# Patient Record
Sex: FEMALE | Race: WHITE | NOT HISPANIC OR LATINO | Employment: PART TIME | URBAN - METROPOLITAN AREA
[De-identification: names, ages, dates, MRNs, and addresses within clinical notes are randomized per-mention and may not be internally consistent; named-entity substitution may affect disease eponyms.]

---

## 2017-05-12 DIAGNOSIS — M87.19 OSTEONECROSIS DUE TO DRUGS OF MULTIPLE SITES (H): Primary | ICD-10-CM

## 2017-05-18 ENCOUNTER — OFFICE VISIT (OUTPATIENT)
Dept: ORTHOPEDICS | Facility: CLINIC | Age: 37
End: 2017-05-18

## 2017-05-18 VITALS — BODY MASS INDEX: 22.89 KG/M2 | WEIGHT: 159.9 LBS | HEIGHT: 70 IN

## 2017-05-18 DIAGNOSIS — M16.7 OTHER SECONDARY OSTEOARTHRITIS OF LEFT HIP: Primary | ICD-10-CM

## 2017-05-18 NOTE — MR AVS SNAPSHOT
"              After Visit Summary   5/18/2017    Alethea Patel    MRN: [id number]           Patient Information     Date Of Birth          1980        Visit Information        Provider Department      5/18/2017 10:45 AM Cheko Saleem MD [facility]        Today's Diagnoses     Other secondary osteoarthritis of left hip    -  1       Follow-ups after your visit        Who to contact     Please call your clinic at [phone] to:    Ask questions about your health    Make or cancel appointments    Discuss your medicines    Learn about your test results    Speak to your doctor   If you have compliments or concerns about an experience at your clinic, or if you wish to file a complaint, please contact [facility] Patient Relations at [phone] or email us at [email or web address]         Additional Information About Your Visit        MyChart Information     [employer] gives you secure access to your electronic health record. If you see a primary care provider, you can also send messages to your care team and make appointments. If you have questions, please call your primary care clinic.  If you do not have a primary care provider, please call [phone] and they will assist you.      [employer] is an electronic gateway that provides easy, online access to your medical records. With [employer], you can request a clinic appointment, read your test results, renew a prescription or communicate with your care team.     To access your existing account, please contact your [facility] or call [phone] for assistance.        Care EveryWhere ID     This is your Care EveryWhere ID. This could be used by other organizations to access your [place] medical records  [id number]        Your Vitals Were     Height BMI (Body Mass Index)                1.778 m (5' 10\") 22.94 kg/m2           Blood Pressure from Last 3 Encounters:   01/10/16 " 115/72   10/22/14 110/64   06/14/12 104/65    Weight from Last 3 Encounters:   05/18/17 72.5 kg (159 lb 14.4 oz)   01/08/16 80.3 kg (177 lb 0.5 oz)   06/25/15 72.6 kg (160 lb)                 Today's Medication Changes          These changes are accurate as of: 5/18/17 11:59 PM.  If you have any questions, ask your nurse or doctor.               Stop taking these medicines if you haven't already. Please contact your care team if you have questions.     citalopram 20 MG tablet   Commonly known as:  celeXA   Stopped by:  Cheko Saleem MD           fentaNYL 100 mcg/hr 72 hr patch   Commonly known as:  DURAGESIC   Stopped by:  Cheko Saleem MD                    Primary Care Provider Office Phone # Fax #    [facility] [phone] [address] [address]        Thank you!     Thank you for choosing [facility]  for your care. Our goal is always to provide you with excellent care. Hearing back from our patients is one way we can continue to improve our services. Please take a few minutes to complete the written survey that you may receive in the mail after your visit with us. Thank you!             Your Updated Medication List - Protect others around you: Learn how to safely use, store and throw away your medicines at www.disposemymeds.org.          This list is accurate as of: 5/18/17 11:59 PM.  Always use your most recent med list.                   Brand Name Dispense Instructions for use    B COMPLEX PO      Take by mouth daily       BACLOFEN PO      Take 20 mg by mouth 4 times daily       BUPROPION HCL PO      Take 150 mg by mouth 2 times daily       COMPAZINE PO      Take 10 mg by mouth every 6 hours as needed.       fluticasone 50 MCG/ACT spray    FLONASE     Spray 2 sprays into both nostrils daily as needed for rhinitis or allergies       ibuprofen 200 MG tablet    ADVIL/MOTRIN    120 tablet    Take 2 tablets (400 mg) by mouth every 6 hours as  needed for mild pain       IRON SUPPLEMENT PO      Take by mouth daily       levothyroxine 75 MCG tablet    SYNTHROID/LEVOTHROID     Take 75 mcg by mouth daily       LORazepam 1 MG tablet    ATIVAN     Take 1 mg by mouth every 8 hours as needed for anxiety       MELATONIN PO      Take 3 mg by mouth At Bedtime       METHADOSE PO      Take 10 mg by mouth every 8 hours as needed for severe pain       metoclopramide 10 MG tablet    REGLAN     Take 10 mg by mouth every 4 hours as needed       montelukast 10 MG tablet    SINGULAIR     Take 10 mg by mouth At Bedtime       NEURONTIN 300 MG capsule   Generic drug:  gabapentin      Take 900 mg by mouth 3 times daily       NEW MED          order for DME     1 Device    [facility] [phone]   Directions: Advance as Tolerated and Instructed by MD       oxyCODONE HCl 20 MG Tabs immediate release tablet    ROXICODONE     Take 20-30 mg by mouth every 3 hours as needed for moderate to severe pain       priLOSEC OTC 20 MG tablet   Generic drug:  omeprazole      Take 20 mg by mouth daily       QUEtiapine 25 MG tablet    SEROquel     Take by mouth At Bedtime       senna-docusate 8.6-50 MG per tablet    SENOKOT-S;PERICOLACE    30 tablet    Take 2 tablets by mouth 2 times daily

## 2017-05-18 NOTE — NURSING NOTE
"Reason For Visit:   Chief Complaint   Patient presents with     Surgical Followup     F/u for Left HAJA Pain S/p Left HAJA: 2002?                  Ht 1.778 m (5' 10\")  Wt 72.5 kg (159 lb 14.4 oz)  BMI 22.94 kg/m2                         Current Outpatient Prescriptions   Medication     NEW MED     Methadone HCl (METHADOSE PO)     oxyCODONE (ROXICODONE) 20 MG TABS immediate release tablet     ibuprofen (ADVIL,MOTRIN) 200 MG tablet     senna-docusate (SENOKOT-S;PERICOLACE) 8.6-50 MG per tablet     order for DME     Ferrous Sulfate (IRON SUPPLEMENT PO)     LORazepam (ATIVAN) 1 MG tablet     B Complex Vitamins (B COMPLEX PO)     QUEtiapine (SEROQUEL) 25 MG tablet     montelukast (SINGULAIR) 10 MG tablet     fluticasone (FLONASE) 50 MCG/ACT nasal spray     levothyroxine (SYNTHROID, LEVOTHROID) 75 MCG tablet     metoclopramide (REGLAN) 10 MG tablet     BACLOFEN PO     BUPROPION HCL PO     Prochlorperazine Maleate (COMPAZINE PO)     Nutritional Supplements (MELATONIN PO)     gabapentin (NEURONTIN) 300 MG capsule     omeprazole (PRILOSEC OTC) 20 MG tablet     No current facility-administered medications for this visit.        Allergies   Allergen Reactions     Chantix [Varenicline] Nausea and Vomiting     Latex Itching     Seasonal Allergies        Neha Gatica C.M.A.             "

## 2017-05-18 NOTE — LETTER
5/18/2017       RE: Alethea Patel  [address]  [place] [address]     Dear Colleague,    Thank you for referring your patient, Alethea Patel, to the [facility] at [facility]. Please see a copy of my visit note below.    DIAGNOSIS:   1. Multi-focal osteonecrosis.  2. Lumbar disc herniation  3. Acute Lymphoblastic Leukemia, chemotherapy [id number]   SURGERIES:  1. 2005, L femoral head resurfacing hemiarthroplasty  2. 6/5/2007, R HAJA  3. 2/2/2010, right total knee arthroplasty. No patella resurfacing  4. 6/12/2012, R patella resurfacing  5. 1/8/2016, L TKA (Stiven) [facility]    HISTORY OF PRESENT ILLNESS:      HISTORY OF PRESENT ILLNESS:  Alethea returns for evaluation of her left hip.  She notes that she has been having increasing pain and discomfort in the groin as well as some radiation to the left thigh area.  It occurs with motion of her left hip joint.  She underwent resurfacing hemiarthroplasty 12 years ago.      She has been able to discontinue her fentanyl patch and only is on oral oxycodone at the present time.  She takes the oxycodone both due to her left hip as well as overall discomfort diffusely related to her osteonecrosis.      IMAGING:  I examined her and also obtained imaging x-rays today which demonstrate continued acetabular cartilage erosion as compared to films from 2005.      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      In order to ascertain what pain component is due to her acetabular arthritis, I have suggested that she undergo a diagnostic lidocaine and a steroid injection into the left hip joint.  I indicated that she will experience pain relief for the first couple of hours related to the lidocaine anesthetic usage.  Thereafter, the steroid may be helpful in the longer term.      If she finds that this is helpful to her then it might be worthwhile to consider proceeding with  conversion of her left surface replacement hemiarthroplasty to a total hip arthroplasty implant.  She has a good understanding of this having undergone a similar procedure on the right side.     If the steroid injection into her hip is not helpful, then the next step would be to obtain a L spine MR for comparison to her last study in 9/2014.  Alethea will self report the outcome of her hip injection via [employer].    Total Time = 15 min, 50% of which was spent in counseling and coordination of care as documented above.    MD Blas Rhoades Family Professor  Oncology and Adult Reconstructive Surgery  Dept Orthopaedic Surgery, [facility]  [phone] office, [phone] pager  [email or web address]

## 2017-05-18 NOTE — PROGRESS NOTES
DIAGNOSIS:   1. Multi-focal osteonecrosis.  2. Lumbar disc herniation  3. Acute Lymphoblastic Leukemia, chemotherapy [id number]   SURGERIES:  1. 2005, L femoral head resurfacing hemiarthroplasty  2. 6/5/2007, R HAJA  3. 2/2/2010, right total knee arthroplasty. No patella resurfacing  4. 6/12/2012, R patella resurfacing  5. 1/8/2016, L TKA (Stiven) [facility]    HISTORY OF PRESENT ILLNESS:      HISTORY OF PRESENT ILLNESS:  Alethea returns for evaluation of her left hip.  She notes that she has been having increasing pain and discomfort in the groin as well as some radiation to the left thigh area.  It occurs with motion of her left hip joint.  She underwent resurfacing hemiarthroplasty 12 years ago.      She has been able to discontinue her fentanyl patch and only is on oral oxycodone at the present time.  She takes the oxycodone both due to her left hip as well as overall discomfort diffusely related to her osteonecrosis.      IMAGING:  I examined her and also obtained imaging x-rays today which demonstrate continued acetabular cartilage erosion as compared to films from 2005.      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      In order to ascertain what pain component is due to her acetabular arthritis, I have suggested that she undergo a diagnostic lidocaine and a steroid injection into the left hip joint.  I indicated that she will experience pain relief for the first couple of hours related to the lidocaine anesthetic usage.  Thereafter, the steroid may be helpful in the longer term.      If she finds that this is helpful to her then it might be worthwhile to consider proceeding with conversion of her left surface replacement hemiarthroplasty to a total hip arthroplasty implant.  She has a good understanding of this having undergone a similar procedure on the right side.     If the steroid injection into her hip is not helpful, then the next step would be to obtain a L spine  MR for comparison to her last study in 9/2014.  Alethea will self report the outcome of her hip injection via Mychart.    Total Time = 15 min, 50% of which was spent in counseling and coordination of care as documented above.    MD Blas Rhoades Family Professor  Oncology and Adult Reconstructive Surgery  Dept Orthopaedic Surgery, [facility]  [phone] office, [phone] pager  [email or web address]

## 2017-08-19 ENCOUNTER — HEALTH MAINTENANCE LETTER (OUTPATIENT)
Age: 37
End: 2017-08-19

## 2017-08-25 DIAGNOSIS — M54.10 RADICULAR PAIN OF LEFT LOWER EXTREMITY: Primary | ICD-10-CM

## 2017-09-11 ENCOUNTER — RADIANT APPOINTMENT (OUTPATIENT)
Dept: MRI IMAGING | Facility: CLINIC | Age: 37
End: 2017-09-11
Attending: ORTHOPAEDIC SURGERY
Payer: COMMERCIAL

## 2017-09-11 PROCEDURE — 72148 MRI LUMBAR SPINE W/O DYE: CPT | Performed by: RADIOLOGY

## 2017-12-14 ENCOUNTER — RADIANT APPOINTMENT (OUTPATIENT)
Dept: GENERAL RADIOLOGY | Facility: CLINIC | Age: 37
End: 2017-12-14
Attending: ORTHOPAEDIC SURGERY
Payer: MEDICARE

## 2017-12-14 ENCOUNTER — OFFICE VISIT (OUTPATIENT)
Dept: ORTHOPEDICS | Facility: CLINIC | Age: 37
End: 2017-12-14
Payer: MEDICARE

## 2017-12-14 VITALS — HEIGHT: 70 IN | BODY MASS INDEX: 23.35 KG/M2 | WEIGHT: 163.1 LBS

## 2017-12-14 DIAGNOSIS — M16.0 PRIMARY OSTEOARTHRITIS OF BOTH HIPS: Primary | ICD-10-CM

## 2017-12-14 DIAGNOSIS — M16.0 PRIMARY OSTEOARTHRITIS OF BOTH HIPS: ICD-10-CM

## 2017-12-14 NOTE — PROGRESS NOTES
DIAGNOSIS:   1. Multi-focal osteonecrosis.  2. Lumbar disc herniation  3. Acute Lymphoblastic Leukemia, chemotherapy [id number]   SURGERIES:  1. 2005, L femoral head resurfacing hemiarthroplasty  2. 6/5/2007, R HAJA  3. 2/2/2010, right total knee arthroplasty. No patella resurfacing  4. 6/12/2012, R patella resurfacing  5. 1/8/2016, L TKA (Stiven) [facility]    HISTORY OF PRESENT ILLNESS:  Alethea returns for evaluation of her left hip.  She notes that she has been having increasing pain and discomfort in the groin as well as some radiation to the left thigh area.  It occurs with motion of her left hip joint.  She underwent resurfacing hemiarthroplasty 12 years ago.      She has been able to discontinue her fentanyl patch and only is on oral oxycodone at the present time.  She takes the oxycodone both due to her left hip as well as overall discomfort diffusely related to her osteonecrosis.      IMAGING:  I examined her and also obtained imaging x-rays today which demonstrate continued acetabular cartilage erosion as compared to films from 2005.      Final Report   MR LUMBAR SPINE W/O CONTRAST 9/11/2017 8:54 AM    History: Radiating pain left lower extremity, Radiculopathy, site  unspecified    Comparison: 9/3/2014 MRI of the spine.    Technique: Sagittal T1-weighted, sagittal T2-weighted, sagittal STIR,  sagittal diffusion-weighted, axial T2-weighted, and axial gradient  echo images of the lumbar spine were obtained without the  administration of intravenous contrast.    Findings: There are 5 lumbar-type vertebrae assumed for the purposes  of this dictation. The tip of the conus medullaris is at L1. The  lumbar vertebral column appears normally aligned. There is moderate  disc height narrowing L4-L5 and L5-S1. Within the lumbar vertebrae,  bone marrow signal demonstrates a superior endplate Schmorl's node at  L4. There is associated increased STIR/T2 signal in the anterior  superior endplate of L4 suspicious for a chronic  fracture deformity.  This is largely unchanged compared to 9/3/2014. Somewhat heterogeneous  T1 and T2 signal throughout the L5 vertebral body, similar to prior.  Loss of the normal T2 interbody disc signal at L4-5 and L5-S1.    On a level by level basis:    L1-2: No significant neural foraminal or spinal canal narrowing.    L2-3: Mild broad-based disc bulge. No significant neural foraminal or  spinal canal narrowing.    L3-4: Mild broad-based disc bulge. No significant spinal canal  narrowing. No significant neural foraminal narrowing.    L4-5: Posterior disc protrusion, ligamentum flavum hypertrophy, and  facet degenerative changes bilaterally causes mild, left greater than  right neural foraminal narrowing.    L5-S1: Broad-based posterior disc protrusion abuts the descending left  S1 nerve root, without clear impingement, with moderate, left greater  than right neural foraminal narrowing. No significant spinal canal  narrowing.     Visualized paraspinal soft tissues are within normal limits.    Impression:   1. Multilevel neural foraminal narrowing most notable at L5-S1  involving the L5 nerve root, left greater than right. This is largely  unchanged compared to 9/3/2014.  2. Unchanged abnormality in the superior/anterior aspect of the L4  vertebral body, possibly related to chronic osteonecrosis. This is  also largely unchanged.    I have personally reviewed the examination and initial interpretation  and I agree with the findings.    JAVI GE MD        IMPRESSION:    1. 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   2. L L5-S1 herniated disc with L5 root compression  3. LLD longer on RLE     We discussed a diagnostic injection into L hip at her last clinic visit but it never got done.  We will arrange for this today.    In order to ascertain what pain component is due to her acetabular arthritis, I have suggested that she undergo a diagnostic lidocaine and a steroid  injection into the left hip joint.  I indicated that she will experience pain relief for the first couple of hours related to the lidocaine anesthetic usage.  Thereafter, the steroid may be helpful in the longer term.     If she finds that this is helpful to her then it might be worthwhile to consider proceeding with conversion of her left surface replacement hemiarthroplasty to a total hip arthroplasty implant.  She has a good understanding of this having undergone a similar procedure on the right side.       Total Time = 25 min, 50% of which was spent in counseling and coordination of care as documented above.    MD Blas Rhoades Family Professor  Oncology and Adult Reconstructive Surgery  Dept Orthopaedic Surgery, [facility]  [phone] office, [phone] pager  [email or web address]

## 2017-12-14 NOTE — MR AVS SNAPSHOT
After Visit Summary   12/14/2017    Alethea Patel    MRN: [id number]           Patient Information     Date Of Birth          1980        Visit Information        Provider Department      12/14/2017 10:45 AM Cheko Saleem MD [facility]        Today's Diagnoses     Primary osteoarthritis of both hips    -  1       Follow-ups after your visit        Future tests that were ordered for you today     Open Future Orders        Priority Expected Expires Ordered    XR Joint Injection Major Left Routine 12/14/2017 12/14/2018 12/14/2017            Who to contact     Please call your clinic at [phone] to:    Ask questions about your health    Make or cancel appointments    Discuss your medicines    Learn about your test results    Speak to your doctor   If you have compliments or concerns about an experience at your clinic, or if you wish to file a complaint, please contact [facility] Patient Relations at [phone] or email us at [email or web address]         Additional Information About Your Visit        [employer] Information     [employer] gives you secure access to your electronic health record. If you see a primary care provider, you can also send messages to your care team and make appointments. If you have questions, please call your primary care clinic.  If you do not have a primary care provider, please call [phone] and they will assist you.      [employer] is an electronic gateway that provides easy, online access to your medical records. With [employer], you can request a clinic appointment, read your test results, renew a prescription or communicate with your care team.     To access your existing account, please contact your [facility] or call [phone] for assistance.        Care EveryWhere ID     This is your Care EveryWhere ID. This could be used by other organizations to access your [place]  "medical records  [id number]        Your Vitals Were     Height BMI (Body Mass Index)                1.765 m (5' 9.5\") 23.74 kg/m2           Blood Pressure from Last 3 Encounters:   01/10/16 115/72   10/22/14 110/64   06/14/12 104/65    Weight from Last 3 Encounters:   12/14/17 74 kg (163 lb 1.6 oz)   05/18/17 72.5 kg (159 lb 14.4 oz)   01/08/16 80.3 kg (177 lb 0.5 oz)               Primary Care Provider Office Phone # Fax #    [facility] [phone]       [address] [address]        Equal Access to Services     EMILIANO MARTINEZ : Salmoe huntero Soshantalali, waaxda luqadaha, qaybta kaalmada adeegyada, anabella silva. So [facility] [phone].    ATENCIÓN: Si habla español, tiene a parisi disposición servicios gratuitos de asistencia lingüística. Ll[facility] [phone].    We comply with applicable federal civil rights laws and [place] laws. We do not discriminate on the basis of race, color, national origin, age, disability, sex, sexual orientation, or gender identity.            Thank you!     Thank you for choosing [facility]  for your care. Our goal is always to provide you with excellent care. Hearing back from our patients is one way we can continue to improve our services. Please take a few minutes to complete the written survey that you may receive in the mail after your visit with us. Thank you!             Your Updated Medication List - Protect others around you: Learn how to safely use, store and throw away your medicines at www.disposemymeds.org.          This list is accurate as of: 12/14/17  2:24 PM.  Always use your most recent med list.                   Brand Name Dispense Instructions for use Diagnosis    B COMPLEX PO      Take by mouth daily        BACLOFEN PO      Take 20 mg by mouth 4 times daily        BUPROPION HCL PO      Take 150 mg by mouth 2 times daily        COMPAZINE PO      Take 10 mg by mouth every 6 " hours as needed.        fluticasone 50 MCG/ACT spray    FLONASE     Spray 2 sprays into both nostrils daily as needed for rhinitis or allergies        ibuprofen 200 MG tablet    ADVIL/MOTRIN    120 tablet    Take 2 tablets (400 mg) by mouth every 6 hours as needed for mild pain        IRON SUPPLEMENT PO      Take by mouth daily        levothyroxine 75 MCG tablet    SYNTHROID/LEVOTHROID     Take 75 mcg by mouth daily        LORazepam 1 MG tablet    ATIVAN     Take 1 mg by mouth every 8 hours as needed for anxiety        MELATONIN PO      Take 3 mg by mouth At Bedtime        METHADOSE PO      Take 30 mg by mouth every 8 hours as needed for severe pain        metoclopramide 10 MG tablet    REGLAN     Take 10 mg by mouth every 4 hours as needed        montelukast 10 MG tablet    SINGULAIR     Take 10 mg by mouth At Bedtime        NEURONTIN 300 MG capsule   Generic drug:  gabapentin      Take 900 mg by mouth 3 times daily        NEW MED           order for DME     1 Device    [facility] [phone]   Directions: Advance as Tolerated and Instructed by MD    Status post total right knee replacement       oxyCODONE HCl 20 MG Tabs immediate release tablet    ROXICODONE     Take 30 mg by mouth every 3 hours as needed for moderate to severe pain        priLOSEC OTC 20 MG tablet   Generic drug:  omeprazole      Take 20 mg by mouth daily        QUEtiapine 25 MG tablet    SEROquel     Take by mouth At Bedtime        senna-docusate 8.6-50 MG per tablet    SENOKOT-S;PERICOLACE    30 tablet    Take 2 tablets by mouth 2 times daily    Status post total left knee replacement

## 2017-12-14 NOTE — LETTER
12/14/2017       RE: Alethea Patel  [address]  [place] [address]     Dear Colleague,    Thank you for referring your patient, Alethea Patel, to the [facility] at [facility]. Please see a copy of my visit note below.    DIAGNOSIS:   1. Multi-focal osteonecrosis.  2. Lumbar disc herniation  3. Acute Lymphoblastic Leukemia, chemotherapy [id number]   SURGERIES:  1. 2005, L femoral head resurfacing hemiarthroplasty  2. 6/5/2007, R HAJA  3. 2/2/2010, right total knee arthroplasty. No patella resurfacing  4. 6/12/2012, R patella resurfacing  5. 1/8/2016, L TKA ([facility]) [facility]    HISTORY OF PRESENT ILLNESS:  Alethea returns for evaluation of her left hip.  She notes that she has been having increasing pain and discomfort in the groin as well as some radiation to the left thigh area.  It occurs with motion of her left hip joint.  She underwent resurfacing hemiarthroplasty 12 years ago.      She has been able to discontinue her fentanyl patch and only is on oral oxycodone at the present time.  She takes the oxycodone both due to her left hip as well as overall discomfort diffusely related to her osteonecrosis.      IMAGING:  I examined her and also obtained imaging x-rays today which demonstrate continued acetabular cartilage erosion as compared to films from 2005.      Final Report   MR LUMBAR SPINE W/O CONTRAST 9/11/2017 8:54 AM    History: Radiating pain left lower extremity, Radiculopathy, site  unspecified    Comparison: 9/3/2014 MRI of the spine.    Technique: Sagittal T1-weighted, sagittal T2-weighted, sagittal STIR,  sagittal diffusion-weighted, axial T2-weighted, and axial gradient  echo images of the lumbar spine were obtained without the  administration of intravenous contrast.    Findings: There are 5 lumbar-type vertebrae assumed for the purposes  of this dictation. The tip of the conus medullaris is at L1. The  lumbar vertebral column appears  normally aligned. There is moderate  disc height narrowing L4-L5 and L5-S1. Within the lumbar vertebrae,  bone marrow signal demonstrates a superior endplate Schmorl's node at  L4. There is associated increased STIR/T2 signal in the anterior  superior endplate of L4 suspicious for a chronic fracture deformity.  This is largely unchanged compared to 9/3/2014. Somewhat heterogeneous  T1 and T2 signal throughout the L5 vertebral body, similar to prior.  Loss of the normal T2 interbody disc signal at L4-5 and L5-S1.    On a level by level basis:    L1-2: No significant neural foraminal or spinal canal narrowing.    L2-3: Mild broad-based disc bulge. No significant neural foraminal or  spinal canal narrowing.    L3-4: Mild broad-based disc bulge. No significant spinal canal  narrowing. No significant neural foraminal narrowing.    L4-5: Posterior disc protrusion, ligamentum flavum hypertrophy, and  facet degenerative changes bilaterally causes mild, left greater than  right neural foraminal narrowing.    L5-S1: Broad-based posterior disc protrusion abuts the descending left  S1 nerve root, without clear impingement, with moderate, left greater  than right neural foraminal narrowing. No significant spinal canal  narrowing.     Visualized paraspinal soft tissues are within normal limits.    Impression:   1. Multilevel neural foraminal narrowing most notable at L5-S1  involving the L5 nerve root, left greater than right. This is largely  unchanged compared to 9/3/2014.  2. Unchanged abnormality in the superior/anterior aspect of the L4  vertebral body, possibly related to chronic osteonecrosis. This is  also largely unchanged.    I have personally reviewed the examination and initial interpretation  and I agree with the findings.    JAVI GE MD        IMPRESSION:    1. 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   2. L L5-S1 herniated disc with L5 root compression  3. LLD longer on  RLE     We discussed a diagnostic injection into L hip at her last clinic visit but it never got done.  We will arrange for this today.    In order to ascertain what pain component is due to her acetabular arthritis, I have suggested that she undergo a diagnostic lidocaine and a steroid injection into the left hip joint.  I indicated that she will experience pain relief for the first couple of hours related to the lidocaine anesthetic usage.  Thereafter, the steroid may be helpful in the longer term.     If she finds that this is helpful to her then it might be worthwhile to consider proceeding with conversion of her left surface replacement hemiarthroplasty to a total hip arthroplasty implant.  She has a good understanding of this having undergone a similar procedure on the right side.       Total Time = 25 min, 50% of which was spent in counseling and coordination of care as documented above.    Again, thank you for allowing me to participate in the care of your patient.      Sincerely,    Cheko Saleem MD

## 2018-02-06 DIAGNOSIS — M87.9 OSTEONECROSIS (H): Primary | ICD-10-CM

## 2018-02-07 ENCOUNTER — TELEPHONE (OUTPATIENT)
Dept: ORTHOPEDICS | Facility: CLINIC | Age: 38
End: 2018-02-07

## 2018-02-07 NOTE — TELEPHONE ENCOUNTER
Left message with Alethea inquiring as to whether or not she had a steroid injection to her Left hip as ordered by Dr. Saleem. I left her my direct call back number to return my call when she is able.

## 2018-03-31 ENCOUNTER — ANESTHESIA EVENT (OUTPATIENT)
Dept: SURGERY | Facility: CLINIC | Age: 38
DRG: 467 | End: 2018-03-31
Payer: MEDICARE

## 2018-04-03 ENCOUNTER — APPOINTMENT (OUTPATIENT)
Dept: GENERAL RADIOLOGY | Facility: CLINIC | Age: 38
DRG: 467 | End: 2018-04-03
Attending: ORTHOPAEDIC SURGERY
Payer: MEDICARE

## 2018-04-03 ENCOUNTER — APPOINTMENT (OUTPATIENT)
Dept: GENERAL RADIOLOGY | Facility: CLINIC | Age: 38
DRG: 467 | End: 2018-04-03
Attending: PHYSICIAN ASSISTANT
Payer: MEDICARE

## 2018-04-03 ENCOUNTER — HOSPITAL ENCOUNTER (INPATIENT)
Facility: CLINIC | Age: 38
LOS: 1 days | Discharge: HOME HEALTH | DRG: 467 | End: 2018-04-04
Attending: ORTHOPAEDIC SURGERY | Admitting: ORTHOPAEDIC SURGERY
Payer: MEDICARE

## 2018-04-03 ENCOUNTER — ANESTHESIA (OUTPATIENT)
Dept: SURGERY | Facility: CLINIC | Age: 38
DRG: 467 | End: 2018-04-03
Payer: MEDICARE

## 2018-04-03 DIAGNOSIS — Z98.890 STATUS POST HIP SURGERY: Primary | ICD-10-CM

## 2018-04-03 LAB
ABO + RH BLD: NORMAL
ABO + RH BLD: NORMAL
BLD GP AB SCN SERPL QL: NORMAL
BLD PROD TYP BPU: NORMAL
BLOOD BANK CMNT PATIENT-IMP: NORMAL
GLUCOSE SERPL-MCNC: 96 MG/DL (ref 70–99)
HCG UR QL: NEGATIVE
HGB BLD-MCNC: 12.6 G/DL (ref 11.7–15.7)
INR PPP: 1.05 (ref 0.86–1.14)
NUM BPU REQUESTED: 1
POTASSIUM SERPL-SCNC: 3.3 MMOL/L (ref 3.4–5.3)
SPECIMEN EXP DATE BLD: NORMAL

## 2018-04-03 PROCEDURE — 36000068 ZZH SURGERY LEVEL 5 1ST 30 MIN - UMMC: Performed by: ORTHOPAEDIC SURGERY

## 2018-04-03 PROCEDURE — 82947 ASSAY GLUCOSE BLOOD QUANT: CPT | Performed by: ANESTHESIOLOGY

## 2018-04-03 PROCEDURE — 86900 BLOOD TYPING SEROLOGIC ABO: CPT | Performed by: ANESTHESIOLOGY

## 2018-04-03 PROCEDURE — 37000009 ZZH ANESTHESIA TECHNICAL FEE, EACH ADDTL 15 MIN: Performed by: ORTHOPAEDIC SURGERY

## 2018-04-03 PROCEDURE — P9041 ALBUMIN (HUMAN),5%, 50ML: HCPCS | Performed by: NURSE ANESTHETIST, CERTIFIED REGISTERED

## 2018-04-03 PROCEDURE — 81025 URINE PREGNANCY TEST: CPT | Performed by: ANESTHESIOLOGY

## 2018-04-03 PROCEDURE — 25000566 ZZH SEVOFLURANE, EA 15 MIN: Performed by: ORTHOPAEDIC SURGERY

## 2018-04-03 PROCEDURE — 25000125 ZZHC RX 250: Performed by: NURSE ANESTHETIST, CERTIFIED REGISTERED

## 2018-04-03 PROCEDURE — 85018 HEMOGLOBIN: CPT | Performed by: ANESTHESIOLOGY

## 2018-04-03 PROCEDURE — 36000070 ZZH SURGERY LEVEL 5 EA 15 ADDTL MIN - UMMC: Performed by: ORTHOPAEDIC SURGERY

## 2018-04-03 PROCEDURE — 40000986 XR PELVIS AND HIP PORTABLE LEFT 2 VIEWS

## 2018-04-03 PROCEDURE — 25000132 ZZH RX MED GY IP 250 OP 250 PS 637: Mod: GY | Performed by: ORTHOPAEDIC SURGERY

## 2018-04-03 PROCEDURE — 84132 ASSAY OF SERUM POTASSIUM: CPT | Performed by: ANESTHESIOLOGY

## 2018-04-03 PROCEDURE — A9270 NON-COVERED ITEM OR SERVICE: HCPCS | Mod: GY | Performed by: PHYSICIAN ASSISTANT

## 2018-04-03 PROCEDURE — 25000128 H RX IP 250 OP 636: Performed by: NURSE ANESTHETIST, CERTIFIED REGISTERED

## 2018-04-03 PROCEDURE — A9270 NON-COVERED ITEM OR SERVICE: HCPCS | Mod: GY | Performed by: ANESTHESIOLOGY

## 2018-04-03 PROCEDURE — 86850 RBC ANTIBODY SCREEN: CPT | Performed by: ANESTHESIOLOGY

## 2018-04-03 PROCEDURE — 25000128 H RX IP 250 OP 636: Performed by: ORTHOPAEDIC SURGERY

## 2018-04-03 PROCEDURE — 25000128 H RX IP 250 OP 636: Performed by: ANESTHESIOLOGY

## 2018-04-03 PROCEDURE — 27210995 ZZH RX 272: Performed by: ORTHOPAEDIC SURGERY

## 2018-04-03 PROCEDURE — 86901 BLOOD TYPING SEROLOGIC RH(D): CPT | Performed by: ANESTHESIOLOGY

## 2018-04-03 PROCEDURE — 25000128 H RX IP 250 OP 636: Performed by: PHYSICIAN ASSISTANT

## 2018-04-03 PROCEDURE — 71000015 ZZH RECOVERY PHASE 1 LEVEL 2 EA ADDTL HR: Performed by: ORTHOPAEDIC SURGERY

## 2018-04-03 PROCEDURE — A9270 NON-COVERED ITEM OR SERVICE: HCPCS | Mod: GY | Performed by: ORTHOPAEDIC SURGERY

## 2018-04-03 PROCEDURE — 40000985 XR PELVIS PORT 1/2 VW

## 2018-04-03 PROCEDURE — 40000170 ZZH STATISTIC PRE-PROCEDURE ASSESSMENT II: Performed by: ORTHOPAEDIC SURGERY

## 2018-04-03 PROCEDURE — C1713 ANCHOR/SCREW BN/BN,TIS/BN: HCPCS | Performed by: ORTHOPAEDIC SURGERY

## 2018-04-03 PROCEDURE — 85610 PROTHROMBIN TIME: CPT | Performed by: ORTHOPAEDIC SURGERY

## 2018-04-03 PROCEDURE — 36415 COLL VENOUS BLD VENIPUNCTURE: CPT | Performed by: ORTHOPAEDIC SURGERY

## 2018-04-03 PROCEDURE — 0SRB04A REPLACEMENT OF LEFT HIP JOINT WITH CERAMIC ON POLYETHYLENE SYNTHETIC SUBSTITUTE, UNCEMENTED, OPEN APPROACH: ICD-10-PCS | Performed by: ORTHOPAEDIC SURGERY

## 2018-04-03 PROCEDURE — 25000132 ZZH RX MED GY IP 250 OP 250 PS 637: Mod: GY | Performed by: ANESTHESIOLOGY

## 2018-04-03 PROCEDURE — 27210794 ZZH OR GENERAL SUPPLY STERILE: Performed by: ORTHOPAEDIC SURGERY

## 2018-04-03 PROCEDURE — 25000132 ZZH RX MED GY IP 250 OP 250 PS 637: Mod: GY | Performed by: PHYSICIAN ASSISTANT

## 2018-04-03 PROCEDURE — 25000125 ZZHC RX 250: Performed by: ORTHOPAEDIC SURGERY

## 2018-04-03 PROCEDURE — 71000014 ZZH RECOVERY PHASE 1 LEVEL 2 FIRST HR: Performed by: ORTHOPAEDIC SURGERY

## 2018-04-03 PROCEDURE — 86923 COMPATIBILITY TEST ELECTRIC: CPT | Performed by: ANESTHESIOLOGY

## 2018-04-03 PROCEDURE — 12000001 ZZH R&B MED SURG/OB UMMC

## 2018-04-03 PROCEDURE — C1776 JOINT DEVICE (IMPLANTABLE): HCPCS | Performed by: ORTHOPAEDIC SURGERY

## 2018-04-03 PROCEDURE — 0SPS0JZ REMOVAL OF SYNTHETIC SUBSTITUTE FROM LEFT HIP JOINT, FEMORAL SURFACE, OPEN APPROACH: ICD-10-PCS | Performed by: ORTHOPAEDIC SURGERY

## 2018-04-03 PROCEDURE — 37000008 ZZH ANESTHESIA TECHNICAL FEE, 1ST 30 MIN: Performed by: ORTHOPAEDIC SURGERY

## 2018-04-03 PROCEDURE — 27210995 ZZH RX 272: Performed by: PHYSICIAN ASSISTANT

## 2018-04-03 PROCEDURE — C9399 UNCLASSIFIED DRUGS OR BIOLOG: HCPCS | Performed by: NURSE ANESTHETIST, CERTIFIED REGISTERED

## 2018-04-03 DEVICE — IMPLANTABLE DEVICE
Type: IMPLANTABLE DEVICE | Site: HIP | Status: NON-FUNCTIONAL
Removed: 2018-05-25

## 2018-04-03 RX ORDER — METHADONE HYDROCHLORIDE 10 MG/1
30 TABLET ORAL ONCE
Status: COMPLETED | OUTPATIENT
Start: 2018-04-03 | End: 2018-04-03

## 2018-04-03 RX ORDER — ONDANSETRON 2 MG/ML
4 INJECTION INTRAMUSCULAR; INTRAVENOUS EVERY 30 MIN PRN
Status: DISCONTINUED | OUTPATIENT
Start: 2018-04-03 | End: 2018-04-03 | Stop reason: HOSPADM

## 2018-04-03 RX ORDER — PROPOFOL 10 MG/ML
INJECTION, EMULSION INTRAVENOUS PRN
Status: DISCONTINUED | OUTPATIENT
Start: 2018-04-03 | End: 2018-04-03

## 2018-04-03 RX ORDER — AMOXICILLIN 250 MG
1 CAPSULE ORAL 2 TIMES DAILY
Status: DISCONTINUED | OUTPATIENT
Start: 2018-04-03 | End: 2018-04-04 | Stop reason: HOSPADM

## 2018-04-03 RX ORDER — ACETAMINOPHEN 325 MG/1
975 TABLET ORAL ONCE
Status: COMPLETED | OUTPATIENT
Start: 2018-04-03 | End: 2018-04-03

## 2018-04-03 RX ORDER — POTASSIUM CHLORIDE 1.5 G/1.58G
20-40 POWDER, FOR SOLUTION ORAL
Status: DISCONTINUED | OUTPATIENT
Start: 2018-04-03 | End: 2018-04-04 | Stop reason: HOSPADM

## 2018-04-03 RX ORDER — OXYCODONE HYDROCHLORIDE 5 MG/1
15 TABLET ORAL
Status: COMPLETED | OUTPATIENT
Start: 2018-04-03 | End: 2018-04-04

## 2018-04-03 RX ORDER — NALOXONE HYDROCHLORIDE 0.4 MG/ML
.1-.4 INJECTION, SOLUTION INTRAMUSCULAR; INTRAVENOUS; SUBCUTANEOUS
Status: ACTIVE | OUTPATIENT
Start: 2018-04-03 | End: 2018-04-04

## 2018-04-03 RX ORDER — FENTANYL CITRATE 50 UG/ML
INJECTION, SOLUTION INTRAMUSCULAR; INTRAVENOUS PRN
Status: DISCONTINUED | OUTPATIENT
Start: 2018-04-03 | End: 2018-04-03

## 2018-04-03 RX ORDER — CEFAZOLIN SODIUM 1 G
1 VIAL (EA) INJECTION EVERY 8 HOURS
Status: COMPLETED | OUTPATIENT
Start: 2018-04-03 | End: 2018-04-04

## 2018-04-03 RX ORDER — PROCHLORPERAZINE MALEATE 10 MG
10 TABLET ORAL EVERY 6 HOURS PRN
Status: DISCONTINUED | OUTPATIENT
Start: 2018-04-03 | End: 2018-04-04 | Stop reason: HOSPADM

## 2018-04-03 RX ORDER — AMOXICILLIN 250 MG
2 CAPSULE ORAL 2 TIMES DAILY
Status: DISCONTINUED | OUTPATIENT
Start: 2018-04-03 | End: 2018-04-04 | Stop reason: HOSPADM

## 2018-04-03 RX ORDER — HYDROMORPHONE HYDROCHLORIDE 1 MG/ML
.5-1 INJECTION, SOLUTION INTRAMUSCULAR; INTRAVENOUS; SUBCUTANEOUS
Status: DISCONTINUED | OUTPATIENT
Start: 2018-04-03 | End: 2018-04-04 | Stop reason: HOSPADM

## 2018-04-03 RX ORDER — ALBUTEROL SULFATE 90 UG/1
2 AEROSOL, METERED RESPIRATORY (INHALATION) EVERY 6 HOURS PRN
Status: DISCONTINUED | OUTPATIENT
Start: 2018-04-03 | End: 2018-04-04 | Stop reason: HOSPADM

## 2018-04-03 RX ORDER — LIDOCAINE 40 MG/G
CREAM TOPICAL
Status: DISCONTINUED | OUTPATIENT
Start: 2018-04-03 | End: 2018-04-04 | Stop reason: HOSPADM

## 2018-04-03 RX ORDER — WARFARIN SODIUM 5 MG/1
5 TABLET ORAL
Status: COMPLETED | OUTPATIENT
Start: 2018-04-03 | End: 2018-04-03

## 2018-04-03 RX ORDER — POTASSIUM CHLORIDE 7.45 MG/ML
10 INJECTION INTRAVENOUS
Status: DISCONTINUED | OUTPATIENT
Start: 2018-04-03 | End: 2018-04-04 | Stop reason: HOSPADM

## 2018-04-03 RX ORDER — LEVOTHYROXINE SODIUM 75 UG/1
75 TABLET ORAL DAILY
Status: DISCONTINUED | OUTPATIENT
Start: 2018-04-04 | End: 2018-04-04 | Stop reason: HOSPADM

## 2018-04-03 RX ORDER — CELECOXIB 200 MG/1
200 CAPSULE ORAL ONCE
Status: COMPLETED | OUTPATIENT
Start: 2018-04-03 | End: 2018-04-03

## 2018-04-03 RX ORDER — POTASSIUM CHLORIDE 29.8 MG/ML
20 INJECTION INTRAVENOUS
Status: DISCONTINUED | OUTPATIENT
Start: 2018-04-03 | End: 2018-04-04 | Stop reason: HOSPADM

## 2018-04-03 RX ORDER — ACETAMINOPHEN 325 MG/1
975 TABLET ORAL EVERY 8 HOURS
Status: DISCONTINUED | OUTPATIENT
Start: 2018-04-03 | End: 2018-04-04 | Stop reason: HOSPADM

## 2018-04-03 RX ORDER — OXYCODONE HYDROCHLORIDE 5 MG/1
20-30 TABLET ORAL
Status: DISCONTINUED | OUTPATIENT
Start: 2018-04-03 | End: 2018-04-04

## 2018-04-03 RX ORDER — GABAPENTIN 100 MG/1
300 CAPSULE ORAL ONCE
Status: DISCONTINUED | OUTPATIENT
Start: 2018-04-03 | End: 2018-04-03 | Stop reason: HOSPADM

## 2018-04-03 RX ORDER — ONDANSETRON 4 MG/1
4 TABLET, ORALLY DISINTEGRATING ORAL EVERY 6 HOURS PRN
Status: DISCONTINUED | OUTPATIENT
Start: 2018-04-03 | End: 2018-04-04 | Stop reason: HOSPADM

## 2018-04-03 RX ORDER — CITRIC ACID/SODIUM CITRATE 334-500MG
30 SOLUTION, ORAL ORAL ONCE
Status: COMPLETED | OUTPATIENT
Start: 2018-04-03 | End: 2018-04-03

## 2018-04-03 RX ORDER — POLYETHYLENE GLYCOL 3350 17 G/17G
17 POWDER, FOR SOLUTION ORAL DAILY
Status: DISCONTINUED | OUTPATIENT
Start: 2018-04-03 | End: 2018-04-04 | Stop reason: HOSPADM

## 2018-04-03 RX ORDER — ACETAMINOPHEN 325 MG/1
650 TABLET ORAL EVERY 4 HOURS PRN
Status: DISCONTINUED | OUTPATIENT
Start: 2018-04-06 | End: 2018-04-04 | Stop reason: HOSPADM

## 2018-04-03 RX ORDER — POTASSIUM CL/LIDO/0.9 % NACL 10MEQ/0.1L
10 INTRAVENOUS SOLUTION, PIGGYBACK (ML) INTRAVENOUS
Status: DISCONTINUED | OUTPATIENT
Start: 2018-04-03 | End: 2018-04-04 | Stop reason: HOSPADM

## 2018-04-03 RX ORDER — FENTANYL CITRATE 50 UG/ML
25-50 INJECTION, SOLUTION INTRAMUSCULAR; INTRAVENOUS
Status: DISCONTINUED | OUTPATIENT
Start: 2018-04-03 | End: 2018-04-03 | Stop reason: HOSPADM

## 2018-04-03 RX ORDER — ONDANSETRON 2 MG/ML
INJECTION INTRAMUSCULAR; INTRAVENOUS PRN
Status: DISCONTINUED | OUTPATIENT
Start: 2018-04-03 | End: 2018-04-03

## 2018-04-03 RX ORDER — ACETAMINOPHEN 325 MG/1
975 TABLET ORAL ONCE
Status: DISCONTINUED | OUTPATIENT
Start: 2018-04-03 | End: 2018-04-03 | Stop reason: HOSPADM

## 2018-04-03 RX ORDER — MAGNESIUM HYDROXIDE 1200 MG/15ML
LIQUID ORAL PRN
Status: DISCONTINUED | OUTPATIENT
Start: 2018-04-03 | End: 2018-04-03 | Stop reason: HOSPADM

## 2018-04-03 RX ORDER — LORAZEPAM 2 MG/1
2 TABLET ORAL
Status: DISCONTINUED | OUTPATIENT
Start: 2018-04-03 | End: 2018-04-03 | Stop reason: HOSPADM

## 2018-04-03 RX ORDER — SODIUM CHLORIDE, SODIUM LACTATE, POTASSIUM CHLORIDE, CALCIUM CHLORIDE 600; 310; 30; 20 MG/100ML; MG/100ML; MG/100ML; MG/100ML
INJECTION, SOLUTION INTRAVENOUS CONTINUOUS
Status: DISCONTINUED | OUTPATIENT
Start: 2018-04-03 | End: 2018-04-04 | Stop reason: HOSPADM

## 2018-04-03 RX ORDER — CEFAZOLIN SODIUM 2 G/100ML
2 INJECTION, SOLUTION INTRAVENOUS
Status: COMPLETED | OUTPATIENT
Start: 2018-04-03 | End: 2018-04-03

## 2018-04-03 RX ORDER — ALBUMIN, HUMAN INJ 5% 5 %
SOLUTION INTRAVENOUS CONTINUOUS PRN
Status: DISCONTINUED | OUTPATIENT
Start: 2018-04-03 | End: 2018-04-03

## 2018-04-03 RX ORDER — CEFAZOLIN SODIUM 1 G/3ML
INJECTION, POWDER, FOR SOLUTION INTRAMUSCULAR; INTRAVENOUS PRN
Status: DISCONTINUED | OUTPATIENT
Start: 2018-04-03 | End: 2018-04-03

## 2018-04-03 RX ORDER — CITRIC ACID/SODIUM CITRATE 334-500MG
30 SOLUTION, ORAL ORAL ONCE
Status: DISCONTINUED | OUTPATIENT
Start: 2018-04-03 | End: 2018-04-03

## 2018-04-03 RX ORDER — GABAPENTIN 300 MG/1
900 CAPSULE ORAL 3 TIMES DAILY
Status: DISCONTINUED | OUTPATIENT
Start: 2018-04-03 | End: 2018-04-04 | Stop reason: HOSPADM

## 2018-04-03 RX ORDER — POTASSIUM CHLORIDE 750 MG/1
20-40 TABLET, EXTENDED RELEASE ORAL
Status: DISCONTINUED | OUTPATIENT
Start: 2018-04-03 | End: 2018-04-04 | Stop reason: HOSPADM

## 2018-04-03 RX ORDER — SODIUM CHLORIDE 9 MG/ML
INJECTION, SOLUTION INTRAVENOUS CONTINUOUS PRN
Status: DISCONTINUED | OUTPATIENT
Start: 2018-04-03 | End: 2018-04-03

## 2018-04-03 RX ORDER — LIDOCAINE HYDROCHLORIDE 20 MG/ML
INJECTION, SOLUTION INFILTRATION; PERINEURAL PRN
Status: DISCONTINUED | OUTPATIENT
Start: 2018-04-03 | End: 2018-04-03

## 2018-04-03 RX ORDER — NALOXONE HYDROCHLORIDE 0.4 MG/ML
.1-.4 INJECTION, SOLUTION INTRAMUSCULAR; INTRAVENOUS; SUBCUTANEOUS
Status: DISCONTINUED | OUTPATIENT
Start: 2018-04-03 | End: 2018-04-04 | Stop reason: HOSPADM

## 2018-04-03 RX ORDER — DEXAMETHASONE SODIUM PHOSPHATE 4 MG/ML
INJECTION, SOLUTION INTRA-ARTICULAR; INTRALESIONAL; INTRAMUSCULAR; INTRAVENOUS; SOFT TISSUE PRN
Status: DISCONTINUED | OUTPATIENT
Start: 2018-04-03 | End: 2018-04-03

## 2018-04-03 RX ORDER — ONDANSETRON 4 MG/1
4 TABLET, ORALLY DISINTEGRATING ORAL EVERY 30 MIN PRN
Status: DISCONTINUED | OUTPATIENT
Start: 2018-04-03 | End: 2018-04-03 | Stop reason: HOSPADM

## 2018-04-03 RX ORDER — LIDOCAINE HYDROCHLORIDE 40 MG/ML
SOLUTION TOPICAL PRN
Status: DISCONTINUED | OUTPATIENT
Start: 2018-04-03 | End: 2018-04-03

## 2018-04-03 RX ORDER — SODIUM CHLORIDE, SODIUM LACTATE, POTASSIUM CHLORIDE, CALCIUM CHLORIDE 600; 310; 30; 20 MG/100ML; MG/100ML; MG/100ML; MG/100ML
INJECTION, SOLUTION INTRAVENOUS CONTINUOUS
Status: DISCONTINUED | OUTPATIENT
Start: 2018-04-03 | End: 2018-04-03 | Stop reason: HOSPADM

## 2018-04-03 RX ORDER — SODIUM CHLORIDE, SODIUM LACTATE, POTASSIUM CHLORIDE, CALCIUM CHLORIDE 600; 310; 30; 20 MG/100ML; MG/100ML; MG/100ML; MG/100ML
INJECTION, SOLUTION INTRAVENOUS CONTINUOUS PRN
Status: DISCONTINUED | OUTPATIENT
Start: 2018-04-03 | End: 2018-04-03

## 2018-04-03 RX ORDER — BACLOFEN 20 MG/1
40 TABLET ORAL
Status: DISCONTINUED | OUTPATIENT
Start: 2018-04-03 | End: 2018-04-03 | Stop reason: HOSPADM

## 2018-04-03 RX ORDER — ONDANSETRON 2 MG/ML
4 INJECTION INTRAMUSCULAR; INTRAVENOUS EVERY 6 HOURS PRN
Status: DISCONTINUED | OUTPATIENT
Start: 2018-04-03 | End: 2018-04-04 | Stop reason: HOSPADM

## 2018-04-03 RX ORDER — KETAMINE HYDROCHLORIDE 10 MG/ML
INJECTION INTRAMUSCULAR; INTRAVENOUS PRN
Status: DISCONTINUED | OUTPATIENT
Start: 2018-04-03 | End: 2018-04-03

## 2018-04-03 RX ORDER — LORAZEPAM 0.5 MG/1
1-2 TABLET ORAL 3 TIMES DAILY PRN
Status: DISCONTINUED | OUTPATIENT
Start: 2018-04-03 | End: 2018-04-04 | Stop reason: HOSPADM

## 2018-04-03 RX ORDER — MONTELUKAST SODIUM 10 MG/1
10 TABLET ORAL AT BEDTIME
Status: DISCONTINUED | OUTPATIENT
Start: 2018-04-03 | End: 2018-04-04 | Stop reason: HOSPADM

## 2018-04-03 RX ORDER — BISACODYL 10 MG
10 SUPPOSITORY, RECTAL RECTAL DAILY
Status: DISCONTINUED | OUTPATIENT
Start: 2018-04-04 | End: 2018-04-04 | Stop reason: HOSPADM

## 2018-04-03 RX ORDER — BUPROPION HYDROCHLORIDE 150 MG/1
150 TABLET, EXTENDED RELEASE ORAL
Status: DISCONTINUED | OUTPATIENT
Start: 2018-04-03 | End: 2018-04-04 | Stop reason: HOSPADM

## 2018-04-03 RX ORDER — GABAPENTIN 100 MG/1
300 CAPSULE ORAL ONCE
Status: COMPLETED | OUTPATIENT
Start: 2018-04-03 | End: 2018-04-03

## 2018-04-03 RX ADMIN — KETAMINE HCL-NACL SOLN PREF SY 50 MG/5ML-0.9% (10MG/ML) 10 MG: 10 SOLUTION PREFILLED SYRINGE at 12:55

## 2018-04-03 RX ADMIN — MIDAZOLAM 2 MG: 1 INJECTION INTRAMUSCULAR; INTRAVENOUS at 11:32

## 2018-04-03 RX ADMIN — PHENYLEPHRINE HYDROCHLORIDE 100 MCG: 10 INJECTION, SOLUTION INTRAMUSCULAR; INTRAVENOUS; SUBCUTANEOUS at 12:30

## 2018-04-03 RX ADMIN — SODIUM CHLORIDE: 9 INJECTION, SOLUTION INTRAVENOUS at 11:50

## 2018-04-03 RX ADMIN — SUGAMMADEX 150 MG: 100 INJECTION, SOLUTION INTRAVENOUS at 14:29

## 2018-04-03 RX ADMIN — KETAMINE HCL-NACL SOLN PREF SY 50 MG/5ML-0.9% (10MG/ML) 10 MG: 10 SOLUTION PREFILLED SYRINGE at 12:58

## 2018-04-03 RX ADMIN — KETAMINE HCL-NACL SOLN PREF SY 50 MG/5ML-0.9% (10MG/ML) 10 MG: 10 SOLUTION PREFILLED SYRINGE at 12:29

## 2018-04-03 RX ADMIN — WARFARIN SODIUM 5 MG: 5 TABLET ORAL at 20:17

## 2018-04-03 RX ADMIN — CEFAZOLIN 1 G: 1 INJECTION, POWDER, FOR SOLUTION INTRAMUSCULAR; INTRAVENOUS at 14:16

## 2018-04-03 RX ADMIN — GABAPENTIN 300 MG: 300 CAPSULE ORAL at 10:49

## 2018-04-03 RX ADMIN — MONTELUKAST SODIUM 10 MG: 10 TABLET, FILM COATED ORAL at 21:52

## 2018-04-03 RX ADMIN — METHADONE HYDROCHLORIDE 30 MG: 10 TABLET ORAL at 16:25

## 2018-04-03 RX ADMIN — KETAMINE HCL-NACL SOLN PREF SY 50 MG/5ML-0.9% (10MG/ML) 10 MG: 10 SOLUTION PREFILLED SYRINGE at 13:23

## 2018-04-03 RX ADMIN — ACETAMINOPHEN 975 MG: 325 TABLET ORAL at 20:16

## 2018-04-03 RX ADMIN — CEFAZOLIN 1 G: 1 INJECTION, POWDER, FOR SOLUTION INTRAMUSCULAR; INTRAVENOUS at 20:34

## 2018-04-03 RX ADMIN — SODIUM CHLORIDE, POTASSIUM CHLORIDE, SODIUM LACTATE AND CALCIUM CHLORIDE: 600; 310; 30; 20 INJECTION, SOLUTION INTRAVENOUS at 13:27

## 2018-04-03 RX ADMIN — GABAPENTIN 900 MG: 300 CAPSULE ORAL at 20:16

## 2018-04-03 RX ADMIN — ROCURONIUM BROMIDE 10 MG: 10 INJECTION INTRAVENOUS at 13:21

## 2018-04-03 RX ADMIN — MAGNESIUM HYDROXIDE 15 ML: 400 SUSPENSION ORAL at 20:17

## 2018-04-03 RX ADMIN — KETAMINE HCL-NACL SOLN PREF SY 50 MG/5ML-0.9% (10MG/ML) 10 MG: 10 SOLUTION PREFILLED SYRINGE at 12:39

## 2018-04-03 RX ADMIN — SODIUM CHLORIDE, POTASSIUM CHLORIDE, SODIUM LACTATE AND CALCIUM CHLORIDE: 600; 310; 30; 20 INJECTION, SOLUTION INTRAVENOUS at 18:09

## 2018-04-03 RX ADMIN — FENTANYL CITRATE 50 MCG: 50 INJECTION, SOLUTION INTRAMUSCULAR; INTRAVENOUS at 13:04

## 2018-04-03 RX ADMIN — ACETAMINOPHEN 975 MG: 325 TABLET ORAL at 10:49

## 2018-04-03 RX ADMIN — HYDROMORPHONE HYDROCHLORIDE 0.5 MG: 1 INJECTION, SOLUTION INTRAMUSCULAR; INTRAVENOUS; SUBCUTANEOUS at 12:49

## 2018-04-03 RX ADMIN — LIDOCAINE HYDROCHLORIDE 160 ML: 40 SOLUTION TOPICAL at 11:38

## 2018-04-03 RX ADMIN — ROCURONIUM BROMIDE 50 MG: 10 INJECTION INTRAVENOUS at 11:36

## 2018-04-03 RX ADMIN — DEXAMETHASONE SODIUM PHOSPHATE 6 MG: 4 INJECTION, SOLUTION INTRAMUSCULAR; INTRAVENOUS at 12:45

## 2018-04-03 RX ADMIN — ROCURONIUM BROMIDE 10 MG: 10 INJECTION INTRAVENOUS at 12:49

## 2018-04-03 RX ADMIN — BUPROPION HYDROCHLORIDE 150 MG: 150 TABLET, FILM COATED, EXTENDED RELEASE ORAL at 20:17

## 2018-04-03 RX ADMIN — PROPOFOL 200 MG: 10 INJECTION, EMULSION INTRAVENOUS at 11:32

## 2018-04-03 RX ADMIN — PHENYLEPHRINE HYDROCHLORIDE 100 MCG: 10 INJECTION, SOLUTION INTRAMUSCULAR; INTRAVENOUS; SUBCUTANEOUS at 11:45

## 2018-04-03 RX ADMIN — PHENYLEPHRINE HYDROCHLORIDE 100 MCG: 10 INJECTION, SOLUTION INTRAMUSCULAR; INTRAVENOUS; SUBCUTANEOUS at 11:39

## 2018-04-03 RX ADMIN — HYDROMORPHONE HYDROCHLORIDE 0.5 MG: 1 INJECTION, SOLUTION INTRAMUSCULAR; INTRAVENOUS; SUBCUTANEOUS at 12:58

## 2018-04-03 RX ADMIN — ROCURONIUM BROMIDE 10 MG: 10 INJECTION INTRAVENOUS at 12:58

## 2018-04-03 RX ADMIN — FENTANYL CITRATE 50 MCG: 50 INJECTION, SOLUTION INTRAMUSCULAR; INTRAVENOUS at 11:32

## 2018-04-03 RX ADMIN — CEFAZOLIN SODIUM 2 G: 2 INJECTION, SOLUTION INTRAVENOUS at 12:20

## 2018-04-03 RX ADMIN — SODIUM CHLORIDE 1 G: 9 INJECTION, SOLUTION INTRAVENOUS at 13:55

## 2018-04-03 RX ADMIN — Medication 25 MG: at 21:52

## 2018-04-03 RX ADMIN — PHENYLEPHRINE HYDROCHLORIDE 100 MCG: 10 INJECTION, SOLUTION INTRAMUSCULAR; INTRAVENOUS; SUBCUTANEOUS at 12:22

## 2018-04-03 RX ADMIN — LIDOCAINE HYDROCHLORIDE 40 MG: 20 INJECTION, SOLUTION INFILTRATION; PERINEURAL at 11:32

## 2018-04-03 RX ADMIN — ALBUMIN (HUMAN): 12.5 SOLUTION INTRAVENOUS at 13:45

## 2018-04-03 RX ADMIN — KETAMINE HCL-NACL SOLN PREF SY 50 MG/5ML-0.9% (10MG/ML) 10 MG: 10 SOLUTION PREFILLED SYRINGE at 12:00

## 2018-04-03 RX ADMIN — ONDANSETRON 4 MG: 2 INJECTION INTRAMUSCULAR; INTRAVENOUS at 14:11

## 2018-04-03 RX ADMIN — PHENYLEPHRINE HYDROCHLORIDE 100 MCG: 10 INJECTION, SOLUTION INTRAMUSCULAR; INTRAVENOUS; SUBCUTANEOUS at 11:42

## 2018-04-03 RX ADMIN — PHENYLEPHRINE HYDROCHLORIDE 100 MCG: 10 INJECTION, SOLUTION INTRAMUSCULAR; INTRAVENOUS; SUBCUTANEOUS at 11:36

## 2018-04-03 RX ADMIN — SODIUM CHLORIDE, POTASSIUM CHLORIDE, SODIUM LACTATE AND CALCIUM CHLORIDE: 600; 310; 30; 20 INJECTION, SOLUTION INTRAVENOUS at 15:00

## 2018-04-03 RX ADMIN — SENNOSIDES AND DOCUSATE SODIUM 1 TABLET: 8.6; 5 TABLET ORAL at 20:17

## 2018-04-03 RX ADMIN — PHENYLEPHRINE HYDROCHLORIDE 100 MCG: 10 INJECTION, SOLUTION INTRAMUSCULAR; INTRAVENOUS; SUBCUTANEOUS at 11:49

## 2018-04-03 RX ADMIN — SODIUM CITRATE AND CITRIC ACID MONOHYDRATE 30 ML: 500; 334 SOLUTION ORAL at 10:49

## 2018-04-03 RX ADMIN — FENTANYL CITRATE 50 MCG: 50 INJECTION, SOLUTION INTRAMUSCULAR; INTRAVENOUS at 13:39

## 2018-04-03 RX ADMIN — CELECOXIB 200 MG: 200 CAPSULE ORAL at 10:49

## 2018-04-03 RX ADMIN — SODIUM CHLORIDE 1 G: 9 INJECTION, SOLUTION INTRAVENOUS at 12:26

## 2018-04-03 RX ADMIN — POLYETHYLENE GLYCOL 3350 17 G: 17 POWDER, FOR SOLUTION ORAL at 20:18

## 2018-04-03 RX ADMIN — PHENYLEPHRINE HYDROCHLORIDE 100 MCG: 10 INJECTION, SOLUTION INTRAMUSCULAR; INTRAVENOUS; SUBCUTANEOUS at 12:15

## 2018-04-03 RX ADMIN — KETAMINE HCL-NACL SOLN PREF SY 50 MG/5ML-0.9% (10MG/ML) 10 MG: 10 SOLUTION PREFILLED SYRINGE at 14:19

## 2018-04-03 RX ADMIN — SODIUM CHLORIDE, POTASSIUM CHLORIDE, SODIUM LACTATE AND CALCIUM CHLORIDE: 600; 310; 30; 20 INJECTION, SOLUTION INTRAVENOUS at 11:15

## 2018-04-03 RX ADMIN — HYDROMORPHONE HYDROCHLORIDE 0.5 MG: 1 INJECTION, SOLUTION INTRAMUSCULAR; INTRAVENOUS; SUBCUTANEOUS at 14:14

## 2018-04-03 ASSESSMENT — ENCOUNTER SYMPTOMS: SEIZURES: 1

## 2018-04-03 ASSESSMENT — LIFESTYLE VARIABLES: TOBACCO_USE: 1

## 2018-04-03 NOTE — OR NURSING
Patient very somnolent in PACU but arousable and follows commands. Portable XR done. Methadone dose given. Art line d/c'd. Dr. Morrison at bedside, verbal sign out given and ok to transfer to 8th floor.

## 2018-04-03 NOTE — BRIEF OP NOTE
Orthopaedic Surgery Brief Op-Note      Patient: Alethea Patel  : 1980  Date of Service: 4/3/2018 2:45 PM    Pre-operative Diagnosis: Left Hip Arthrosis After Prior Surface Replacement  Post-operative Diagnosis: same    Procedure(s) Performed: Procedure(s):  Conversion of Left Hip Rigo-Arthroplasty to Left Total Hip Replacement - Wound Class: I-Clean    Staff: Dr. Saleem  Assistants:   MD Jeff Stack PA-C    Anesthesia: General  EBL: 300 cc  UOP: see anesthesia record    Implants:     Implant Name Type Inv. Item Serial No.  Lot No. LRB No. Used   IMP SHELL ACETABULUM BIOM RANAWAT 54MM SIZE [id number] Total Joint Component/Insert IMP SHELL ACETABULUM BIOM RANAWAT 54MM SIZE [id number]  [employer] [id number] Left 1   resurfacing hip component      Left 1   IMP LINER ACET POLY RINGLOC BIOM [id number] Total Joint Component/Insert IMP LINER ACET POLY RINGLOC BIOM [id number]  [employer] [id number] Left 1   taperloc femoral porous coated stem reduced distal high offset type 1 taper     [id number] Left 1   biolox delta hip system modular ceramic neck         [id number] Left 1     Drains: ANAHY drain  Intra-op Labs/Cxs/Specimens: None  Complications: No apparent complications during procedure  Findings: Please see dictated operative note for details    Disposition: Stable to PACU, then admit to Orthopaedics    POST OPERATIVE PLAN    Assessment/Plan: Alethea Patel is a 37 year old female s/p Procedure(s):  Conversion of Left Hip Rigo-Arthroplasty to Left Total Hip Replacement - Wound Class: I-Clean on 4/3/2018 with Dr. Saleem.    Activity: Up with assist and assistive devices as needed until independent. Posterior hip precautions x 3 months:  1) No L hip flexion beyond 90 degrees  2) No adduction beyond midline  3) No internal rotation beyond neutral   Weight bearing status: WBAT  Antibiotics: Cefazolin x 24 hours  Diet: Begin with clear fluids and progress diet as  tolerated. Bowel regimen. Anti-emetics PRN.    DVT prophylaxis: Mechanical while in hospital with Warfarin starting POD 0 (Pharmacy to dose) x 4 weeks  Elevation: Elevate heels off of bed on pillows   Wound Care: Aquacel x 5-7 days.    Drains: Document output per shift, Ortho will discontinue on POD #1-2.  Pain management: Orals PRN, IV for breakthrough only; Pain consult placed, appreciate recommendations  X-rays: AP Pelvis and Lateral L hip in PACU.   Physical Therapy: Mobilization, ROM, ADL's, Posterior hip precautions.    Occupational Therapy: ADL's  Labs: Trend Hgb on POD #1, 2  Consults: PT, OT. Hospitalist, appreciate assistance in caring for this patient throughout the perioperative period    Future Appointments  Date Time Provider Department Center   4/4/2018 9:30 AM Sabra Morales, PT [facility] [place]   4/4/2018 11:00 AM Liz Proctor OT [facility] [place]   4/4/2018 1:30 PM Misty Neil, PT [facility]       Disposition: Pending progress with therapies, pain control on orals, and medical stability, anticipate discharge to Home vs TCU on POD #2-3.    I assisted with positioning, prepping and draping, and closure.      Jeff Cifuentes PA-C 4/3/2018 2:45 PM  Orthopaedic Surgery    Please page me at [phone] with any questions/concerns during regular weekday hours before 5pm. If there is no response, if it is a weekend, or if it is during evening hours then please page the orthopaedic surgery resident on call.

## 2018-04-03 NOTE — IP AVS SNAPSHOT
[facility]    [address]    [facility] [address]    Phone:  [phone]                                       After Visit Summary   4/3/2018    Alethea Patel    MRN: [id number]           After Visit Summary Signature Page     I have received my discharge instructions, and my questions have been answered. I have discussed any challenges I see with this plan with the nurse or doctor.    ..........................................................................................................................................  Patient/Patient Representative Signature      ..........................................................................................................................................  Patient Representative Print Name and Relationship to Patient    ..................................................               ................................................  Date                                            Time    ..........................................................................................................................................  Reviewed by Signature/Title    ...................................................              ..............................................  Date                                                            Time

## 2018-04-03 NOTE — IP AVS SNAPSHOT
MRN:[id number]                      After Visit Summary   4/3/2018    Alethea Patel    MRN: [id number]           Thank you!     Thank you for choosing [facility] for your care. Our goal is always to provide you with excellent care. Hearing back from our patients is one way we can continue to improve our services. Please take a few minutes to complete the written survey that you may receive in the mail after you visit with us. Thank you!        Patient Information     Date Of Birth          1980        About your hospital stay     You were admitted on:  April 3, 2018 You last received care in the:  formerly [facility]    You were discharged on:  April 4, 2018        Reason for your hospital stay       You were admitted to the hospital following your total hip arthroplasty.                  Who to Call     For medical emergencies, please call 911.  For non-urgent questions about your medical care, please call your primary care provider or clinic, [phone]  For questions related to your surgery, please call your surgery clinic        Attending Provider     Provider Cheko Christianson MD Orthopedics       Primary Care Provider Office Phone # Fax #    [facility] [phone]       When to contact your care team       CALL YOUR PHYSICIAN IF:  -Pain in your hip persists or worsens in the first few days after surgery.  -Excessive redness or drainage of cloudy or bloody material from the wounds (Clear red tinted fluid and some mild drainage should be expected). Drainage of any kind 5 days after surgery should be reported to the doctor.  -You have a temperature elevation greater than 101.5    -You have pain, swelling or redness in your calf.  -You have numbness or weakness in your leg or foot.      You may contact your physician at [phone] during business hours.    For after hours or weekend calls, you may contact the hospital at [phone] and ask for the  on-call orthopedic resident                  After Care Instructions     Activity       Your activity upon discharge will be as tolerated. You must maintain posterior hip precautions to the operative hip (see below) for the next 12 weeks with no exceptions.     POSTERIOR HIP PRECAUTIONS:  1) no hip flexion >90 degrees (no bending down at the waist)  2) no internal rotation past neutral (no pivoting)  3) no adduction past midline (no crossing your legs)            Diet       You may return to your regular, pre-surgery diet unless instructed otherwise by your provider.            Discharge Instructions       JOSY/TOTAL HIP ARTHROPLASTY POST OPERATIVE INSTRUCTIONS    A.  COMFORT:  -Swelling: An ice pack can be an effective means of controlling swelling and discomfort. Place a thin towel between your skin and the ice pack and apply to skin for 20 minutes.   -Pain Medication: Take medication as prescribed, but only as often as necessary.  Avoid alcohol and driving if you are taking pain medication.  Remember that Tylenol can be used for pain relief as well, as you wean off the narcotics.  Maximum Tylenol dose for a single day is 4000 mg.            B.  ACTIVITIES:  -Activity: Posterior hip precautions should be maintained on the operative hip for at least 12 weeks.  -Exercises: Point and flex your feet and wiggle your toes, move your ankle around in a [place], to help prevent complications such as blood clotting in your legs.   -Weight bearing status: You may weight bear on the operative leg as tolerated  -Physical therapy: Follow-up with physical therapy as discussed with your provider  -Driving: Driving is NOT permitted until allowed by your provider.  Athletic activities: Athletic activities, such as swimming, bicycling, jogging, running and stop-and-go-sports should be avoided until allowed by your doctor.  -Return to work: May returned to work when allowed by your provider.       C. INCISION CARE:    -Keep the  initial post-op dressing on for 7 days, as discussed above. You may shower 48 hours after surgery, however the dressing on your hip should remain in place during showering. It is acceptable for water to run over the dressing, but you are not to soak or submerge your wound underwater. The steri-strips (small white tape that is directly on the incision areas) should be left on until they fall off or are removed at your first office visit.            Discharge Instructions       [facility] with RN - start 4/5 - check clinically, VS, review meds, check hgb, BMP - update PMD.  Suppository if 3 days and no BM  Call primary MD to arrange follow up in next 1-2 weeks.            Wound care and dressings       You have a clean dressing on your surgical wound. This dressing should stay in place for 5-7 days to allow the incision to heal. After 7 days, you may change the dressing. Please use sterile 4x4 gauze dressings with tape over top to secure the dressing. If the dressing becomes wet or saturated with wound drainage, it is appropriate to change the dressing. If drainage persists from the incision site to the extent that it is frequently saturating the dressing, please contact your clinic nurse.                  Follow-up Appointments     Adult [facility] Follow-up and recommended labs and tests       You are to return to clinic in 2 weeks for wound check with the clinic nurse. Approximately 6 weeks after your surgery, you will be scheduled for an appointment with Dr. Saleem. At this time, AP pelvis imaging will be obtained.  *patient is scheduled on 4/16 at 11:00a and 5/14 at 11:00a with Dr. Saleem*    If you need to schedule your 2 and 6 week postoperative visits or haven't received confirmation regarding these visits, please call one of the following numbers within 7 days of discharge.    For patients that see Dr. Saleem at:   -[facility]: [phone]                  Your next 10 appointments  already scheduled     Apr 16, 2018 11:00 AM CDT   (Arrive by 10:45 AM)   Return Visit with Cheko Saleem MD   [facility] ([facility])    [address]  [address] [facility] [address] [phone]            May 14, 2018 11:00 AM CDT   (Arrive by 10:45 AM)   Return Visit with Cheko Saleem MD   [facility] ([facility])    [address]  [address] [facility] [address] [phone]              Additional Services     Home Care OT Referral for Hospital Discharge       Occupational Therapy- Evaluate and Treat.            Home Care PT Referral for Hospital Discharge       Physical Therapy- Evaluate and Treat.            Home care nursing referral       [facility] [place] [address]   Phone  [phone]    RN skilled nursing visit. RN to assess vital signs and weight, respiratory and cardiac status, pain level and activity tolerance, incision for signs/symptoms of infection, hydration, nutrition and bowel status and home safety.  RN to teach medication management.  RN to provide lab draws. RN to draw INR on Thursday 4/5 and call results to [facility]  Phone: [phone]  Fax: [phone]            INR Clinic Referral       [facility]  Phone: [phone]  Fax: [phone]    For INR and Warfarin monitoring (Goal = 1.8-2.5 )  Indication for Anticoagulation: DVT prophylaxis  MD Following: Dr. Cheko Saleem  Expected duration of therapy: 4 weeks  Next INR lab draw due on: Thursday 4/5                  Warfarin Instruction     You have started taking a medicine called warfarin. This is a blood-thinning medicine (anticoagulant). It helps prevent and treat blood clots.      Before leaving the hospital, make sure you know how much to take and how long to take it.      You will need regular blood tests to make sure your blood is clotting  "safely. It is very important to see your doctor for regular blood tests.    Talk to your doctor before taking any new medicine (this includes over-the-counter drugs and herbal products). Many medicines can interact with warfarin. This may cause more bleeding or too much clotting.     Eating a lot of vitamin K--found in green, leafy vegetables--can change the way warfarin works in your body. Do NOT avoid these foods. Instead, try to eat the same amount each day.     Bleeding is the most common side-effect of warfarin. You may notice bleeding gums, a bloody nose, bruises and bleeding longer when you cut yourself. See a doctor at once if:   o You cough up blood  o You find blood in your stool (poop)  o You have a deep cut, or a cut that bleeds longer than 10 minutes   o You have a bad cut, hard fall, accident or hit your head (go to urgent care or the emergency room).    For women who can get pregnant: This medicine can harm an unborn baby. Be very careful not to get pregnant while taking this medicine. If you think you might be pregnant, call your doctor right away.    For more information, read \"Guide to Warfarin Therapy,  the booklet you received in the hospital.        Additional Information     If you use hormonal birth control (such as the pill, patch, ring or implants): You'll need a second form of birth control for 7 days (condoms, a diaphragm or contraceptive foam). While in the hospital, you received a medicine called Bridion. Your normal birth control will not work as well for a week after taking this medicine.          Pending Results     No orders found for last 3 day(s).            Statement of Approval     Ordered          04/04/18 3999  I have reviewed and agree with all the recommendations and orders detailed in this document.  EFFECTIVE NOW     Approved and electronically signed by:  Antonia Romero APRN CNP             Admission Information     Date & Time Provider Department Dept. Phone    " "4/3/2018 Cheko Saleem MD [facility] [phone]      Your Vitals Were     Blood Pressure Pulse Temperature Respirations Height Weight    102/62 94 97.8  F (36.6  C) 16 1.765 m (5' 9.5\") 75 kg (165 lb 5.5 oz)    Last Period Pulse Oximetry BMI (Body Mass Index)             03/21/2018 (Approximate) 92% 24.07 kg/m2         [facility] Information     [employer] gives you secure access to your electronic health record. If you see a primary care provider, you can also send messages to your care team and make appointments. If you have questions, please call your primary care clinic.  If you do not have a primary care provider, please call [phone] and they will assist you.        Care EveryWhere ID     This is your Care EveryWhere ID. This could be used by other organizations to access your [facility] medical records  [id number]        Equal Access to Services     EMILIANO MARTINEZ : Salome Mccann, homer dao, verenice grey, anabella chacon . So [facility] [phone].    ATENCIÓN: Si habla español, tiene a parisi disposición servicios gratuitos de asistencia lingüística. Llame al [phone].    We comply with applicable federal civil rights laws and [place] laws. We do not discriminate on the basis of race, color, national origin, age, disability, sex, sexual orientation, or gender identity.               Review of your medicines      START taking        Dose / Directions    acetaminophen 325 MG tablet   Commonly known as:  TYLENOL        Dose:  650 mg   Start taking on:  4/6/2018   Take 2 tablets (650 mg) by mouth every 4 hours as needed for other (multimodal surgical pain management along with NSAIDS and opioid medication as indicated based on pain control and physical function.)   Quantity:  100 tablet   Refills:  1       bisacodyl 10 MG Suppository   Commonly known as:  DULCOLAX        Dose:  10 mg   Place 1 suppository (10 mg) rectally daily as needed for constipation "   Quantity:  3 suppository   Refills:  0       order for DME        Equipment being ordered: Bath Seat () Treatment Diagnosis: Limited independence in ADLs/mobility due to hip precautions and recent hip surgery   Quantity:  1 each   Refills:  0       polyethylene glycol Packet   Commonly known as:  MIRALAX/GLYCOLAX        Dose:  17 g   Take 17 g by mouth daily   Quantity:  7 packet   Refills:  0       warfarin 5 MG tablet   Commonly known as:  COUMADIN        Take 1 tablet today. INR tomorrow. Additional dose instructions per anticoagulation clinic.   Quantity:  45 tablet   Refills:  1         CONTINUE these medicines which may have CHANGED, or have new prescriptions. If we are uncertain of the size of tablets/capsules you have at home, strength may be listed as something that might have changed.        Dose / Directions    oxyCODONE IR 30 MG tablet   Commonly known as:  ROXICODONE   This may have changed:    - medication strength  - how much to take  - when to take this  - reasons to take this  - additional instructions        Dose:  30-45 mg   Take 1-1.5 tablets (30-45 mg) by mouth every 3 hours as needed for other (pain control or improvement in physical function. Hold dose for analgesic side effects.) Continue this regimen as prescribed in the hospital. Work with pain provider to adjust dose appropriately   Refills:  0       WELLBUTRIN SR PO   This may have changed:  Another medication with the same name was removed. Continue taking this medication, and follow the directions you see here.        Dose:  150 mg   Take 150 mg by mouth 2 times daily   Refills:  0         CONTINUE these medicines which have NOT CHANGED        Dose / Directions    albuterol 108 (90 BASE) MCG/ACT Inhaler   Commonly known as:  PROAIR HFA/PROVENTIL HFA/VENTOLIN HFA        Dose:  2 puff   Inhale 2 puffs into the lungs every 6 hours as needed for shortness of breath / dyspnea or wheezing   Refills:  0       B COMPLEX PO        Take by  mouth daily   Refills:  0       BACLOFEN PO        Dose:  20 mg   Take 20 mg by mouth 4 times daily   Refills:  0       COMPAZINE PO        Dose:  10 mg   Take 10 mg by mouth every 6 hours as needed.   Refills:  0       fluticasone 50 MCG/ACT spray   Commonly known as:  FLONASE        Dose:  2 spray   Spray 2 sprays into both nostrils daily as needed for rhinitis or allergies   Refills:  0       IRON SUPPLEMENT PO        Take by mouth daily   Refills:  0       levothyroxine 75 MCG tablet   Commonly known as:  SYNTHROID/LEVOTHROID        Dose:  75 mcg   Take 75 mcg by mouth daily   Refills:  0       LEXAPRO PO        Dose:  20 mg   Take 20 mg by mouth daily   Refills:  0       LORazepam 1 MG tablet   Commonly known as:  ATIVAN        Dose:  1-2 mg   Take 1-2 mg by mouth 3 times daily Max 5 tablets daily   Refills:  0       MELATONIN PO        Dose:  3 mg   Take 3 mg by mouth At Bedtime   Refills:  0       METHADOSE PO   Indication:  Pt reports that she takes 3/daily        Dose:  30-40 mg   Take 30-40 mg by mouth every 12 hours as needed for severe pain   Refills:  0       metoclopramide 10 MG tablet   Commonly known as:  REGLAN        Dose:  10 mg   Take 10 mg by mouth every 4 hours as needed   Refills:  0       montelukast 10 MG tablet   Commonly known as:  SINGULAIR        Dose:  10 mg   Take 10 mg by mouth At Bedtime   Refills:  0       NEURONTIN 300 MG capsule   Generic drug:  gabapentin        Dose:  900 mg   Take 900 mg by mouth 3 times daily   Refills:  0       order for DME   Used for:  Status post total right knee replacement        [facility] [phone]   Directions: Advance as Tolerated and Instructed by MD   Quantity:  1 Device   Refills:  0       priLOSEC OTC 20 MG tablet   Generic drug:  omeprazole        Dose:  20 mg   Take 20 mg by mouth daily   Refills:  0       QUEtiapine 25 MG tablet   Commonly known as:  SEROquel        Take by mouth At Bedtime   Refills:  0       senna-docusate  8.6-50 MG per tablet   Commonly known as:  SENOKOT-S;PERICOLACE   Used for:  Status post total left knee replacement        Dose:  2 tablet   Take 2 tablets by mouth 2 times daily   Quantity:  30 tablet   Refills:  0       TIZANIDINE HCL PO        Dose:  2 mg   Take 2 mg by mouth every 6 hours as needed for muscle spasms   Refills:  0       TRAZODONE HCL PO        Dose:  50 mg   Take 50 mg by mouth At Bedtime   Refills:  0         STOP taking     ibuprofen 200 MG tablet   Commonly known as:  ADVIL/MOTRIN           NEW MED                Where to get your medicines      These medications were sent to [facility] [address] [place] [address] [address]  [address] [facility] [address]     Phone:  [phone]     bisacodyl 10 MG Suppository    polyethylene glycol Packet    warfarin 5 MG tablet         Some of these will need a paper prescription and others can be bought over the counter. Ask your nurse if you have questions.     Bring a paper prescription for each of these medications     acetaminophen 325 MG tablet    order for DME                Protect others around you: Learn how to safely use, store and throw away your medicines at www.disposemymeds.org.        Information about OPIOIDS     PRESCRIPTION OPIOIDS: WHAT YOU NEED TO KNOW    Prescription opioids can be used to help relieve moderate to severe pain and are often prescribed following a surgery or injury, or for certain health conditions. These medications can be an important part of treatment but also come with serious risks. It is important to work with your health care provider to make sure you are getting the safest, most effective care.    WHAT ARE THE RISKS AND SIDE EFFECTS OF OPIOID USE?  Prescription opioids carry serious risks of addiction and overdose, especially with prolonged use. An opioid overdose, often marked by slowed breathing can cause sudden death. The use of prescription  opioids can have a number of side effects as well, even when taken as directed:      Tolerance - meaning you might need to take more of a medication for the same pain relief    Physical dependence - meaning you have symptoms of withdrawal when a medication is stopped    Increased sensitivity to pain    Constipation    Nausea, vomiting, and dry mouth    Sleepiness and dizziness    Confusion    Depression    Low levels of testosterone that can result in lower sex drive, energy, and strength    Itching and sweating    RISKS ARE GREATER WITH:    History of drug misuse, substance use disorder, or overdose    Mental health conditions (such as depression or anxiety)    Sleep apnea    Older age (65 years or older)    Pregnancy    Avoid alcohol while taking prescription opioids.   Also, unless specifically advised by your health care provider, medications to avoid include:    Benzodiazepines (such as Xanax or Valium)    Muscle relaxants (such as Soma or Flexeril)    Hypnotics (such as Ambien or Lunesta)    Other prescription opioids    KNOW YOUR OPTIONS:  Talk to your health care provider about ways to manage your pain that do not involve prescription opioids. Some of these options may actually work better and have fewer risks and side effects:    Pain relievers such as acetaminophen, ibuprofen, and naproxen    Some medications that are also used for depression or seizures    Physical therapy and exercise    Cognitive behavioral therapy, a psychological, goal-directed approach, in which patients learn how to modify physical, behavioral, and emotional triggers of pain and stress    IF YOU ARE PRESCRIBED OPIOIDS FOR PAIN:    Never take opioids in greater amounts or more often than prescribed    Follow up with your primary health care provider and work together to create a plan on how to manage your pain.    Talk about ways to help manage your pain that do not involve prescription opioids    Talk about all concerns and side  effects    Help prevent misuse and abuse    Never sell or share prescription opioids    Never use another person's prescription opioids    Store prescription opioids in a secure place and out of reach of others (this may include visitors, children, friends, and family)    Visit www.cdc.gov/drugoverdose to learn about risks of opioid abuse and overdose    If you believe you may be struggling with addiction, tell your health care provider and ask for guidance or call [facility]'s National Helpline at 6-199-347-HELP    LEARN MORE / www.cdc.gov/drugoverdose/prescribing/guideline.html    Safely dispose of unused prescription opioids: Find your local drug take-back programs and more information about the importance of safe disposal at www.doseofreality.[place].gov             Medication List: This is a list of all your medications and when to take them. Check marks below indicate your daily home schedule. Keep this list as a reference.      Medications           Morning Afternoon Evening Bedtime As Needed    acetaminophen 325 MG tablet   Commonly known as:  TYLENOL   Take 2 tablets (650 mg) by mouth every 4 hours as needed for other (multimodal surgical pain management along with NSAIDS and opioid medication as indicated based on pain control and physical function.)   Start taking on:  4/6/2018   Last time this was given:  975 mg on 4/4/2018 10:48 AM                                albuterol 108 (90 BASE) MCG/ACT Inhaler   Commonly known as:  PROAIR HFA/PROVENTIL HFA/VENTOLIN HFA   Inhale 2 puffs into the lungs every 6 hours as needed for shortness of breath / dyspnea or wheezing                                B COMPLEX PO   Take by mouth daily                                BACLOFEN PO   Take 20 mg by mouth 4 times daily                                bisacodyl 10 MG Suppository   Commonly known as:  DULCOLAX   Place 1 suppository (10 mg) rectally daily as needed for constipation                                COMPAZINE PO   Take  10 mg by mouth every 6 hours as needed.                                fluticasone 50 MCG/ACT spray   Commonly known as:  FLONASE   Spray 2 sprays into both nostrils daily as needed for rhinitis or allergies                                IRON SUPPLEMENT PO   Take by mouth daily                                levothyroxine 75 MCG tablet   Commonly known as:  SYNTHROID/LEVOTHROID   Take 75 mcg by mouth daily   Last time this was given:  75 mcg on 4/4/2018  8:19 AM                                LEXAPRO PO   Take 20 mg by mouth daily                                LORazepam 1 MG tablet   Commonly known as:  ATIVAN   Take 1-2 mg by mouth 3 times daily Max 5 tablets daily                                MELATONIN PO   Take 3 mg by mouth At Bedtime                                METHADOSE PO   Take 30-40 mg by mouth every 12 hours as needed for severe pain                                metoclopramide 10 MG tablet   Commonly known as:  REGLAN   Take 10 mg by mouth every 4 hours as needed                                montelukast 10 MG tablet   Commonly known as:  SINGULAIR   Take 10 mg by mouth At Bedtime   Last time this was given:  10 mg on 4/3/2018  9:52 PM                                NEURONTIN 300 MG capsule   Take 900 mg by mouth 3 times daily   Last time this was given:  900 mg on 4/4/2018  8:18 AM   Generic drug:  gabapentin                                order for DME   [facility] [phone]   Directions: Advance as Tolerated and Instructed by MD                                order for DME   Equipment being ordered: Bath Seat () Treatment Diagnosis: Limited independence in ADLs/mobility due to hip precautions and recent hip surgery                                oxyCODONE IR 30 MG tablet   Commonly known as:  ROXICODONE   Take 1-1.5 tablets (30-45 mg) by mouth every 3 hours as needed for other (pain control or improvement in physical function. Hold dose for analgesic side effects.) Continue  this regimen as prescribed in the hospital. Work with pain provider to adjust dose appropriately   Last time this was given:  45 mg on 4/4/2018 11:30 AM                                polyethylene glycol Packet   Commonly known as:  MIRALAX/GLYCOLAX   Take 17 g by mouth daily   Last time this was given:  17 g on 4/4/2018  8:19 AM                                priLOSEC OTC 20 MG tablet   Take 20 mg by mouth daily   Generic drug:  omeprazole                                QUEtiapine 25 MG tablet   Commonly known as:  SEROquel   Take by mouth At Bedtime   Last time this was given:  25 mg on 4/3/2018  9:52 PM                                senna-docusate 8.6-50 MG per tablet   Commonly known as:  SENOKOT-S;PERICOLACE   Take 2 tablets by mouth 2 times daily   Last time this was given:  2 tablets on 4/4/2018  8:19 AM                                TIZANIDINE HCL PO   Take 2 mg by mouth every 6 hours as needed for muscle spasms                                TRAZODONE HCL PO   Take 50 mg by mouth At Bedtime                                warfarin 5 MG tablet   Commonly known as:  COUMADIN   Take 1 tablet today. INR tomorrow. Additional dose instructions per anticoagulation clinic.   Last time this was given:  5 mg on 4/3/2018  8:17 PM                                WELLBUTRIN SR PO   Take 150 mg by mouth 2 times daily   Last time this was given:  150 mg on 4/4/2018  8:18 AM

## 2018-04-03 NOTE — ANESTHESIA PROCEDURE NOTES
Arterial Line Procedure Note  Staff:     Anesthesiologist:  ЕКАТЕРИНА LEVY  Location: In OR After Induction  Procedure Start/Stop Times:     patient identified, IV checked, site marked, risks and benefits discussed, informed consent, monitors and equipment checked, pre-op evaluation and at physician/surgeon's request      Correct Patient: Yes      Correct Position: Yes      Correct Site: Yes      Correct Procedure: Yes      Correct Laterality:  Yes    Site Marked:  Yes  Line Placement:     Procedure:  Arterial Line    Insertion Site:  Radial    Insertion laterality:  Right    Skin Prep: Chloraprep      Patient Prep: mask, sterile gloves, hat and hand hygiene      Local skin infiltration:  None    Catheter size:  20 gauge, 12 cm    Dressing:  Tegaderm    Complications:  None obvious    Arterial waveform: Yes      IBP within 10% of NIBP: Yes    Assessment/Narrative:      No issues with placement.

## 2018-04-03 NOTE — OR NURSING
PACU to Inpatient Nursing Handoff    Patient Alethea Patel is a 37 year old female who speaks English.   Procedure Procedure(s):  Conversion of Left Hip Rigo-Arthroplasty to Left Total Hip Replacement - Wound Class: I-Clean   Surgeon(s) Primary: Cheko Saleem MD  Assisting: Jose Knutson MD; Jeff Cifuentes PA-C     Allergies   Allergen Reactions     Chantix [Varenicline] Nausea and Vomiting     Other reaction(s): Vomiting     No Clinical Screening - See Comments      Other reaction(s): Unknown Reaction     Seasonal Allergies      Ciprofloxacin      Other reaction(s): Other  Interacts with other medication     Latex Itching and Rash     Other reaction(s): Other - Describe In Comment Field  Vaginal itching, burning  Other reaction(s): Intolerance       Isolation  [unfilled]     Past Medical History   has a past medical history of Acute lymphoblastic leukemia in remission (H); Anxiety; Depression; Depressive disorder; Gastro-oesophageal reflux disease; IBS (irritable bowel syndrome); Osteonecrosis (H); and Osteonecrosis (H).    Anesthesia General   Dermatome Level     Preop Meds acetaminophen (Tylenol) - time given: 1049  celecoxib (Celebrex) - time given: 1049  gabapentin (Neurontin) - time given: 1049  Bicitra - time given: 1049   Nerve block Not applicable   Intraop Meds dexamethasone (Decadron)  fentanyl (Sublimaze): 150 mcg total  hydromorphone (Dilaudid): 1.5 mg total  ketamine (Ketalar): 70 mg given  ondansetron (Zofran): last given at 4   Local Meds Yes - Local Cocktail (morphine, ropivacaine, epinephrine, Toradol)   Antibiotics cefazolin (Ancef) - last given at 1420     Pain Patient Currently in Pain: sleeping: patient not able to self report   PACU meds  Not applicable   PCA / epidural No   Capnography Respiratory Monitoring (EtCO2): 45 mmHg  Integrated Pulmonary Index (IPI): 8-9   Telemetry ECG Rhythm: Sinus tachycardia   Inpatient Telemetry Monitor Ordered? No        Labs Glucose Lab  Results   Component Value Date    GLC 96 04/03/2018       Hgb Lab Results   Component Value Date    HGB 12.6 04/03/2018       INR Lab Results   Component Value Date    INR 2.0 01/29/2016      PACU Imaging Completed     Wound/Incision Incision/Surgical Site 06/12/12 Right Knee (Active)   Number of days:2121       Incision/Surgical Site 01/08/16 Left Knee (Active)   Number of days:816       Incision/Surgical Site 04/03/18 Left Hip (Active)   Incision Assessment [employer] 4/3/2018  2:48 PM   Closure Adhesive strip(s) 4/3/2018  2:48 PM   Incision Drainage Amount None 4/3/2018  2:48 PM   Dressing Intervention Clean, dry, intact 4/3/2018  2:48 PM   Number of days:0      CMS Peripheral Neurovascular WDL: WDL (04/03/18 1448)  LLE Temperature: warm (04/03/18 1109)  LLE Color: no discoloration (04/03/18 1109)  LLE Sensation: no numbness;no tingling (04/03/18 1109)   Equipment ice pack and abductor pillow   Other LDA  15fr ANAHY Left hip     IV Access Peripheral IV 06/12/12 Right Hand (Active)   Number of days:2121       Peripheral IV 04/03/18 Right Lower forearm (Active)   Site Assessment [employer] 4/3/2018  2:48 PM   Line Status Infusing 4/3/2018  2:48 PM   Phlebitis Scale 0-->no symptoms 4/3/2018  2:48 PM   Infiltration Scale 0 4/3/2018 10:44 AM   Number of days:0       Peripheral IV 04/03/18 Left Hand (Active)   Site Assessment [employer] 4/3/2018  2:48 PM   Line Status Saline locked 4/3/2018  2:48 PM   Number of days:0       Peripheral IV 04/03/18 Left;Dorsal Hand (Active)   Number of days:0      Blood Products Albumin  mL   Intake/Output Date 04/03/18 0700 - 04/04/18 0659   Shift 8274-1638 1907-3021 6072-8924 24 Hour Total   I  N  T  A  K  E   I.V. 1500 300  1800    Colloid 250   250    Shift Total  (mL/kg) 1750  (23.33) 300  (4)  2050  (27.33)   O  U  T  P  U  T   Urine 50   50    Blood 300   300    Shift Total  (mL/kg) 350  (4.67)   350  (4.67)   Weight (kg) 75 75 75 75        Drains / Jordan Closed/Suction Drain 1 Left Hip Bulb 15  Jamil (Active)   Site Description WDL X;UTV 4/3/2018  2:48 PM   Drainage Appearance Bloody/Bright Red 4/3/2018  2:48 PM   Number of days:0       Urethral Catheter Non-latex;Straight-tip 16 fr (Active)   Collection Container Standard 4/3/2018  2:48 PM   Securement Method Securing device (Describe) 4/3/2018  2:48 PM   Rationale for Continued Use Anesthesia 4/3/2018  2:48 PM   Number of days:0      Time of void PreOp Void Prior to Procedure: 1000 (04/03/18 1036)    PostOp      Diapered? No   Bladder Scan     PO    ice chips     Vitals    B/P: 125/85  T: 97.9  F (36.6  C)    Temp src: Axillary  P:  Pulse: 141 (04/03/18 0944)    Heart Rate: 114 (04/03/18 1515)     R: 15  O2:  SpO2: 100 %    O2 Device: None (Room air) (04/03/18 0944)              Family/support present significant other   Patient belongings Patient Belongings: clothing;shoes;dental appliance/dentures (upper dentures)  Disposition of Belongings: Sent to security per site process   Patient transported on bed   DC meds/scripts (obs/outpt) Yes, meds   Inpatient Pain Meds Released? Yes       Special needs/considerations Significant longstanding opioid use, upper dentures   Tasks needing completion        Thu Carballo, STEVEN  ASCOM [id number]

## 2018-04-03 NOTE — ANESTHESIA CARE TRANSFER NOTE
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    Anesthesia Type:   General, ETT     Note:  Airway :Face Mask  Patient transferred to:PACU  Comments: Alethea arrived in PACU sleeping on her back and exchanging well.  Report given and all questions answered.  Handoff Report: Identifed the Patient, Identified the Reponsible Provider, Reviewed the pertinent medical history, Discussed the surgical course, Reviewed Intra-OP anesthesia mangement and issues during anesthesia, Set expectations for post-procedure period and Allowed opportunity for questions and acknowledgement of understanding      Vitals: (Last set prior to Anesthesia Care Transfer)    CRNA VITALS  4/3/2018 1416 - 4/3/2018 1503      4/3/2018             Pulse: 115    SpO2: 100 %                Electronically Signed By: Joel Betancourt CRNA, APRN CRNA  April 3, 2018  3:03 PM

## 2018-04-03 NOTE — ANESTHESIA POSTPROCEDURE EVALUATION
Patient: Alethea Patel    Procedure(s):  Conversion of Left Hip Rigo-Arthroplasty to Left Total Hip Replacement - Wound Class: I-Clean    Diagnosis:Left Hip Arthrosis After Prior Surface Replacement  Diagnosis Additional Information: No value filed.    Anesthesia Type:  General, ETT    Note:  Anesthesia Post Evaluation    Patient location during evaluation: PACU and Bedside  Patient participation: Able to fully participate in evaluation  Level of consciousness: lethargic and awake  Pain management: adequate  Airway patency: patent  Cardiovascular status: acceptable  Respiratory status: acceptable  Hydration status: balanced  PONV: none     Anesthetic complications: None          Last vitals:  Vitals:    04/03/18 1500 04/03/18 1515 04/03/18 1600   BP: 119/86 125/85 120/74   Pulse:      Resp: 14 15 12   Temp:  36.6  C (97.9  F)    SpO2: 100% 100% 95%         Electronically Signed By: Damari Morrison MD  April 3, 2018  4:29 PM

## 2018-04-03 NOTE — PROVIDER NOTIFICATION
Notified ortho resident on-call of CO2 trending at 50-51 on capnography. Will not give any narcotics until more alert and CO2 decreases. Will monitor closely.

## 2018-04-03 NOTE — ANESTHESIA PREPROCEDURE EVALUATION
Anesthesia Evaluation     . Pt has had prior anesthetic. Type: General    No history of anesthetic complications          ROS/MED HX    ENT/Pulmonary:     (+)tobacco use, Current use 1 packs/day  , . .   (-) recent URI   Neurologic:     (+)seizures last seizure: 2014 features: One sz only,     Cardiovascular:  - neg cardiovascular ROS       METS/Exercise Tolerance:  >4 METS   Hematologic:  - neg hematologic  ROS       Musculoskeletal:   (+) , , other musculoskeletal- Joint issues due to high dose prednisone to treat ALL in 2006      GI/Hepatic:     (+) GERD Asymptomatic on medication,       Renal/Genitourinary:  - ROS Renal section negative       Endo:  - neg endo ROS       Psychiatric:     (+) psychiatric history anxiety and depression      Infectious Disease:  - neg infectious disease ROS       Malignancy:   (+) Malignancy History of Lymphoma/Leukemia  Lymph CA Remission status post Chemo,         Other:    (+) No chance of pregnancy C-spine cleared: N/A, H/O Chronic Pain,H/O chronic opiod use , no other significant disability                    Physical Exam  Normal systems: pulmonary    Airway   Mallampati: I  TM distance: >3 FB  Neck ROM: full    Dental   (+) upper dentures    Cardiovascular   Rhythm and rate: regular and abnormal    PE comment: Sinus tachycardia 141 on admission.  Pt denies any illict drug use.    Pulmonary    breath sounds clear to auscultation                    Anesthesia Plan      History & Physical Review  History and physical reviewed and following examination; no interval change.    ASA Status:  3 .    NPO Status:  > 2 hours and > 8 hours    Plan for General and ETT with Intravenous and Propofol induction. Maintenance will be Balanced.    PONV prophylaxis:  Ondansetron (or other 5HT-3)  Additional equipment: 2nd IV and Arterial Line Reviewed GA risks.  Pt known to have anxiety in healthcare setting, traditionally is tachycardic due to anxiety.  Will monitor HR prior to going to OR. Pt  had part of a throat lozenge on arrival at the hospital at 8:45; pt not ill, took it for a dry throat. Pt will take Bicitra just prior to going to the OR.  All questions answered.  Pt agrees with plan and wishes to proceed.       Postoperative Care  Postoperative pain management:  IV analgesics and Oral pain medications.      Consents  Anesthetic plan, risks, benefits and alternatives discussed with:  Patient..                          .

## 2018-04-04 ENCOUNTER — APPOINTMENT (OUTPATIENT)
Dept: PHYSICAL THERAPY | Facility: CLINIC | Age: 38
DRG: 467 | End: 2018-04-04
Attending: ORTHOPAEDIC SURGERY
Payer: MEDICARE

## 2018-04-04 ENCOUNTER — APPOINTMENT (OUTPATIENT)
Dept: PHYSICAL THERAPY | Facility: CLINIC | Age: 38
DRG: 467 | End: 2018-04-04
Attending: PHYSICIAN ASSISTANT
Payer: MEDICARE

## 2018-04-04 ENCOUNTER — ANTICOAGULATION THERAPY VISIT (OUTPATIENT)
Dept: ANTICOAGULATION | Facility: CLINIC | Age: 38
End: 2018-04-04

## 2018-04-04 ENCOUNTER — APPOINTMENT (OUTPATIENT)
Dept: OCCUPATIONAL THERAPY | Facility: CLINIC | Age: 38
DRG: 467 | End: 2018-04-04
Attending: PHYSICIAN ASSISTANT
Payer: MEDICARE

## 2018-04-04 VITALS
RESPIRATION RATE: 16 BRPM | WEIGHT: 165.34 LBS | HEIGHT: 70 IN | BODY MASS INDEX: 23.67 KG/M2 | HEART RATE: 94 BPM | TEMPERATURE: 97.8 F | SYSTOLIC BLOOD PRESSURE: 102 MMHG | DIASTOLIC BLOOD PRESSURE: 62 MMHG | OXYGEN SATURATION: 92 %

## 2018-04-04 DIAGNOSIS — Z47.1 AFTERCARE FOLLOWING JOINT REPLACEMENT: ICD-10-CM

## 2018-04-04 DIAGNOSIS — Z79.01 LONG-TERM (CURRENT) USE OF ANTICOAGULANTS: ICD-10-CM

## 2018-04-04 LAB
ANION GAP SERPL CALCULATED.3IONS-SCNC: 9 MMOL/L (ref 3–14)
BUN SERPL-MCNC: 7 MG/DL (ref 7–30)
CALCIUM SERPL-MCNC: 7.7 MG/DL (ref 8.5–10.1)
CHLORIDE SERPL-SCNC: 107 MMOL/L (ref 94–109)
CO2 SERPL-SCNC: 26 MMOL/L (ref 20–32)
CREAT SERPL-MCNC: 0.75 MG/DL (ref 0.52–1.04)
GFR SERPL CREATININE-BSD FRML MDRD: 86 ML/MIN/1.7M2
GLUCOSE SERPL-MCNC: 105 MG/DL (ref 70–99)
HGB BLD-MCNC: 8.9 G/DL (ref 11.7–15.7)
INR PPP: 1.12 (ref 0.86–1.14)
MAGNESIUM SERPL-MCNC: 2 MG/DL (ref 1.6–2.3)
POTASSIUM SERPL-SCNC: 4 MMOL/L (ref 3.4–5.3)
POTASSIUM SERPL-SCNC: 4 MMOL/L (ref 3.4–5.3)
SODIUM SERPL-SCNC: 142 MMOL/L (ref 133–144)

## 2018-04-04 PROCEDURE — A9270 NON-COVERED ITEM OR SERVICE: HCPCS | Mod: GY | Performed by: NURSE PRACTITIONER

## 2018-04-04 PROCEDURE — A9270 NON-COVERED ITEM OR SERVICE: HCPCS | Mod: GY | Performed by: SPECIALIST

## 2018-04-04 PROCEDURE — A9270 NON-COVERED ITEM OR SERVICE: HCPCS | Mod: GY | Performed by: ANESTHESIOLOGY

## 2018-04-04 PROCEDURE — A9270 NON-COVERED ITEM OR SERVICE: HCPCS | Mod: GY | Performed by: ORTHOPAEDIC SURGERY

## 2018-04-04 PROCEDURE — 97165 OT EVAL LOW COMPLEX 30 MIN: CPT | Mod: GO | Performed by: OCCUPATIONAL THERAPIST

## 2018-04-04 PROCEDURE — 40000193 ZZH STATISTIC PT WARD VISIT: Performed by: PHYSICAL THERAPIST

## 2018-04-04 PROCEDURE — 97110 THERAPEUTIC EXERCISES: CPT | Mod: GP | Performed by: PHYSICAL THERAPIST

## 2018-04-04 PROCEDURE — 97535 SELF CARE MNGMENT TRAINING: CPT | Mod: GO | Performed by: OCCUPATIONAL THERAPIST

## 2018-04-04 PROCEDURE — 36415 COLL VENOUS BLD VENIPUNCTURE: CPT | Performed by: PHYSICIAN ASSISTANT

## 2018-04-04 PROCEDURE — 25000132 ZZH RX MED GY IP 250 OP 250 PS 637: Mod: GY | Performed by: PHYSICIAN ASSISTANT

## 2018-04-04 PROCEDURE — 99207 ZZC CONSULT E&M CHANGED TO INITIAL LEVEL: CPT | Performed by: NURSE PRACTITIONER

## 2018-04-04 PROCEDURE — 40000133 ZZH STATISTIC OT WARD VISIT: Performed by: OCCUPATIONAL THERAPIST

## 2018-04-04 PROCEDURE — 25000132 ZZH RX MED GY IP 250 OP 250 PS 637: Mod: GY | Performed by: ORTHOPAEDIC SURGERY

## 2018-04-04 PROCEDURE — 25000132 ZZH RX MED GY IP 250 OP 250 PS 637: Mod: GY | Performed by: ANESTHESIOLOGY

## 2018-04-04 PROCEDURE — 25000128 H RX IP 250 OP 636: Performed by: PHYSICIAN ASSISTANT

## 2018-04-04 PROCEDURE — 85610 PROTHROMBIN TIME: CPT | Performed by: PHYSICIAN ASSISTANT

## 2018-04-04 PROCEDURE — 25000132 ZZH RX MED GY IP 250 OP 250 PS 637: Mod: GY | Performed by: SPECIALIST

## 2018-04-04 PROCEDURE — 97116 GAIT TRAINING THERAPY: CPT | Mod: GP | Performed by: PHYSICAL THERAPIST

## 2018-04-04 PROCEDURE — 99207 ZZC CDG-MDM COMPONENT: MEETS LOW - DOWN CODED: CPT | Performed by: INTERNAL MEDICINE

## 2018-04-04 PROCEDURE — 99232 SBSQ HOSP IP/OBS MODERATE 35: CPT | Performed by: INTERNAL MEDICINE

## 2018-04-04 PROCEDURE — 84132 ASSAY OF SERUM POTASSIUM: CPT | Performed by: PHYSICIAN ASSISTANT

## 2018-04-04 PROCEDURE — 80048 BASIC METABOLIC PNL TOTAL CA: CPT | Performed by: INTERNAL MEDICINE

## 2018-04-04 PROCEDURE — 97530 THERAPEUTIC ACTIVITIES: CPT | Mod: GP | Performed by: PHYSICAL THERAPIST

## 2018-04-04 PROCEDURE — 85018 HEMOGLOBIN: CPT | Performed by: PHYSICIAN ASSISTANT

## 2018-04-04 PROCEDURE — 83735 ASSAY OF MAGNESIUM: CPT | Performed by: INTERNAL MEDICINE

## 2018-04-04 PROCEDURE — A9270 NON-COVERED ITEM OR SERVICE: HCPCS | Mod: GY | Performed by: PHYSICIAN ASSISTANT

## 2018-04-04 PROCEDURE — 25000132 ZZH RX MED GY IP 250 OP 250 PS 637: Mod: GY | Performed by: NURSE PRACTITIONER

## 2018-04-04 PROCEDURE — 99222 1ST HOSP IP/OBS MODERATE 55: CPT | Performed by: NURSE PRACTITIONER

## 2018-04-04 PROCEDURE — 97161 PT EVAL LOW COMPLEX 20 MIN: CPT | Mod: GP | Performed by: PHYSICAL THERAPIST

## 2018-04-04 RX ORDER — BISACODYL 10 MG
10 SUPPOSITORY, RECTAL RECTAL DAILY PRN
Qty: 3 SUPPOSITORY | Refills: 0 | Status: SHIPPED | OUTPATIENT
Start: 2018-04-04 | End: 2018-05-17

## 2018-04-04 RX ORDER — ACETAMINOPHEN 325 MG/1
650 TABLET ORAL EVERY 4 HOURS PRN
Qty: 100 TABLET | Refills: 1 | Status: ON HOLD | OUTPATIENT
Start: 2018-04-06 | End: 2018-05-18

## 2018-04-04 RX ORDER — OXYCODONE HYDROCHLORIDE 30 MG/1
30-45 TABLET ORAL
Refills: 0 | COMMUNITY
Start: 2018-04-04 | End: 2018-05-17

## 2018-04-04 RX ORDER — WARFARIN SODIUM 5 MG/1
5 TABLET ORAL ONCE
Status: COMPLETED | OUTPATIENT
Start: 2018-04-04 | End: 2018-04-04

## 2018-04-04 RX ORDER — WARFARIN SODIUM 5 MG/1
TABLET ORAL
Qty: 45 TABLET | Refills: 1 | Status: SHIPPED | OUTPATIENT
Start: 2018-04-04 | End: 2018-05-17

## 2018-04-04 RX ORDER — POLYETHYLENE GLYCOL 3350 17 G/17G
17 POWDER, FOR SOLUTION ORAL DAILY
Qty: 7 PACKET | Refills: 0 | Status: SHIPPED | OUTPATIENT
Start: 2018-04-04 | End: 2018-05-17

## 2018-04-04 RX ORDER — OXYCODONE HYDROCHLORIDE 15 MG/1
30-45 TABLET ORAL
Status: DISCONTINUED | OUTPATIENT
Start: 2018-04-04 | End: 2018-04-04 | Stop reason: HOSPADM

## 2018-04-04 RX ORDER — WARFARIN SODIUM 5 MG/1
5 TABLET ORAL
Status: DISCONTINUED | OUTPATIENT
Start: 2018-04-04 | End: 2018-04-04

## 2018-04-04 RX ADMIN — OXYCODONE HYDROCHLORIDE 45 MG: 15 TABLET ORAL at 05:06

## 2018-04-04 RX ADMIN — OXYCODONE HYDROCHLORIDE 15 MG: 5 TABLET ORAL at 02:20

## 2018-04-04 RX ADMIN — HYDROMORPHONE HYDROCHLORIDE 1 MG: 1 INJECTION, SOLUTION INTRAMUSCULAR; INTRAVENOUS; SUBCUTANEOUS at 03:35

## 2018-04-04 RX ADMIN — GABAPENTIN 900 MG: 300 CAPSULE ORAL at 08:18

## 2018-04-04 RX ADMIN — CEFAZOLIN 1 G: 1 INJECTION, POWDER, FOR SOLUTION INTRAMUSCULAR; INTRAVENOUS at 03:28

## 2018-04-04 RX ADMIN — BUPROPION HYDROCHLORIDE 150 MG: 150 TABLET, FILM COATED, EXTENDED RELEASE ORAL at 08:18

## 2018-04-04 RX ADMIN — WARFARIN SODIUM 5 MG: 5 TABLET ORAL at 15:14

## 2018-04-04 RX ADMIN — ACETAMINOPHEN 975 MG: 325 TABLET ORAL at 02:16

## 2018-04-04 RX ADMIN — SENNOSIDES AND DOCUSATE SODIUM 2 TABLET: 8.6; 5 TABLET ORAL at 08:19

## 2018-04-04 RX ADMIN — OMEPRAZOLE 20 MG: 20 CAPSULE, DELAYED RELEASE ORAL at 08:18

## 2018-04-04 RX ADMIN — POTASSIUM CHLORIDE 40 MEQ: 750 TABLET, FILM COATED, EXTENDED RELEASE ORAL at 00:13

## 2018-04-04 RX ADMIN — OXYCODONE HYDROCHLORIDE 45 MG: 15 TABLET ORAL at 11:30

## 2018-04-04 RX ADMIN — SODIUM CHLORIDE, POTASSIUM CHLORIDE, SODIUM LACTATE AND CALCIUM CHLORIDE: 600; 310; 30; 20 INJECTION, SOLUTION INTRAVENOUS at 06:39

## 2018-04-04 RX ADMIN — POLYETHYLENE GLYCOL 3350 17 G: 17 POWDER, FOR SOLUTION ORAL at 08:19

## 2018-04-04 RX ADMIN — POTASSIUM CHLORIDE 20 MEQ: 750 TABLET, FILM COATED, EXTENDED RELEASE ORAL at 02:15

## 2018-04-04 RX ADMIN — GABAPENTIN 900 MG: 300 CAPSULE ORAL at 15:14

## 2018-04-04 RX ADMIN — LEVOTHYROXINE SODIUM 75 MCG: 75 TABLET ORAL at 08:19

## 2018-04-04 RX ADMIN — ACETAMINOPHEN 975 MG: 325 TABLET ORAL at 10:48

## 2018-04-04 NOTE — PHARMACY-ADMISSION MEDICATION HISTORY
Admission medication history interview status for the 4/3/2018 admission is complete. See Epic admission navigator for allergy information, pharmacy, prior to admission medications and immunization status.     Medication history interview sources:  Jo Ann ([place], [place]) [phone]; [place] PDMP (accessed by Salazar Romo CNP)    Changes made to PTA medication list (reason)  Added: Lexapro 20 mg daily, tizanidine 2mg q6h prn muscle spasms, trazodone 50mg QHS  Deleted: n/a  Changed: bupropion 150 mg BID --> bupropion (Wellbutrin SR) PO 150mg BID  Methadone 30mg q8h prn -->  30-40mg q12h prn severe pain  Oxycodone 120mg TID --> 90-120mg q4h prn pain  Lorazepam 1-2mg TID --> lorazepam 1-2mg TID, Max 5 tablets daily    Additional medication history information (including reliability of information, actions taken by pharmacist):   - Ativan 1mg tablet filled for #180, 30DS  - methadone 10mg tablet filled for #240, 30DS (per [place] PDMP)  - oxycodone 30mg tablet filled for #360, 15DS (per [place] PDMP)      Prior to Admission medications    Medication Sig Last Dose Taking? Auth Provider   acetaminophen (TYLENOL) 325 MG tablet Take 2 tablets (650 mg) by mouth every 4 hours as needed for other (multimodal surgical pain management along with NSAIDS and opioid medication as indicated based on pain control and physical function.)  Yes Thomas Rayo   polyethylene glycol (MIRALAX/GLYCOLAX) Packet Take 17 g by mouth daily  Yes Thomas Rayo   BuPROPion HCl (WELLBUTRIN SR PO) Take 150 mg by mouth 2 times daily 4/2/2018 Yes Unknown, Entered By History   TIZANIDINE HCL PO Take 2 mg by mouth every 6 hours as needed for muscle spasms  Yes Unknown, Entered By History   Escitalopram Oxalate (LEXAPRO PO) Take 20 mg by mouth daily  Yes Unknown, Entered By History   OXYCODONE HCL PO Take  mg by mouth every 4 hours as needed  4/3/2018 at 0800 Yes Reported, Patient   albuterol (PROAIR HFA/PROVENTIL HFA/VENTOLIN HFA) 108 (90  BASE) MCG/ACT Inhaler Inhale 2 puffs into the lungs every 6 hours as needed for shortness of breath / dyspnea or wheezing 4/3/2018 at 1030 Yes Reported, Patient   Methadone HCl (METHADOSE PO) Take 30-40 mg by mouth every 12 hours as needed for severe pain  4/3/2018 at Unknown time Yes Reported, Patient   senna-docusate (SENOKOT-S;PERICOLACE) 8.6-50 MG per tablet Take 2 tablets by mouth 2 times daily 4/2/2018 at Unknown time Yes Devante Broderick MD   Ferrous Sulfate (IRON SUPPLEMENT PO) Take by mouth daily 4/2/2018 at Unknown time Yes Reported, Patient   LORazepam (ATIVAN) 1 MG tablet Take 1-2 mg by mouth 3 times daily Max 5 tablets daily 4/3/2018 at 0800 Yes Reported, Patient   QUEtiapine (SEROQUEL) 25 MG tablet Take by mouth At Bedtime 4/2/2018 at Unknown time Yes Reported, Patient   montelukast (SINGULAIR) 10 MG tablet Take 10 mg by mouth At Bedtime 4/2/2018 at Unknown time Yes Reported, Patient   fluticasone (FLONASE) 50 MCG/ACT nasal spray Spray 2 sprays into both nostrils daily as needed for rhinitis or allergies Past Month at Unknown time Yes Reported, Patient   levothyroxine (SYNTHROID, LEVOTHROID) 75 MCG tablet Take 75 mcg by mouth daily 4/2/2018 at Unknown time Yes Reported, Patient   BACLOFEN PO Take 20 mg by mouth 4 times daily  4/2/2018 at Unknown time Yes Reported, Patient   Prochlorperazine Maleate (COMPAZINE PO) Take 10 mg by mouth every 6 hours as needed.   4/2/2018 at Unknown time Yes Reported, Patient   gabapentin (NEURONTIN) 300 MG capsule Take 900 mg by mouth 3 times daily  4/3/2018 at 0745 Yes Reported, Patient   omeprazole (PRILOSEC OTC) 20 MG tablet Take 20 mg by mouth daily  4/2/2018 at Unknown time Yes Reported, Patient   order for [facility]  [facility]  [phone]     Directions: Advance as Tolerated and Instructed by Cheko Ireland MD   B Complex Vitamins (B COMPLEX PO) Take by mouth daily More than a month at Unknown time  Reported, Patient   metoclopramide (REGLAN) 10  MG tablet Take 10 mg by mouth every 4 hours as needed Unknown at Unknown time  Reported, Patient   Nutritional Supplements (MELATONIN PO) Take 3 mg by mouth At Bedtime  More than a month at Unknown time  Reported, Patient         Medication history completed by: Theodora Saleem, PharmD-4

## 2018-04-04 NOTE — PROGRESS NOTES
Care Coordinator Progress Note     Admission Date/Time:  4/3/2018  Attending MD:  Cheko Saleem MD     Data  Chart reviewed, discussed with interdisciplinary team.   Patient was admitted for: Status post hip surgery.    Concerns with insurance coverage for discharge needs: None.  Current Living Situation: Patient lives with family.  Support System: Supportive and Involved  Services Involved: none prior to admission but has had homecare after previous ortho surgeries  Transportation: Family or Friend will provide  Barriers to Discharge: none    Coordination of Care and Referrals: Provided patient/family with options for Home Care.  Patient's first choice is [facility] as she had services through them in 2016 after last surgery. Per Dariana at [facility], they are unable to staff home RN visit tomorrow.  Patient's second choice was [facility].  Referral initiated for RN, PT, OT. INR Clinic referral entered. Orders reflected on AVS.     Assessment  Patient is s/p hip surgery on 4/3. Met with patient at bedside to review d/c planning. We discussed home care referral as detailed above and reviewed Medicare's criteria for homebound status. She is familiar with home care and post-op recovery process from previous hip surgery in 2016. Family will provide ride home. She had no other questions or concerns.     Plan  Anticipated Discharge Date:  Thurs 4/4  Anticipated Discharge Plan:  Home with family and home care    Clarisa Vizcaino RN  Care Coordinator  Pager [phone]

## 2018-04-04 NOTE — PLAN OF CARE
Problem: Hip Arthroplasty (Total, Partial) (Adult)  Goal: Signs and Symptoms of Listed Potential Problems Will be Absent, Minimized or Managed (Hip Arthroplasty)  Signs and symptoms of listed potential problems will be absent, minimized or managed by discharge/transition of care (reference Hip Arthroplasty (Total, Partial) (Adult) CPG).  Outcome: Improving   Pt arrived to the unit at 1715 form PACU. Pt is lethargic. Able to make needs known.  VS: Stable except tachycardic.   O2: On 2 lpm and sats 94-96%. Pt CO2 level running high 51-53. Ortho resident notified.    Output: Has montano catheter and draining.   Last BM:    Activity: Pt dangled at bedside. Assist X1. WBAT to LLE.   Skin: Intact except left hip incision    Pain: Pt stated pain as tolerable. Pt has been lethargic and CO2 is high. Ortho resident Dr. Olmstead instructed to hold pain meds till this resolve.   CMS: Intact. Denies N/T.   Dressing: Left hip incisional dressing CDI. Ice applied. Abductor pillow maintained between lower extremities.    Diet: Tolerating regular diet. Denies N/V.   LDA: PIV running LR at 50 ml/hr into right AC.   Equipment: Walker, IV pole, abductor pillow, PCDS.   Plan: TBD. Will continue to monitor.   Additional Info:  pt's potassium was 3.3. Notified moonlighter in person.

## 2018-04-04 NOTE — DISCHARGE SUMMARY
[facility] Discharge Summary    Alethea Patel MRN# [id number]   Age: 37 year old YOB: 1980     Date of Admission:  4/3/2018  Date of Discharge::  4/4/2018  3:49 PM  Admitting Physician:  Cheko Saleem MD  Discharge Physician:  Thomas Rayo      Admission Diagnoses:  Status post hip surgery [Z98.890]    Discharge Diagnosis:  Patient Active Problem List    Diagnosis Date Noted     Status post hip surgery 04/03/2018     Priority: Medium     S/P total knee arthroplasty 01/08/2016     Priority: Medium     Irritable bowel syndrome 07/14/2015     Priority: Medium     Anxiety 07/14/2015     Priority: Medium     Avascular necrosis of bone (H) 07/14/2015     Priority: Medium     Overview:   Overview:   Has pain contract with Dr. Dewitt in [facility]  Many of her large joints as a result of high dose prednisone from her protocol for ALL.   Has had bilateral total hip arthroplasty.  Right TKA.  Patella repair.  Possibility of left TKA soon       Moderate episode of recurrent major depressive disorder (H) 06/08/2015     Priority: Medium     Lumbar disc herniation with radiculopathy 10/07/2014     Priority: Medium     Elbow pain 07/07/2014     Priority: Medium     Seizure (H) 05/08/2014     Priority: Medium     Overview:   Overview:   In January 2014. EEG on 3/21/14 was nl. Dr. Sweeney believed that her seizure like disorder was due to Wellbutrin and antibiotic that she took at that time, that made her seizure threshold low. See scanned document.        Gastroesophageal reflux disease 02/03/2014     Priority: Medium     Female infertility 11/20/2013     Priority: Medium     Failed total knee, right (H) 06/12/2012     Priority: Medium     Acute lymphoblastic leukemia (ALL) (H) 02/06/2012     Priority: Medium     Overview:   Overview:   In remission. Diagnosed in 2003. 3 years of chemotherapy.   Under care of Dr. Dewitt in [facility] clinic in [place].        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)  02/06/2012     Priority: Medium         Procedures:  Left hip conversion of resurfacing to total hip arthroplasty    Medications Prior to Admission:  Prescriptions Prior to Admission   Medication Sig Dispense Refill Last Dose     OXYCODONE HCL PO Take 120 mg by mouth 3 times daily   4/3/2018 at 0800     albuterol (PROAIR HFA/PROVENTIL HFA/VENTOLIN HFA) 108 (90 BASE) MCG/ACT Inhaler Inhale 2 puffs into the lungs every 6 hours as needed for shortness of breath / dyspnea or wheezing   4/3/2018 at 1030     Methadone HCl (METHADOSE PO) Take 30 mg by mouth every 8 hours as needed for severe pain    4/3/2018 at Unknown time     senna-docusate (SENOKOT-S;PERICOLACE) 8.6-50 MG per tablet Take 2 tablets by mouth 2 times daily 30 tablet 0 4/2/2018 at Unknown time     Ferrous Sulfate (IRON SUPPLEMENT PO) Take by mouth daily   4/2/2018 at Unknown time     LORazepam (ATIVAN) 1 MG tablet Take 1-2 mg by mouth 3 times daily    4/3/2018 at 0800     QUEtiapine (SEROQUEL) 25 MG tablet Take by mouth At Bedtime   4/2/2018 at Unknown time     montelukast (SINGULAIR) 10 MG tablet Take 10 mg by mouth At Bedtime   4/2/2018 at Unknown time     fluticasone (FLONASE) 50 MCG/ACT nasal spray Spray 2 sprays into both nostrils daily as needed for rhinitis or allergies   Past Month at Unknown time     levothyroxine (SYNTHROID, LEVOTHROID) 75 MCG tablet Take 75 mcg by mouth daily   4/2/2018 at Unknown time     BACLOFEN PO Take 20 mg by mouth 4 times daily    4/2/2018 at Unknown time     BUPROPION HCL PO Take 150 mg by mouth 2 times daily   4/2/2018 at Unknown time     Prochlorperazine Maleate (COMPAZINE PO) Take 10 mg by mouth every 6 hours as needed.     4/2/2018 at Unknown time     gabapentin (NEURONTIN) 300 MG capsule Take 900 mg by mouth 3 times daily    4/3/2018 at 0745     omeprazole (PRILOSEC OTC) 20 MG tablet Take 20 mg by mouth daily    4/2/2018 at Unknown time     NEW MED    Taking     [DISCONTINUED] ibuprofen (ADVIL,MOTRIN) 200 MG tablet  Take 2 tablets (400 mg) by mouth every 6 hours as needed for mild pain 120 tablet  3/30/2018     order for DME [facility]  [facility]  [phone]     Directions: Advance as Tolerated and Instructed by MD 1 Device 0 Taking     B Complex Vitamins (B COMPLEX PO) Take by mouth daily   More than a month at Unknown time     metoclopramide (REGLAN) 10 MG tablet Take 10 mg by mouth every 4 hours as needed   Unknown at Unknown time     Nutritional Supplements (MELATONIN PO) Take 3 mg by mouth At Bedtime    More than a month at Unknown time       Discharge Medications:  Current Discharge Medication List      START taking these medications    Details   acetaminophen (TYLENOL) 325 MG tablet Take 2 tablets (650 mg) by mouth every 4 hours as needed for other (multimodal surgical pain management along with NSAIDS and opioid medication as indicated based on pain control and physical function.)  Qty: 100 tablet, Refills: 1    Associated Diagnoses: Status post hip surgery      polyethylene glycol (MIRALAX/GLYCOLAX) Packet Take 17 g by mouth daily  Qty: 7 packet, Refills: 0    Associated Diagnoses: Status post hip surgery         CONTINUE these medications which have NOT CHANGED    Details   OXYCODONE HCL PO Take 120 mg by mouth 3 times daily      albuterol (PROAIR HFA/PROVENTIL HFA/VENTOLIN HFA) 108 (90 BASE) MCG/ACT Inhaler Inhale 2 puffs into the lungs every 6 hours as needed for shortness of breath / dyspnea or wheezing      Methadone HCl (METHADOSE PO) Take 30 mg by mouth every 8 hours as needed for severe pain       senna-docusate (SENOKOT-S;PERICOLACE) 8.6-50 MG per tablet Take 2 tablets by mouth 2 times daily  Qty: 30 tablet, Refills: 0    Associated Diagnoses: Status post total left knee replacement      Ferrous Sulfate (IRON SUPPLEMENT PO) Take by mouth daily      LORazepam (ATIVAN) 1 MG tablet Take 1-2 mg by mouth 3 times daily       QUEtiapine (SEROQUEL) 25 MG tablet Take by mouth At Bedtime      montelukast (SINGULAIR)  10 MG tablet Take 10 mg by mouth At Bedtime      fluticasone (FLONASE) 50 MCG/ACT nasal spray Spray 2 sprays into both nostrils daily as needed for rhinitis or allergies      levothyroxine (SYNTHROID, LEVOTHROID) 75 MCG tablet Take 75 mcg by mouth daily      BACLOFEN PO Take 20 mg by mouth 4 times daily       BUPROPION HCL PO Take 150 mg by mouth 2 times daily      Prochlorperazine Maleate (COMPAZINE PO) Take 10 mg by mouth every 6 hours as needed.        gabapentin (NEURONTIN) 300 MG capsule Take 900 mg by mouth 3 times daily       omeprazole (PRILOSEC OTC) 20 MG tablet Take 20 mg by mouth daily       NEW MED       order for [facility]  [facility]  [phone]     Directions: Advance as Tolerated and Instructed by MD Rangel: 1 Device, Refills: 0    Associated Diagnoses: Status post total right knee replacement      B Complex Vitamins (B COMPLEX PO) Take by mouth daily      metoclopramide (REGLAN) 10 MG tablet Take 10 mg by mouth every 4 hours as needed      Nutritional Supplements (MELATONIN PO) Take 3 mg by mouth At Bedtime          STOP taking these medications       ibuprofen (ADVIL,MOTRIN) 200 MG tablet Comments:   Reason for Stopping:               Consultations:  Hospital medicine, PT, OT.     Brief History of Illness:   Patient with AVN and prior left hip bartolo-resurfacing approximately 10 yrs prior.  She performed well for a time, however developed acetabular wear leading to pain.  After discussion of risks and benefits she was taken to the OR for the above procedure.     Hospital Course:  Patient was admitted to the hospital post-operatively for pain control and rehab. (S)He received 24 hours of antibiotics, and was placed on Warfarin for DVT ppx. (S)He did well post-op with no major complications. Patient began working with PT on POD#1, and (s)he progressed as expected for discharge to home. At the time of discharge, patient was tolerating a regular diet, voiding spontaneously, had a BM, was ambulating  independently, and had good PO pain control. Patient was deemed appropriate for discharge to home on POD#1.    Physical exam at discharge:  Gen: AOx3, NAD  Resp: non-labored breathing on room air  Extremities:   LLE: Dressing C/D/I. Fires EHL, FHL, tib ant, GSC. SILT in DP/Sp/Tib nerves. DP pulse palp.       Labs at discharge:   Hemoglobin   Date Value Ref Range Status   04/04/2018 8.9 (L) 11.7 - 15.7 g/dL Final   ]    Lab Results   Component Value Date    INR 1.12 04/04/2018    INR 1.05 04/03/2018    INR 2.0 01/29/2016    INR 2.1 01/25/2016    INR 1.20 01/22/2016    INR 1.3 01/18/2016    INR 1.2 01/15/2016    INR 1.8 01/13/2016    INR 2.42 01/11/2016    INR 2.15 01/10/2016    INR 1.12 01/09/2016    INR 1.04 01/08/2016         Discharge Instructions and Follow-Up:    Discharge Procedure Orders  Reason for your hospital stay   Order Comments: You were admitted to the hospital following your total hip arthroplasty.     Adult [facility] Follow-up and recommended labs and tests   Order Comments: You are to return to clinic in 2 weeks for wound check with the clinic nurse. Approximately 6 weeks after your surgery, you will be scheduled for an appointment with Dr. Saleem. At this time, AP pelvis imaging will be obtained.    If you need to schedule your 2 and 6 week postoperative visits or haven't received confirmation regarding these visits, please call one of the following numbers within 7 days of discharge.    For patients that see Dr. Saleem at:   -[facility]: [phone]     Activity   Order Comments: Your activity upon discharge will be as tolerated. You must maintain posterior hip precautions to the operative hip (see below) for the next 12 weeks with no exceptions.     POSTERIOR HIP PRECAUTIONS:  1) no hip flexion >90 degrees (no bending down at the waist)  2) no internal rotation past neutral (no pivoting)  3) no adduction past midline (no crossing your legs)   Order Specific  Question Answer Comments   Is discharge order? Yes      When to contact your care team   Order Comments: CALL YOUR PHYSICIAN IF:  -Pain in your hip persists or worsens in the first few days after surgery.  -Excessive redness or drainage of cloudy or bloody material from the wounds (Clear red tinted fluid and some mild drainage should be expected). Drainage of any kind 5 days after surgery should be reported to the doctor.  -You have a temperature elevation greater than 101.5    -You have pain, swelling or redness in your calf.  -You have numbness or weakness in your leg or foot.      You may contact your physician at [phone] during business hours.    For after hours or weekend calls, you may contact the hospital at [phone] and ask for the on-call orthopedic resident     Wound care and dressings   Order Comments: You have a clean dressing on your surgical wound. This dressing should stay in place for 5-7 days to allow the incision to heal. After 7 days, you may change the dressing. Please use sterile 4x4 gauze dressings with tape over top to secure the dressing. If the dressing becomes wet or saturated with wound drainage, it is appropriate to change the dressing. If drainage persists from the incision site to the extent that it is frequently saturating the dressing, please contact your clinic nurse.     Discharge Instructions   Order Comments: JOSY/TOTAL HIP ARTHROPLASTY POST OPERATIVE INSTRUCTIONS    A.  COMFORT:  -Swelling: An ice pack can be an effective means of controlling swelling and discomfort. Place a thin towel between your skin and the ice pack and apply to skin for 20 minutes.   -Pain Medication: Take medication as prescribed, but only as often as necessary.  Avoid alcohol and driving if you are taking pain medication.  Remember that Tylenol can be used for pain relief as well, as you wean off the narcotics.  Maximum Tylenol dose for a single day is 4000 mg.            B.   ACTIVITIES:  -Activity: Posterior hip precautions should be maintained on the operative hip for at least 12 weeks.  -Exercises: Point and flex your feet and wiggle your toes, move your ankle around in a [place], to help prevent complications such as blood clotting in your legs.   -Weight bearing status: You may weight bear on the operative leg as tolerated  -Physical therapy: Follow-up with physical therapy as discussed with your provider  -Driving: Driving is NOT permitted until allowed by your provider.  Athletic activities: Athletic activities, such as swimming, bicycling, jogging, running and stop-and-go-sports should be avoided until allowed by your doctor.  -Return to work: May returned to work when allowed by your provider.       C. INCISION CARE:    -Keep the initial post-op dressing on for 7 days, as discussed above. You may shower 48 hours after surgery, however the dressing on your hip should remain in place during showering. It is acceptable for water to run over the dressing, but you are not to soak or submerge your wound underwater. The steri-strips (small white tape that is directly on the incision areas) should be left on until they fall off or are removed at your first office visit.     Full Code     Diet   Order Comments: You may return to your regular, pre-surgery diet unless instructed otherwise by your provider.   Order Specific Question Answer Comments   Is discharge order? Yes            Discharge Disposition:  Discharged to home    Thomas Rayo  PGY4  [phone]

## 2018-04-04 NOTE — PROGRESS NOTES
04/04/18 0957   Quick Adds   Type of Visit Initial Occupational Therapy Evaluation   Living Environment   Lives With child(jasvir), dependent;spouse   Living Arrangements house   Number of Stairs to Enter Home 3   Number of Stairs Within Home 7   Transportation Available car;family or friend will provide   Living Environment Comment Split level, shower chair, reacher, RTS   Self-Care   Usual Activity Tolerance good   Current Activity Tolerance moderate   Equipment Currently Used at Home none   Activity/Exercise/Self-Care Comment Patient independent in self-cares/mobility without AE   Functional Level Prior   Ambulation 0-->independent   Transferring 0-->independent   Toileting 0-->independent   Bathing 0-->independent   Dressing 0-->independent   Eating 0-->independent   Communication 0-->understands/communicates without difficulty   Swallowing 0-->swallows foods/liquids without difficulty   Cognition 0 - no cognition issues reported   Fall history within last six months no   Prior Functional Level Comment Patient independent in self-cares/mobility without AE   General Information   Onset of Illness/Injury or Date of Surgery - Date 04/03/18   Referring Physician Dr Saleem   Patient/Family Goals Statement return home to baseline   Additional Occupational Profile Info/Pertinent History of Current Problem POD #1 s/p L HAJA   Precautions/Limitations left hip precautions   Weight-Bearing Status - LLE weight-bearing as tolerated   General Observations On RA, alert   Cognitive Status Examination   Cognitive Comment No cognitive concerns   Sensory Examination   Sensory Comments No N/T noted   Pain Assessment   Patient Currently in Pain Yes, see Vital Sign flowsheet   Mobility   Bed Mobility Bed mobility skill: Sit to supine;Bed mobility skill: Supine to sit   Bed Mobility Skill: Sit to Supine   Level of [place]: Sit/Supine stand-by assist   Bed Mobility Skill: Supine to Sit   Level of [place]: Supine/Sit stand-by  "assist   Transfer Skill: Bed to Chair/Chair to Bed   Level of [place]: Bed to Chair stand-by assist   Assistive Device - Transfer Skill Bed to Chair Chair to Bed Rehab Eval standard walker   Transfer Skill: Sit to Stand   Level of [place]: Sit/Stand stand-by assist   Assistive Device for Transfer: Sit/Stand standard walker   Transfer Skill: Toilet Transfer   Level of [place]: Toilet stand-by assist   Assistive Device standard walker   Tub/Shower Transfer   Tub/Shower Transfer Transfer Skill: Tub/Shower Transfers   Transfer Skill: Tub/Shower Transfer   Level of [place]: Tub/Shower contact guard   Assistive Device standard walker   Upper Body Dressing   Level of [place]: Dress Upper Body independent   Lower Body Dressing   Level of [place]: Dress Lower Body stand-by assist   Assistive Device reacher;sock-aid   Toileting   Level of [place]: Toilet stand-by assist   Grooming   Level of [place]: Grooming independent   Eating/Self Feeding   Level of [place]: Eating independent   Activities of Daily Living Analysis   Impairments Contributing to Impaired Activities of Daily Living pain;post surgical precautions;ROM decreased   General Therapy Interventions   Planned Therapy Interventions ADL retraining   Clinical Impression   Criteria for Skilled Therapeutic Interventions Met yes, treatment indicated   OT Diagnosis impaired self-cares/mobility   Influenced by the following impairments pain; decreased ROM   Assessment of Occupational Performance 1-3 Performance Deficits   Identified Performance Deficits dressing, bathing, toileting   Clinical Decision Making (Complexity) Low complexity   Therapy Frequency daily   Predicted Duration of Therapy Intervention (days/wks) 1 day   Anticipated Discharge Disposition Home with Assist   Risks and Benefits of Treatment have been explained. Yes   Patient, Family & other staff in agreement with plan of care Yes   [facility] AM-PAC TM \"6 " "Clicks\"   2016, Trustees of [facility], under license to [employer].  All rights reserved.   6 Clicks Short Forms Daily Activity Inpatient Short Form   [facility] AM-PAC  \"6 Clicks\" Daily Activity Inpatient Short Form   1. Putting on and taking off regular lower body clothing? 4 - None   2. Bathing (including washing, rinsing, drying)? 4 - None   3. Toileting, which includes using toilet, bedpan or urinal? 4 - None   4. Putting on and taking off regular upper body clothing? 4 - None   5. Taking care of personal grooming such as brushing teeth? 4 - None   6. Eating meals? 4 - None   Daily Activity Raw Score (Score out of 24.Lower scores equate to lower levels of function) 24   Total Evaluation Time   Total Evaluation Time (Minutes) 8     "

## 2018-04-04 NOTE — PLAN OF CARE
Problem: Patient Care Overview  Goal: Plan of Care/Patient Progress Review  Pt seen by PT. She does well with mobility. Supervision required for hip precautions with bed mobility and turning during gait training.  Pt does demonstrate safety for discharge home today but if still in house at 1:30, could review bed mobility and HEP.  Recommend OP PT as soon as possible to continue glut strengthening and hip flexor stretching due to posturing with anterior tilt, excessive lordosis and tendency toward IR/add.

## 2018-04-04 NOTE — MR AVS SNAPSHOT
Alethea Patel   4/4/2018   Anticoagulation Therapy Visit    Description:  37 year old female   Provider:  Ambreen Vines, RN   Department:  [facility]           INR as of 4/4/2018     Today's INR       Anticoagulation Summary as of 4/4/2018     INR goal 2.0-2.5   Today's INR    Next INR check     Indications   Long-term (current) use of anticoagulants [Z79.01] [Z79.01]  Aftercare following joint replacement [Z47.1] [Z47.1]

## 2018-04-04 NOTE — PLAN OF CARE
Problem: PT General Care Plan  Goal: Bed Mobility (PT)  PT Bed Mobility   Discharge Planner PT   Patient plan for discharge: home with home PT today  Current status: Ambulating in 200' with ww independently.  Pt Transfers independently.  Needs cues for hip precautions. Excellent pain control.    Barriers to return to prior living situation: none  Recommendations for discharge: home with home PT.   Rationale for recommendations: Home PT to assess home safety and reinforce hip precautions.     Physical Therapy Discharge Summary    Reason for therapy discharge:    Discharged to home with home therapy.    Progress towards therapy goal(s). See goals on Care Plan in [facility] electronic health record for goal details.  Goals met    Therapy recommendation(s):    Continued therapy is recommended.  Rationale/Recommendations:  Home PT to address home safety and strengthening of hip and to reinforce hip precautions with daily activity.  Continue home exercise program.           Entered by: Misty Neil 04/04/2018 3:01 PM

## 2018-04-04 NOTE — CONSULTS
Inpatient Pain Management Service: Consultation      DATE OF CONSULT: April 4, 2018      REASON FOR PAIN CONSULTATION:  Alethea Patel is a 37 year old female I am seeing in consultation at the request of Jeff Hurd PA-C for evaluation and recommendations for her acute post-operative pain.       CHIEF PAIN COMPLAINT: left hip pain      ASSESSMENT:   1. Acute postoperative pain status post left hip revision, conversion from left femoral bartolo-resurfacing to a total hip arthroplasty on 04/03/2018. Patient currently requiring less opioid then what she takes at home at baseline with no symptoms of withdrawal during this admission  2. It is very concerning the patient is having issues with sedation last night on less opioid than what she takes outpatient at baseline, cannot rule out possible diversion of her chronic opioids. Patient received post-op, 1.5 mg total of IV hydromorphone from 12:00PM to 14:14PM in the PACU yesterday, and methadone 30mg at 16:25, then received another 1 mg of IV hydromorphone at 03:35, and then 15 mg of oxycodone IR at 02:20 AM, and oxycodone IR 45mg at 05:06 this morning. Patient had issues with sedation last night.   3. History of chronic pain with history of avascular necrosis and ALL.  4. ALL in remission. Chronic opioids managed by [facility]. Patient reports they plan to taper her opioids when she is out of the global postoperative recover period after her hip surgery.  5. History of seizure-like activity x 1 episode, patient underwent neurology workup, unknown etiology.  6. Anxiety, reports anxiety well managed on lorazepam at home.  7. Depression, denies any suicidal ideation and reports her depression is stable.   8. History of GERD, and IBS.  9. Opioid tolerant on extremely high dose opioids.   10. History of concern for opioid abuse/overuse, according to Dr. Johnny Armijo's (Pain Service provider) note on 6/12/12, the patient was applying heating pads over fentanyl  patches to the point of burning her skin. Patient is no longer on fentanyl patches.   11. Inconsistent report surrounding her reported home doses of multiple sedating medications and doses verified by our pain pharmacist with her home pharmacy and the [place] Prescription Monitoring Program Report.   12. History of marijuana use. She reports last smoking marijuana about 3 months ago, and states it helps her pain.   13. Patient is on multiple sedating medications at baseline as outlined below. Need to use extreme caution in re-initiating her home medications as the patient had issues with sedation last night during this admission, and was on less medication than what she is allowed to take outpatient. Patient denies ever being sedated on her home regimen today during our visit.     Outpatient medications related to pain prior to admission:   --methadone 30-40 mg PO BID, Maximum of 80mg/day, (Patient reports taking 30 mg PO Q 8 hours at home this would = 90 mg total daily dose).   --oxycodone 90 -120mg PO Q 4 hours PRN, Patient reports taking 90-120mg 3-4 times per day and uses on average 12-16 tablets/day (30mg tablets). Patient is prescribed oxycodone 30 mg tablets a maximum of 24 tablets per day that was recently increased from 12 tablets per day to 24 tablets per day on 03/31/2018.  --lorazepam 1-2 mg PO TID PRN, (maximum of 5 per day)  --baclofen 20mg PO QID  --gabapentin 900mg PO TID  --tizanidine 2mg PO Q 6 hours PRN  --trazodone 50 mg PO Q HS  --quetiapine 25 mg PO Q HS  --bupropion 150 mg PO BID  --escitalopram 20 mg PO Daily       Outpatient opioids prescribed by: Jasvir Justice [facility]  PRIMARY CARE PROVIDER: [facility]  PAIN SPECIALIST: NONE    [facility] database reviewed      TREATMENT RECOMMENDATIONS/PLAN:   1. IF Patient stays inpatient, start methadone 15 mg in am, 15mg daytime, and 15 mg in PM, PO Q 8 hours scheduled, this is about a 50% reduction from what her home  dose is in the setting of sedation after receiving 30 mg dose yesterday .  (Patient is prescribed methadone 10 mg tablets with a total daily dose of 80mg/day, this was verified with her home pharmacy and with the [place] Prescription Monitoring Program Report.)    I thoroughly questioned the patient about her home opioid use and doses. I questioned her multiple times if she is indeed taking her methadone every day as she reports, and I notified her that she could die if we start this dose and she has not been taking this medication at home. She was adamant that she is indeed taking her methadone every day and takes it 90mg TID.     Patient should follow up in the outpatient setting with [facility] for possible adjustments to her chronic regimen, and possible taper. I talked with [facility] today via phone, and notified Jasvir Justice CNP of my concerns.     Would recommend limiting sedating medications.   2. Continue oxycodone 30-45 mg PO Q 3 hours PRN.     I thoroughly questioned the patient about her home opioid use and doses. I questioned her multiple times if she is indeed taking her oxycodone every day as she reports, and I notified her that she could die if we start this dose and she has not been taking this medication at home. She was adamant that she is indeed taking her oxycodone every day and takes it 90-120mg 3-4 times per day.   3. Change hydromorphone to 0.5-0.8mg IV Q 2 hours PRN for rescue only. Use orals first.   4. Start baclofen 20 mg PO Q 6 hours scheduled, this is her home dose, verified with her home pharmacy.  5. Start escitalopram 20 mg PO Q Daily, this is her home dose, verified with her home pharmacy.    6. Start lidocaine 5% patches, 1-3 patches TD  Q 12 hours on and 12 hours off.   7. Defer use of NSAID to Orthopedic team.   8. Continue acetaminophen 975 mg PO Q 8 hours scheduled    Discontinue PRN acetaminophen  9. Continue gabapentin 900mg PO Q 8 hours, this is her home  dose,  can re-assess for any possible titration going forward.  10. Continue lorazepam 1-2 mg PO TID PRN, this is the patient's home dose. however concurrent use of chronic benzodiazepines and chronic opioids increases risk for respiratory depression 4 fold, consider possible adjustment outpatient by [facility], who is prescribing her chronic opioids and benzos.   11. Continue quetiapine 25 mg PO Q HS, this is her home dose.   12. Continue bupropion 150 mg PO BID, this is her home dose.   13. Hold home tizanidine at this time, will re-assess for possible initiation, as patient is already on multiple sedating medications, most of which are her home medications,  with reports of sedation last night.   14. Hold home trazodone at this time, will re-assess for possible initiation, as patient is already on multiple sedating medications, most of which are her home medications, with reports of sedation last night.   15. ICE 20 minutes Q 1 hours PRN  16. Continue aggressive bowel regimen for opioid induced constipation.   17. I strongly recommend the Orthopedic Team send this patient to the PAC clinic, prior to any other surgeries for a Pre-operative pain visit with our Pain Pharmacist as this patient is on extremely high doses of chronic opioids with multiple sedating medications, and is at very high risk for respiratory depression.   18. I received a call from Antonia Romero [facility]  Orth[facility] at 11:08 AM after seeing the patient at 9:00AM today.  Orthopedic team informed me the patient was cleared by physical therapy and the patient wants to discharge home today. Per review of the [place] Prescription Monitoring Program Report, the patient received a refill of her methadone on 03/31/18 for a 30 day supply. OF NOTE: the patient also received a refill on her oxycodone 30 mg tablets #360 for a 15 day supply (this is 24 tablets per day) on 03/31/2018 which was an increase from her baseline maximum of 12 tablets per day  "that she had been on since October 2017 prior to this admisison.   19. UPON DISCHARGE: DO NOT prescribe opioids as patient has plenty of opioid at home to cover her post-operative pain base upon her recent refills outpatient and her current inpatient opioid requirements.   20. IF patient goes for further surgical procedures, get a comprehensive quantitative urine drug screen with mass spectrometry and gas chromatography,  prior to any further surgical procedures in the Pre-Op area to rule out possible diversion of opioids, and to assist with pos-operative pain recommendations.         ASSESSMENT AND RECOMMENDATIONS DISCUSSED WITH: Antonia Romero CNP, plan discussed with Dr. Arturo Tijerina MD from the pain service.       Thank you for consulting the Inpatient Pain Management Service.   The above recommendations are to be acted upon at the primary team s discretion.    To reach us:  Mon - Friday 8 AM - 3 PM: Pager [phone]   After hours, weekends and holidays: Primary service should call [phone] for the on-call pain specialist    HISTORY OF PRESENT ILLNESS: Alethea Patel is a 37 year old female with history of ALL, avascular necrosis in multiple joints. History of bilateral HAJA, and right TKA, anxiety and depression who is on extremely high chronic opioids, Patient is status post left hip revision, conversion from left femoral bartolo-resurfacing to a total hip arthroplasty on 04/03/2018.   Today she alert and oriented, no signs of sedation during our visit. She appears comfortable, but reports her pain is 8/10. Before I could assess her pain any further the patient states that she is concerned her oxycodone IR dose is less that what she is taking at home, and said \" are you guys trying to make me go through withdrawal?\". I explained to her that although the 45mg of oxycodone, that she is currently receiving with each dose, is less than her home dose, we are however, giving her the exact total daily oxycodone " "amount that she takes at home, as we are giving the oxycodone more frequently than what she takes it at home. She then calmed down and allowed me to further assess her pain. She reports her pain is intolerable, and states \"nothing you guys are giving me is helping\". The patient at this time still appeared to be comfortable and was moving around the bed with no increased pain behavior. She reports her pain is located in her left hip, and describes the pain as throbbing, comes and goes, and admits to muscle spasms in her calves. She reports standing at the bedside last night with assistance. She reports sleeping for 2-3 hours total last night due to interruptions and pain. She reports being hungry and has ordered a breakfast. She reports her last bowel movement was the day before surgery. She denies chest pain, shortness of breath and denies any left lower extremity weakness. She admits to marijuana use and reports last using 3 months ago. She denies any other illicit drug use. She denies ETOH abuse and states she only drinks one drink of hard liquor every 6 months. She reports smoking tobacco 1 PPD.       PAIN HISTORY:   Location: left hip  Duration: comes and goes  Pain intensity: 8/10  Quality of the pain: throbbing, aching  Aggravating factors: activity  Relieving factors : rest, nothing helps    CAPA (Clinically Aligned Pain Assessment)  Comfort (How is your pain?): Intolerable  Change in Pain (Since your last medication/intervention?): About the same  Pain Control (How are your pain treatments working?): Partially effective pain control  Functioning (Are you able to do activities to get better?) : Pain keeps me from doing most of what I need to do  Sleep (Does your pain management allow you to sleep or rest?): Awake with pain most of the night       FUNCTIONAL STATUS:  Change:      improving  Oral intake:     Regular  Bowel function:    Normal  Activity level:     Up with assistance ambulating in " room  Sleep:      Reports sleeping 2-3 hours last night  Mood:      having problems adjusting      REVIEW OF 10 BODY SYSTEMS: 10 point ROS of systems including Constitutional, Eyes, Respiratory, Cardiovascular, Gastroenterology, Genitourinary, Integumentary, Musculoskeletal, Psychiatric were all negative except for pertinent positives noted in my HPI.       INPATIENT MEDICATIONS PERTINENT TO PAIN CONSULT:   Medications related to Pain Management (Future)    Start     Dose/Rate Route Frequency Ordered Stop    04/06/18 0000  acetaminophen (TYLENOL) tablet 650 mg      650 mg Oral EVERY 4 HOURS PRN 04/03/18 1510      04/06/18 0000  acetaminophen (TYLENOL) 325 MG tablet      650 mg Oral EVERY 4 HOURS PRN 04/04/18 0748      04/04/18 0800  bisacodyl (DULCOLAX) Suppository 10 mg      10 mg Rectal DAILY 04/03/18 1717      04/04/18 0308  oxyCODONE IR (ROXICODONE) tablet 30-45 mg      30-45 mg Oral EVERY 3 HOURS PRN 04/04/18 0310      04/04/18 0000  polyethylene glycol (MIRALAX/GLYCOLAX) Packet      17 g Oral DAILY 04/04/18 0748      04/03/18 2009  LORazepam (ATIVAN) tablet 1-2 mg      1-2 mg Oral 3 TIMES DAILY PRN 04/03/18 2009 04/03/18 2000  gabapentin (NEURONTIN) capsule 900 mg      900 mg Oral 3 TIMES DAILY 04/03/18 1717      04/03/18 2000  senna-docusate (SENOKOT-S;PERICOLACE) 8.6-50 MG per tablet 1 tablet      1 tablet Oral 2 TIMES DAILY 04/03/18 1510      04/03/18 2000  senna-docusate (SENOKOT-S;PERICOLACE) 8.6-50 MG per tablet 2 tablet      2 tablet Oral 2 TIMES DAILY 04/03/18 1510      04/03/18 1730  polyethylene glycol (MIRALAX/GLYCOLAX) Packet 17 g      17 g Oral DAILY 04/03/18 1717      04/03/18 1717  lidocaine 1 % 1 mL      1 mL Other EVERY 1 HOUR PRN 04/03/18 1717      04/03/18 1717  lidocaine (LMX4) kit       Topical EVERY 1 HOUR PRN 04/03/18 1717      04/03/18 1515  acetaminophen (TYLENOL) tablet 975 mg      975 mg Oral EVERY 8 HOURS 04/03/18 1510 04/06/18 0959    04/03/18 1510  HYDROmorphone (PF)  (DILAUDID) injection 0.5-1 mg      0.5-1 mg Intravenous EVERY 2 HOURS PRN 04/03/18 1510              CURRENT MEDICATIONS:   Current Facility-Administered Medications Ordered in Epic   Medication Dose Route Frequency Provider Last Rate Last Dose     oxyCODONE IR (ROXICODONE) tablet 30-45 mg  30-45 mg Oral Q3H PRN Kristopher Olmstead MD   45 mg at 04/04/18 0506     warfarin (COUMADIN) tablet 5 mg  5 mg Oral ONCE at 18:00 Cheko Saleem MD         naloxone (NARCAN) injection 0.1-0.4 mg  0.1-0.4 mg Intravenous Q2 Min PRN Damari Morrison MD         albuterol (PROAIR HFA/PROVENTIL HFA/VENTOLIN HFA) Inhaler 2 puff  2 puff Inhalation Q6H PRN Jeff Cifuentes PA-C         buPROPion (WELLBUTRIN SR) 12 hr tablet 150 mg  150 mg Oral BID AC Jeff Cifuentes PA-C   150 mg at 04/04/18 0818     gabapentin (NEURONTIN) capsule 900 mg  900 mg Oral TID Jeff Cifuentes PA-C   900 mg at 04/04/18 0818     levothyroxine (SYNTHROID/LEVOTHROID) tablet 75 mcg  75 mcg Oral Daily Jeff Cifuentes PA-C   75 mcg at 04/04/18 0819     montelukast (SINGULAIR) tablet 10 mg  10 mg Oral At Bedtime Jeff Cifuentes PA-C   10 mg at 04/03/18 2152     omeprazole (priLOSEC) CR capsule 20 mg  20 mg Oral QAM AC Jeff Cifuentes PA-C   20 mg at 04/04/18 0818     QUEtiapine (SEROquel) half-tab 25 mg  25 mg Oral At Bedtime Jeff Cifuentes PA-C   25 mg at 04/03/18 2152     lidocaine 1 % 1 mL  1 mL Other Q1H PRN Jeff Cifuentes PA-C         lidocaine (LMX4) kit   Topical Q1H PRN Jeff Cifuentes PA-C         sodium chloride (PF) 0.9% PF flush 3 mL  3 mL Intracatheter Q1H PRN Jeff Cifuentes PA-C         sodium chloride (PF) 0.9% PF flush 3 mL  3 mL Intracatheter Q8H Jeff Cifuentes PA-C         Warfarin Therapy Reminder (Check START DATE - warfarin may be starting in the FUTURE)  1 each Does not apply Continuous PRN Jeff Cifuentes PA-C         lactated ringers infusion    Intravenous Continuous Jeff Cifuentes PA-C 50 mL/hr at 04/04/18 0639       bisacodyl (DULCOLAX) Suppository 10 mg  10 mg Rectal Daily Jeff Cifuentes PA-C         naloxone (NARCAN) injection 0.1-0.4 mg  0.1-0.4 mg Intravenous Q2 Min PRN Jeff Cifuentes PA-C         magnesium hydroxide (MILK OF MAGNESIA) suspension 15 mL  15 mL Oral BID Jeff Cifuentes PA-C   15 mL at 04/03/18 2017     HYDROmorphone (PF) (DILAUDID) injection 0.5-1 mg  0.5-1 mg Intravenous Q2H PRN Jeff Cifuentes PA-C   1 mg at 04/04/18 0335     acetaminophen (TYLENOL) tablet 975 mg  975 mg Oral Q8H Jeff Cifuentes PA-C   975 mg at 04/04/18 0216     [START ON 4/6/2018] acetaminophen (TYLENOL) tablet 650 mg  650 mg Oral Q4H PRN Jeff Cifuentes PA-C         ondansetron (ZOFRAN-ODT) ODT tab 4 mg  4 mg Oral Q6H PRN Jeff Cifuentes PA-C        Or     ondansetron (ZOFRAN) injection 4 mg  4 mg Intravenous Q6H PRN Jeff Cifuentes PA-C         prochlorperazine (COMPAZINE) injection 10 mg  10 mg Intravenous Q6H PRN Jeff Cifuentes PA-C        Or     prochlorperazine (COMPAZINE) tablet 10 mg  10 mg Oral Q6H PRN Jeff Cifuentes PA-C         senna-docusate (SENOKOT-S;PERICOLACE) 8.6-50 MG per tablet 1 tablet  1 tablet Oral BID Jeff Cifuentes PA-C   1 tablet at 04/03/18 2017    Or     senna-docusate (SENOKOT-S;PERICOLACE) 8.6-50 MG per tablet 2 tablet  2 tablet Oral BID Jeff Cifuentes PA-C   2 tablet at 04/04/18 0819     polyethylene glycol (MIRALAX/GLYCOLAX) Packet 17 g  17 g Oral Daily Jeff Cifuentes PA-C   17 g at 04/04/18 0819     LORazepam (ATIVAN) tablet 1-2 mg  1-2 mg Oral TID PRN Valarie Najera MD         potassium chloride SA (K-DUR/KLOR-CON M) CR tablet 20-40 mEq  20-40 mEq Oral Q2H PRN Valarie Najera MD   20 mEq at 04/04/18 0215     potassium chloride (KLOR-CON) Packet 20-40 mEq  20-40 mEq Oral or Feeding Tube Q2H PRN Reinaldo  Valarie Ledezma MD         potassium chloride 10 mEq in 100 mL sterile water intermittent infusion (premix)  10 mEq Intravenous Q1H PRN Valarie Najera MD         potassium chloride 10 mEq in 100 mL intermittent infusion with 10 mg lidocaine  10 mEq Intravenous Q1H PRN Valarie Najera MD         potassium chloride 20 mEq in 50 mL intermittent infusion  20 mEq Intravenous Q1H PRN Valarie Najera MD         Current Outpatient Prescriptions Ordered in Epic   Medication Sig Dispense Refill     [START ON 4/6/2018] acetaminophen (TYLENOL) 325 MG tablet Take 2 tablets (650 mg) by mouth every 4 hours as needed for other (multimodal surgical pain management along with NSAIDS and opioid medication as indicated based on pain control and physical function.) 100 tablet 1     polyethylene glycol (MIRALAX/GLYCOLAX) Packet Take 17 g by mouth daily 7 packet 0           HOME/PREVIOUS MEDICATIONS:   Prior to Admission medications    Medication Sig Start Date End Date Taking? Authorizing Provider   acetaminophen (TYLENOL) 325 MG tablet Take 2 tablets (650 mg) by mouth every 4 hours as needed for other (multimodal surgical pain management along with NSAIDS and opioid medication as indicated based on pain control and physical function.) 4/6/18  Yes Kyra Rayos   polyethylene glycol (MIRALAX/GLYCOLAX) Packet Take 17 g by mouth daily 4/4/18  Yes LesDavid de souzaolas   OXYCODONE HCL PO Take 120 mg by mouth 3 times daily   Yes Reported, Patient   albuterol (PROAIR HFA/PROVENTIL HFA/VENTOLIN HFA) 108 (90 BASE) MCG/ACT Inhaler Inhale 2 puffs into the lungs every 6 hours as needed for shortness of breath / dyspnea or wheezing   Yes Reported, Patient   Methadone HCl (METHADOSE PO) Take 30 mg by mouth every 8 hours as needed for severe pain    Yes Reported, Patient   senna-docusate (SENOKOT-S;PERICOLACE) 8.6-50 MG per tablet Take 2 tablets by mouth 2 times daily 1/11/16  Yes Devante Broderick MD   Ferrous Sulfate (IRON  SUPPLEMENT PO) Take by mouth daily   Yes Reported, Patient   LORazepam (ATIVAN) 1 MG tablet Take 1-2 mg by mouth 3 times daily    Yes Reported, Patient   QUEtiapine (SEROQUEL) 25 MG tablet Take by mouth At Bedtime   Yes Reported, Patient   montelukast (SINGULAIR) 10 MG tablet Take 10 mg by mouth At Bedtime   Yes Reported, Patient   fluticasone (FLONASE) 50 MCG/ACT nasal spray Spray 2 sprays into both nostrils daily as needed for rhinitis or allergies   Yes Reported, Patient   levothyroxine (SYNTHROID, LEVOTHROID) 75 MCG tablet Take 75 mcg by mouth daily   Yes Reported, Patient   BACLOFEN PO Take 20 mg by mouth 4 times daily    Yes Reported, Patient   BUPROPION HCL PO Take 150 mg by mouth 2 times daily   Yes Reported, Patient   Prochlorperazine Maleate (COMPAZINE PO) Take 10 mg by mouth every 6 hours as needed.     Yes Reported, Patient   gabapentin (NEURONTIN) 300 MG capsule Take 900 mg by mouth 3 times daily    Yes Reported, Patient   omeprazole (PRILOSEC OTC) 20 MG tablet Take 20 mg by mouth daily    Yes Reported, Patient   NEW MED     Reported, Patient   order for [facility]  [facility]  [phone]     Directions: Advance as Tolerated and Instructed by MD 1/9/16   Cheko Saleem MD   B Complex Vitamins (B COMPLEX PO) Take by mouth daily    Reported, Patient   metoclopramide (REGLAN) 10 MG tablet Take 10 mg by mouth every 4 hours as needed    Reported, Patient   Nutritional Supplements (MELATONIN PO) Take 3 mg by mouth At Bedtime     Reported, Patient         ALLERGIES:    Allergies   Allergen Reactions     Chantix [Varenicline] Nausea and Vomiting     Other reaction(s): Vomiting     No Clinical Screening - See Comments      Other reaction(s): Unknown Reaction     Seasonal Allergies      Ciprofloxacin      Other reaction(s): Other  Interacts with other medication     Latex Itching and Rash     Other reaction(s): Other - Describe In Comment Field  Vaginal itching, burning  Other reaction(s): Intolerance             PAST MEDICAL AND PSYCHIATRIC HISTORY:    Past Medical History:   Diagnosis Date     Acute lymphoblastic leukemia in remission (H)      Anxiety      Depression      Depressive disorder      Gastro-oesophageal reflux disease      IBS (irritable bowel syndrome)      Osteonecrosis (H)      Osteonecrosis (H)            PAST SURGICAL HISTORY:   Past Surgical History:   Procedure Laterality Date     ARTHROPLASTY KNEE Left 1/8/2016    Procedure: ARTHROPLASTY KNEE;  Surgeon: Cheko Saleem MD;  Location: [facility] OR     ARTHROPLASTY PATELLO-FEMORAL (KNEE)  6/12/2012    Procedure: ARTHROPLASTY PATELLO-FEMORAL (KNEE);  Right Knee Patella Resurfacing;  Surgeon: Cheko Saleem MD;  Location: [facility] OR     ARTHROPLASTY REVISION HIP Left 4/3/2018    Procedure: ARTHROPLASTY REVISION HIP;  Conversion of Left Hip Rigo-Arthroplasty to Left Total Hip Replacement;  Surgeon: Cheko Saleem MD;  Location: [facility] OR     C PELVIS/HIP JOINT SURGERY UNLISTED       HIP SURGERY  5/31/2005    Left hip resurfacing hemiarthroplasty     HIP SURGERY  6/5/07     INJECT BLOCK MEDIAL BRANCH CERVICAL/THORACIC/LUMBAR N/A 1/9/2016    Procedure: INJECT BLOCK MEDIAL BRANCH CERVICAL / THORACIC / LUMBAR;  Surgeon: GENERIC ANESTHESIA PROVIDER;  Location: [facility] OR     JOINT REPLACEMENT  2/2/2010    Rt TKA     KNEE SURGERY       ORTHOPEDIC SURGERY      right foot surgery           FAMILY HISTORY: family history includes Low Back Problems in her mother; Migraines in her son; Soft Tissue Cancer in her brother.      HEALTH & LIFESTYLE PRACTICES:   Tobacco:  reports that she has been smoking Cigarettes.  She started smoking about 20 years ago. She has a 10.00 pack-year smoking history. She has never used smokeless tobacco.  Alcohol:  reports that she drinks alcohol.  Illicit drugs:  reports that she does not use illicit drugs.       SOCIAL HISTORY: Lives with her 15 year old son and  in [place], [place]      LABORATORY VALUES:   Last Basic Metabolic Panel:  Lab  Results   Component Value Date     04/04/2018      Lab Results   Component Value Date    POTASSIUM 4.0 04/04/2018    POTASSIUM 4.0 04/04/2018     Lab Results   Component Value Date    CHLORIDE 107 04/04/2018     Lab Results   Component Value Date    APOLONIA 7.7 04/04/2018     Lab Results   Component Value Date    CO2 26 04/04/2018     Lab Results   Component Value Date    BUN 7 04/04/2018     Lab Results   Component Value Date    CR 0.75 04/04/2018     Lab Results   Component Value Date     04/04/2018       CBC results:  Lab Results   Component Value Date    WBC 10.4 01/11/2016     Lab Results   Component Value Date    HGB 8.9 04/04/2018     Lab Results   Component Value Date    HCT 31.1 07/18/2008     Lab Results   Component Value Date     07/18/2008       LFT results:  Lab Results   Component Value Date    AST 11 01/10/2016     Lab Results   Component Value Date    ALT 15 01/10/2016     Lab Results   Component Value Date    ALKPHOS 64 01/10/2016         Lab Results   Component Value Date    ALBUMIN 2.5 01/10/2016         Lab Results   Component Value Date    INR 1.12 04/04/2018       Labs above reviewed as well as additional relevant diagnostic studies from the EPIC record.       PHYSICAL EXAMINATION:  VITAL SIGNS:  B/P: 102/62, T: 97.8, P: 94, R: 16    CONSTITUTIONAL/GENERAL APPEARANCE: Alert and Oriented x3 and no acute distress  NECK: free range of motion  ENT: moist mucous membranes  EYES: PERRLA and Pupils 4mm  PULMONARY:non-labored, clear to auscultation and regular respiratory rate  CARDIOVASCULAR:Regular rate and rhythm, acyanotic and peripheral pulses +2 all extremities  ABDOMINAL:bowel sounds positive all quadrants and round, non-tender  MUSCULOSKELETAL/BACK/SPINE/EXTREMITIES: 4/5 strength left lower extremity with dorsi and plantar flexion.    GAIT: not assessed, patient resting in bed  NEURO:  SILT all major nerve distributions LLE and No allodynia  SKIN:  normal, warm,  dry  PSYCHIATRIC/BEHAVIORAL/OBSERVATIONS:     Judgment/Insight - intact   Orientation - oriented x 3   Memory - intact   Mood and affect - appropriate      TIME SPENT: 65 minutes including 45 minutes of face-to-face time counseling her  about her pain management treatment options, and coordinating care with the primary team.    Salazar Ramirez CNP  April 4, 2018, 9:30 AM  Inpatient Pain Management Service

## 2018-04-04 NOTE — PLAN OF CARE
"Problem: Hip Arthroplasty (Total, Partial) (Adult)  Goal: Signs and Symptoms of Listed Potential Problems Will be Absent, Minimized or Managed (Hip Arthroplasty)  Signs and symptoms of listed potential problems will be absent, minimized or managed by discharge/transition of care (reference Hip Arthroplasty (Total, Partial) (Adult) CPG).     VS: VS stable, capnography improved throughout night. CO2 decreased to normal range of 35-45. Denies CP   O2: >92% on RA, denies SOB   Output: Jordan draining adequate output. Removed at 0650.    Last BM:    Activity: WBAT, Ax1.     Skin: Incision, tattoos, bruising on R wrist, purple areas on bilateral thighs, abdomen.   Pain: Reported pain in L hip radiating to knee. Pt was very lethargic at beginning of shift and was informed not to give narcotics until CO2 improved & increased alertness per moonlighter. Pt became more alert and was very upset that oxycodone was only 20-30 mg q3h. Her and her  state she takes 90 mg 3-4x/day. Paged on-call Dr. Olmstead who increased Oxycodone to 30-45 mg q3h but pt still states this \"probably won't be enough\". Received 1 mg IV Dilaudid x1 which reduced pain significantly. Has pain consult in AM.   CMS: Intact, denies numbness/tingling in all extremities   Dressing: CDI   Diet: Regular, no N/V   LDA: PIV in R forearm infusing, PIV in L hand SL    Equipment: PCDs, capnography    Plan: Continue to monitor.    Additional Info: Pt has pain consult this AM. Pt  states he \"can't trust this place and shouldn't get surgeries here anymore. Pain consult should be before surgery\". Both patient and  upset that she was not on same dosage of meds as before surgery.              "

## 2018-04-04 NOTE — PROGRESS NOTES
[facility]   Met with pt and spouse to discuss plans for HC.  Pt to be discharged home 04 04 18  and has agreed to have [facility] follow with services of SN, PT and OT. Patient care support center processing referral.  Pt and spouse verbalized understanding that initial visit is scheduled for 04 05 18.    Pt has 24 hour phone number for [facility] for any questions or concerns.

## 2018-04-04 NOTE — PLAN OF CARE
Problem: Patient Care Overview  Goal: Plan of Care/Patient Progress Review  Discharge Planner OT   Patient plan for discharge: Home with assist  Current status: Patient met all OT goals.  Ordered AE for DC.  Barriers to return to prior living situation: None  Recommendations for discharge: Home with assist  Rationale for recommendations: No further inpatient or home OT needs.    Occupational Therapy Discharge Summary    Reason for therapy discharge:    All goals and outcomes met, no further needs identified.    Progress towards therapy goal(s). See goals on Care Plan in [facility] electronic health record for goal details.  Goals met    Therapy recommendation(s):    No further therapy is recommended.           Entered by: Liz Proctor 04/04/2018 1:23 PM

## 2018-04-04 NOTE — CONSULTS
[facility] Internal Medicine Consultation    Alethea Patel MRN# [id number]   Age: 37 year old YOB: 1980   Date of Admission: 4/3/2018     Reason for consult:  post operative medical management        Requesting physician         Level of consult: Consult, follow and place orders           Assessment and Recommendation:   Assessment and plan    Failed left hip bartolo-resurfacing   s/p Revision left hip bartolo-resurfacing to total hip arthroplasty  discharge and pain management per orthopedic surgery    postoperative sleepiness and hypoxia  Secondary to huge dose of narcotics  Continue oxygen supplementation and continuous pulse oximetry     chronic pain and narcotic dependency   Pt is on huge dose of methadone along with oxycodone   Defer pain team to further regulate     Acute lymphoblastic leukemia (ALL)  Remote history and in remission      Gastroesophageal reflux disease  Symptoms under control with PPI   Continue and monitor      Irritable bowel syndrome  No acute symptoms      Anxiety  Continue home dose of lorazepam      Seizure   Not on any med's currently   Did not had any recently per significant other      Moderate episode of recurrent major depressive disorder  Optimally controlled  Continue home med's and defer out pt work up     H/o Hypothyroidism   Continue home dose of levothyroxine     Code status : full code    DVT prophylaxis: warfarin                   Chief Complaint:        History is obtained from the patient          History of Present Illness:   This patient is a 37 year old  female with a significant past medical history of GERD,depression, anxiety, seizures, narcotic dependency on methadone and huge dose of oxycodone  who had Failed left hip bartolo-resurfacing and underwent Revision left hip bartolo-resurfacing to total hip arthroplasty. Pt was seen after the surgery and was falling asleep during exam. Pt also had issues with hypoxia and took her oxygen off to eat  food but was falling asleep even when eating her dinner. Pt denies any chest pain, SOB, nausea, dizziness and palpitations during exam.           Past Medical History:     Past Medical History:   Diagnosis Date     Acute lymphoblastic leukemia in remission (H)      Anxiety      Depression      Depressive disorder      Gastro-oesophageal reflux disease      IBS (irritable bowel syndrome)      Osteonecrosis (H)      Osteonecrosis (H)              Past Surgical History:     Past Surgical History:   Procedure Laterality Date     ARTHROPLASTY KNEE Left 1/8/2016    Procedure: ARTHROPLASTY KNEE;  Surgeon: Cheko Saleem MD;  Location: [facility] OR     ARTHROPLASTY PATELLO-FEMORAL (KNEE)  6/12/2012    Procedure: ARTHROPLASTY PATELLO-FEMORAL (KNEE);  Right Knee Patella Resurfacing;  Surgeon: Cheko Saleem MD;  Location: [facility] OR     C PELVIS/HIP JOINT SURGERY UNLISTED       HIP SURGERY  5/31/2005    Left hip resurfacing hemiarthroplasty     HIP SURGERY  6/5/07     INJECT BLOCK MEDIAL BRANCH CERVICAL/THORACIC/LUMBAR N/A 1/9/2016    Procedure: INJECT BLOCK MEDIAL BRANCH CERVICAL / THORACIC / LUMBAR;  Surgeon: GENERIC ANESTHESIA PROVIDER;  Location: [facility] OR     JOINT REPLACEMENT  2/2/2010    Rt TKA     KNEE SURGERY       ORTHOPEDIC SURGERY      right foot surgery             Social History:     Social History     Social History     Marital status:      Spouse name: N/A     Number of children: N/A     Years of education: N/A     Occupational History     Not on file.     Social History Main Topics     Smoking status: Current Every Day Smoker     Packs/day: 1.00     Years: 10.00     Types: Cigarettes     Start date: 5/29/1997     Smokeless tobacco: Never Used     Alcohol use Yes      Comment: very rare     Drug use: No     Sexual activity: Yes     Partners: Male     Birth control/ protection: None     Other Topics Concern     Not on file     Social History Narrative             Family History:     Family History   Problem Relation  Age of Onset     Migraines Son      Soft Tissue Cancer Brother      Low Back Problems Mother      Family history reviewed and updated in EPIC          Immunizations:   Immunizations are up to date          Allergies:   This patient is allergic to is allergic to chantix [varenicline]; no clinical screening - see comments; seasonal allergies; ciprofloxacin; and latex.          Medications:     Prescriptions Prior to Admission   Medication Sig Dispense Refill Last Dose     OXYCODONE HCL PO Take 120 mg by mouth 3 times daily   4/3/2018 at 0800     albuterol (PROAIR HFA/PROVENTIL HFA/VENTOLIN HFA) 108 (90 BASE) MCG/ACT Inhaler Inhale 2 puffs into the lungs every 6 hours as needed for shortness of breath / dyspnea or wheezing   4/3/2018 at 1030     Methadone HCl (METHADOSE PO) Take 30 mg by mouth every 8 hours as needed for severe pain    4/3/2018 at Unknown time     senna-docusate (SENOKOT-S;PERICOLACE) 8.6-50 MG per tablet Take 2 tablets by mouth 2 times daily 30 tablet 0 4/2/2018 at Unknown time     Ferrous Sulfate (IRON SUPPLEMENT PO) Take by mouth daily   4/2/2018 at Unknown time     LORazepam (ATIVAN) 1 MG tablet Take 1-2 mg by mouth 3 times daily    4/3/2018 at 0800     QUEtiapine (SEROQUEL) 25 MG tablet Take by mouth At Bedtime   4/2/2018 at Unknown time     montelukast (SINGULAIR) 10 MG tablet Take 10 mg by mouth At Bedtime   4/2/2018 at Unknown time     fluticasone (FLONASE) 50 MCG/ACT nasal spray Spray 2 sprays into both nostrils daily as needed for rhinitis or allergies   Past Month at Unknown time     levothyroxine (SYNTHROID, LEVOTHROID) 75 MCG tablet Take 75 mcg by mouth daily   4/2/2018 at Unknown time     BACLOFEN PO Take 20 mg by mouth 4 times daily    4/2/2018 at Unknown time     BUPROPION HCL PO Take 150 mg by mouth 2 times daily   4/2/2018 at Unknown time     Prochlorperazine Maleate (COMPAZINE PO) Take 10 mg by mouth every 6 hours as needed.     4/2/2018 at Unknown time     gabapentin (NEURONTIN) 300  MG capsule Take 900 mg by mouth 3 times daily    4/3/2018 at 0745     omeprazole (PRILOSEC OTC) 20 MG tablet Take 20 mg by mouth daily    4/2/2018 at Unknown time     NEW MED    Taking     ibuprofen (ADVIL,MOTRIN) 200 MG tablet Take 2 tablets (400 mg) by mouth every 6 hours as needed for mild pain 120 tablet  3/30/2018     order for DME CPM  [facility]  [phone]     Directions: Advance as Tolerated and Instructed by MD 1 Device 0 Taking     B Complex Vitamins (B COMPLEX PO) Take by mouth daily   More than a month at Unknown time     metoclopramide (REGLAN) 10 MG tablet Take 10 mg by mouth every 4 hours as needed   Unknown at Unknown time     Nutritional Supplements (MELATONIN PO) Take 3 mg by mouth At Bedtime    More than a month at Unknown time             Review of Systems:   The Review of Systems is negative other than noted in the HPI    General: Alert, interactive, NAD.   HEENT: AT/NC. PERRLA. Anicteric.Moist MM.    Neck: Supple. No JVD. No Lymphadenopathy.  Heart/CVS: Normal S1 and S2. Regular. No m/r/g .   Chest/Respi: Non labored breathing. CTA BL.   Abdomen/GI: Soft, non distended, non tender. No g/r/r.   Extremities/MSK: Distal pulses 2+, well perfused. Rest per ortho.   Neuro: Alert and oriented x4. Cranial nerves II-XII are grossly intact.    Skin:  No new rash over exposed areas.             Data:     Lab Results   Component Value Date    WBC 10.4 01/11/2016    HGB 12.6 04/03/2018    HCT 31.1 (L) 07/18/2008     07/18/2008     01/18/2016    POTASSIUM 3.3 (L) 04/03/2018    CHLORIDE 105 01/18/2016    CO2 24 01/18/2016    BUN 5 (L) 01/18/2016    CR 0.70 01/18/2016    GLC 96 04/03/2018    AST 11 01/10/2016    ALT 15 01/10/2016    ALKPHOS 64 01/10/2016    BILITOTAL 0.2 01/10/2016    INR 1.05 04/03/2018     All cardiac studies reviewed by me.  All imaging studies reviewed by me.     Attestation:  I have reviewed today's vital signs, notes, medications, labs and imaging.    Vidu B.  MD Reinaldo

## 2018-04-04 NOTE — PROGRESS NOTES
"[facility] Internal Medicine Progress Note            Interval History:   Record reviewed.  Seen with RN.  Discussed with ortho.  S/P Conversion of Left Hip Rigo-Arthroplasty to Left Total Hip Replacement - Wound Class: I-Clean,  Dr. Saleem  4/3.  5 total hip surgeries.   High dose PTA opiate requirement.   Adequate  pain control.  Ambulated, tolerated stairs without postural dizziness.  Planning on DC later today with planned resumption of PTA opiate regimen.   No CP, SOB, cough, nausea, reflux, abd pain or distention.  Passing  flatus.  Last BM 2 days ago.  Voiding Ok.  On O2 2 liters NC over night.           Medications:   All medications reviewed today          Physical Exam:   Blood pressure 102/62, pulse 94, temperature 97.8  F (36.6  C), resp. rate 16, height 1.765 m (5' 9.5\"), weight 75 kg (165 lb 5.5 oz), last menstrual period 03/21/2018, SpO2 92 %.    Intake/Output Summary (Last 24 hours) at 04/04/18 1206  Last data filed at 04/04/18 0937   Gross per 24 hour   Intake             2050 ml   Output             1735 ml   Net              315 ml       General:  Alert.  Appropriate.  No distress.  Off O2.     Heent:      Neck:    Skin:    Chest:  clear    Cardiac:  Reg without gallop, murmur.  No JVD.     Abdomen:  Non distended, soft, non-tender.  BS normal.     Extremities:  Perfused.  No edema, calf, posterior thigh tenderness to suggest DVT.     Neuro:            Data:     Results for orders placed or performed during the hospital encounter of 04/03/18 (from the past 24 hour(s))   XR Pelvis Port 1/2 Views    Narrative    Exam: Intraoperative film of the bilateral hips dated 4/3/2018.    COMPARISON: 12/14/2017.    CLINICAL HISTORY: Hip replacement.    FINDINGS: Single intraoperative view of the bilateral hips were  obtained. Postsurgical changes of a right total hip arthroplasty.  Interval removal of a left femoral resurfacing arthroplasty with  placement of new components of a left total hip " arthroplasty. The  femoral stem component and acetabular component are in place.      Impression    IMPRESSION: New postsurgical changes of placement of a left total hip  arthroplasty, as above.    TOBY GALDAMEZ MD   XR Pelvis w Hip Port Left G/E 2 Views    Narrative    Exam: Single frontal view of both hips and a lateral view of the left  hip dated 4/3/2018.    COMPARISON: Same day.    CLINICAL HISTORY: Postop hip arthroplasty.    FINDINGS: Single frontal view of both hips and a lateral view of the  left hip was obtained. Post surgical changes of bilateral total hip  arthroplasties, the left hip new. The hardware appears intact.      Impression    IMPRESSION:  1. New postsurgical changes of a left total hip arthroplasty, without  complication.  2. Stable postsurgical changes of a right total hip orthoplasty.    TOBY GALDAMEZ MD   Internal Medicine Adult IP Consult for [facility]: Patient to be seen: Routine within 24 hrs; Call back #: [phone]; Perioperative medical management; Consultant may enter orders: Yes    Narrative    Valarie Najera MD     4/3/2018  9:42 PM  Hospital Internal Medicine Consultation    Alethea Patel MRN# [id number]   Age: 37 year old YOB: 1980   Date of Admission: 4/3/2018     Reason for consult:  post operative medical management        Requesting physician         Level of consult: Consult, follow and place orders           Assessment and Recommendation:   Assessment and plan    Failed left hip bartolo-resurfacing   s/p Revision left hip bartolo-resurfacing to total hip arthroplasty  discharge and pain management per orthopedic surgery    postoperative sleepiness and hypoxia  Secondary to huge dose of narcotics  Continue oxygen supplementation and continuous pulse oximetry     chronic pain and narcotic dependency   Pt is on huge dose of methadone along with oxycodone   Defer pain team to further regulate     Acute lymphoblastic leukemia (ALL)  Remote  history and in remission      Gastroesophageal reflux disease  Symptoms under control with PPI   Continue and monitor      Irritable bowel syndrome  No acute symptoms      Anxiety  Continue home dose of lorazepam      Seizure   Not on any med's currently   Did not had any recently per significant other      Moderate episode of recurrent major depressive disorder  Optimally controlled  Continue home med's and defer out pt work up     H/o Hypothyroidism   Continue home dose of levothyroxine     Code status : full code    DVT prophylaxis: warfarin                   Chief Complaint:        History is obtained from the patient          History of Present Illness:   This patient is a 37 year old  female with a significant   past medical history of GERD,depression, anxiety, seizures,   narcotic dependency on methadone and huge dose of oxycodone  who   had Failed left hip bartolo-resurfacing and underwent Revision left   hip bartolo-resurfacing to total hip arthroplasty. Pt was seen after   the surgery and was falling asleep during exam. Pt also had   issues with hypoxia and took her oxygen off to eat food but was   falling asleep even when eating her dinner. Pt denies any chest   pain, SOB, nausea, dizziness and palpitations during exam.           Past Medical History:     Past Medical History:   Diagnosis Date     Acute lymphoblastic leukemia in remission (H)      Anxiety      Depression      Depressive disorder      Gastro-oesophageal reflux disease      IBS (irritable bowel syndrome)      Osteonecrosis (H)      Osteonecrosis (H)              Past Surgical History:     Past Surgical History:   Procedure Laterality Date     ARTHROPLASTY KNEE Left 1/8/2016    Procedure: ARTHROPLASTY KNEE;  Surgeon: Cheko Saleem MD;    Location: [facility] OR     ARTHROPLASTY PATELLO-FEMORAL (KNEE)  6/12/2012    Procedure: ARTHROPLASTY PATELLO-FEMORAL (KNEE);  Right Knee   Patella Resurfacing;  Surgeon: Cheko Saleem MD;  Location: [facility]    OR     C PELVIS/HIP JOINT SURGERY UNLISTED       HIP SURGERY  5/31/2005    Left hip resurfacing hemiarthroplasty     HIP SURGERY  6/5/07     INJECT BLOCK MEDIAL BRANCH CERVICAL/THORACIC/LUMBAR N/A   1/9/2016    Procedure: INJECT BLOCK MEDIAL BRANCH CERVICAL / THORACIC /   LUMBAR;  Surgeon: GENERIC ANESTHESIA PROVIDER;  Location: [facility] OR     JOINT REPLACEMENT  2/2/2010    Rt TKA     KNEE SURGERY       ORTHOPEDIC SURGERY      right foot surgery             Social History:     Social History     Social History     Marital status:      Spouse name: N/A     Number of children: N/A     Years of education: N/A     Occupational History     Not on file.     Social History Main Topics     Smoking status: Current Every Day Smoker     Packs/day: 1.00     Years: 10.00     Types: Cigarettes     Start date: 5/29/1997     Smokeless tobacco: Never Used     Alcohol use Yes      Comment: very rare     Drug use: No     Sexual activity: Yes     Partners: Male     Birth control/ protection: None     Other Topics Concern     Not on file     Social History Narrative             Family History:     Family History   Problem Relation Age of Onset     Migraines Son      Soft Tissue Cancer Brother      Low Back Problems Mother      Family history reviewed and updated in [employer]          Immunizations:   Immunizations are up to date          Allergies:   This patient is allergic to is allergic to chantix [varenicline];   no clinical screening - see comments; seasonal allergies;   ciprofloxacin; and latex.          Medications:     Prescriptions Prior to Admission   Medication Sig Dispense Refill Last Dose     OXYCODONE HCL PO Take 120 mg by mouth 3 times daily   4/3/2018   at 0800     albuterol (PROAIR HFA/PROVENTIL HFA/VENTOLIN HFA) 108 (90 BASE)   MCG/ACT Inhaler Inhale 2 puffs into the lungs every 6 hours as   needed for shortness of breath / dyspnea or wheezing   4/3/2018   at 1030     Methadone HCl (METHADOSE PO) Take 30 mg by mouth  every 8 hours   as needed for severe pain    4/3/2018 at Unknown time     senna-docusate (SENOKOT-S;PERICOLACE) 8.6-50 MG per tablet Take   2 tablets by mouth 2 times daily 30 tablet 0 4/2/2018 at Unknown   time     Ferrous Sulfate (IRON SUPPLEMENT PO) Take by mouth daily     4/2/2018 at Unknown time     LORazepam (ATIVAN) 1 MG tablet Take 1-2 mg by mouth 3 times   daily    4/3/2018 at 0800     QUEtiapine (SEROQUEL) 25 MG tablet Take by mouth At Bedtime     4/2/2018 at Unknown time     montelukast (SINGULAIR) 10 MG tablet Take 10 mg by mouth At   Bedtime   4/2/2018 at Unknown time     fluticasone (FLONASE) 50 MCG/ACT nasal spray Spray 2 sprays   into both nostrils daily as needed for rhinitis or allergies     Past Month at Unknown time     levothyroxine (SYNTHROID, LEVOTHROID) 75 MCG tablet Take 75 mcg   by mouth daily   4/2/2018 at Unknown time     BACLOFEN PO Take 20 mg by mouth 4 times daily    4/2/2018 at   Unknown time     BUPROPION HCL PO Take 150 mg by mouth 2 times daily   4/2/2018   at Unknown time     Prochlorperazine Maleate (COMPAZINE PO) Take 10 mg by mouth   every 6 hours as needed.     4/2/2018 at Unknown time     gabapentin (NEURONTIN) 300 MG capsule Take 900 mg by mouth 3   times daily    4/3/2018 at 0745     omeprazole (PRILOSEC OTC) 20 MG tablet Take 20 mg by mouth   daily    4/2/2018 at Unknown time     NEW MED    Taking     ibuprofen (ADVIL,MOTRIN) 200 MG tablet Take 2 tablets (400 mg)   by mouth every 6 hours as needed for mild pain 120 tablet    3/30/2018     order for DME CPM  [facility]  [phone]     Directions: Advance as Tolerated and Instructed by MD 1 Device 0   Taking     B Complex Vitamins (B COMPLEX PO) Take by mouth daily   More   than a month at Unknown time     metoclopramide (REGLAN) 10 MG tablet Take 10 mg by mouth every   4 hours as needed   Unknown at Unknown time     Nutritional Supplements (MELATONIN PO) Take 3 mg by mouth At   Bedtime    More than a month at  Unknown time             Review of Systems:   The Review of Systems is negative other than noted in the HPI    General: Alert, interactive, NAD.   HEENT: AT/NC. PERRLA. Anicteric.Moist MM.    Neck: Supple. No JVD. No Lymphadenopathy.  Heart/CVS: Normal S1 and S2. Regular. No m/r/g .   Chest/Respi: Non labored breathing. CTA BL.   Abdomen/GI: Soft, non distended, non tender. No g/r/r.   Extremities/MSK: Distal pulses 2+, well perfused. Rest per ortho.     Neuro: Alert and oriented x4. Cranial nerves II-XII are grossly   intact.    Skin:  No new rash over exposed areas.             Data:     Lab Results   Component Value Date    WBC 10.4 01/11/2016    HGB 12.6 04/03/2018    HCT 31.1 (L) 07/18/2008     07/18/2008     01/18/2016    POTASSIUM 3.3 (L) 04/03/2018    CHLORIDE 105 01/18/2016    CO2 24 01/18/2016    BUN 5 (L) 01/18/2016    CR 0.70 01/18/2016    GLC 96 04/03/2018    AST 11 01/10/2016    ALT 15 01/10/2016    ALKPHOS 64 01/10/2016    BILITOTAL 0.2 01/10/2016    INR 1.05 04/03/2018     All cardiac studies reviewed by me.  All imaging studies reviewed by me.     Attestation:  I have reviewed today's vital signs, notes, medications, labs and   imaging.    Valarie Najera MD        INR   Result Value Ref Range    INR 1.05 0.86 - 1.14   Hemoglobin   Result Value Ref Range    Hemoglobin 8.9 (L) 11.7 - 15.7 g/dL   INR   Result Value Ref Range    INR 1.12 0.86 - 1.14   Potassium Level scheduled every Mon Wed Fri   Result Value Ref Range    Potassium 4.0 3.4 - 5.3 mmol/L   Basic metabolic panel   Result Value Ref Range    Sodium 142 133 - 144 mmol/L    Potassium 4.0 3.4 - 5.3 mmol/L    Chloride 107 94 - 109 mmol/L    Carbon Dioxide 26 20 - 32 mmol/L    Anion Gap 9 3 - 14 mmol/L    Glucose 105 (H) 70 - 99 mg/dL    Urea Nitrogen 7 7 - 30 mg/dL    Creatinine 0.75 0.52 - 1.04 mg/dL    GFR Estimate 86 >60 mL/min/1.7m2    GFR Estimate If Black >90 >60 mL/min/1.7m2    Calcium 7.7 (L) 8.5 - 10.1 mg/dL   Magnesium    Result Value Ref Range    Magnesium 2.0 1.6 - 2.3 mg/dL              Assessment and Plan:   1)  S'/P Conversion of Left Hip Rigo-Arthroplasty to Left Total Hip Replacement - Wound Class: I-Clean.  Indication Left Hip Arthrosis After Prior Surface Replacement.  On coumadin.  Passed therapy with plan to resume PTA opiate regimen (reviewed).  2)  Acute blood loss anemia, adequate.    3)  HS hypoxemia 2nd to sedation from residual effect anesthesia and administered opiates.  Resolved presently.  Anticipate should do OK off HS O2 with going back to PTA opiate regimen which she tolerated previously.  4)  GERD controlled on PPI.  5)  Chronic pain, high dose opiate requirement.  6)  Seizure disorder quiescent.  7)  Hypothyroid on replacement.   8)  ALL in remission.   9)  H/o ON involving multiple joints.    PLAN:  1)  Clinically appears stable for DC.  2)  Meds/orders reviewed.  Expect should do well on resumed PTA meds in terms of oxygenation as did well before admit.  Bowel meds.  Supp if 3 days and no BM.  3)  HHC with RN - check labs, clinically, labs, review meds.  PMD appt 1-2 weeks.  After lunch RN to check O2 sat RA with sleep if pt able.   Disposition Plan   Expected discharge today to home.      Entered: Devante Broderick 04/04/2018, 12:06 PM     ADD:  Per RN able to sleep on RA maintaining O2 sat > 90%.  Stable for DC.          Attestation:  I have reviewed today's vital signs, notes, medications, labs and imaging.     Devante Broderick MD

## 2018-04-04 NOTE — PHARMACY-ANTICOAGULATION SERVICE
Clinical Pharmacy - Warfarin Dosing Consult     Pharmacy has been consulted to manage this patient s warfarin therapy.  Indication: DVT/PE Prophylaxis  Therapy Goal:  (inr 1.8--2.5)  Warfarin Prior to Admission: No  Recent documented change in oral intake/nutrition: Yes    INR   Date Value Ref Range Status   04/03/2018 1.05 0.86 - 1.14 Final   01/29/2016 2.0  Final       Recommend warfarin 5 mg today.  Pharmacy will monitor Alethea Patel daily and order warfarin doses to achieve specified goal.      Please contact pharmacy as soon as possible if the warfarin needs to be held for a procedure or if the warfarin goals change.

## 2018-04-04 NOTE — PLAN OF CARE
Problem: Patient Care Overview  Goal: Plan of Care/Patient Progress Review  Outcome: Adequate for Discharge Date Met: 04/04/18  Pt. states ready for discharge and is requesting discharge.Passed PT/OT.PIV's removed. Went over discharge instructions with patient. Coumadin 5  mg po given at 15:15. Pt. states that she understands her therapeutic discharge plan and her medication regimen. Pt. states that she will follow her medication regimen and her therapeutic discharge plan. All question were addressed. Pt script sent to her own pharmacy.[facility] to follow. Pt. left the unit at 15:25 with all valuables, belongings,  and copy of her after visit summary accompanied by .

## 2018-04-04 NOTE — OP NOTE
OPERATION REPORT     DATE OF OPERATION: 4/3/2018     SURGEON: Cheko Saleem MD.     ASSISTANT: Jose Knutson, Fellow, NARCISO Álvarez    ANESTHESIOLOGIST: Anesthesiologist: Damari Morrison MD  CRNA: Joel Betancourt APRN CRNA; Brenna Hendricks APRN CRNA     ANESTHESIA: General    PREOPERATIVE DIAGNOSIS:   1. Failed left hip bartolo-resurfacing  1. Multi-focal osteonecrosis.  2. Lumbar disc herniation  3. Acute Lymphoblastic Leukemia, chemotherapy [id number]     POSTOPERATIVE DIAGNOSIS: same    OPERATION(S) PERFORMED: Revision left hip bartolo-resurfacing to total hip arthroplasty    BACKGROUND:  Patient with AVN and prior left hip bartolo-resurfacing approximately 12 yrs prior.  She performed well for a time, however developed acetabular wear leading to pain.  After discussion of risks and benefits she was taken to the OR for the above procedure.   PRIOR SURGERIES:  1. 2005, L femoral head resurfacing hemiarthroplasty  2. 6/5/2007, R HAJA  3. 2/2/2010, right total knee arthroplasty. No patella resurfacing  4. 6/12/2012, R patella resurfacing  5. 1/8/2016, L TKA ([facility]) [facility]    OPERATIVE FINDINGS: Stable appearing hip bartolo-resurfacing, obvious acetabular wear and cyst formation    OPERATIVE PROCEDURE:   The patient was placed on the operating table in the lateral decubitus position after induction of general anesthesia. With the operated hip turned up, standard prep and drape was performed. A minimal incision posterior lateral approach to the hip joint was then performed making a curvilinear incision over the greater trochanter in line with the prior incision and taking this down through the subcutaneous tissue. The gluteus micheal was then split in line with its fibers. After internal rotation of the femur, the posterior capsule was detached and the soft tissue capsule was divided from the posterior aspect of the femoral neck. The capsule tagged with a suture.  The hip joint was then dislocated. A femoral neck  osteotomy was performed at the mid portion of the femoral neck, using a single sided reciprocating saw to reach around the neck of the prior femoral implant. Head was removed. The femur was then displaced anteriorly and the acetabulum was exposed.  The acetabulum was now reamed by medializing to an appropriate dept. Sequentially enlarging reaming was performed to a 1 mm undersize relative to the cup size implanted. The above mentioned size cup was selected as the optimal implant. It was impacted into a 45-degree abducted and 20-degree anteverted position. A trial liner component was placed.   Attention was now directed to the femur where it was exposed. Given the prior cemet, care was taken to remove this with a curette and deandre.  Once this had been adequately evacuated, sequential broaching were performed to allow placement of the above mentioned diameter component. After trialing and using the broach, the hip was found to be secure and stable in full extension and external rotation of > 45 degrees as well as flexion of 90 degrees combined with internal rotation > 45 degrees.  Leg lengths were judged to be within 5 mm of symmetry. An intraoperative image was then obtained to ensure appropriate sizing and placement of implants.   At this time, the acetabulum was re-exposed and a liner insert locked into position with the apex of the elevated lip placed at 9 o'clock position. After securing the stability of the implant, attention was directed to the femur where the above-mentioned size was inserted in antegrade fashion. There is no evidence of any fracture of the femur during insertion. Again, trial reduction was performed and the above mentioned size neck length was selected. The range of motion was confirmed again and the hip was stable. The real head was impacted on the Moya taper junction after it had been cleansed and dried.  A thorough irrigation of the wound was now performed. A drain was placed in the deep  portion of the wound. Wound closure was accomplished by reapproximating the posterior capsular soft tissues as well as obturator internus and the piriformis muscles. The remainder of the wound was closed in layers with absorbable sutures using standard technique. Sterile dressing was applied.     ESTIMATED BLOOD LOSS: 300 mL.     POSTOPERATIVE PLAN: Weightbearing as tolerated on the operated hip. Standard posterior hip exposure dislocation precautions. Coumadin anticoagulation x 4 week's duration with target INR of 2.0.     FINAL IMPLANTS:   Implant Name Type Inv. Item Serial No.  Lot No. LRB No. Used   IMP SHELL ACETABULUM BIOM RANAWAT 54MM SIZE [id number] Total Joint Component/Insert IMP SHELL ACETABULUM BIOM RANAWAT 54MM SIZE 24 [id number]  [employer] [id number] Left 1             IMP LINER ACET POLY RINGLOC BIOM [id number] Total Joint Component/Insert IMP LINER ACET POLY RINGLOC BIOM [id number]  [employer] [id number] Left 1   taperloc femoral porous coated stem reduced distal high offset type 1 taper Size 12    [id number] Left 1   biolox delta hip system modular ceramic neck  [facility]       [id number] Left 1     ATTESTATION: Dr Saleem was present at all critical portions of this case and immediately available at all times during the duration of the case.       Signed:  Jose Knutson 4/3/2018 7:26 PM    Attending MD (Dr. Cheko Saleem) Attestation:  I was present during the key portions of the procedure and I was immediately available for the entire procedure between opening and closing.    Cheko Saleem MD  [facility] Professor  Oncology and Adult Reconstructive Surgery  Dept Orthopaedic Surgery, [facility]

## 2018-04-05 ENCOUNTER — ANTICOAGULATION THERAPY VISIT (OUTPATIENT)
Dept: ANTICOAGULATION | Facility: CLINIC | Age: 38
End: 2018-04-05

## 2018-04-05 DIAGNOSIS — Z79.01 LONG-TERM (CURRENT) USE OF ANTICOAGULANTS: ICD-10-CM

## 2018-04-05 DIAGNOSIS — Z96.642 AFTERCARE FOLLOWING LEFT HIP JOINT REPLACEMENT SURGERY: ICD-10-CM

## 2018-04-05 DIAGNOSIS — Z47.1 AFTERCARE FOLLOWING LEFT HIP JOINT REPLACEMENT SURGERY: ICD-10-CM

## 2018-04-05 LAB
ANION GAP SERPL CALCULATED.3IONS-SCNC: 8 MMOL/L (ref 3–14)
BUN SERPL-MCNC: 5 MG/DL (ref 7–30)
CALCIUM SERPL-MCNC: 8.1 MG/DL (ref 8.5–10.1)
CHLORIDE SERPL-SCNC: 104 MMOL/L (ref 94–109)
CO2 SERPL-SCNC: 28 MMOL/L (ref 20–32)
CREAT SERPL-MCNC: 0.76 MG/DL (ref 0.52–1.04)
GFR SERPL CREATININE-BSD FRML MDRD: 85 ML/MIN/1.7M2
GLUCOSE SERPL-MCNC: 84 MG/DL (ref 70–99)
HGB BLD-MCNC: 9.2 G/DL (ref 11.7–15.7)
INR PPP: 1.6
POTASSIUM SERPL-SCNC: 4.1 MMOL/L (ref 3.4–5.3)
SODIUM SERPL-SCNC: 140 MMOL/L (ref 133–144)

## 2018-04-05 PROCEDURE — 85018 HEMOGLOBIN: CPT | Performed by: INTERNAL MEDICINE

## 2018-04-05 PROCEDURE — 80048 BASIC METABOLIC PNL TOTAL CA: CPT | Performed by: INTERNAL MEDICINE

## 2018-04-05 NOTE — PROGRESS NOTES
ANTICOAGULATION FOLLOW-UP CLINIC VISIT    Patient Name:  Alethea Patel  Date:  4/5/2018  Contact Type:  Telephone    SUBJECTIVE:     Patient Findings     Positives Change in medications (taking tylenol 650 mg BID)    Comments Left HAJA on 4/3/18  Menses started about 2 weeks early.  INR jumped from 1.12 to 1.6 after 2 doses of warfarin.           OBJECTIVE    INR   Date Value Ref Range Status   04/05/2018 1.6  Final       ASSESSMENT / PLAN  INR assessment SUB    Recheck INR In: 4 DAYS    INR Location [facility] INR      Anticoagulation Summary as of 4/5/2018     INR goal 2.0-2.5   Today's INR 1.6!   Maintenance plan No maintenance plan   Full instructions 4/5: 2.5 mg; 4/6: 2.5 mg; 4/7: 5 mg; 4/8: 2.5 mg; Otherwise No maintenance plan   Next INR check 4/9/2018   Priority INR   Target end date     Indications   Long-term (current) use of anticoagulants [Z79.01] [Z79.01]  Aftercare following joint replacement [Z47.1] [Z47.1]         Anticoagulation Episode Summary     INR check location     Preferred lab     [facility]    Comments HAJA on 4/3  End date 5/1      Anticoagulation Care Providers     Provider Role Specialty Phone number    Cheko Saleem MD Responsible Orthopedics [phone]            See the Encounter Report to view Anticoagulation Flowsheet and Dosing Calendar (Go to Encounters tab in chart review, and find the Anticoagulation Therapy Visit)    Spoke with Henny AMBROSIO.    Anita Borja RN

## 2018-04-05 NOTE — MR AVS SNAPSHOT
Alethea Patel   4/5/2018   Anticoagulation Therapy Visit    Description:  37 year old female   Provider:  Anita Borja RN   Department:  [facility]           INR as of 4/5/2018     Today's INR 1.6!      Anticoagulation Summary as of 4/5/2018     INR goal 2.0-2.5   Today's INR 1.6!   Full instructions 4/5: 2.5 mg; 4/6: 2.5 mg; 4/7: 5 mg; 4/8: 2.5 mg; Otherwise No maintenance plan   Next INR check 4/9/2018    Indications   Long-term (current) use of anticoagulants [Z79.01] [Z79.01]  Aftercare following joint replacement [Z47.1] [Z47.1]         April 2018 Details    Sun Mon Tue Wed Thu Fri Sat     1               2               3               4               5      2.5 mg   See details      6      2.5 mg         7      5 mg           8      2.5 mg         9            10               11               12               13               14                 15               16               17               18               19               20               21                 22               23               24               25               26               27               28                 29               30                     Date Details   04/05 This INR check       Date of next INR:  4/9/2018         How to take your warfarin dose     To take:  2.5 mg Take 0.5 of a 5 mg tablet.    To take:  5 mg Take 1 of the 5 mg tablets.

## 2018-04-05 NOTE — PROGRESS NOTES
Dates of hospitalization: 4/3/2018 to 4/4/2018  Reason for hospitalization: Left Total Hip Arthroplasty   Procedures performed: Left Total Hip Arthroplasty  Vitamin K or FFP administered? No  Inpatient warfarin doses added to calendar? No warfarin administered inpatient  Medication changes at discharge: Start Tylenol, stop ibuprofen  Warfarin dosing after DC: Warfarin 5 mg daily or per coumadin clinic  Patient discharged on Lovenox? No  Next INR date: 4/5/2018  Where is the patient discharging to? (home, TCU, staying locally, etc.): Home  Will patient have home care? Yes

## 2018-04-05 NOTE — MR AVS SNAPSHOT
Alethea Patel   4/5/2018   Anticoagulation Therapy Visit    Description:  37 year old female   Provider:  Kay Gates [facility]   Department:  [facility]           INR as of 4/5/2018     Today's INR       Anticoagulation Summary as of 4/5/2018     INR goal 2.0-2.5   Today's INR    Next INR check     Indications   Long-term (current) use of anticoagulants [Z79.01] [Z79.01]  Aftercare following joint replacement [Z47.1] [Z47.1]

## 2018-04-06 ENCOUNTER — TELEPHONE (OUTPATIENT)
Dept: ORTHOPEDICS | Facility: CLINIC | Age: 38
End: 2018-04-06

## 2018-04-06 NOTE — TELEPHONE ENCOUNTER
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 Varghese is recommending 2 visits next week, 1 visit the following week for gait training, HEP. Approved recommendations.

## 2018-04-07 LAB
BLD PROD TYP BPU: NORMAL
BLD UNIT ID BPU: 0
BLOOD PRODUCT CODE: NORMAL
BPU ID: NORMAL
TRANSFUSION STATUS PATIENT QL: NORMAL
TRANSFUSION STATUS PATIENT QL: NORMAL

## 2018-04-09 ENCOUNTER — ANTICOAGULATION THERAPY VISIT (OUTPATIENT)
Dept: ANTICOAGULATION | Facility: CLINIC | Age: 38
End: 2018-04-09

## 2018-04-09 DIAGNOSIS — Z47.1 AFTERCARE FOLLOWING LEFT HIP JOINT REPLACEMENT SURGERY: ICD-10-CM

## 2018-04-09 DIAGNOSIS — Z79.01 LONG-TERM (CURRENT) USE OF ANTICOAGULANTS: ICD-10-CM

## 2018-04-09 DIAGNOSIS — Z96.642 AFTERCARE FOLLOWING LEFT HIP JOINT REPLACEMENT SURGERY: ICD-10-CM

## 2018-04-09 LAB — INR PPP: 1.6

## 2018-04-09 NOTE — PROGRESS NOTES
ANTICOAGULATION FOLLOW-UP CLINIC VISIT    Patient Name:  Alethea Patel  Date:  4/9/2018  Contact Type:  Telephone    SUBJECTIVE:        OBJECTIVE    INR   Date Value Ref Range Status   04/09/2018 1.6  Final       ASSESSMENT / PLAN  No question data found.  Anticoagulation Summary as of 4/9/2018     INR goal 2.0-2.5   Today's INR 1.6!   Maintenance plan No maintenance plan   Full instructions 4/9: 5 mg; 4/10: 5 mg; 4/11: 2.5 mg; Otherwise No maintenance plan   Next INR check 4/12/2018   Priority INR   Target end date     Indications   Long-term (current) use of anticoagulants [Z79.01] [Z79.01]  Aftercare following joint replacement [Z47.1] [Z47.1]         Anticoagulation Episode Summary     INR check location     Preferred lab     Send INR reminders to [facility]    Comments HAJA on 4/3  End date 5/1      Anticoagulation Care Providers     Provider Role Specialty Phone number    Cheko Saleem MD Responsible Orthopedics [phone]            See the Encounter Report to view Anticoagulation Flowsheet and Dosing Calendar (Go to Encounters tab in chart review, and find the Anticoagulation Therapy Visit)    Spoke with Akosua Vines RN

## 2018-04-12 ENCOUNTER — ANTICOAGULATION THERAPY VISIT (OUTPATIENT)
Dept: ANTICOAGULATION | Facility: CLINIC | Age: 38
End: 2018-04-12

## 2018-04-12 DIAGNOSIS — Z79.01 LONG-TERM (CURRENT) USE OF ANTICOAGULANTS: ICD-10-CM

## 2018-04-12 DIAGNOSIS — Z47.1 AFTERCARE FOLLOWING LEFT HIP JOINT REPLACEMENT SURGERY: ICD-10-CM

## 2018-04-12 DIAGNOSIS — Z96.642 AFTERCARE FOLLOWING LEFT HIP JOINT REPLACEMENT SURGERY: ICD-10-CM

## 2018-04-12 LAB — INR PPP: 1.6

## 2018-04-12 NOTE — MR AVS SNAPSHOT
Alethea Patel   4/12/2018   Anticoagulation Therapy Visit    Description:  37 year old female   Provider:  Ambreen Vines, RN   Department:  [facility]           INR as of 4/12/2018     Today's INR 1.6!      Anticoagulation Summary as of 4/12/2018     INR goal 2.0-2.5   Today's INR 1.6!   Full instructions 4/12: 7.5 mg; 4/13: 5 mg; 4/14: 5 mg; 4/15: 5 mg; Otherwise No maintenance plan   Next INR check 4/16/2018    Indications   Long-term (current) use of anticoagulants [Z79.01] [Z79.01]  Aftercare following joint replacement [Z47.1] [Z47.1]         April 2018 Details    Sun Mon Tue Wed Thu Fri Sat     1               2               3               4               5               6               7                 8               9               10               11               12      7.5 mg   See details      13      5 mg         14      5 mg           15      5 mg         16            17               18               19               20               21                 22               23               24               25               26               27               28                 29               30                     Date Details   04/12 This INR check       Date of next INR:  4/16/2018         How to take your warfarin dose     To take:  5 mg Take 1 of the 5 mg tablets.    To take:  7.5 mg Take 1.5 of the 5 mg tablets.

## 2018-04-12 NOTE — PROGRESS NOTES
ANTICOAGULATION FOLLOW-UP CLINIC VISIT    Patient Name:  Alethea Patel  Date:  4/12/2018  Contact Type:  Telephone    SUBJECTIVE:     Patient Findings     Positives Unexplained INR or factor level change           OBJECTIVE    INR   Date Value Ref Range Status   04/12/2018 1.6  Final       ASSESSMENT / PLAN  No question data found.  Anticoagulation Summary as of 4/12/2018     INR goal 2.0-2.5   Today's INR 1.6!   Maintenance plan No maintenance plan   Full instructions 4/12: 7.5 mg; 4/13: 5 mg; 4/14: 5 mg; 4/15: 5 mg; Otherwise No maintenance plan   Next INR check 4/16/2018   Priority INR   Target end date     Indications   Long-term (current) use of anticoagulants [Z79.01] [Z79.01]  Aftercare following joint replacement [Z47.1] [Z47.1]         Anticoagulation Episode Summary     INR check location     Preferred lab     Send INR reminders to [facility]    Comments HAJA on 4/3  End date 5/1      Anticoagulation Care Providers     Provider Role Specialty Phone number    Cheko Saleem MD Responsible Orthopedics [phone]            See the Encounter Report to view Anticoagulation Flowsheet and Dosing Calendar (Go to Encounters tab in chart review, and find the Anticoagulation Therapy Visit)    Spoke with Henny AMBROSIO.    Ambreen Vines RN

## 2018-04-16 ENCOUNTER — ANTICOAGULATION THERAPY VISIT (OUTPATIENT)
Dept: ANTICOAGULATION | Facility: CLINIC | Age: 38
End: 2018-04-16

## 2018-04-16 DIAGNOSIS — Z47.1 AFTERCARE FOLLOWING LEFT HIP JOINT REPLACEMENT SURGERY: ICD-10-CM

## 2018-04-16 DIAGNOSIS — Z96.642 AFTERCARE FOLLOWING LEFT HIP JOINT REPLACEMENT SURGERY: ICD-10-CM

## 2018-04-16 DIAGNOSIS — Z79.01 LONG-TERM (CURRENT) USE OF ANTICOAGULANTS: ICD-10-CM

## 2018-04-16 LAB — INR POINT OF CARE: 1.2 (ref 0.86–1.14)

## 2018-04-16 PROCEDURE — 85610 PROTHROMBIN TIME: CPT | Mod: QW

## 2018-04-16 NOTE — MR AVS SNAPSHOT
Alethea Patel   4/16/2018   Anticoagulation Therapy Visit    Description:  37 year old female   Provider:  Cari Mai, RN   Department:  [facility]           INR as of 4/16/2018     Today's INR 1.2!      Anticoagulation Summary as of 4/16/2018     INR goal 2.0-2.5   Today's INR 1.2!   Full instructions 4/16: 7.5 mg; 4/17: 7.5 mg; 4/18: 5 mg; Otherwise No maintenance plan   Next INR check 4/19/2018    Indications   Long-term (current) use of anticoagulants [Z79.01] [Z79.01]  Aftercare following joint replacement [Z47.1] [Z47.1]         April 2018 Details    Sun Mon Tue Wed Thu Fri Sat     1               2               3               4               5               6               7                 8               9               10               11               12               13               14                 15               16      7.5 mg   See details      17      7.5 mg         18      5 mg         19            20               21                 22               23               24               25               26               27               28                 29               30                     Date Details   04/16 This INR check       Date of next INR:  4/19/2018         How to take your warfarin dose     To take:  5 mg Take 1 of the 5 mg tablets.    To take:  7.5 mg Take 1.5 of the 5 mg tablets.

## 2018-04-16 NOTE — PROGRESS NOTES
ANTICOAGULATION FOLLOW-UP CLINIC VISIT    Patient Name:  Alethea Patel  Date:  4/16/2018  Contact Type:  Telephone    SUBJECTIVE:     Patient Findings     Positives Missed doses    Comments Dose on 4/14 was missed            OBJECTIVE    INR Protime   Date Value Ref Range Status   04/16/2018 1.2 (A) 0.86 - 1.14 Final       ASSESSMENT / PLAN  No question data found.  Anticoagulation Summary as of 4/16/2018     INR goal 2.0-2.5   Today's INR 1.2!   Maintenance plan No maintenance plan   Full instructions 4/16: 7.5 mg; 4/17: 7.5 mg; 4/18: 5 mg; Otherwise No maintenance plan   Next INR check 4/19/2018   Priority INR   Target end date     Indications   Long-term (current) use of anticoagulants [Z79.01] [Z79.01]  Aftercare following joint replacement [Z47.1] [Z47.1]         Anticoagulation Episode Summary     INR check location     Preferred lab     Send INR reminders to [facility]    Comments HAJA on 4/3  End date 5/1      Anticoagulation Care Providers     Provider Role Specialty Phone number    Cheko Slaeem MD Responsible Orthopedics [phone]            See the Encounter Report to view Anticoagulation Flowsheet and Dosing Calendar (Go to Encounters tab in chart review, and find the Anticoagulation Therapy Visit)    Spoke with patient's home care nurse Henny. Gave them their new warfarin recommendation.  No changes in health, medication, or diet. No falls. No signs or symptoms of bleed or clotting.  See above notation      Cari Mai RN             4/19/18 ADDENDUM  Spoke with home care.  They will get an INR tomorrow.  Patient was unavailable today.  Updated Anticoagulation tracking flowsheet.    Kai Oliver RN

## 2018-04-19 ENCOUNTER — RADIANT APPOINTMENT (OUTPATIENT)
Dept: GENERAL RADIOLOGY | Facility: CLINIC | Age: 38
End: 2018-04-19
Attending: ORTHOPAEDIC SURGERY
Payer: MEDICARE

## 2018-04-19 ENCOUNTER — TELEPHONE (OUTPATIENT)
Dept: ORTHOPEDICS | Facility: CLINIC | Age: 38
End: 2018-04-19

## 2018-04-19 ENCOUNTER — OFFICE VISIT (OUTPATIENT)
Dept: ORTHOPEDICS | Facility: CLINIC | Age: 38
End: 2018-04-19
Payer: MEDICARE

## 2018-04-19 DIAGNOSIS — M87.9 OSTEONECROSIS (H): ICD-10-CM

## 2018-04-19 DIAGNOSIS — M87.9 OSTEONECROSIS (H): Primary | ICD-10-CM

## 2018-04-19 RX ORDER — SENNOSIDES A AND B 8.6 MG/1
TABLET, FILM COATED ORAL
COMMUNITY
Start: 2016-12-07 | End: 2018-05-17

## 2018-04-19 RX ORDER — VIT B1 MN/B2/B3/B5/B6/B12/C/FA 18-250-400
TABLET ORAL
COMMUNITY
Start: 2013-12-02 | End: 2018-05-17

## 2018-04-19 RX ORDER — DULOXETIN HYDROCHLORIDE 60 MG/1
60 CAPSULE, DELAYED RELEASE ORAL 2 TIMES DAILY
COMMUNITY
End: 2024-01-26

## 2018-04-19 RX ORDER — POTASSIUM GLUCONATE 595(99)MG
1 TABLET, EXTENDED RELEASE ORAL DAILY
COMMUNITY
Start: 2013-11-20 | End: 2018-05-17

## 2018-04-19 ASSESSMENT — ENCOUNTER SYMPTOMS
BOWEL INCONTINENCE: 0
HEADACHES: 1
LOSS OF CONSCIOUSNESS: 0
MYALGIAS: 1
NERVOUS/ANXIOUS: 1
NAIL CHANGES: 0
SKIN CHANGES: 0
TREMORS: 0
WEAKNESS: 1
SYNCOPE: 0
WEIGHT LOSS: 0
PARALYSIS: 0
PANIC: 1
ALTERED TEMPERATURE REGULATION: 1
SWOLLEN GLANDS: 0
SEIZURES: 0
POOR WOUND HEALING: 0
CHILLS: 0
DIFFICULTY URINATING: 0
STIFFNESS: 1
CONSTIPATION: 1
DEPRESSION: 1
HALLUCINATIONS: 0
DIARRHEA: 0
HYPOTENSION: 0
POLYDIPSIA: 0
MUSCLE CRAMPS: 1
HEMATURIA: 0
FEVER: 0
BACK PAIN: 1
DECREASED CONCENTRATION: 1
ORTHOPNEA: 0
DYSURIA: 0
DECREASED APPETITE: 0
SLEEP DISTURBANCES DUE TO BREATHING: 0
DIZZINESS: 1
LIGHT-HEADEDNESS: 1
POLYPHAGIA: 0
NECK PAIN: 1
LEG PAIN: 1
SPEECH CHANGE: 0
RECTAL PAIN: 0
EXERCISE INTOLERANCE: 0
JAUNDICE: 0
MUSCLE WEAKNESS: 1
TINGLING: 1
BLOATING: 1
BLOOD IN STOOL: 0
FATIGUE: 0
ARTHRALGIAS: 1
BRUISES/BLEEDS EASILY: 1
NIGHT SWEATS: 0
FLANK PAIN: 0
INCREASED ENERGY: 1
HEARTBURN: 0
MEMORY LOSS: 0
DISTURBANCES IN COORDINATION: 0
NUMBNESS: 1
NAUSEA: 1
WEIGHT GAIN: 0
PALPITATIONS: 0
INSOMNIA: 1
JOINT SWELLING: 1
HYPERTENSION: 1
VOMITING: 0
ABDOMINAL PAIN: 1

## 2018-04-19 ASSESSMENT — HOOS S4: HOW SEVERE IS YOUR HIP JOINT STIFFNESS AFTER FIRST WAKENING IN THE MORNING?: SEVERE

## 2018-04-19 ASSESSMENT — ACTIVITIES OF DAILY LIVING (ADL)
ADL_SUM: 68
ADL_MEAN: 4
ADL_SUBSCALE_SCORE: 0

## 2018-04-19 NOTE — LETTER
4/19/2018       RE: Alethea Patel  [address]  [facility] [address]     Dear Colleague,    Thank you for referring your patient, Alethea Patel, to the [facility] at [facility]. Please see a copy of my visit note below.    Pt seen by fellow for post-op visit after recent dislocation.  Closed reduction done in [place]    No problems currently.      Again, thank you for allowing me to participate in the care of your patient.      Sincerely,    Cheko Saleem MD

## 2018-04-19 NOTE — NURSING NOTE
Chief Complaint   Patient presents with     RECHECK     Pt. states that she is here today after she Dislocated her Hip on 4/16/18. She was brought to [facility] in [place] via Ambulance. She states that they tried to put her hip back in 6 times, and finally brought her to the OR.      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 DOS: 4/3/18       37 year old  1980                                      [facility] [place], [place] - [address]  [facility] - [place], [place] - [address]    Allergies   Allergen Reactions     Chantix [Varenicline] Nausea and Vomiting     Other reaction(s): Vomiting     No Clinical Screening - See Comments      Other reaction(s): Unknown Reaction     Seasonal Allergies      Ciprofloxacin      Other reaction(s): Other  Interacts with other medication     Latex Itching and Rash     Other reaction(s): Other - Describe In Comment Field  Vaginal itching, burning  Other reaction(s): Intolerance     Current Outpatient Prescriptions   Medication     acetaminophen (TYLENOL) 325 MG tablet     albuterol (PROAIR HFA/PROVENTIL HFA/VENTOLIN HFA) 108 (90 BASE) MCG/ACT Inhaler     B Complex Vitamins (B COMPLEX PO)     B Complex-C-Folic Acid (KP B COMPLEX-C) TABS     BACLOFEN PO     bisacodyl (DULCOLAX) 10 MG Suppository     BuPROPion HCl (WELLBUTRIN SR PO)     DULoxetine (CYMBALTA) 60 MG EC capsule     Escitalopram Oxalate (LEXAPRO PO)     Ferrous Sulfate (IRON SUPPLEMENT PO)     fluticasone (FLONASE) 50 MCG/ACT nasal spray     gabapentin (NEURONTIN) 300 MG capsule     Iron, Ferrous Gluconate, 256 (28 Fe) MG TABS     levothyroxine (SYNTHROID, LEVOTHROID) 75 MCG tablet     LORazepam (ATIVAN) 1 MG tablet     Methadone HCl (METHADOSE PO)     metoclopramide (REGLAN) 10 MG tablet     montelukast (SINGULAIR) 10 MG tablet     Nutritional Supplements (MELATONIN PO)      omeprazole (PRILOSEC OTC) 20 MG tablet     omeprazole (PRILOSEC) 20 MG CR capsule     order for DME     order for DME     oxyCODONE IR (ROXICODONE) 30 MG tablet     polyethylene glycol (MIRALAX/GLYCOLAX) Packet     Potassium Gluconate 595 MG TBCR     Xlxoya-WwMdn-LjFjcv-FA-Omega (MULTIVITAMIN/MINERALS PO)     Prochlorperazine Maleate (COMPAZINE PO)     QUEtiapine (SEROQUEL) 25 MG tablet     ranitidine (ZANTAC) 150 MG tablet     senna (RA SENNA) 8.6 MG tablet     senna-docusate (SENOKOT-S;PERICOLACE) 8.6-50 MG per tablet     TIZANIDINE HCL PO     TRAZODONE HCL PO     warfarin (COUMADIN) 5 MG tablet     No current facility-administered medications for this visit.          Questionnaires:  HOOS Hip Dysfunction & Osteoarthritis Outcome Questionnaire    Hip Dysfunction & Osteoarthritis Outcome Score (HOOS), English Version LK 2.0 (Tono PAGE, Hayder DINH, Tameka VIEYRA, 2003) 4/19/2018   S1. Do you feel grinding, hear clicking or any other type of noise from your hip? Sometimes   S2. Difficulties spreading legs wide apart None   S3. Difficulties to stride out when walking Severe   S4. How severe is your hip joint stiffness after first wakening in the morning? Severe   S5. How severe is your hip stiffness after sitting, lying or resting LATER IN THE DAY? Severe   Symptom Count 5   Symptom Sum 11   Symptom Mean 2.2   Symptom Subscale Score 45   P1. How often is your hip painful? Always   P2. Straightening your hip fully Extreme   P3. Bending your hip FULLY Extreme   P4. Walking on a flat surface Extreme   P5. Going up or down stairs Severe   P6. At night while in bed Severe   P7. Sitting or lying Severe   P8. Standing upright Extreme   P9. Walking on a hard surface (asphalt, concrete, etc.) Severe   P10. Walking on an uneven surface Extreme   Pain Count 10   Pain Sum 36   Pain Mean 3.6   Pain Subscale Score 10   A1. Descending stairs Extreme   A2. Ascending stairs Extreme   A3. Rising from sitting Extreme   A4. Standing Extreme    A5. Bending to the floor/ an object Extreme   A6. Walking on a flat surface Extreme   A7. Getting in/out of car Extreme   A8. Going shopping Extreme   A9. Putting on socks/stockings Extreme   A10. Rising from bed Extreme   A11. Taking off socks/stockings Extreme   A12. Lying in bed (turning over, maintaining hip position) Extreme   A13. Getting in/out of bed Extreme   A14. Sitting Extreme   A15. Getting on/off toilet Extreme   A16. Heavy domestic duties (moving heavy boxes, scrubbing floors, etc.) Extreme   A17. Light domestic duties (cooking, dusting, etc.) Extreme   ADL Count 17   ADL Sum 68   ADL Mean 4   ADL Subscale Score 0   SP1. Squatting Extreme   SP2. Running Extreme   SP3. Twisting/pivoting on loaded leg Extreme   SP4. Walking on uneven surface Extreme   Sports/Rec Count 4   Sports/Rec Sum 16   Sports/Rec Mean 4   Sports/Rec Subscale Score 0   Q1. How often are you aware of your hip problem? Constantly   Q2. Have you modified you life style to avoid activities potentially damaging to your hip? Totally   Q3. How much are you troubled with lack of confidence in your hip? Extremely   Q4. In general, how much difficulty do you have with your hip? Extreme   QOL Count 4   QOL Sum 16   QOL Mean 4   Quality of Life Subscale Score 0            KOOS Knee Survey Assessment    No flowsheet data found.         Promis 10 Assessment    PROMIS 10 4/19/2018   In general, would you say your health is: Poor   In general, would you say your quality of life is: Poor   In general, how would you rate your physical health? Poor   In general, how would you rate your mental health, including your mood and your ability to think? Poor   In general, how would you rate your satisfaction with your social activities and relationships? Poor   In general, would you say your health is: -   In general, would you say your quality of life is: -   In general, how would you rate your physical health? -   In general, how would you rate  your mental health, including your mood and your ability to think? -   In general, how would you rate your satisfaction with your social activities and relationships? -   In general, please rate how well you carry out your usual social activities and roles. (This includes activities at home, at work and in your community, and responsibilities as a parent, child, spouse, employee, friend, etc.) -   To what extent are you able to carry out your everyday physical activities such as walking, climbing stairs, carrying groceries, or moving a chair? -   In the past 7 days, how often have you been bothered by emotional problems such as feeling anxious, depressed, or irritable? -   In the past 7 days, how would you rate your fatigue on average? -   In the past 7 days, how would you rate your pain on average, where 0 means no pain, and 10 means worst imaginable pain? -   Global Mental Health Score -   Global Physical Health Score -   PROMIS TOTAL - SUBSCORES -   Some recent data might be hidden

## 2018-04-19 NOTE — MR AVS SNAPSHOT
After Visit Summary   4/19/2018    Alethea Patel    MRN: [id number]           Patient Information     Date Of Birth          1980        Visit Information        Provider Department      4/19/2018 1:45 PM Cheko Saleem MD [facility]        Today's Diagnoses     Osteonecrosis (H)    -  1       Follow-ups after your visit        Your next 10 appointments already scheduled     May 14, 2018 11:00 AM CDT   (Arrive by 10:45 AM)   Return Visit with Cheko Saleem MD   [facility] ([facility])    [address] [place] [id number] [phone]              Who to contact     Please call your clinic at [phone] to:    Ask questions about your health    Make or cancel appointments    Discuss your medicines    Learn about your test results    Speak to your doctor            Additional Information About Your Visit        MyChart Information     [employer] gives you secure access to your electronic health record. If you see a primary care provider, you can also send messages to your care team and make appointments. If you have questions, please call your primary care clinic.  If you do not have a primary care provider, please call [phone] and they will assist you.      [employer] is an electronic gateway that provides easy, online access to your medical records. With [employer], you can request a clinic appointment, read your test results, renew a prescription or communicate with your care team.     To access your existing account, please contact your [facility] or call [phone] for assistance.        Care EveryWhere ID     This is your Care EveryWhere ID. This could be used by other organizations to access your [facility] medical records  [id number]        Your Vitals Were     Last Period                   03/21/2018 (Approximate)            Blood Pressure from Last 3 Encounters:    04/04/18 102/62   01/10/16 115/72   10/22/14 110/64    Weight from Last 3 Encounters:   04/03/18 75 kg (165 lb 5.5 oz)   12/14/17 74 kg (163 lb 1.6 oz)   05/18/17 72.5 kg (159 lb 14.4 oz)               Primary Care Provider Office Phone # Fax #    [facility] [phone] [address] [address]        Equal Access to Services     EMILIANO MARTINEZ : Hadii aad ku hadasho Soomaali, waaxda luqadaha, qaybta kaalmada adeegyada, anabella chacon . So [facility] [phone].    ATENCIÓN: Si habla español, tiene a parisi disposición servicios gratuitos de asistencia lingüística. [facility] [phone].    We comply with applicable federal civil rights laws and [place] laws. We do not discriminate on the basis of race, color, national origin, age, disability, sex, sexual orientation, or gender identity.            Thank you!     Thank you for choosing [facility]  for your care. Our goal is always to provide you with excellent care. Hearing back from our patients is one way we can continue to improve our services. Please take a few minutes to complete the written survey that you may receive in the mail after your visit with us. Thank you!             Your Updated Medication List - Protect others around you: Learn how to safely use, store and throw away your medicines at www.disposemymeds.org.          This list is accurate as of 4/19/18 11:59 PM.  Always use your most recent med list.                   Brand Name Dispense Instructions for use Diagnosis    acetaminophen 325 MG tablet    TYLENOL    100 tablet    Take 2 tablets (650 mg) by mouth every 4 hours as needed for other (multimodal surgical pain management along with NSAIDS and opioid medication as indicated based on pain control and physical function.)    Status post hip surgery       albuterol 108 (90 BASE) MCG/ACT Inhaler    PROAIR HFA/PROVENTIL HFA/VENTOLIN HFA     Inhale 2 puffs into the  lungs every 6 hours as needed for shortness of breath / dyspnea or wheezing        B COMPLEX PO      Take by mouth daily        BACLOFEN PO      Take 20 mg by mouth 4 times daily        bisacodyl 10 MG Suppository    DULCOLAX    3 suppository    Place 1 suppository (10 mg) rectally daily as needed for constipation    Status post hip surgery       COMPAZINE PO      Take 10 mg by mouth every 6 hours as needed.        DULoxetine 60 MG EC capsule    CYMBALTA     Take 120 mg by mouth        fluticasone 50 MCG/ACT spray    FLONASE     Spray 2 sprays into both nostrils daily as needed for rhinitis or allergies        Iron (Ferrous Gluconate) 256 (28 Fe) MG Tabs           IRON SUPPLEMENT PO      Take by mouth daily        KP B COMPLEX-C Tabs           levothyroxine 75 MCG tablet    SYNTHROID/LEVOTHROID     Take 75 mcg by mouth daily        LEXAPRO PO      Take 20 mg by mouth daily        LORazepam 1 MG tablet    ATIVAN     Take 1-2 mg by mouth 3 times daily Max 5 tablets daily        MELATONIN PO      Take 3 mg by mouth At Bedtime        METHADOSE PO      Take 30-40 mg by mouth every 12 hours as needed for severe pain        metoclopramide 10 MG tablet    REGLAN     Take 10 mg by mouth every 4 hours as needed        montelukast 10 MG tablet    SINGULAIR     Take 10 mg by mouth At Bedtime        MULTIVITAMIN/MINERALS PO           NEURONTIN 300 MG capsule   Generic drug:  gabapentin      Take 900 mg by mouth 3 times daily        omeprazole 20 MG CR capsule    priLOSEC     Take 20 mg by mouth        order for DME     1 Device    [facility] [phone]   Directions: Advance as Tolerated and Instructed by MD    Status post total right knee replacement       order for DME     1 each    Equipment being ordered: Bath Seat () Treatment Diagnosis: Limited independence in ADLs/mobility due to hip precautions and recent hip surgery    Status post hip surgery       oxyCODONE IR 30 MG tablet    ROXICODONE     Take 1-1.5  tablets (30-45 mg) by mouth every 3 hours as needed for other (pain control or improvement in physical function. Hold dose for analgesic side effects.) Continue this regimen as prescribed in the hospital. Work with pain provider to adjust dose appropriately    Status post hip surgery       polyethylene glycol Packet    MIRALAX/GLYCOLAX    7 packet    Take 17 g by mouth daily    Status post hip surgery       Potassium Gluconate 595 MG Tbcr           priLOSEC OTC 20 MG tablet   Generic drug:  omeprazole      Take 20 mg by mouth daily        QUEtiapine 25 MG tablet    SEROquel     Take by mouth At Bedtime        RA SENNA 8.6 MG tablet   Generic drug:  senna      TK 1 T PO ONCE DAILY        ranitidine 150 MG tablet    ZANTAC     Take 150 mg by mouth        senna-docusate 8.6-50 MG per tablet    SENOKOT-S;PERICOLACE    30 tablet    Take 2 tablets by mouth 2 times daily    Status post total left knee replacement       TIZANIDINE HCL PO      Take 2 mg by mouth every 6 hours as needed for muscle spasms        TRAZODONE HCL PO      Take 50 mg by mouth At Bedtime        warfarin 5 MG tablet    COUMADIN    45 tablet    Take 1 tablet today. INR tomorrow. Additional dose instructions per anticoagulation clinic.    Status post hip surgery       WELLBUTRIN SR PO      Take 150 mg by mouth 2 times daily

## 2018-04-20 ENCOUNTER — ANTICOAGULATION THERAPY VISIT (OUTPATIENT)
Dept: ANTICOAGULATION | Facility: CLINIC | Age: 38
End: 2018-04-20

## 2018-04-20 DIAGNOSIS — Z96.642 AFTERCARE FOLLOWING LEFT HIP JOINT REPLACEMENT SURGERY: ICD-10-CM

## 2018-04-20 DIAGNOSIS — Z47.1 AFTERCARE FOLLOWING LEFT HIP JOINT REPLACEMENT SURGERY: ICD-10-CM

## 2018-04-20 DIAGNOSIS — Z79.01 LONG-TERM (CURRENT) USE OF ANTICOAGULANTS: ICD-10-CM

## 2018-04-20 LAB — INR PPP: 1.3

## 2018-04-20 NOTE — MR AVS SNAPSHOT
Alethea Patel   4/20/2018   Anticoagulation Therapy Visit    Description:  37 year old female   Provider:  Rita Fischer RN   Department:  [facility]           INR as of 4/20/2018     Today's INR 1.3!      Anticoagulation Summary as of 4/20/2018     INR goal 2.0-2.5   Today's INR 1.3!   Full instructions 4/20: 7.5 mg; 4/21: 5 mg; 4/22: 5 mg; Otherwise No maintenance plan   Next INR check 4/23/2018    Indications   Long-term (current) use of anticoagulants [Z79.01] [Z79.01]  Aftercare following joint replacement [Z47.1] [Z47.1]         April 2018 Details    Sun Mon Tue Wed Thu Fri Sat     1               2               3               4               5               6               7                 8               9               10               11               12               13               14                 15               16               17               18               19               20      7.5 mg   See details      21      5 mg           22      5 mg         23            24               25               26               27               28                 29               30                     Date Details   04/20 This INR check       Date of next INR:  4/23/2018         How to take your warfarin dose     To take:  5 mg Take 1 of the 5 mg tablets.    To take:  7.5 mg Take 1.5 of the 5 mg tablets.

## 2018-04-20 NOTE — PROGRESS NOTES
ANTICOAGULATION FOLLOW-UP CLINIC VISIT    Patient Name:  Alethea Patel  Date:  4/20/2018  Contact Type:  Telephone    SUBJECTIVE:     Patient Findings     Positives No Problem Findings           OBJECTIVE    INR   Date Value Ref Range Status   04/20/2018 1.3  Final       ASSESSMENT / PLAN  INR assessment SUB    Recheck INR In: 3 DAYS    INR Location [facility]      Anticoagulation Summary as of 4/20/2018     INR goal 2.0-2.5   Today's INR 1.3!   Maintenance plan No maintenance plan   Full instructions 4/20: 7.5 mg; 4/21: 5 mg; 4/22: 5 mg; Otherwise No maintenance plan   Next INR check 4/23/2018   Priority INR   Target end date     Indications   Long-term (current) use of anticoagulants [Z79.01] [Z79.01]  Aftercare following joint replacement [Z47.1] [Z47.1]         Anticoagulation Episode Summary     INR check location     Preferred lab     Send INR reminders to [facility]    Comments HAJA on 4/3  End date 5/1      Anticoagulation Care Providers     Provider Role Specialty Phone number    Cheko Saleem MD Responsible Orthopedics [phone]            See the Encounter Report to view Anticoagulation Flowsheet and Dosing Calendar (Go to Encounters tab in chart review, and find the Anticoagulation Therapy Visit)  Spoke with ZIA Banuelos Nurse.    Rita Fischer RN

## 2018-04-23 ENCOUNTER — ANTICOAGULATION THERAPY VISIT (OUTPATIENT)
Dept: ANTICOAGULATION | Facility: CLINIC | Age: 38
End: 2018-04-23

## 2018-04-23 DIAGNOSIS — Z96.642 AFTERCARE FOLLOWING LEFT HIP JOINT REPLACEMENT SURGERY: ICD-10-CM

## 2018-04-23 DIAGNOSIS — Z47.1 AFTERCARE FOLLOWING LEFT HIP JOINT REPLACEMENT SURGERY: ICD-10-CM

## 2018-04-23 DIAGNOSIS — Z79.01 LONG-TERM (CURRENT) USE OF ANTICOAGULANTS: ICD-10-CM

## 2018-04-23 LAB — INR PPP: 2.4

## 2018-04-23 NOTE — MR AVS SNAPSHOT
Alethea Patel   4/23/2018   Anticoagulation Therapy Visit    Description:  37 year old female   Provider:  Ambreen Vines, RN   Department:  [facility]           INR as of 4/23/2018     Today's INR 2.4      Anticoagulation Summary as of 4/23/2018     INR goal 2.0-2.5   Today's INR 2.4   Full instructions 4/23: 7.5 mg; 4/24: 7.5 mg; 4/25: 7.5 mg; Otherwise No maintenance plan   Next INR check 4/26/2018    Indications   Long-term (current) use of anticoagulants [Z79.01] [Z79.01]  Aftercare following joint replacement [Z47.1] [Z47.1]         April 2018 Details    Sun Mon Tue Wed Thu Fri Sat     1               2               3               4               5               6               7                 8               9               10               11               12               13               14                 15               16               17               18               19               20               21                 22               23      7.5 mg   See details      24      7.5 mg         25      7.5 mg         26            27               28                 29               30                     Date Details   04/23 This INR check       Date of next INR:  4/26/2018         How to take your warfarin dose     To take:  7.5 mg Take 1.5 of the 5 mg tablets.

## 2018-04-23 NOTE — PROGRESS NOTES
ANTICOAGULATION FOLLOW-UP CLINIC VISIT    Patient Name:  Alethea Patel  Date:  4/23/2018  Contact Type:  Telephone    SUBJECTIVE:     Patient Findings     Comments Patient took 10mg of warfarin on 4/21-4/22.           OBJECTIVE    INR   Date Value Ref Range Status   04/23/2018 2.4  Final       ASSESSMENT / PLAN  No question data found.  Anticoagulation Summary as of 4/23/2018     INR goal 2.0-2.5   Today's INR 2.4   Maintenance plan No maintenance plan   Full instructions 4/23: 7.5 mg; 4/24: 7.5 mg; 4/25: 7.5 mg; Otherwise No maintenance plan   Next INR check 4/26/2018   Priority INR   Target end date     Indications   Long-term (current) use of anticoagulants [Z79.01] [Z79.01]  Aftercare following joint replacement [Z47.1] [Z47.1]         Anticoagulation Episode Summary     INR check location     Preferred lab     Send INR reminders to [facility]    Comments HAJA on 4/3  End date 5/1      Anticoagulation Care Providers     Provider Role Specialty Phone number    Cheko Saleem MD [facility] [phone]            See the Encounter Report to view Anticoagulation Flowsheet and Dosing Calendar (Go to Encounters tab in chart review, and find the Anticoagulation Therapy Visit)    Spoke with Henny AMBROSIO.    Ambreen Vines RN

## 2018-04-23 NOTE — PROGRESS NOTES
Pt seen by fellow for post-op visit after recent dislocation.  Closed reduction done in [place]    No problems currently.      Answers for HPI/ROS submitted by the patient on 4/19/2018   General Symptoms: Yes  Skin Symptoms: Yes  HENT Symptoms: No  EYE SYMPTOMS: No  HEART SYMPTOMS: Yes  LUNG SYMPTOMS: No  INTESTINAL SYMPTOMS: Yes  URINARY SYMPTOMS: Yes  GYNECOLOGIC SYMPTOMS: No  BREAST SYMPTOMS: No  SKELETAL SYMPTOMS: Yes  BLOOD SYMPTOMS: Yes  NERVOUS SYSTEM SYMPTOMS: Yes  MENTAL HEALTH SYMPTOMS: Yes  Fever: No  Loss of appetite: No  Weight loss: No  Weight gain: No  Fatigue: No  Night sweats: No  Chills: No  Increased stress: Yes  Excessive hunger: No  Excessive thirst: No  Feeling hot or cold when others believe the temperature is normal: Yes  Loss of height: No  Post-operative complications: Yes  Surgical site pain: Yes  Hallucinations: No  Change in or Loss of Energy: Yes  Hyperactivity: No  Confusion: Yes  Changes in hair: No  Changes in moles/birth marks: No  Itching: No  Rashes: No  Changes in nails: No  Acne: No  Hair in places you don't want it: No  Change in facial hair: No  Warts: No  Non-healing sores: No  Scarring: No  Flaking of skin: No  Color changes of hands/feet in cold : No  Sun sensitivity: No  Skin thickening: No  Chest pain or pressure: No  Fast or irregular heartbeat: No  Pain in legs with walking: Yes  Trouble breathing while lying down: No  Fingers or toes appear blue: No  High blood pressure: Yes  Low blood pressure: No  Fainting: No  Murmurs: No  Pacemaker: No  Varicose veins: No  Edema or swelling: No  Wake up at night with shortness of breath: No  Light-headedness: Yes  Exercise intolerance: No  Heart burn or indigestion: No  Nausea: Yes  Vomiting: No  Abdominal pain: Yes  Bloating: Yes  Constipation: Yes  Diarrhea: No  Blood in stool: No  Black stools: No  Rectal or Anal pain: No  Fecal incontinence: No  Yellowing of skin or eyes: No  Vomit with blood: No  Change in stools: No  Trouble  holding urine or incontinence: No  Pain or burning: No  Trouble starting or stopping: No  Increased frequency of urination: No  Blood in urine: No  Decreased frequency of urination: No  Frequent nighttime urination: Yes  Flank pain: No  Difficulty emptying bladder: No  Back pain: Yes  Muscle aches: Yes  Neck pain: Yes  Swollen joints: Yes  Joint pain: Yes  Bone pain: Yes  Muscle cramps: Yes  Muscle weakness: Yes  Joint stiffness: Yes  Bone fracture: No  Anemia: No  Swollen glands: No  Easy bleeding or bruising: Yes  Trouble with coordination: No  Dizziness or trouble with balance: Yes  Fainting or black-out spells: No  Memory loss: No  Headache: Yes  Seizures: No  Speech problems: No  Tingling: Yes  Tremor: No  Weakness: Yes  Difficulty walking: Yes  Paralysis: No  Numbness: Yes  Nervous or Anxious: Yes  Depression: Yes  Trouble sleeping: Yes  Trouble thinking or concentrating: Yes  Mood changes: Yes  Panic attacks: Yes

## 2018-04-26 ENCOUNTER — ANTICOAGULATION THERAPY VISIT (OUTPATIENT)
Dept: ANTICOAGULATION | Facility: CLINIC | Age: 38
End: 2018-04-26

## 2018-04-26 DIAGNOSIS — Z79.01 LONG-TERM (CURRENT) USE OF ANTICOAGULANTS: ICD-10-CM

## 2018-04-26 DIAGNOSIS — Z96.642 AFTERCARE FOLLOWING LEFT HIP JOINT REPLACEMENT SURGERY: ICD-10-CM

## 2018-04-26 DIAGNOSIS — Z47.1 AFTERCARE FOLLOWING LEFT HIP JOINT REPLACEMENT SURGERY: ICD-10-CM

## 2018-04-26 LAB — INR PPP: 1.3

## 2018-04-26 NOTE — PROGRESS NOTES
ANTICOAGULATION FOLLOW-UP CLINIC VISIT    Patient Name:  Alethea Patel  Date:  4/26/2018  Contact Type:  Telephone    SUBJECTIVE:        OBJECTIVE    INR   Date Value Ref Range Status   04/26/2018 1.3  Final       ASSESSMENT / PLAN  No question data found.  Anticoagulation Summary as of 4/26/2018     INR goal 2.0-2.5   Today's INR 1.3!   Maintenance plan No maintenance plan   Full instructions 4/26: 10 mg; 4/27: 10 mg; 4/28: 10 mg; 4/29: 7.5 mg; 4/30: 7.5 mg; 5/1: 7.5 mg; Otherwise No maintenance plan   Next INR check    Target end date     Indications   Long-term (current) use of anticoagulants [Z79.01] [Z79.01]  Aftercare following joint replacement [Z47.1] [Z47.1]         Anticoagulation Episode Summary     INR check location     Preferred lab     Resolved date 5/2/2018    Resolved reason Therapy  Complete    Send INR reminders to [facility]    Comments HAJA on 4/3  End date 5/1      Anticoagulation Care Providers     Provider Role Specialty Phone number    Cheko Saleem MD Responsible Orthopedics [phone]            See the Encounter Report to view Anticoagulation Flowsheet and Dosing Calendar (Go to Encounters tab in chart review, and find the Anticoagulation Therapy Visit)    Spoke with Henny AMBROSIO.     Ambreen Vines RN

## 2018-04-26 NOTE — MR AVS SNAPSHOT
Alethea Patel   4/26/2018   Anticoagulation Therapy Visit    Description:  37 year old female   Provider:  Ambreen Vines, RN   Department:  [facility]           INR as of 4/26/2018     Today's INR 1.3!      Anticoagulation Summary as of 4/26/2018     INR goal 2.0-2.5   Today's INR 1.3!   Full instructions 4/26: 10 mg; 4/27: 10 mg; 4/28: 10 mg; 4/29: 7.5 mg; 4/30: 7.5 mg; 5/1: 7.5 mg; Otherwise No maintenance plan   Next INR check     Indications   Long-term (current) use of anticoagulants [Z79.01] [Z79.01]  Aftercare following joint replacement [Z47.1] [Z47.1]         Anticoagulation Episode Summary     Resolved date 5/2/2018    Resolved reason Therapy  Complete

## 2018-05-11 DIAGNOSIS — Z98.890 STATUS POST HIP SURGERY: Primary | ICD-10-CM

## 2018-05-17 ENCOUNTER — DOCUMENTATION ONLY (OUTPATIENT)
Dept: ORTHOPEDICS | Facility: CLINIC | Age: 38
End: 2018-05-17

## 2018-05-17 ENCOUNTER — HOSPITAL ENCOUNTER (INPATIENT)
Facility: CLINIC | Age: 38
LOS: 1 days | Discharge: HOME | DRG: 561 | End: 2018-05-18
Attending: EMERGENCY MEDICINE | Admitting: ORTHOPAEDIC SURGERY
Payer: MEDICARE

## 2018-05-17 DIAGNOSIS — Z98.890 STATUS POST HIP SURGERY: ICD-10-CM

## 2018-05-17 DIAGNOSIS — S73.005D DISLOCATION OF LEFT HIP, SUBSEQUENT ENCOUNTER: Primary | ICD-10-CM

## 2018-05-17 DIAGNOSIS — S73.005D HIP DISLOCATION, LEFT, SUBSEQUENT ENCOUNTER: ICD-10-CM

## 2018-05-17 LAB
ABO + RH BLD: NORMAL
ABO + RH BLD: NORMAL
ANION GAP SERPL CALCULATED.3IONS-SCNC: 8 MMOL/L (ref 3–14)
BLD GP AB SCN SERPL QL: NORMAL
BLOOD BANK CMNT PATIENT-IMP: NORMAL
BUN SERPL-MCNC: 5 MG/DL (ref 7–30)
CALCIUM SERPL-MCNC: 8 MG/DL (ref 8.5–10.1)
CHLORIDE SERPL-SCNC: 108 MMOL/L (ref 94–109)
CO2 SERPL-SCNC: 27 MMOL/L (ref 20–32)
CREAT SERPL-MCNC: 0.73 MG/DL (ref 0.52–1.04)
ERYTHROCYTE [DISTWIDTH] IN BLOOD BY AUTOMATED COUNT: 13.4 % (ref 10–15)
GFR SERPL CREATININE-BSD FRML MDRD: 88 ML/MIN/1.7M2
GLUCOSE SERPL-MCNC: 103 MG/DL (ref 70–99)
HCT VFR BLD AUTO: 30.1 % (ref 35–47)
HGB BLD-MCNC: 9.7 G/DL (ref 11.7–15.7)
INR PPP: 1.13 (ref 0.86–1.14)
MCH RBC QN AUTO: 28.8 PG (ref 26.5–33)
MCHC RBC AUTO-ENTMCNC: 32.2 G/DL (ref 31.5–36.5)
MCV RBC AUTO: 89 FL (ref 78–100)
PLATELET # BLD AUTO: 182 10E9/L (ref 150–450)
POTASSIUM SERPL-SCNC: 3.8 MMOL/L (ref 3.4–5.3)
RBC # BLD AUTO: 3.37 10E12/L (ref 3.8–5.2)
SODIUM SERPL-SCNC: 143 MMOL/L (ref 133–144)
SPECIMEN EXP DATE BLD: NORMAL
WBC # BLD AUTO: 7.1 10E9/L (ref 4–11)

## 2018-05-17 PROCEDURE — 25000128 H RX IP 250 OP 636: Performed by: EMERGENCY MEDICINE

## 2018-05-17 PROCEDURE — 96376 TX/PRO/DX INJ SAME DRUG ADON: CPT

## 2018-05-17 PROCEDURE — 80048 BASIC METABOLIC PNL TOTAL CA: CPT | Performed by: STUDENT IN AN ORGANIZED HEALTH CARE EDUCATION/TRAINING PROGRAM

## 2018-05-17 PROCEDURE — 99285 EMERGENCY DEPT VISIT HI MDM: CPT | Mod: 25

## 2018-05-17 PROCEDURE — 86900 BLOOD TYPING SEROLOGIC ABO: CPT | Performed by: STUDENT IN AN ORGANIZED HEALTH CARE EDUCATION/TRAINING PROGRAM

## 2018-05-17 PROCEDURE — 86850 RBC ANTIBODY SCREEN: CPT | Performed by: STUDENT IN AN ORGANIZED HEALTH CARE EDUCATION/TRAINING PROGRAM

## 2018-05-17 PROCEDURE — 96361 HYDRATE IV INFUSION ADD-ON: CPT

## 2018-05-17 PROCEDURE — 96360 HYDRATION IV INFUSION INIT: CPT

## 2018-05-17 PROCEDURE — 96374 THER/PROPH/DIAG INJ IV PUSH: CPT

## 2018-05-17 PROCEDURE — 85027 COMPLETE CBC AUTOMATED: CPT | Performed by: STUDENT IN AN ORGANIZED HEALTH CARE EDUCATION/TRAINING PROGRAM

## 2018-05-17 PROCEDURE — 25000132 ZZH RX MED GY IP 250 OP 250 PS 637: Mod: GY | Performed by: STUDENT IN AN ORGANIZED HEALTH CARE EDUCATION/TRAINING PROGRAM

## 2018-05-17 PROCEDURE — 85610 PROTHROMBIN TIME: CPT | Performed by: STUDENT IN AN ORGANIZED HEALTH CARE EDUCATION/TRAINING PROGRAM

## 2018-05-17 PROCEDURE — 96375 TX/PRO/DX INJ NEW DRUG ADDON: CPT

## 2018-05-17 PROCEDURE — A9270 NON-COVERED ITEM OR SERVICE: HCPCS | Mod: GY | Performed by: STUDENT IN AN ORGANIZED HEALTH CARE EDUCATION/TRAINING PROGRAM

## 2018-05-17 PROCEDURE — 12000001 ZZH R&B MED SURG/OB UMMC

## 2018-05-17 PROCEDURE — 86901 BLOOD TYPING SEROLOGIC RH(D): CPT | Performed by: STUDENT IN AN ORGANIZED HEALTH CARE EDUCATION/TRAINING PROGRAM

## 2018-05-17 RX ORDER — POLYETHYLENE GLYCOL 3350 17 G/17G
17 POWDER, FOR SOLUTION ORAL DAILY
Status: DISCONTINUED | OUTPATIENT
Start: 2018-05-18 | End: 2018-05-17

## 2018-05-17 RX ORDER — BISACODYL 10 MG
10 SUPPOSITORY, RECTAL RECTAL DAILY PRN
Status: DISCONTINUED | OUTPATIENT
Start: 2018-05-17 | End: 2018-05-18 | Stop reason: HOSPADM

## 2018-05-17 RX ORDER — FLUTICASONE PROPIONATE 50 MCG
2 SPRAY, SUSPENSION (ML) NASAL DAILY PRN
Status: DISCONTINUED | OUTPATIENT
Start: 2018-05-17 | End: 2018-05-18 | Stop reason: HOSPADM

## 2018-05-17 RX ORDER — TIZANIDINE 2 MG/1
2 TABLET ORAL EVERY 6 HOURS PRN
Status: DISCONTINUED | OUTPATIENT
Start: 2018-05-17 | End: 2018-05-18 | Stop reason: HOSPADM

## 2018-05-17 RX ORDER — OXYCODONE HYDROCHLORIDE 30 MG/1
120 TABLET ORAL EVERY 4 HOURS PRN
COMMUNITY
End: 2021-01-11 | Stop reason: DRUGHIGH

## 2018-05-17 RX ORDER — BUPROPION HYDROCHLORIDE 150 MG/1
150 TABLET, EXTENDED RELEASE ORAL 2 TIMES DAILY
Status: DISCONTINUED | OUTPATIENT
Start: 2018-05-17 | End: 2018-05-18 | Stop reason: HOSPADM

## 2018-05-17 RX ORDER — LORAZEPAM 1 MG/1
1-2 TABLET ORAL 2 TIMES DAILY PRN
Status: DISCONTINUED | OUTPATIENT
Start: 2018-05-17 | End: 2018-05-18 | Stop reason: HOSPADM

## 2018-05-17 RX ORDER — MORPHINE SULFATE 4 MG/ML
4 INJECTION, SOLUTION INTRAMUSCULAR; INTRAVENOUS
Status: DISCONTINUED | OUTPATIENT
Start: 2018-05-17 | End: 2018-05-18 | Stop reason: HOSPADM

## 2018-05-17 RX ORDER — OXYCODONE HYDROCHLORIDE 5 MG/1
5-10 TABLET ORAL
Status: DISCONTINUED | OUTPATIENT
Start: 2018-05-17 | End: 2018-05-17

## 2018-05-17 RX ORDER — POLYETHYLENE GLYCOL 3350 17 G/17G
17 POWDER, FOR SOLUTION ORAL DAILY PRN
Status: DISCONTINUED | OUTPATIENT
Start: 2018-05-17 | End: 2018-05-18 | Stop reason: HOSPADM

## 2018-05-17 RX ORDER — BACLOFEN 20 MG/1
20 TABLET ORAL AT BEDTIME
Status: DISCONTINUED | OUTPATIENT
Start: 2018-05-17 | End: 2018-05-18 | Stop reason: HOSPADM

## 2018-05-17 RX ORDER — LEVOTHYROXINE SODIUM 75 UG/1
75 TABLET ORAL DAILY
Status: DISCONTINUED | OUTPATIENT
Start: 2018-05-17 | End: 2018-05-18 | Stop reason: HOSPADM

## 2018-05-17 RX ORDER — OXYCODONE HYDROCHLORIDE 30 MG/1
90-120 TABLET ORAL EVERY 6 HOURS PRN
Status: DISCONTINUED | OUTPATIENT
Start: 2018-05-17 | End: 2018-05-18 | Stop reason: HOSPADM

## 2018-05-17 RX ORDER — LORAZEPAM 1 MG/1
1-2 TABLET ORAL 3 TIMES DAILY
Status: DISCONTINUED | OUTPATIENT
Start: 2018-05-18 | End: 2018-05-17

## 2018-05-17 RX ORDER — ONDANSETRON 2 MG/ML
4 INJECTION INTRAMUSCULAR; INTRAVENOUS EVERY 30 MIN PRN
Status: DISCONTINUED | OUTPATIENT
Start: 2018-05-17 | End: 2018-05-18 | Stop reason: HOSPADM

## 2018-05-17 RX ORDER — AMOXICILLIN 250 MG
2 CAPSULE ORAL 2 TIMES DAILY
Status: DISCONTINUED | OUTPATIENT
Start: 2018-05-17 | End: 2018-05-18 | Stop reason: HOSPADM

## 2018-05-17 RX ORDER — ACETAMINOPHEN 325 MG/1
650 TABLET ORAL EVERY 4 HOURS PRN
Status: DISCONTINUED | OUTPATIENT
Start: 2018-05-17 | End: 2018-05-18 | Stop reason: HOSPADM

## 2018-05-17 RX ORDER — SODIUM CHLORIDE, SODIUM LACTATE, POTASSIUM CHLORIDE, CALCIUM CHLORIDE 600; 310; 30; 20 MG/100ML; MG/100ML; MG/100ML; MG/100ML
INJECTION, SOLUTION INTRAVENOUS CONTINUOUS
Status: DISCONTINUED | OUTPATIENT
Start: 2018-05-17 | End: 2018-05-18 | Stop reason: HOSPADM

## 2018-05-17 RX ORDER — TRAZODONE HYDROCHLORIDE 50 MG/1
50 TABLET, FILM COATED ORAL AT BEDTIME
Status: DISCONTINUED | OUTPATIENT
Start: 2018-05-17 | End: 2018-05-17

## 2018-05-17 RX ORDER — ESCITALOPRAM OXALATE 20 MG/1
20 TABLET ORAL DAILY
Status: DISCONTINUED | OUTPATIENT
Start: 2018-05-17 | End: 2018-05-18 | Stop reason: HOSPADM

## 2018-05-17 RX ORDER — OXYCODONE HYDROCHLORIDE 30 MG/1
120 TABLET ORAL EVERY 4 HOURS PRN
Status: DISCONTINUED | OUTPATIENT
Start: 2018-05-17 | End: 2018-05-17

## 2018-05-17 RX ORDER — MONTELUKAST SODIUM 10 MG/1
10 TABLET ORAL AT BEDTIME
Status: DISCONTINUED | OUTPATIENT
Start: 2018-05-17 | End: 2018-05-18 | Stop reason: HOSPADM

## 2018-05-17 RX ORDER — METHADONE HYDROCHLORIDE 5 MG/5ML
40 SOLUTION ORAL EVERY 8 HOURS
Status: DISCONTINUED | OUTPATIENT
Start: 2018-05-17 | End: 2018-05-18 | Stop reason: HOSPADM

## 2018-05-17 RX ORDER — ALBUTEROL SULFATE 90 UG/1
2 AEROSOL, METERED RESPIRATORY (INHALATION) EVERY 6 HOURS PRN
Status: DISCONTINUED | OUTPATIENT
Start: 2018-05-17 | End: 2018-05-18 | Stop reason: HOSPADM

## 2018-05-17 RX ORDER — LORAZEPAM 1 MG/1
1-2 TABLET ORAL AT BEDTIME
Status: DISCONTINUED | OUTPATIENT
Start: 2018-05-17 | End: 2018-05-18 | Stop reason: HOSPADM

## 2018-05-17 RX ORDER — MORPHINE SULFATE 4 MG/ML
8 INJECTION, SOLUTION INTRAMUSCULAR; INTRAVENOUS ONCE
Status: COMPLETED | OUTPATIENT
Start: 2018-05-17 | End: 2018-05-17

## 2018-05-17 RX ORDER — QUETIAPINE FUMARATE 25 MG/1
25 TABLET, FILM COATED ORAL AT BEDTIME
Status: DISCONTINUED | OUTPATIENT
Start: 2018-05-17 | End: 2018-05-18 | Stop reason: HOSPADM

## 2018-05-17 RX ORDER — DULOXETIN HYDROCHLORIDE 60 MG/1
120 CAPSULE, DELAYED RELEASE ORAL DAILY
Status: DISCONTINUED | OUTPATIENT
Start: 2018-05-17 | End: 2018-05-18 | Stop reason: HOSPADM

## 2018-05-17 RX ORDER — BACLOFEN 20 MG/1
20 TABLET ORAL 4 TIMES DAILY
Status: DISCONTINUED | OUTPATIENT
Start: 2018-05-18 | End: 2018-05-17

## 2018-05-17 RX ORDER — GABAPENTIN 300 MG/1
900 CAPSULE ORAL 3 TIMES DAILY
Status: DISCONTINUED | OUTPATIENT
Start: 2018-05-17 | End: 2018-05-18 | Stop reason: HOSPADM

## 2018-05-17 RX ORDER — LANOLIN ALCOHOL/MO/W.PET/CERES
3 CREAM (GRAM) TOPICAL AT BEDTIME
Status: DISCONTINUED | OUTPATIENT
Start: 2018-05-17 | End: 2018-05-18 | Stop reason: HOSPADM

## 2018-05-17 RX ADMIN — SODIUM CHLORIDE, POTASSIUM CHLORIDE, SODIUM LACTATE AND CALCIUM CHLORIDE: 600; 310; 30; 20 INJECTION, SOLUTION INTRAVENOUS at 18:54

## 2018-05-17 RX ADMIN — BUPROPION HYDROCHLORIDE 150 MG: 150 TABLET, FILM COATED, EXTENDED RELEASE ORAL at 23:51

## 2018-05-17 RX ADMIN — QUETIAPINE FUMARATE 25 MG: 25 TABLET ORAL at 23:51

## 2018-05-17 RX ADMIN — LEVOTHYROXINE SODIUM 75 MCG: 75 TABLET ORAL at 23:52

## 2018-05-17 RX ADMIN — DULOXETINE HYDROCHLORIDE 120 MG: 60 CAPSULE, DELAYED RELEASE ORAL at 23:51

## 2018-05-17 RX ADMIN — ESCITALOPRAM OXALATE 20 MG: 20 TABLET ORAL at 23:52

## 2018-05-17 RX ADMIN — MORPHINE SULFATE 8 MG: 4 INJECTION, SOLUTION INTRAMUSCULAR; INTRAVENOUS at 20:21

## 2018-05-17 RX ADMIN — MORPHINE SULFATE 4 MG: 4 INJECTION, SOLUTION INTRAMUSCULAR; INTRAVENOUS at 23:05

## 2018-05-17 RX ADMIN — MELATONIN TAB 3 MG 3 MG: 3 TAB at 23:58

## 2018-05-17 RX ADMIN — MONTELUKAST SODIUM 10 MG: 10 TABLET, FILM COATED ORAL at 23:57

## 2018-05-17 RX ADMIN — GABAPENTIN 900 MG: 300 CAPSULE ORAL at 23:51

## 2018-05-17 RX ADMIN — SENNOSIDES AND DOCUSATE SODIUM 2 TABLET: 8.6; 5 TABLET ORAL at 23:51

## 2018-05-17 RX ADMIN — MORPHINE SULFATE 4 MG: 4 INJECTION, SOLUTION INTRAMUSCULAR; INTRAVENOUS at 18:55

## 2018-05-17 ASSESSMENT — ENCOUNTER SYMPTOMS
FEVER: 0
ARTHRALGIAS: 1

## 2018-05-17 NOTE — PROGRESS NOTES
Marivel-op worksheet received and forwarded to surgery scheduling. Patient has been scheduled for surgery per Dr. Saleem for 5/18/18 at [place].

## 2018-05-17 NOTE — ED NOTES
Bed: ED01  Expected date:   Expected time:   Means of arrival:   Comments:  Pt from [facility]

## 2018-05-17 NOTE — ED PROVIDER NOTES
[facility] EMERGENCY DEPARTMENT ([facility])    5/17/18     ED 1    History     Chief Complaint   Patient presents with     Hip Pain/dislocation     The history is provided by the patient and medical records.     Alethea Patel is a 37-year-old female post left hip arthroplasty by Dr. Saleem approximately 6 weeks ago.  She apparently dislocated this hip and was seen at an outside hospital where attempt was made to reduce the hip unsuccessfully.  Communication was then between that hospital and Dr. Saleem with the plan of admitting the patient to an inpatient bed for planned OR reduction of the dislocation tomorrow.  There was no bed available so the patient was advised to come to the Emergency Department and be held until a room is available. There was no plan for attempted reduction here.    This part of the document was transcribed by Yuliet Rollins Scribe.    Past Medical History:   Diagnosis Date     Acute lymphoblastic leukemia in remission (H)      Anxiety      Depression      Depressive disorder      Gastro-oesophageal reflux disease      IBS (irritable bowel syndrome)      Osteonecrosis (H)      Osteonecrosis (H)      Social History     Social History     Marital status:      Spouse name: N/A     Number of children: N/A     Years of education: N/A     Occupational History     Not on file.     Social History Main Topics     Smoking status: Current Every Day Smoker     Packs/day: 1.00     Years: 10.00     Types: Cigarettes     Start date: 5/29/1997     Smokeless tobacco: Never Used     Alcohol use Yes      Comment: very rare     Drug use: No     Sexual activity: Yes     Partners: Male     Birth control/ protection: None     Other Topics Concern     Not on file     Social History Narrative       I have reviewed the Medications, Allergies, Past Medical and Surgical History, and Social History in the Epic system.    Review of Systems   Constitutional: Negative for fever.   Musculoskeletal:  Positive for arthralgias (Left hip).        Hip pain   All other systems reviewed and are negative.      Physical Exam   BP: 128/87  Pulse: 84  Temp: 99.1  F (37.3  C)  Resp: 16  Weight: 82.6 kg (182 lb)  SpO2: 95 %      Physical Exam   Constitutional: She is oriented to person, place, and time. She has a sickly appearance. No distress.   HENT:   Head: Atraumatic.   Eyes: Pupils are equal, round, and reactive to light.   Cardiovascular: Normal rate, regular rhythm and normal heart sounds.    Pulmonary/Chest: Effort normal and breath sounds normal.   Musculoskeletal:        Left hip: She exhibits decreased range of motion and bony tenderness.   Neurological: She is alert and oriented to person, place, and time.   Skin: She is not diaphoretic.   Nursing note and vitals reviewed.      ED Course     ED Course     Procedures        Medications   lactated ringers infusion ( Intravenous New Bag 5/17/18 1854)   morphine (PF) injection 4 mg (4 mg Intravenous Given 5/17/18 1855)   ondansetron (ZOFRAN) injection 4 mg (not administered)            Labs Ordered and Resulted from Time of ED Arrival Up to the Time of Departure from the ED - No data to display         Assessments & Plan (with Medical Decision Making)   Patient with recurrent left hip dislocation here after failed reduction at outside hospital plan by staff surgeon Dr. Resendez is admit to [facility] for OR reduction in the morning.  She was to be a direct admit but no beds are available.  Will hold here and keep her comfortable until then.    I have reviewed the nursing notes.    I have reviewed the findings, diagnosis, plan and need for follow up with the patient.    New Prescriptions    No medications on file       Final diagnoses:   Hip dislocation, left, subsequent encounter       5/17/2018   [facility], [place], EMERGENCY DEPARTMENT     Roberto Perez MD  05/17/18 2984

## 2018-05-17 NOTE — IP AVS SNAPSHOT
[facility]    [address] [address]    Phone:  [phone]                                       After Visit Summary   5/17/2018    Alethea Patel    MRN: [id number]           After Visit Summary Signature Page     I have received my discharge instructions, and my questions have been answered. I have discussed any challenges I see with this plan with the nurse or doctor.    ..........................................................................................................................................  Patient/Patient Representative Signature      ..........................................................................................................................................  Patient Representative Print Name and Relationship to Patient    ..................................................               ................................................  Date                                            Time    ..........................................................................................................................................  Reviewed by Signature/Title    ...................................................              ..............................................  Date                                                            Time

## 2018-05-17 NOTE — IP AVS SNAPSHOT
MRN:[id number]                      After Visit Summary   5/17/2018    Alethea Patel    MRN: [id number]           Thank you!     Thank you for choosing [facility] for your care. Our goal is always to provide you with excellent care. Hearing back from our patients is one way we can continue to improve our services. Please take a few minutes to complete the written survey that you may receive in the mail after you visit with us. Thank you!        Patient Information     Date Of Birth          1980        About your hospital stay     You were admitted on:  May 18, 2018 You last received care in the:  [facility] PACU    You were discharged on:  May 18, 2018       Who to Call     For medical emergencies, please call 911.  For non-urgent questions about your medical care, please call your primary care provider or clinic, [phone]  For questions related to your surgery, please call your surgery clinic        Attending Provider     Provider Specialty    Roberto Perez MD Emergency Medicine    Cheko Saleem MD Orthopedics       Primary Care Provider Office Phone # Fax #    [facility] [phone]      After Care Instructions     Discharge Instructions       SAME DAY SURGERY POST OPERATIVE DISCHARGE INSTRUCTIONS    FOLLOW UP APPOINTMENT  Please call [phone], option #1, during weekday business hours 8 am - 4:30 pm to schedule your follow up appointment. If you had a biopsy or soft tissue excisions, schedule your appointment two weeks after surgery. If you had an arthroscopic procedure or core decompression, schedule your appointment four weeks after surgery.    Your follow up appointments will be at the location that you regularly see Dr. Saleem:    [facility]  [address] [place] [id number]  [phone]      ACTIVITY  You may participate in activities as tolerated     Exercises:   Perform the  following exercises at least three times per day for the first two weeks after surgery to prevent complications, such as blood clots in your legs:  1) Point and flex your feet  2) Move your ankle around in big circles  3) Wiggle your toes   Also, perform thigh muscle tightening exercises for 10 to 15 minutes at least three times per day for the first four weeks after surgery.    Athletic Activities:  Activities such as swimming, bicycling, jogging, running, and stop-and-go sports should be avoided until permitted by your provider.    Hip Abduction pillow and brace:  The abduction pillow should be worn at all times while in bed. The brace should be worn at all times while out of bed.    Driving:  Driving is not permitted until directed by your provider. Under no circumstance are you permitted to drive while using narcotic pain medications.    Return to Work:  You may return to work when directed by your provider.       COMFORT AND PAIN MANAGEMENT  Elevation:   During times of inactivity throughout the first two weeks after surgery, make an effort to decrease swelling by elevating your operative extremity.     Icing:  An ice pack will be provided to control swelling and discomfort after surgery. Place a thin towel on your skin and apply the ice pack overtop. You may apply ice for 20 minutes as often as two times per hour.    Pain Medications:  You will be discharged with acetaminophen (Tylenol). Acetaminophen can help to effectively manage pain when used as prescribed, however, do not exceed the maximum daily dose of 3000 mg. Your previously prescribed narcotic pain medication should be reserved for severe, breakthrough pain. Take the narcotic medication as prescribed and use only as often as necessary.       ANTICOAGULATION  None      WOUND CARE AND SHOWERING  Wound care:  Leave the initial dressing on for at least three days after surgery. If the incision is leaking any fluid or blood through or around the bandage,  remove the existing bandage and replace it with gauze and tape. Be sure to wash your hands before and after and use gloves if available. A bandage is usually only necessary for three days, but it is best to keep it on for five days if possible.     Showering:  You may begin taking showers on the third day after surgery as long as the incision is dry. If there is drainage from the incision, cover the site with gauze and tape and keep it dry while showering. If there is not drainage, getting the wound wet while showering is acceptable. To dry, gently pat the incision dry and reapply dressing as needed.     Tub Bathing:  Tub bathing, swimming, or any other activities that cause your incision to be submerged should be avoided until allowed by your provider. Typically, patients are allowed to return to these activities four weeks after surgery.      DIET  Move to a regular diet as you are able and be sure to drink plenty of fluids. If you feel nauseous or sick to your stomach, drink only clear liquids such as apple juice, ginger ale, broth, or 7-UP.      RECOMMENDATIONS FOR A SUCCESSFUL RECOVERY  A responsible adult should accompany you for the first 24 hours after surgery. Avoid strenuous or risky activities and ask for assistance climbing stairs. If you feel lightheaded during your recovery, sit down for a few minutes and ask someone for help before getting up. Get plenty of rest after surgery and avoid alcohol. Do not make important legal decisions during your recovery.       CONTACTING YOUR PHYSICIAN:  You may experience symptoms that require follow-up before your scheduled appointment. Please contact Dr. Saleem's office if you experience:  1) Signs of infection such as fever (temperature >100.4 F or 38 C), growing tenderness at the surgery site, a large amount of drainage or bleeding, severe pain, foul-smelling drainage, redness, and/or swelling.  2) If it has been over 8-10 hours since surgery, and you still have  not urinated (or passed water).   3) Headache for over 24 hours.   4) Numbness, tingling, or weakness the day after surgery (if you had spinal anesthesia).       Regular business hours (Monday - Friday, 8am - 5pm):  [facility]: [phone]    If you have any other concerns during normal business hours, please contact Dr. Saleem's nurse, Dariana Krause RN, at [phone] during normal business hours 8 am - 5 pm (leave message as needed). If your concern is urgent, please page Dariana Krause RN at [phone] and key in your 10 digit phone number followed by the # sign after the beep.     After hours and weekends:  [facility] on call Orthopedic resident: [phone]    If you have an emergent concern, contact the Emergency Department at the [facility] - [phone] - or [place] - [phone] - [place].            No Alcohol       While using narcotic medication            No driving or operating machinery       While using narcotic medication                  Further instructions from your care team       Same-Day Surgery   Adult Discharge Orders & Instructions     For 24 hours after surgery:  1. Get plenty of rest.  A responsible adult must stay with you for at least 24 hours after you leave the hospital.   2. Pain medication can slow your reflexes. Do not drive or use heavy equipment.  If you have weakness or tingling, don't drive or use heavy equipment until this feeling goes away.  3. Mixing alcohol and pain medication can cause dizziness and slow your breathing. It can even be fatal. Do not drink alcohol while taking pain medication.  4. Avoid strenuous or risky activities.  Ask for help when climbing stairs.   5. You may feel lightheaded.  If so, sit for a few minutes before standing.  Have someone help you get up.   6. If you have nausea (feel sick to your stomach), drink only clear liquids such as apple juice, ginger ale, broth or 7-Up.   Rest may also help.  Be sure to drink enough fluids.  Move to a regular diet as you feel able. Take pain medications with a small amount of solid food, such as toast or crackers, to avoid nausea.   7. A slight fever is normal. Call the doctor if your fever is over 100 F (37.7 C) (taken under the tongue) or lasts longer than 24 hours.  8. You may have a dry mouth, muscle aches, trouble sleeping or a sore throat.  These symptoms should go away after 24 hours.  9. Do not make important or legal decisions.   Pain Management:      1. Take pain medication (if prescribed) for pain as directed by your physician.        2. WARNING: If the pain medication you have been prescribed contains Tylenol  (acetaminophen), DO NOT take additional doses of Tylenol (acetaminophen).     Call your doctor for any of the followin.  Signs of infection (fever, growing tenderness at the surgery site, severe pain, a large amount of drainage or bleeding, foul-smelling drainage, redness, swelling).    2.  It has been over 8 to 10 hours since surgery and you are still not able to urinate (pee).    3.  Headache for over 24 hours.    4.  Numbness, tingling or weakness the day after surgery (if you had spinal anesthesia).  To contact a doctor, call _____________________________________ or:      [phone] and ask for the Resident On Call for:          __________________________________________ (answered 24 hours a day)      Emergency Department:  [facility]: [phone]  [facility]: [phone]               Rev. 10/2014     Additional Information     If you use hormonal birth control (such as the pill, patch, ring or implants): You'll need a second form of birth control for 7 days (condoms, a diaphragm or contraceptive foam). While in the hospital, you received a medicine called Bridion. Your normal birth control will not work as well for a week after taking this medicine.          Pending Results      Date and Time Order Name Status Description    5/18/2018 0814 Fluid Culture Aerobic Bacterial In process     5/18/2018 0814 Cell count with differential fluid In process     5/18/2018 0814 Anaerobic bacterial culture Preliminary             Admission Information     Date & Time Provider Department Dept. Phone    5/17/2018 Cheko Saleem MD [facility] PACU [phone]      Your Vitals Were     Blood Pressure Pulse Temperature Respirations Weight Pulse Oximetry    121/83 87 97.7  F (36.5  C) (Oral) 16 82.6 kg (182 lb) 96%    BMI (Body Mass Index)                   26.49 kg/m2           MyChart Information     [employer] gives you secure access to your electronic health record. If you see a primary care provider, you can also send messages to your care team and make appointments. If you have questions, please call your primary care clinic.  If you do not have a primary care provider, please call [phone] and they will assist you.        Care EveryWhere ID     This is your Care EveryWhere ID. This could be used by other organizations to access your [facility] medical records  [id number]        Equal Access to Services     ZEFERINO MARTINEZ : Hadii dayron Mccann, waaxda luqadaha, qaybta kaalmaclay grey, anabella chacon . [facility] [phone].    ATENCIÓN: Si habla español, tiene a parisi disposición servicios gratuitos de asistencia lingüística. Llame al [phone].    We comply with applicable federal civil rights laws and [place] laws. We do not discriminate on the basis of race, color, national origin, age, disability, sex, sexual orientation, or gender identity.               Review of your medicines      CONTINUE these medicines which may have CHANGED, or have new prescriptions. If we are uncertain of the size of tablets/capsules you have at home, strength may be listed as something that might have changed.        Dose / Directions    acetaminophen 325 MG tablet   Commonly known as:   TYLENOL   This may have changed:    - when to take this  - reasons to take this   Used for:  Status post hip surgery        Dose:  650 mg   Take 2 tablets (650 mg) by mouth every 4 hours   Quantity:  70 tablet   Refills:  0         CONTINUE these medicines which have NOT CHANGED        Dose / Directions    albuterol 108 (90 BASE) MCG/ACT Inhaler   Commonly known as:  PROAIR HFA/PROVENTIL HFA/VENTOLIN HFA        Dose:  2 puff   Inhale 2 puffs into the lungs every 6 hours as needed for shortness of breath / dyspnea or wheezing   Refills:  0       B COMPLEX PO        Dose:  1 tablet   Take 1 tablet by mouth daily Dose unknown; OTC   Refills:  0       BACLOFEN PO        Dose:  20 mg   Take 20 mg by mouth 4 times daily   Refills:  0       COMPAZINE PO        Dose:  10 mg   Take 10 mg by mouth every 6 hours as needed.   Refills:  0       DULoxetine 60 MG EC capsule   Commonly known as:  CYMBALTA        Dose:  60 mg   Take 60 mg by mouth 2 times daily   Refills:  0       fluticasone 50 MCG/ACT spray   Commonly known as:  FLONASE        Dose:  2 spray   Spray 2 sprays into both nostrils daily as needed for rhinitis or allergies   Refills:  0       levothyroxine 75 MCG tablet   Commonly known as:  SYNTHROID/LEVOTHROID        Dose:  75 mcg   Take 75 mcg by mouth daily   Refills:  0       LEXAPRO PO        Dose:  20 mg   Take 20 mg by mouth daily   Refills:  0       LORazepam 1 MG tablet   Commonly known as:  ATIVAN        Dose:  1-2 mg   Take 1-2 mg by mouth 3 times daily Max 5 tablets daily   Refills:  0       MELATONIN PO        Dose:  3 mg   Take 3 mg by mouth At Bedtime   Refills:  0       METHADONE HCL PO        Dose:  40 mg   Take 40 mg by mouth every 8 hours   Refills:  0       montelukast 10 MG tablet   Commonly known as:  SINGULAIR        Dose:  10 mg   Take 10 mg by mouth At Bedtime   Refills:  0       MULTIVITAMIN ADULT PO        Dose:  1 tablet   Take 1 tablet by mouth daily   Refills:  0       NEURONTIN 300 MG  capsule   Generic drug:  gabapentin        Dose:  900 mg   Take 900 mg by mouth 3 times daily   Refills:  0       order for DME   Used for:  Status post total right knee replacement        [facility] [phone]   Directions: Advance as Tolerated and Instructed by MD   Quantity:  1 Device   Refills:  0       order for DME   Used for:  Status post hip surgery        Equipment being ordered: Bath Seat () Treatment Diagnosis: Limited independence in ADLs/mobility due to hip precautions and recent hip surgery   Quantity:  1 each   Refills:  0       POTASSIUM PO        Dose:  1 tablet   Take 1 tablet by mouth daily Dose unknown; OTC   Refills:  0       priLOSEC OTC 20 MG tablet   Generic drug:  omeprazole        Dose:  20 mg   Take 20 mg by mouth daily   Refills:  0       QUEtiapine 25 MG tablet   Commonly known as:  SEROquel        Dose:  25 mg   Take 25 mg by mouth At Bedtime   Refills:  0       ranitidine 150 MG tablet   Commonly known as:  ZANTAC        Dose:  150 mg   Take 150 mg by mouth At Bedtime   Refills:  0       ROXICODONE 30 MG tablet   Generic drug:  oxyCODONE IR        Dose:  120 mg   Take 120 mg by mouth every 4 hours as needed for moderate to severe pain   Refills:  0       senna-docusate 8.6-50 MG per tablet   Commonly known as:  SENOKOT-S;PERICOLACE   Used for:  Status post total left knee replacement        Dose:  2 tablet   Take 2 tablets by mouth 2 times daily   Quantity:  30 tablet   Refills:  0       TIZANIDINE HCL PO        Dose:  2 mg   Take 2 mg by mouth every 6 hours as needed for muscle spasms   Refills:  0       WELLBUTRIN SR PO        Dose:  150 mg   Take 150 mg by mouth 2 times daily   Refills:  0            Where to get your medicines      These medications were sent to [place], [place] - [address]  [address] [address]     Phone:  [phone]     acetaminophen 325 MG tablet                Protect others around  you: Learn how to safely use, store and throw away your medicines at www.disposemymeds.org.        Information about OPIOIDS     PRESCRIPTION OPIOIDS: WHAT YOU NEED TO KNOW   You have a prescription for an opioid (narcotic) pain medicine. Opioids can cause addiction. If you have a history of chemical dependency of any type, you are at a higher risk of becoming addicted to opioids. Only take this medicine after all other options have been tried. Take it for as short a time and as few doses as possible.     Do not:    Drive. If you drive while taking these medicines, you could be arrested for driving under the influence (DUI).    Operate heavy machinery    Do any other dangerous activities while taking these medicines.     Drink any alcohol while taking these medicines.      Take with any other medicines that contain acetaminophen. Read all labels carefully. Look for the word  acetaminophen  or  Tylenol.  Ask your pharmacist if you have questions or are unsure.    Store your pills in a secure place, locked if possible. We will not replace any lost or stolen medicine. If you don t finish your medicine, please throw away (dispose) as directed by your pharmacist. The [place] Pollution Control Agency has more information about safe disposal: https://www.pca.[facility].[place].us/living-green/managing-unwanted-medications    All opioids tend to cause constipation. Drink plenty of water and eat foods that have a lot of fiber, such as fruits, vegetables, prune juice, apple juice and high-fiber cereal. Take a laxative (Miralax, milk of magnesia, Colace, Senna) if you don t move your bowels at least every other day.              Medication List: This is a list of all your medications and when to take them. Check marks below indicate your daily home schedule. Keep this list as a reference.      Medications           Morning Afternoon Evening Bedtime As Needed    acetaminophen 325 MG tablet   Commonly known as:  TYLENOL   Take 2 tablets  (650 mg) by mouth every 4 hours                                albuterol 108 (90 BASE) MCG/ACT Inhaler   Commonly known as:  PROAIR HFA/PROVENTIL HFA/VENTOLIN HFA   Inhale 2 puffs into the lungs every 6 hours as needed for shortness of breath / dyspnea or wheezing                                B COMPLEX PO   Take 1 tablet by mouth daily Dose unknown; OTC                                BACLOFEN PO   Take 20 mg by mouth 4 times daily   Last time this was given:  20 mg on 5/18/2018 12:25 AM                                COMPAZINE PO   Take 10 mg by mouth every 6 hours as needed.                                DULoxetine 60 MG EC capsule   Commonly known as:  CYMBALTA   Take 60 mg by mouth 2 times daily   Last time this was given:  120 mg on 5/17/2018 11:51 PM                                fluticasone 50 MCG/ACT spray   Commonly known as:  FLONASE   Spray 2 sprays into both nostrils daily as needed for rhinitis or allergies                                levothyroxine 75 MCG tablet   Commonly known as:  SYNTHROID/LEVOTHROID   Take 75 mcg by mouth daily   Last time this was given:  75 mcg on 5/17/2018 11:52 PM                                LEXAPRO PO   Take 20 mg by mouth daily   Last time this was given:  20 mg on 5/17/2018 11:52 PM                                LORazepam 1 MG tablet   Commonly known as:  ATIVAN   Take 1-2 mg by mouth 3 times daily Max 5 tablets daily   Last time this was given:  2 mg on 5/18/2018 12:24 AM                                MELATONIN PO   Take 3 mg by mouth At Bedtime   Last time this was given:  3 mg on 5/17/2018 11:58 PM                                METHADONE HCL PO   Take 40 mg by mouth every 8 hours   Last time this was given:  40 mg on 5/18/2018 12:24 AM                                montelukast 10 MG tablet   Commonly known as:  SINGULAIR   Take 10 mg by mouth At Bedtime   Last time this was given:  10 mg on 5/17/2018 11:57 PM                                MULTIVITAMIN ADULT  PO   Take 1 tablet by mouth daily                                NEURONTIN 300 MG capsule   Take 900 mg by mouth 3 times daily   Last time this was given:  900 mg on 5/17/2018 11:51 PM   Generic drug:  gabapentin                                order for DME   [facility] [phone]   Directions: Advance as Tolerated and Instructed by MD                                order for DME   Equipment being ordered: Bath Seat () Treatment Diagnosis: Limited independence in ADLs/mobility due to hip precautions and recent hip surgery                                POTASSIUM PO   Take 1 tablet by mouth daily Dose unknown; OTC                                priLOSEC OTC 20 MG tablet   Take 20 mg by mouth daily   Generic drug:  omeprazole                                QUEtiapine 25 MG tablet   Commonly known as:  SEROquel   Take 25 mg by mouth At Bedtime   Last time this was given:  25 mg on 5/17/2018 11:51 PM                                ranitidine 150 MG tablet   Commonly known as:  ZANTAC   Take 150 mg by mouth At Bedtime                                ROXICODONE 30 MG tablet   Take 120 mg by mouth every 4 hours as needed for moderate to severe pain   Last time this was given:  120 mg on 5/18/2018 12:24 AM   Generic drug:  oxyCODONE IR                                senna-docusate 8.6-50 MG per tablet   Commonly known as:  SENOKOT-S;PERICOLACE   Take 2 tablets by mouth 2 times daily   Last time this was given:  2 tablets on 5/17/2018 11:51 PM                                TIZANIDINE HCL PO   Take 2 mg by mouth every 6 hours as needed for muscle spasms                                WELLBUTRIN SR PO   Take 150 mg by mouth 2 times daily   Last time this was given:  150 mg on 5/17/2018 11:51 PM

## 2018-05-17 NOTE — ED TRIAGE NOTES
Per EMS pt was excersing this am and dislocated her hip, this is her second dislocation. Went to [facility], they were unable to put her hip back in. Received total of 20 mg of morphine, 4 mg of morphine an hour prior to leaving and also received some propofolol and ketimine.     normal... Well appearing, well nourished, awake, alert, oriented to person, place, time/situation and in no apparent distress.

## 2018-05-18 ENCOUNTER — ANESTHESIA (OUTPATIENT)
Dept: SURGERY | Facility: CLINIC | Age: 38
DRG: 561 | End: 2018-05-18
Payer: MEDICARE

## 2018-05-18 ENCOUNTER — APPOINTMENT (OUTPATIENT)
Dept: GENERAL RADIOLOGY | Facility: CLINIC | Age: 38
DRG: 561 | End: 2018-05-18
Attending: ORTHOPAEDIC SURGERY
Payer: MEDICARE

## 2018-05-18 ENCOUNTER — ANESTHESIA EVENT (OUTPATIENT)
Dept: SURGERY | Facility: CLINIC | Age: 38
DRG: 561 | End: 2018-05-18
Payer: MEDICARE

## 2018-05-18 VITALS
SYSTOLIC BLOOD PRESSURE: 128 MMHG | OXYGEN SATURATION: 99 % | WEIGHT: 182 LBS | BODY MASS INDEX: 26.49 KG/M2 | RESPIRATION RATE: 12 BRPM | TEMPERATURE: 98 F | HEART RATE: 87 BPM | DIASTOLIC BLOOD PRESSURE: 80 MMHG

## 2018-05-18 PROBLEM — S73.005D HIP DISLOCATION, LEFT, SUBSEQUENT ENCOUNTER: Status: ACTIVE | Noted: 2018-05-18

## 2018-05-18 LAB
APPEARANCE FLD: NORMAL
COLOR FLD: NORMAL
LYMPHOCYTES NFR FLD MANUAL: 4 %
NEUTS BAND NFR FLD MANUAL: 96 %
SPECIMEN SOURCE FLD: NORMAL
WBC # FLD AUTO: 5146 /UL

## 2018-05-18 PROCEDURE — 71000014 ZZH RECOVERY PHASE 1 LEVEL 2 FIRST HR: Performed by: ORTHOPAEDIC SURGERY

## 2018-05-18 PROCEDURE — 25000566 ZZH SEVOFLURANE, EA 15 MIN: Performed by: ORTHOPAEDIC SURGERY

## 2018-05-18 PROCEDURE — 37000009 ZZH ANESTHESIA TECHNICAL FEE, EACH ADDTL 15 MIN: Performed by: ORTHOPAEDIC SURGERY

## 2018-05-18 PROCEDURE — 25000128 H RX IP 250 OP 636: Performed by: EMERGENCY MEDICINE

## 2018-05-18 PROCEDURE — 25000128 H RX IP 250 OP 636: Performed by: ANESTHESIOLOGY

## 2018-05-18 PROCEDURE — 25000132 ZZH RX MED GY IP 250 OP 250 PS 637: Performed by: STUDENT IN AN ORGANIZED HEALTH CARE EDUCATION/TRAINING PROGRAM

## 2018-05-18 PROCEDURE — 71000015 ZZH RECOVERY PHASE 1 LEVEL 2 EA ADDTL HR: Performed by: ORTHOPAEDIC SURGERY

## 2018-05-18 PROCEDURE — 0S9B3ZZ DRAINAGE OF LEFT HIP JOINT, PERCUTANEOUS APPROACH: ICD-10-PCS | Performed by: ORTHOPAEDIC SURGERY

## 2018-05-18 PROCEDURE — 87070 CULTURE OTHR SPECIMN AEROBIC: CPT | Performed by: ORTHOPAEDIC SURGERY

## 2018-05-18 PROCEDURE — 37000008 ZZH ANESTHESIA TECHNICAL FEE, 1ST 30 MIN: Performed by: ORTHOPAEDIC SURGERY

## 2018-05-18 PROCEDURE — 89051 BODY FLUID CELL COUNT: CPT | Performed by: ORTHOPAEDIC SURGERY

## 2018-05-18 PROCEDURE — 0SSBXZZ REPOSITION LEFT HIP JOINT, EXTERNAL APPROACH: ICD-10-PCS | Performed by: ORTHOPAEDIC SURGERY

## 2018-05-18 PROCEDURE — C9399 UNCLASSIFIED DRUGS OR BIOLOG: HCPCS | Performed by: NURSE ANESTHETIST, CERTIFIED REGISTERED

## 2018-05-18 PROCEDURE — 36000053 ZZH SURGERY LEVEL 2 EA 15 ADDTL MIN - UMMC: Performed by: ORTHOPAEDIC SURGERY

## 2018-05-18 PROCEDURE — 40000278 XR SURGERY CARM FLUORO LESS THAN 5 MIN: Mod: TC

## 2018-05-18 PROCEDURE — 25000128 H RX IP 250 OP 636: Performed by: NURSE ANESTHETIST, CERTIFIED REGISTERED

## 2018-05-18 PROCEDURE — 71000027 ZZH RECOVERY PHASE 2 EACH 15 MINS: Performed by: ORTHOPAEDIC SURGERY

## 2018-05-18 PROCEDURE — A9270 NON-COVERED ITEM OR SERVICE: HCPCS | Performed by: STUDENT IN AN ORGANIZED HEALTH CARE EDUCATION/TRAINING PROGRAM

## 2018-05-18 PROCEDURE — 27210794 ZZH OR GENERAL SUPPLY STERILE: Performed by: ORTHOPAEDIC SURGERY

## 2018-05-18 PROCEDURE — 36000055 ZZH SURGERY LEVEL 2 W FLUORO 1ST 30 MIN - UMMC: Performed by: ORTHOPAEDIC SURGERY

## 2018-05-18 PROCEDURE — 40000170 ZZH STATISTIC PRE-PROCEDURE ASSESSMENT II: Performed by: ORTHOPAEDIC SURGERY

## 2018-05-18 PROCEDURE — 87075 CULTR BACTERIA EXCEPT BLOOD: CPT | Performed by: ORTHOPAEDIC SURGERY

## 2018-05-18 RX ORDER — FENTANYL CITRATE 50 UG/ML
INJECTION, SOLUTION INTRAMUSCULAR; INTRAVENOUS PRN
Status: DISCONTINUED | OUTPATIENT
Start: 2018-05-18 | End: 2018-05-18

## 2018-05-18 RX ORDER — ONDANSETRON 4 MG/1
4 TABLET, ORALLY DISINTEGRATING ORAL EVERY 30 MIN PRN
Status: DISCONTINUED | OUTPATIENT
Start: 2018-05-18 | End: 2018-05-18 | Stop reason: HOSPADM

## 2018-05-18 RX ORDER — PROCHLORPERAZINE MALEATE 10 MG
10 TABLET ORAL EVERY 6 HOURS PRN
Status: DISCONTINUED | OUTPATIENT
Start: 2018-05-18 | End: 2018-05-18 | Stop reason: HOSPADM

## 2018-05-18 RX ORDER — ONDANSETRON 2 MG/ML
4 INJECTION INTRAMUSCULAR; INTRAVENOUS EVERY 6 HOURS PRN
Status: DISCONTINUED | OUTPATIENT
Start: 2018-05-18 | End: 2018-05-18 | Stop reason: HOSPADM

## 2018-05-18 RX ORDER — FENTANYL CITRATE 50 UG/ML
25-50 INJECTION, SOLUTION INTRAMUSCULAR; INTRAVENOUS
Status: DISCONTINUED | OUTPATIENT
Start: 2018-05-18 | End: 2018-05-18 | Stop reason: HOSPADM

## 2018-05-18 RX ORDER — METOCLOPRAMIDE 10 MG/1
10 TABLET ORAL EVERY 6 HOURS PRN
Status: DISCONTINUED | OUTPATIENT
Start: 2018-05-18 | End: 2018-05-18 | Stop reason: HOSPADM

## 2018-05-18 RX ORDER — SODIUM CHLORIDE 9 MG/ML
INJECTION, SOLUTION INTRAVENOUS CONTINUOUS
Status: DISCONTINUED | OUTPATIENT
Start: 2018-05-18 | End: 2018-05-18 | Stop reason: HOSPADM

## 2018-05-18 RX ORDER — ACETAMINOPHEN 325 MG/1
650 TABLET ORAL EVERY 4 HOURS
Qty: 70 TABLET | Refills: 0 | Status: ON HOLD | OUTPATIENT
Start: 2018-05-18 | End: 2018-05-25

## 2018-05-18 RX ORDER — METOPROLOL TARTRATE 1 MG/ML
5 INJECTION, SOLUTION INTRAVENOUS EVERY 6 HOURS
Status: DISCONTINUED | OUTPATIENT
Start: 2018-05-18 | End: 2018-05-18 | Stop reason: HOSPADM

## 2018-05-18 RX ORDER — CALCIUM CARBONATE 500 MG/1
1000 TABLET, CHEWABLE ORAL 4 TIMES DAILY PRN
Status: DISCONTINUED | OUTPATIENT
Start: 2018-05-18 | End: 2018-05-18 | Stop reason: HOSPADM

## 2018-05-18 RX ORDER — NALOXONE HYDROCHLORIDE 0.4 MG/ML
.1-.4 INJECTION, SOLUTION INTRAMUSCULAR; INTRAVENOUS; SUBCUTANEOUS
Status: DISCONTINUED | OUTPATIENT
Start: 2018-05-18 | End: 2018-05-18

## 2018-05-18 RX ORDER — ONDANSETRON 2 MG/ML
4 INJECTION INTRAMUSCULAR; INTRAVENOUS EVERY 30 MIN PRN
Status: DISCONTINUED | OUTPATIENT
Start: 2018-05-18 | End: 2018-05-18 | Stop reason: HOSPADM

## 2018-05-18 RX ORDER — HYDROMORPHONE HYDROCHLORIDE 1 MG/ML
.3-.5 INJECTION, SOLUTION INTRAMUSCULAR; INTRAVENOUS; SUBCUTANEOUS EVERY 10 MIN PRN
Status: DISCONTINUED | OUTPATIENT
Start: 2018-05-18 | End: 2018-05-18 | Stop reason: HOSPADM

## 2018-05-18 RX ORDER — LIDOCAINE 40 MG/G
CREAM TOPICAL
Status: DISCONTINUED | OUTPATIENT
Start: 2018-05-18 | End: 2018-05-18 | Stop reason: HOSPADM

## 2018-05-18 RX ORDER — NALOXONE HYDROCHLORIDE 0.4 MG/ML
.1-.4 INJECTION, SOLUTION INTRAMUSCULAR; INTRAVENOUS; SUBCUTANEOUS
Status: DISCONTINUED | OUTPATIENT
Start: 2018-05-18 | End: 2018-05-18 | Stop reason: HOSPADM

## 2018-05-18 RX ORDER — ONDANSETRON 4 MG/1
4 TABLET, ORALLY DISINTEGRATING ORAL EVERY 6 HOURS PRN
Status: DISCONTINUED | OUTPATIENT
Start: 2018-05-18 | End: 2018-05-18 | Stop reason: HOSPADM

## 2018-05-18 RX ORDER — MEPERIDINE HYDROCHLORIDE 25 MG/ML
12.5 INJECTION INTRAMUSCULAR; INTRAVENOUS; SUBCUTANEOUS
Status: DISCONTINUED | OUTPATIENT
Start: 2018-05-18 | End: 2018-05-18 | Stop reason: HOSPADM

## 2018-05-18 RX ORDER — SODIUM CHLORIDE, SODIUM LACTATE, POTASSIUM CHLORIDE, CALCIUM CHLORIDE 600; 310; 30; 20 MG/100ML; MG/100ML; MG/100ML; MG/100ML
INJECTION, SOLUTION INTRAVENOUS CONTINUOUS
Status: DISCONTINUED | OUTPATIENT
Start: 2018-05-18 | End: 2018-05-18 | Stop reason: HOSPADM

## 2018-05-18 RX ORDER — METOCLOPRAMIDE HYDROCHLORIDE 5 MG/ML
10 INJECTION INTRAMUSCULAR; INTRAVENOUS EVERY 6 HOURS PRN
Status: DISCONTINUED | OUTPATIENT
Start: 2018-05-18 | End: 2018-05-18 | Stop reason: HOSPADM

## 2018-05-18 RX ADMIN — BACLOFEN 20 MG: 20 TABLET ORAL at 00:25

## 2018-05-18 RX ADMIN — SODIUM CHLORIDE, POTASSIUM CHLORIDE, SODIUM LACTATE AND CALCIUM CHLORIDE: 600; 310; 30; 20 INJECTION, SOLUTION INTRAVENOUS at 03:17

## 2018-05-18 RX ADMIN — ROCURONIUM BROMIDE 40 MG: 10 INJECTION INTRAVENOUS at 07:28

## 2018-05-18 RX ADMIN — PHENYLEPHRINE HYDROCHLORIDE 100 MCG: 10 INJECTION, SOLUTION INTRAMUSCULAR; INTRAVENOUS; SUBCUTANEOUS at 07:52

## 2018-05-18 RX ADMIN — OXYCODONE HYDROCHLORIDE 120 MG: 30 TABLET ORAL at 00:24

## 2018-05-18 RX ADMIN — LORAZEPAM 2 MG: 1 TABLET ORAL at 00:24

## 2018-05-18 RX ADMIN — FENTANYL CITRATE 50 MCG: 50 INJECTION, SOLUTION INTRAMUSCULAR; INTRAVENOUS at 07:28

## 2018-05-18 RX ADMIN — METHADONE HYDROCHLORIDE 40 MG: 5 SOLUTION ORAL at 00:24

## 2018-05-18 RX ADMIN — ONDANSETRON 4 MG: 2 INJECTION INTRAMUSCULAR; INTRAVENOUS at 07:39

## 2018-05-18 RX ADMIN — FENTANYL CITRATE 25 MCG: 50 INJECTION INTRAMUSCULAR; INTRAVENOUS at 08:52

## 2018-05-18 RX ADMIN — HYDROMORPHONE HYDROCHLORIDE 1 MG: 1 INJECTION, SOLUTION INTRAMUSCULAR; INTRAVENOUS; SUBCUTANEOUS at 03:36

## 2018-05-18 RX ADMIN — MIDAZOLAM 1 MG: 1 INJECTION INTRAMUSCULAR; INTRAVENOUS at 07:24

## 2018-05-18 RX ADMIN — SUGAMMADEX 160 MG: 100 INJECTION, SOLUTION INTRAVENOUS at 07:59

## 2018-05-18 RX ADMIN — SODIUM CHLORIDE, POTASSIUM CHLORIDE, SODIUM LACTATE AND CALCIUM CHLORIDE: 600; 310; 30; 20 INJECTION, SOLUTION INTRAVENOUS at 07:22

## 2018-05-18 ASSESSMENT — LIFESTYLE VARIABLES: TOBACCO_USE: 1

## 2018-05-18 ASSESSMENT — ENCOUNTER SYMPTOMS: SEIZURES: 1

## 2018-05-18 NOTE — ANESTHESIA POSTPROCEDURE EVALUATION
Patient: Alethea Patel    Procedure(s):  Closed Reduction Left Hip, and Aspiration Culture    - Wound Class: N/A    Diagnosis:Dislocation Of Left Hip Replacement   Diagnosis Additional Information: No value filed.    Anesthesia Type:  General, ETT    Note:  Anesthesia Post Evaluation    Patient location during evaluation: PACU  Patient participation: Able to fully participate in evaluation  Level of consciousness: awake and alert  Pain management: adequate  Airway patency: patent  Cardiovascular status: acceptable  Respiratory status: acceptable  Hydration status: acceptable  PONV: none             Last vitals:  Vitals:    05/18/18 0945 05/18/18 1000 05/18/18 1028   BP: 121/83 113/85    Pulse:      Resp: 16 16    Temp:      SpO2: 96% 96% 97%         Electronically Signed By: Akosua Jett MD  May 18, 2018  10:33 AM

## 2018-05-18 NOTE — DISCHARGE INSTRUCTIONS
Same-Day Surgery   Adult Discharge Orders & Instructions     For 24 hours after surgery:  1. Get plenty of rest.  A responsible adult must stay with you for at least 24 hours after you leave the hospital.   2. Pain medication can slow your reflexes. Do not drive or use heavy equipment.  If you have weakness or tingling, don't drive or use heavy equipment until this feeling goes away.  3. Mixing alcohol and pain medication can cause dizziness and slow your breathing. It can even be fatal. Do not drink alcohol while taking pain medication.  4. Avoid strenuous or risky activities.  Ask for help when climbing stairs.   5. You may feel lightheaded.  If so, sit for a few minutes before standing.  Have someone help you get up.   6. If you have nausea (feel sick to your stomach), drink only clear liquids such as apple juice, ginger ale, broth or 7-Up.  Rest may also help.  Be sure to drink enough fluids.  Move to a regular diet as you feel able. Take pain medications with a small amount of solid food, such as toast or crackers, to avoid nausea.   7. A slight fever is normal. Call the doctor if your fever is over 100 F (37.7 C) (taken under the tongue) or lasts longer than 24 hours.  8. You may have a dry mouth, muscle aches, trouble sleeping or a sore throat.  These symptoms should go away after 24 hours.  9. Do not make important or legal decisions.   Pain Management:      1. Take pain medication (if prescribed) for pain as directed by your physician.        2. WARNING: If the pain medication you have been prescribed contains Tylenol  (acetaminophen), DO NOT take additional doses of Tylenol (acetaminophen).     Call your doctor for any of the followin.  Signs of infection (fever, growing tenderness at the surgery site, severe pain, a large amount of drainage or bleeding, foul-smelling drainage, redness, swelling).    2.  It has been over 8 to 10 hours since surgery and you are still not able to urinate (pee).    3.   Headache for over 24 hours.    4.  Numbness, tingling or weakness the day after surgery (if you had spinal anesthesia).  To contact a doctor, call _____________________________________ or:      [phone] and ask for the Resident On Call for:          __________________________________________ (answered 24 hours a day)      Emergency Department:  [facility]: [phone]  [facility]: [phone]               Rev. 10/2014

## 2018-05-18 NOTE — BRIEF OP NOTE
Orthopaedic Surgery Brief Op-Note     Patient: Alethea Patel  : 1980  Date of Service: 2018 8:21 AM    Preoperative Diagnosis: Dislocation Of Left Hip Replacement      Postoperative Diagnosis: same    Procedure: Procedure(s):  Closed Reduction Left Hip, and Aspiration Culture    - Wound Class: N/A    Surgeon: Dr. Saleem    Assistants:  Jeff Cifuentes PA-C    Anesthesia: General     Estimated Blood Loss: 2 cc    Specimen:   ID Type Source Tests Collected by Time Destination   1 :  Fluid Hip, Left ANAEROBIC BACTERIAL CULTURE, CELL COUNT WITH DIFFERENTIAL FLUID, FLUID CULTURE AEROBIC BACTERIAL Cheko Saleem MD 2018  7:43 AM           Complications: none    Condition: stable    Findings: please see dictated operative note    Plan:    Hip abduction pillow worn while in bed, hip brace worn while out of bed    WBAT, assistive devices as needed    PT as outpatient    ADAT    Pain control with orals prn    Bowel prophylaxis with senna-docusate    DVT prophylaxis: mechanical only     Disposition: patient may be discharged with  once appropriate discharge criteria have been met    I assisted with positioning, reduction, and closure    Jeff Cifuentes PA-C  Orthopaedic Surgery    Please page me at [phone] with any questions/concerns. If there is no response, if it is a weekend, or if it is during evening hours, please page the orthopaedic surgery resident on call.

## 2018-05-18 NOTE — OP NOTE
Procedure Date: 2018      SURGEON:  Cheko Fernando MD      ASSISTANT:  NARCISO Álvarez       PREOPERATIVE DIAGNOSIS:  Dislocation of left total hip arthroplasty.      POSTOPERATIVE DIAGNOSIS:  Dislocation of left total hip arthroplasty.      PROCEDURE PERFORMED:     1.  Closed reduction of left total hip arthroplasty.   2.  Aspiration of left hip and trochanteric bursa.      INDICATIONS:  This patient who has had a total hip arthroplasty performed 1 month ago had an acute dislocation.  She was performing a squat and stretching exercise when she sustained a posterior dislocation yesterday.  She was transferred to the [facility].  She has been anticoagulated.      DESCRIPTION OF PROCEDURE:  After induction of general anesthesia, the patient was placed on the operating table in the lateral decubitus position.  With traction, adduction and internal rotation, the hip was then reduced without difficulty in a closed manner.  A large amount of swelling was present over the trochanteric bursa, representing hematoma.  For this reason, I performed an aspiration.      After sterile prep, a 20-mL syringe and a 16-gauge needle were used to access the trochanteric bursa from multiple vantage points.  Approximately 100 mL of sanguinous fluid was withdrawn.  Cell count as well as cultures were obtained and sent as well.  A sterile dressing was applied.        The patient was placed in her bed with an abductor pillow and sent to the recovery room in satisfactory condition.            CHEKO FERNANDO MD             D: 2018   T: 2018   MT: KARY      Name:     ELIZABETH ZAPATA   MRN:      [id number]        Account:        [id number]   :      1980           Procedure Date: 2018      Document: [id number]

## 2018-05-18 NOTE — H&P
[facility], Orthopaedic Surgery H&P    Alethea Patel MRN# [id number]   Age: 37 year old YOB: 1980     Date of Admission:  5/17/2018        Assessment and Plan:   Assessment:  37-year-old female with recurrent left HAJA dislocation    Plan:  -Admit to Ortho  -Plan for closed possible open reduction under general anesthesia in operating room tomorrow morning with Dr. Saleem  -Consent obtained and placed in chart  -Nonweightbearing left lower extremity  -Bedrest for now  -Preop labs ordered  -Medicine consult for preoperative clearance and medical comanagement          History of Present Illness:   Patient was seen and examined by me. History, PMH, Meds, SH, complete ROS (10 organ systems) and PE reviewed with patient and prior medical records.      37-year-old female with history of bilateral HAJA and one prior episode of left HAJA dislocation requiring closed reduction under general anesthesia who was transferred from outside hospital for left HAJA dislocation.  Patient reports she was dancing when she felt her hip give out and fell to the floor.  She was unable to bear weight or move her left lower extremity due to hip pain.  Denies numbness, paresthesias, LOC, pain anywhere else in her body.          Past Medical History:     Past Medical History:   Diagnosis Date     Acute lymphoblastic leukemia in remission (H)      Anxiety      Depression      Depressive disorder      Gastro-oesophageal reflux disease      IBS (irritable bowel syndrome)      Osteonecrosis (H)      Osteonecrosis (H)              Past Surgical History:     Past Surgical History:   Procedure Laterality Date     ARTHROPLASTY KNEE Left 1/8/2016    Procedure: ARTHROPLASTY KNEE;  Surgeon: Cheko Saleem MD;  Location: [facility]     ARTHROPLASTY PATELLO-FEMORAL (KNEE)  6/12/2012    Procedure: ARTHROPLASTY PATELLO-FEMORAL (KNEE);  Right Knee Patella Resurfacing;  Surgeon: Cheko Saleem MD;  Location: [facility]     ARTHROPLASTY REVISION HIP  Left 4/3/2018    Procedure: ARTHROPLASTY REVISION HIP;  Conversion of Left Hip Rigo-Arthroplasty to Left Total Hip Replacement;  Surgeon: Cheko Saleem MD;  Location: [facility] OR     C PELVIS/HIP JOINT SURGERY UNLISTED       HIP SURGERY  5/31/2005    Left hip resurfacing hemiarthroplasty     HIP SURGERY  6/5/07     INJECT BLOCK MEDIAL BRANCH CERVICAL/THORACIC/LUMBAR N/A 1/9/2016    Procedure: INJECT BLOCK MEDIAL BRANCH CERVICAL / THORACIC / LUMBAR;  Surgeon: GENERIC ANESTHESIA PROVIDER;  Location: [facility] OR     JOINT REPLACEMENT  2/2/2010    Rt TKA     KNEE SURGERY       ORTHOPEDIC SURGERY      right foot surgery             Social History:     Social History     Social History     Marital status:      Spouse name: N/A     Number of children: N/A     Years of education: N/A     Social History Main Topics     Smoking status: Current Every Day Smoker     Packs/day: 1.00     Years: 10.00     Types: Cigarettes     Start date: 5/29/1997     Smokeless tobacco: Never Used     Alcohol use Yes      Comment: very rare     Drug use: No     Sexual activity: Yes     Partners: Male     Birth control/ protection: None     Other Topics Concern     None     Social History Narrative             Family History:     Family History   Problem Relation Age of Onset     Migraines Son      Soft Tissue Cancer Brother      Low Back Problems Mother               Medications:     Current Facility-Administered Medications   Medication     lactated ringers infusion     morphine (PF) injection 4 mg     ondansetron (ZOFRAN) injection 4 mg     Current Outpatient Prescriptions   Medication Sig     acetaminophen (TYLENOL) 325 MG tablet Take 2 tablets (650 mg) by mouth every 4 hours as needed for other (multimodal surgical pain management along with NSAIDS and opioid medication as indicated based on pain control and physical function.)     albuterol (PROAIR HFA/PROVENTIL HFA/VENTOLIN HFA) 108 (90 BASE) MCG/ACT Inhaler Inhale 2 puffs into the lungs  every 6 hours as needed for shortness of breath / dyspnea or wheezing     B Complex Vitamins (B COMPLEX PO) Take 1 tablet by mouth daily Dose unknown; OTC     BACLOFEN PO Take 20 mg by mouth 4 times daily      BuPROPion HCl (WELLBUTRIN SR PO) Take 150 mg by mouth 2 times daily     DULoxetine (CYMBALTA) 60 MG EC capsule Take 60 mg by mouth 2 times daily      Escitalopram Oxalate (LEXAPRO PO) Take 20 mg by mouth daily     fluticasone (FLONASE) 50 MCG/ACT nasal spray Spray 2 sprays into both nostrils daily as needed for rhinitis or allergies     gabapentin (NEURONTIN) 300 MG capsule Take 900 mg by mouth 3 times daily      levothyroxine (SYNTHROID, LEVOTHROID) 75 MCG tablet Take 75 mcg by mouth daily     LORazepam (ATIVAN) 1 MG tablet Take 1-2 mg by mouth 3 times daily Max 5 tablets daily     METHADONE HCL PO Take 40 mg by mouth every 8 hours     montelukast (SINGULAIR) 10 MG tablet Take 10 mg by mouth At Bedtime     Nutritional Supplements (MELATONIN PO) Take 3 mg by mouth At Bedtime      omeprazole (PRILOSEC OTC) 20 MG tablet Take 20 mg by mouth daily      oxyCODONE IR (ROXICODONE) 30 MG tablet Take 120 mg by mouth every 4 hours as needed for moderate to severe pain     Oxnrgl-FrUcq-TaGmqq-FA-Omega (MULTIVITAMIN/MINERALS PO) Take 1 tablet by mouth daily Dose unknown; OTC     Prochlorperazine Maleate (COMPAZINE PO) Take 10 mg by mouth every 6 hours as needed.       QUEtiapine (SEROQUEL) 25 MG tablet Take by mouth At Bedtime     ranitidine (ZANTAC) 150 MG tablet Take 150 mg by mouth     senna-docusate (SENOKOT-S;PERICOLACE) 8.6-50 MG per tablet Take 2 tablets by mouth 2 times daily     TIZANIDINE HCL PO Take 2 mg by mouth every 6 hours as needed for muscle spasms     order for DME [facility]  [facility]  [phone]     Directions: Advance as Tolerated and Instructed by MD     order for DME Equipment being ordered: Bath Seat ()  Treatment Diagnosis: Limited independence in ADLs/mobility due to hip precautions and  recent hip surgery             Allergies:      Allergies   Allergen Reactions     Chantix [Varenicline] Nausea and Vomiting     Other reaction(s): Vomiting     No Clinical Screening - See Comments      Other reaction(s): Unknown Reaction     Seasonal Allergies      Ciprofloxacin      Other reaction(s): Other  Interacts with other medication     Latex Itching and Rash     Other reaction(s): Other - Describe In Comment Field  Vaginal itching, burning  Other reaction(s): Intolerance            Review of Systems:   A comprehensive 10 point review of systems (constitutional, ENT, cardiac, peripheral vascular, respiratory, GI, , Musculoskeletal, skin, Neurological) was performed and found to be negative except as described in this note.           Physical Exam:   COMPLETE EXAMINATION:   VITAL SIGNS: /82  Pulse 84  Temp 99.1  F (37.3  C) (Oral)  Resp 16  Wt 82.6 kg (182 lb)  SpO2 97%  BMI 26.49 kg/m2  GENERAL: Laying in bed, somnolent, tearful at times  RESP: Non labored breathing  ABD: Diffuse scabbing about lower abdomen  SKIN: Diffuse scabbing about lower abdomen and proximal left thigh  LYMPHATIC:  Grossly normal  NEURO:  Grossly normal   VASCULAR: All 4 extremity pulses 2+ present  MUSCULOSKELETAL:   LLE  Inspection: Diffuse scabbing about the proximal anterior thigh.  Leg held in extension  Palpation: Tender to palpation about the left hip  ROM: Unable to perform range of motion testing due to pain  Strength: Unable to perform formal  strength testing due to pain.  Fires tibialis anterior, gastrocnemius, EHL, FHL  Sensory: SILT L3-S1  Circulation: Palpable dorsalis pedis pulse, warm and well perfused, capillary refill < 2 seconds            Data:   All pertinent laboratory data reviewed  All imaging studies reviewed by me.    Recent Labs   Lab Test  05/17/18 2003 04/05/18   1256  04/04/18   0558   01/11/16   0542   HGB  9.7*  9.2*  8.9*   < >  9.2*   WBC  7.1   --    --    --   10.4    < > = values  in this interval not displayed.     No results for input(s): FTYP, FNEU, FOTH, FCOL, FAPR, FWBC in the last 79668 hours.    Signed:    This consultation has been discussed with Dr. Riley, PGY-4 and has been discussed with Dr. Saleem, Attending Physician.    Signed,  Steven Urena MD  PGY-1, Orthopaedic Surgery  #: [phone]
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
## 2018-05-18 NOTE — ANESTHESIA PREPROCEDURE EVALUATION
Anesthesia Evaluation     . Pt has had prior anesthetic. Type: General    No history of anesthetic complications          ROS/MED HX    ENT/Pulmonary:     (+)tobacco use, Current use 1 packs/day  , . .   (-) recent URI   Neurologic:     (+)seizures last seizure: 2014 features: One sz only,     Cardiovascular:  - neg cardiovascular ROS       METS/Exercise Tolerance:  >4 METS   Hematologic:  - neg hematologic  ROS       Musculoskeletal:   (+) , , other musculoskeletal- Joint issues due to high dose prednisone to treat ALL in 2006      GI/Hepatic:     (+) GERD Asymptomatic on medication,       Renal/Genitourinary:  - ROS Renal section negative       Endo:  - neg endo ROS       Psychiatric:     (+) psychiatric history anxiety and depression      Infectious Disease:  - neg infectious disease ROS       Malignancy:   (+) Malignancy History of Lymphoma/Leukemia  Lymph CA Remission status post Chemo,         Other:    (+) No chance of pregnancy C-spine cleared: N/A, H/O Chronic Pain,H/O chronic opiod use , no other significant disability                    Physical Exam      Airway   Mallampati: II  Neck ROM: full    Dental   (+) missing    Cardiovascular   Rhythm and rate: regular and normal      Pulmonary    breath sounds clear to auscultation                    Anesthesia Plan      History & Physical Review      ASA Status:  3 .    NPO Status:  > 8 hours    Plan for General and ETT with Intravenous induction. Maintenance will be Inhalation.    PONV prophylaxis:  Ondansetron (or other 5HT-3) and Dexamethasone or Solumedrol       Postoperative Care  Postoperative pain management:  IV analgesics and Oral pain medications.      Consents  Anesthetic plan, risks, benefits and alternatives discussed with:  Patient..                          .

## 2018-05-18 NOTE — PHARMACY-ADMISSION MEDICATION HISTORY
Admission medication history interview status for the 5/17/2018 admission is complete. See Epic admission navigator for allergy information, pharmacy, prior to admission medications and immunization status.     Medication history interview sources:  Patient, Patient's family member, pharmacy ([facility])    Changes made to PTA medication list (reason)  Added: None  Deleted: Patient no longer taking the following medications:  Bisacodyl 10mg suppository; Place 1 suppository rectally daily as needed for constipation.  Warfarin 5mg tablet; Take based on INR results  Trazodone HCL 50mg tablet; Take 1 tablet by mouth at bedtime (patient stopped taking due to it making her feel sick)  Metoclopramide 10mg tablet; Take 1 tablet by mouth every 4 hours as needed.  Polyethylene glycol packet; Take 17g by mouth daily.  Changed: Ferrous Sulfate and Ferrous Gluconate changed to Oral Iron Supplement as patient did not know which specific supplement she was taking.  Methadone 30-40mg by mouth every 12 hours changed to 40mg by mouth every 8 hours.  Oxycodone IR 30mg tablet take 30mg-45mg every 3 hours as needed changed to 120mg every 4 hours as needed for pain (not to exceed 16  Tablets)    Additional medication history information (including reliability of information, actions taken by pharmacist):    Patient and family member were moderate historians of the patient's medication history. Called pharmacy to verify medications, doses, and instructions.  Besides her pain medications, patient takes all of her medications at night. This includes her bupropion 150mg and duloxetine 60mg which were both written to be taken twice daily. She instead takes bupropion 300mg and duloxetine 120mg once daily at bedtime.  Patient takes two Lorazepam 1mg tablets at bedtime and occasionally takes them during the day. However, the pharmacy reports the patient fills her lorazepam prescription every 30 days. Uncertain how often she is actually taking  during the day.  Patient takes 3-4 Oxycodone 30mg tablets by mouth four times daily.   Patient's baclofen is written to take 4 times daily scheduled, however she is only taking one 20mg tablet at bedtime.  Lorazepam 1mg tablet last filled on 4/26/2018 for #180.  Oxycodone IR 30mg tablet last filled on 4/26/2018 for #480  Methadone 10mg tablet last filled on 4/26/2018 for #360      Prior to Admission medications    Medication Sig Last Dose Taking? Auth Provider   acetaminophen (TYLENOL) 325 MG tablet Take 2 tablets (650 mg) by mouth every 4 hours as needed for other (multimodal surgical pain management along with NSAIDS and opioid medication as indicated based on pain control and physical function.) Past Week at Unknown time Yes Thomas Rayo   albuterol (PROAIR HFA/PROVENTIL HFA/VENTOLIN HFA) 108 (90 BASE) MCG/ACT Inhaler Inhale 2 puffs into the lungs every 6 hours as needed for shortness of breath / dyspnea or wheezing Past Week at Unknown time Yes Reported, Patient   B Complex Vitamins (B COMPLEX PO) Take 1 tablet by mouth daily Dose unknown; OTC 5/16/2018 at PM Yes Reported, Patient   BACLOFEN PO Take 20 mg by mouth 4 times daily  5/16/2018 at PM Yes Reported, Patient   BuPROPion HCl (WELLBUTRIN SR PO) Take 150 mg by mouth 2 times daily 5/16/2018 at PM Yes Unknown, Entered By History   DULoxetine (CYMBALTA) 60 MG EC capsule Take 60 mg by mouth 2 times daily  5/16/2018 at PM Yes Reported, Patient   Escitalopram Oxalate (LEXAPRO PO) Take 20 mg by mouth daily 5/16/2018 at PM Yes Unknown, Entered By History   fluticasone (FLONASE) 50 MCG/ACT nasal spray Spray 2 sprays into both nostrils daily as needed for rhinitis or allergies 5/16/2018 at PM Yes Reported, Patient   gabapentin (NEURONTIN) 300 MG capsule Take 900 mg by mouth 3 times daily  5/16/2018 at PM Yes Reported, Patient   levothyroxine (SYNTHROID, LEVOTHROID) 75 MCG tablet Take 75 mcg by mouth daily 5/16/2018 at PM Yes Reported, Patient   LORazepam  (ATIVAN) 1 MG tablet Take 1-2 mg by mouth 3 times daily Max 5 tablets daily 5/16/2018 at PM Yes Reported, Patient   METHADONE HCL PO Take 40 mg by mouth every 8 hours 5/16/2018 at PM Yes Unknown, Entered By History   montelukast (SINGULAIR) 10 MG tablet Take 10 mg by mouth At Bedtime 5/16/2018 at PM Yes Reported, Patient   Multiple Vitamins-Minerals (MULTIVITAMIN ADULT PO) Take 1 tablet by mouth daily 5/16/2018 at PM Yes Unknown, Entered By History   Nutritional Supplements (MELATONIN PO) Take 3 mg by mouth At Bedtime  5/16/2018 at PM Yes Reported, Patient   omeprazole (PRILOSEC OTC) 20 MG tablet Take 20 mg by mouth daily  5/16/2018 at PM Yes Reported, Patient   oxyCODONE IR (ROXICODONE) 30 MG tablet Take 120 mg by mouth every 4 hours as needed for moderate to severe pain 5/16/2018 at PM Yes Unknown, Entered By History   POTASSIUM PO Take 1 tablet by mouth daily Dose unknown; OTC 5/16/2018 at PM Yes Unknown, Entered By History   Prochlorperazine Maleate (COMPAZINE PO) Take 10 mg by mouth every 6 hours as needed.   5/16/2018 at PM Yes Reported, Patient   QUEtiapine (SEROQUEL) 25 MG tablet Take 25 mg by mouth At Bedtime  5/16/2018 at PM Yes Reported, Patient   ranitidine (ZANTAC) 150 MG tablet Take 150 mg by mouth At Bedtime  5/16/2018 at PM Yes Reported, Patient   senna-docusate (SENOKOT-S;PERICOLACE) 8.6-50 MG per tablet Take 2 tablets by mouth 2 times daily 5/16/2018 at PM Yes Devante Broderick MD   TIZANIDINE HCL PO Take 2 mg by mouth every 6 hours as needed for muscle spasms 5/16/2018 at PM Yes Unknown, Entered By History   order for DME [facility]  [facility]  [phone]     Directions: Advance as Tolerated and Instructed by Cheko Ireland MD   order for DME Equipment being ordered: Bath Seat ()  Treatment Diagnosis: Limited independence in ADLs/mobility due to hip precautions and recent hip surgery   Cheko Saleem MD         Medication history completed by: Sawyer Davies, Pharmacy  Intern

## 2018-05-18 NOTE — ED NOTES
Turning and repositioning offered and also heel off bed to elevate but pt declined, has been shifting her weight off her buttock in bed and encouraged to continue doing it.

## 2018-05-18 NOTE — OR NURSING
No incision on left hip but, fluid aspirated for culture so several puncture area on hip.  Unable to access due to gauze with elastic tape over in for pressure.  No drainage noted and dressing clean and dry.

## 2018-05-18 NOTE — ANESTHESIA CARE TRANSFER NOTE
Patient: Alethea Patel    Procedure(s):  Closed Reduction Left Hip, and Aspiration Culture    - Wound Class: N/A    Diagnosis: Dislocation Of Left Hip Replacement   Diagnosis Additional Information: No value filed.    Anesthesia Type:   General, ETT     Note:  Airway :Face Mask  Patient transferred to:PACU  Handoff Report: Identifed the Patient, Identified the Reponsible Provider, Reviewed the pertinent medical history, Discussed the surgical course, Reviewed Intra-OP anesthesia mangement and issues during anesthesia, Set expectations for post-procedure period and Allowed opportunity for questions and acknowledgement of understanding      Vitals: (Last set prior to Anesthesia Care Transfer)    CRNA VITALS  5/18/2018 0750 - 5/18/2018 0827      5/18/2018             Pulse: 97    SpO2: 100 %    Resp Rate (observed): 15                Electronically Signed By: VALENTIN Evans CRNA  May 18, 2018  8:27 AM

## 2018-05-21 ENCOUNTER — TELEPHONE (OUTPATIENT)
Dept: ORTHOPEDICS | Facility: CLINIC | Age: 38
End: 2018-05-21

## 2018-05-21 NOTE — DISCHARGE SUMMARY
ORTHOPAEDIC DISCHARGE SUMMARY     Date of Admission: 5/17/2018  Date of Discharge: 5/18/2018 11:06 AM  Disposition: Home  Staff Physician: No att. providers found  Primary Care Provider: Jeevan Boyce [facility]    DISCHARGE DIAGNOSIS:  Dislocation Of Left Hip Replacement     PROCEDURES: Procedure(s):  CLOSED REDUCTION HIP on 5/17/2018 - 5/18/2018    BRIEF HISTORY:  Hx of HAJA who presented dislocated    HOSPITAL COURSE:    Surgery was uncomplicated. Alethea Patel has done well post-operatively. Medicine was consulted post operatively to aid in management of medical comorbidities. See final recommendations below. The patient received routine nursing cares and is medically stable. Vital signs are stable. The patient is tolerating a regular diet without GI distress/nausea or vomiting. Voiding spontaneously. All PT/OT goals have been met for safe mobility. Pain is now controlled on oral medications which will be available on discharge. Stool softeners have been used while taking pain medications to help prevent constipation. Alethea Patel is deemed medically safe to discharge.     Antibiotics:  No incision made   DVT prophylaxis:  mechanical   PT Progress: Has met PT/OT goals for safe mobility.    Pain Meds:  Weaned off all IV pain meds by discharge.  Inpatient Events: No significant events or complications.     Discharge orders and instructions as below.    FOLLOWUP:    Follow up with Dr. Saleem at 4 weeks postoperatively.  No future appointments.    Appointments on [facility]. Call [phone] if you haven't heard regarding these appointments within 7 days of discharge.    PLANNED DISCHARGE ORDERS:           Discharge Medication List as of 5/18/2018 10:10 AM      CONTINUE these medications which have CHANGED    Details   acetaminophen (TYLENOL) 325 MG tablet Take 2 tablets (650 mg) by mouth every 4 hours, Disp-70 tablet, R-0, E-Prescribe         CONTINUE these medications  which have NOT CHANGED    Details   albuterol (PROAIR HFA/PROVENTIL HFA/VENTOLIN HFA) 108 (90 BASE) MCG/ACT Inhaler Inhale 2 puffs into the lungs every 6 hours as needed for shortness of breath / dyspnea or wheezing, Historical      B Complex Vitamins (B COMPLEX PO) Take 1 tablet by mouth daily Dose unknown; OTC, Historical      BACLOFEN PO Take 20 mg by mouth 4 times daily , Historical      BuPROPion HCl (WELLBUTRIN SR PO) Take 150 mg by mouth 2 times daily, Historical      DULoxetine (CYMBALTA) 60 MG EC capsule Take 60 mg by mouth 2 times daily , Historical      Escitalopram Oxalate (LEXAPRO PO) Take 20 mg by mouth daily, Historical      fluticasone (FLONASE) 50 MCG/ACT nasal spray Spray 2 sprays into both nostrils daily as needed for rhinitis or allergies, Historical      gabapentin (NEURONTIN) 300 MG capsule Take 900 mg by mouth 3 times daily , Historical      levothyroxine (SYNTHROID, LEVOTHROID) 75 MCG tablet Take 75 mcg by mouth daily, Historical      LORazepam (ATIVAN) 1 MG tablet Take 1-2 mg by mouth 3 times daily Max 5 tablets daily, Historical      METHADONE HCL PO Take 40 mg by mouth every 8 hours, Historical      montelukast (SINGULAIR) 10 MG tablet Take 10 mg by mouth At Bedtime, Historical      Multiple Vitamins-Minerals (MULTIVITAMIN ADULT PO) Take 1 tablet by mouth daily, Historical      Nutritional Supplements (MELATONIN PO) Take 3 mg by mouth At Bedtime , Historical      omeprazole (PRILOSEC OTC) 20 MG tablet Take 20 mg by mouth daily , Historical      !! order for DME [facility]  [facility]  [phone]     Directions: Advance as Tolerated and Instructed by MDDisp-1 Device, R-0, Local Print      !! order for DME Equipment being ordered: Bath Seat ()  Treatment Diagnosis: Limited independence in ADLs/mobility due to hip precautions and recent hip surgeryDisp-1 each, R-0, Local Print      oxyCODONE IR (ROXICODONE) 30 MG tablet Take 120 mg by mouth every 4 hours as needed for moderate to  severe pain, Historical      POTASSIUM PO Take 1 tablet by mouth daily Dose unknown; OTC, Historical      Prochlorperazine Maleate (COMPAZINE PO) Take 10 mg by mouth every 6 hours as needed.  , Historical      QUEtiapine (SEROQUEL) 25 MG tablet Take 25 mg by mouth At Bedtime , Historical      ranitidine (ZANTAC) 150 MG tablet Take 150 mg by mouth At Bedtime , Historical      senna-docusate (SENOKOT-S;PERICOLACE) 8.6-50 MG per tablet Take 2 tablets by mouth 2 times daily, Disp-30 tablet, R-0, E-Prescribe      TIZANIDINE HCL PO Take 2 mg by mouth every 6 hours as needed for muscle spasms, Historical       !! - Potential duplicate medications found. Please discuss with provider.      STOP taking these medications       bisacodyl (DULCOLAX) 10 MG Suppository Comments:   Reason for Stopping:         polyethylene glycol (MIRALAX/GLYCOLAX) Packet Comments:   Reason for Stopping:                 Discharge Procedure Orders  Discharge Instructions   Order Comments: SAME DAY SURGERY POST OPERATIVE DISCHARGE INSTRUCTIONS    FOLLOW UP APPOINTMENT  Please call [phone], option #1, during weekday business hours 8 am - 4:30 pm to schedule your follow up appointment. If you had a biopsy or soft tissue excisions, schedule your appointment two weeks after surgery. If you had an arthroscopic procedure or core decompression, schedule your appointment four weeks after surgery.    Your follow up appointments will be at the location that you regularly see Dr. Saleem:    [facility]  [address] [place] [id number]  [phone]      ACTIVITY  You may participate in activities as tolerated     Exercises:   Perform the following exercises at least three times per day for the first two weeks after surgery to prevent complications, such as blood clots in your legs:  1) Point and flex your feet  2) Move your ankle around in big circles  3) Wiggle your toes   Also, perform thigh  muscle tightening exercises for 10 to 15 minutes at least three times per day for the first four weeks after surgery.    Athletic Activities:  Activities such as swimming, bicycling, jogging, running, and stop-and-go sports should be avoided until permitted by your provider.    Hip Abduction pillow and brace:  The abduction pillow should be worn at all times while in bed. The brace should be worn at all times while out of bed.    Driving:  Driving is not permitted until directed by your provider. Under no circumstance are you permitted to drive while using narcotic pain medications.    Return to Work:  You may return to work when directed by your provider.       COMFORT AND PAIN MANAGEMENT  Elevation:   During times of inactivity throughout the first two weeks after surgery, make an effort to decrease swelling by elevating your operative extremity.     Icing:  An ice pack will be provided to control swelling and discomfort after surgery. Place a thin towel on your skin and apply the ice pack overtop. You may apply ice for 20 minutes as often as two times per hour.    Pain Medications:  You will be discharged with acetaminophen (Tylenol). Acetaminophen can help to effectively manage pain when used as prescribed, however, do not exceed the maximum daily dose of 3000 mg. Your previously prescribed narcotic pain medication should be reserved for severe, breakthrough pain. Take the narcotic medication as prescribed and use only as often as necessary.       ANTICOAGULATION  None      WOUND CARE AND SHOWERING  Wound care:  Leave the initial dressing on for at least three days after surgery. If the incision is leaking any fluid or blood through or around the bandage, remove the existing bandage and replace it with gauze and tape. Be sure to wash your hands before and after and use gloves if available. A bandage is usually only necessary for three days, but it is best to keep it on for five days if possible.      Showering:  You may begin taking showers on the third day after surgery as long as the incision is dry. If there is drainage from the incision, cover the site with gauze and tape and keep it dry while showering. If there is not drainage, getting the wound wet while showering is acceptable. To dry, gently pat the incision dry and reapply dressing as needed.     Tub Bathing:  Tub bathing, swimming, or any other activities that cause your incision to be submerged should be avoided until allowed by your provider. Typically, patients are allowed to return to these activities four weeks after surgery.      DIET  Move to a regular diet as you are able and be sure to drink plenty of fluids. If you feel nauseous or sick to your stomach, drink only clear liquids such as apple juice, ginger ale, broth, or 7-UP.      RECOMMENDATIONS FOR A SUCCESSFUL RECOVERY  A responsible adult should accompany you for the first 24 hours after surgery. Avoid strenuous or risky activities and ask for assistance climbing stairs. If you feel lightheaded during your recovery, sit down for a few minutes and ask someone for help before getting up. Get plenty of rest after surgery and avoid alcohol. Do not make important legal decisions during your recovery.       CONTACTING YOUR PHYSICIAN:  You may experience symptoms that require follow-up before your scheduled appointment. Please contact Dr. Saleem's office if you experience:  1) Signs of infection such as fever (temperature >100.4 F or 38 C), growing tenderness at the surgery site, a large amount of drainage or bleeding, severe pain, foul-smelling drainage, redness, and/or swelling.  2) If it has been over 8-10 hours since surgery, and you still have not urinated (or passed water).   3) Headache for over 24 hours.   4) Numbness, tingling, or weakness the day after surgery (if you had spinal anesthesia).       Regular business hours (Monday - Friday, 8am - 5pm):  [facility]  [facility]: [phone]    If you have any other concerns during normal business hours, please contact Dr. Saleem's nurse, Dariana Krause RN, at [phone] during normal business hours 8 am - 5 pm (leave message as needed). If your concern is urgent, please page Dariana Krause RN at [phone] and key in your 10 digit phone number followed by the # sign after the beep.     After hours and weekends:  [facility] on call Orthopedic resident: [phone]    If you have an emergent concern, contact the Emergency Department at the [place] - [phone] - or [place] - [phone] - [place].     No driving or operating machinery   Order Comments: While using narcotic medication     No Alcohol   Order Comments: While using narcotic medication         Ronald Nash MD  PGY-4 Orthopaedic Surgery

## 2018-05-21 NOTE — TELEPHONE ENCOUNTER
[facility] Call Center    Phone Message    May a detailed message be left on voicemail: yes    Reason for Call: Other: Would like to discuss discharge instructions post hospital with either Dariana or Dr. Saleem.     Action Taken: Message routed to:  [facility] ([facility] Orthopedics

## 2018-05-21 NOTE — TELEPHONE ENCOUNTER
Patient was contacted regarding her post-operative questions.  Patient advised to refrain from driving as directed by Dr. Saleem and advised that she cannot drive while taking pain medications.  Patient was advised to leave the dressing in place for 5 days post-operatively.  Patient questions were answered to her satisfaction.    Mounika Vazquez LPN

## 2018-05-22 ENCOUNTER — APPOINTMENT (OUTPATIENT)
Dept: GENERAL RADIOLOGY | Facility: CLINIC | Age: 38
DRG: 565 | End: 2018-05-22
Attending: EMERGENCY MEDICINE
Payer: MEDICARE

## 2018-05-22 ENCOUNTER — HOSPITAL ENCOUNTER (INPATIENT)
Facility: CLINIC | Age: 38
LOS: 1 days | Discharge: LEFT BEFORE DISCHARGE | DRG: 565 | End: 2018-05-23
Attending: EMERGENCY MEDICINE | Admitting: ORTHOPAEDIC SURGERY
Payer: MEDICARE

## 2018-05-22 ENCOUNTER — TELEPHONE (OUTPATIENT)
Dept: ORTHOPEDICS | Facility: CLINIC | Age: 38
End: 2018-05-22

## 2018-05-22 ENCOUNTER — NURSE TRIAGE (OUTPATIENT)
Dept: NURSING | Facility: CLINIC | Age: 38
End: 2018-05-22

## 2018-05-22 DIAGNOSIS — S73.005S: ICD-10-CM

## 2018-05-22 PROBLEM — Z96.649 FAILED TOTAL HIP ARTHROPLASTY WITH DISLOCATION (H): Status: ACTIVE | Noted: 2018-05-22

## 2018-05-22 PROBLEM — T84.028A FAILED TOTAL HIP ARTHROPLASTY WITH DISLOCATION (H): Status: ACTIVE | Noted: 2018-05-22

## 2018-05-22 LAB
ABO + RH BLD: NORMAL
ABO + RH BLD: NORMAL
ANION GAP SERPL CALCULATED.3IONS-SCNC: 7 MMOL/L (ref 3–14)
BASOPHILS # BLD AUTO: 0 10E9/L (ref 0–0.2)
BASOPHILS NFR BLD AUTO: 0.2 %
BLD GP AB SCN SERPL QL: NORMAL
BLOOD BANK CMNT PATIENT-IMP: NORMAL
BUN SERPL-MCNC: 9 MG/DL (ref 7–30)
CALCIUM SERPL-MCNC: 8.6 MG/DL (ref 8.5–10.1)
CHLORIDE SERPL-SCNC: 108 MMOL/L (ref 94–109)
CO2 SERPL-SCNC: 29 MMOL/L (ref 20–32)
CREAT SERPL-MCNC: 0.89 MG/DL (ref 0.52–1.04)
CRP SERPL-MCNC: 10.7 MG/L (ref 0–8)
DIFFERENTIAL METHOD BLD: ABNORMAL
EOSINOPHIL # BLD AUTO: 0.3 10E9/L (ref 0–0.7)
EOSINOPHIL NFR BLD AUTO: 2.7 %
ERYTHROCYTE [DISTWIDTH] IN BLOOD BY AUTOMATED COUNT: 13.5 % (ref 10–15)
GFR SERPL CREATININE-BSD FRML MDRD: 70 ML/MIN/1.7M2
GLUCOSE SERPL-MCNC: 99 MG/DL (ref 70–99)
HCT VFR BLD AUTO: 34.7 % (ref 35–47)
HGB BLD-MCNC: 11.3 G/DL (ref 11.7–15.7)
IMM GRANULOCYTES # BLD: 0 10E9/L (ref 0–0.4)
IMM GRANULOCYTES NFR BLD: 0.2 %
INR PPP: 0.97 (ref 0.86–1.14)
LYMPHOCYTES # BLD AUTO: 3 10E9/L (ref 0.8–5.3)
LYMPHOCYTES NFR BLD AUTO: 32.3 %
MCH RBC QN AUTO: 29 PG (ref 26.5–33)
MCHC RBC AUTO-ENTMCNC: 32.6 G/DL (ref 31.5–36.5)
MCV RBC AUTO: 89 FL (ref 78–100)
MONOCYTES # BLD AUTO: 0.5 10E9/L (ref 0–1.3)
MONOCYTES NFR BLD AUTO: 5.4 %
NEUTROPHILS # BLD AUTO: 5.5 10E9/L (ref 1.6–8.3)
NEUTROPHILS NFR BLD AUTO: 59.2 %
NRBC # BLD AUTO: 0 10*3/UL
NRBC BLD AUTO-RTO: 0 /100
PLATELET # BLD AUTO: 226 10E9/L (ref 150–450)
POTASSIUM SERPL-SCNC: 4.3 MMOL/L (ref 3.4–5.3)
RBC # BLD AUTO: 3.9 10E12/L (ref 3.8–5.2)
SODIUM SERPL-SCNC: 144 MMOL/L (ref 133–144)
SPECIMEN EXP DATE BLD: NORMAL
WBC # BLD AUTO: 9.2 10E9/L (ref 4–11)

## 2018-05-22 PROCEDURE — A9270 NON-COVERED ITEM OR SERVICE: HCPCS | Mod: GY | Performed by: STUDENT IN AN ORGANIZED HEALTH CARE EDUCATION/TRAINING PROGRAM

## 2018-05-22 PROCEDURE — 73502 X-RAY EXAM HIP UNI 2-3 VIEWS: CPT

## 2018-05-22 PROCEDURE — 25000132 ZZH RX MED GY IP 250 OP 250 PS 637: Mod: GY | Performed by: STUDENT IN AN ORGANIZED HEALTH CARE EDUCATION/TRAINING PROGRAM

## 2018-05-22 PROCEDURE — 99285 EMERGENCY DEPT VISIT HI MDM: CPT | Mod: 25 | Performed by: EMERGENCY MEDICINE

## 2018-05-22 PROCEDURE — 85610 PROTHROMBIN TIME: CPT | Performed by: EMERGENCY MEDICINE

## 2018-05-22 PROCEDURE — 86850 RBC ANTIBODY SCREEN: CPT | Performed by: STUDENT IN AN ORGANIZED HEALTH CARE EDUCATION/TRAINING PROGRAM

## 2018-05-22 PROCEDURE — 99222 1ST HOSP IP/OBS MODERATE 55: CPT | Performed by: INTERNAL MEDICINE

## 2018-05-22 PROCEDURE — 96374 THER/PROPH/DIAG INJ IV PUSH: CPT | Performed by: EMERGENCY MEDICINE

## 2018-05-22 PROCEDURE — 85025 COMPLETE CBC W/AUTO DIFF WBC: CPT | Performed by: EMERGENCY MEDICINE

## 2018-05-22 PROCEDURE — 86900 BLOOD TYPING SEROLOGIC ABO: CPT | Performed by: STUDENT IN AN ORGANIZED HEALTH CARE EDUCATION/TRAINING PROGRAM

## 2018-05-22 PROCEDURE — 12000001 ZZH R&B MED SURG/OB UMMC

## 2018-05-22 PROCEDURE — 86140 C-REACTIVE PROTEIN: CPT | Performed by: EMERGENCY MEDICINE

## 2018-05-22 PROCEDURE — 25000128 H RX IP 250 OP 636: Performed by: EMERGENCY MEDICINE

## 2018-05-22 PROCEDURE — 80048 BASIC METABOLIC PNL TOTAL CA: CPT | Performed by: EMERGENCY MEDICINE

## 2018-05-22 PROCEDURE — 86901 BLOOD TYPING SEROLOGIC RH(D): CPT | Performed by: STUDENT IN AN ORGANIZED HEALTH CARE EDUCATION/TRAINING PROGRAM

## 2018-05-22 PROCEDURE — 99285 EMERGENCY DEPT VISIT HI MDM: CPT | Mod: Z6 | Performed by: EMERGENCY MEDICINE

## 2018-05-22 PROCEDURE — 25000132 ZZH RX MED GY IP 250 OP 250 PS 637: Performed by: EMERGENCY MEDICINE

## 2018-05-22 RX ORDER — LORAZEPAM 0.5 MG/1
1-2 TABLET ORAL 3 TIMES DAILY
Status: DISCONTINUED | OUTPATIENT
Start: 2018-05-22 | End: 2018-05-23 | Stop reason: HOSPADM

## 2018-05-22 RX ORDER — BUPROPION HYDROCHLORIDE 150 MG/1
150 TABLET, EXTENDED RELEASE ORAL 2 TIMES DAILY
Status: DISCONTINUED | OUTPATIENT
Start: 2018-05-22 | End: 2018-05-23 | Stop reason: HOSPADM

## 2018-05-22 RX ORDER — PROCHLORPERAZINE 25 MG
25 SUPPOSITORY, RECTAL RECTAL EVERY 12 HOURS PRN
Status: DISCONTINUED | OUTPATIENT
Start: 2018-05-22 | End: 2018-05-23 | Stop reason: HOSPADM

## 2018-05-22 RX ORDER — BACLOFEN 20 MG/1
20 TABLET ORAL 4 TIMES DAILY
Status: DISCONTINUED | OUTPATIENT
Start: 2018-05-22 | End: 2018-05-23 | Stop reason: HOSPADM

## 2018-05-22 RX ORDER — AMOXICILLIN 250 MG
2 CAPSULE ORAL 2 TIMES DAILY
Status: DISCONTINUED | OUTPATIENT
Start: 2018-05-22 | End: 2018-05-22

## 2018-05-22 RX ORDER — ONDANSETRON 4 MG/1
4 TABLET, ORALLY DISINTEGRATING ORAL EVERY 6 HOURS PRN
Status: DISCONTINUED | OUTPATIENT
Start: 2018-05-22 | End: 2018-05-23 | Stop reason: HOSPADM

## 2018-05-22 RX ORDER — ACETAMINOPHEN 325 MG/1
650 TABLET ORAL EVERY 4 HOURS PRN
Status: DISCONTINUED | OUTPATIENT
Start: 2018-05-22 | End: 2018-05-23 | Stop reason: HOSPADM

## 2018-05-22 RX ORDER — METOCLOPRAMIDE HYDROCHLORIDE 5 MG/ML
10 INJECTION INTRAMUSCULAR; INTRAVENOUS EVERY 6 HOURS PRN
Status: DISCONTINUED | OUTPATIENT
Start: 2018-05-22 | End: 2018-05-23 | Stop reason: HOSPADM

## 2018-05-22 RX ORDER — ALBUTEROL SULFATE 90 UG/1
2 AEROSOL, METERED RESPIRATORY (INHALATION) EVERY 6 HOURS PRN
Status: DISCONTINUED | OUTPATIENT
Start: 2018-05-22 | End: 2018-05-23 | Stop reason: HOSPADM

## 2018-05-22 RX ORDER — FLUTICASONE PROPIONATE 50 MCG
2 SPRAY, SUSPENSION (ML) NASAL DAILY PRN
Status: DISCONTINUED | OUTPATIENT
Start: 2018-05-22 | End: 2018-05-23 | Stop reason: HOSPADM

## 2018-05-22 RX ORDER — BISACODYL 10 MG
10 SUPPOSITORY, RECTAL RECTAL DAILY PRN
Status: DISCONTINUED | OUTPATIENT
Start: 2018-05-22 | End: 2018-05-23 | Stop reason: HOSPADM

## 2018-05-22 RX ORDER — PROCHLORPERAZINE MALEATE 10 MG
10 TABLET ORAL EVERY 6 HOURS PRN
Status: DISCONTINUED | OUTPATIENT
Start: 2018-05-22 | End: 2018-05-22

## 2018-05-22 RX ORDER — AMOXICILLIN 250 MG
1 CAPSULE ORAL 2 TIMES DAILY PRN
Status: DISCONTINUED | OUTPATIENT
Start: 2018-05-22 | End: 2018-05-23 | Stop reason: HOSPADM

## 2018-05-22 RX ORDER — LANOLIN ALCOHOL/MO/W.PET/CERES
3 CREAM (GRAM) TOPICAL AT BEDTIME
Status: DISCONTINUED | OUTPATIENT
Start: 2018-05-22 | End: 2018-05-23 | Stop reason: HOSPADM

## 2018-05-22 RX ORDER — GABAPENTIN 300 MG/1
900 CAPSULE ORAL 3 TIMES DAILY
Status: DISCONTINUED | OUTPATIENT
Start: 2018-05-23 | End: 2018-05-23 | Stop reason: HOSPADM

## 2018-05-22 RX ORDER — TIZANIDINE 2 MG/1
2 TABLET ORAL EVERY 6 HOURS PRN
Status: DISCONTINUED | OUTPATIENT
Start: 2018-05-22 | End: 2018-05-23 | Stop reason: HOSPADM

## 2018-05-22 RX ORDER — AMOXICILLIN 250 MG
2 CAPSULE ORAL 2 TIMES DAILY PRN
Status: DISCONTINUED | OUTPATIENT
Start: 2018-05-22 | End: 2018-05-23 | Stop reason: HOSPADM

## 2018-05-22 RX ORDER — SODIUM CHLORIDE 9 MG/ML
INJECTION, SOLUTION INTRAVENOUS CONTINUOUS
Status: DISCONTINUED | OUTPATIENT
Start: 2018-05-22 | End: 2018-05-23

## 2018-05-22 RX ORDER — OXYCODONE HYDROCHLORIDE 30 MG/1
120 TABLET ORAL EVERY 4 HOURS PRN
Status: DISCONTINUED | OUTPATIENT
Start: 2018-05-22 | End: 2018-05-23

## 2018-05-22 RX ORDER — DULOXETIN HYDROCHLORIDE 60 MG/1
60 CAPSULE, DELAYED RELEASE ORAL 2 TIMES DAILY
Status: DISCONTINUED | OUTPATIENT
Start: 2018-05-22 | End: 2018-05-23 | Stop reason: HOSPADM

## 2018-05-22 RX ORDER — METHADONE HYDROCHLORIDE 10 MG/1
40 TABLET ORAL EVERY 8 HOURS
Status: DISCONTINUED | OUTPATIENT
Start: 2018-05-22 | End: 2018-05-23 | Stop reason: HOSPADM

## 2018-05-22 RX ORDER — PROCHLORPERAZINE MALEATE 10 MG
10 TABLET ORAL EVERY 6 HOURS PRN
Status: DISCONTINUED | OUTPATIENT
Start: 2018-05-22 | End: 2018-05-23 | Stop reason: HOSPADM

## 2018-05-22 RX ORDER — AMOXICILLIN 250 MG
2 CAPSULE ORAL 2 TIMES DAILY
Status: DISCONTINUED | OUTPATIENT
Start: 2018-05-22 | End: 2018-05-23 | Stop reason: HOSPADM

## 2018-05-22 RX ORDER — METOCLOPRAMIDE 10 MG/1
10 TABLET ORAL EVERY 6 HOURS PRN
Status: DISCONTINUED | OUTPATIENT
Start: 2018-05-22 | End: 2018-05-23 | Stop reason: HOSPADM

## 2018-05-22 RX ORDER — MONTELUKAST SODIUM 10 MG/1
10 TABLET ORAL AT BEDTIME
Status: DISCONTINUED | OUTPATIENT
Start: 2018-05-22 | End: 2018-05-23 | Stop reason: HOSPADM

## 2018-05-22 RX ORDER — AMOXICILLIN 250 MG
1 CAPSULE ORAL 2 TIMES DAILY
Status: DISCONTINUED | OUTPATIENT
Start: 2018-05-22 | End: 2018-05-23 | Stop reason: HOSPADM

## 2018-05-22 RX ORDER — QUETIAPINE FUMARATE 25 MG/1
25 TABLET, FILM COATED ORAL AT BEDTIME
Status: DISCONTINUED | OUTPATIENT
Start: 2018-05-22 | End: 2018-05-23 | Stop reason: HOSPADM

## 2018-05-22 RX ORDER — ESCITALOPRAM OXALATE 20 MG/1
20 TABLET ORAL DAILY
Status: DISCONTINUED | OUTPATIENT
Start: 2018-05-23 | End: 2018-05-23 | Stop reason: HOSPADM

## 2018-05-22 RX ORDER — HYDROMORPHONE HYDROCHLORIDE 1 MG/ML
.3-.5 INJECTION, SOLUTION INTRAMUSCULAR; INTRAVENOUS; SUBCUTANEOUS
Status: DISCONTINUED | OUTPATIENT
Start: 2018-05-22 | End: 2018-05-23

## 2018-05-22 RX ORDER — NALOXONE HYDROCHLORIDE 0.4 MG/ML
.1-.4 INJECTION, SOLUTION INTRAMUSCULAR; INTRAVENOUS; SUBCUTANEOUS
Status: DISCONTINUED | OUTPATIENT
Start: 2018-05-22 | End: 2018-05-23 | Stop reason: HOSPADM

## 2018-05-22 RX ORDER — HYDROXYZINE HYDROCHLORIDE 50 MG/1
50 TABLET, FILM COATED ORAL ONCE
Status: COMPLETED | OUTPATIENT
Start: 2018-05-22 | End: 2018-05-22

## 2018-05-22 RX ORDER — LEVOTHYROXINE SODIUM 75 UG/1
75 TABLET ORAL DAILY
Status: DISCONTINUED | OUTPATIENT
Start: 2018-05-23 | End: 2018-05-23 | Stop reason: HOSPADM

## 2018-05-22 RX ORDER — ONDANSETRON 2 MG/ML
4 INJECTION INTRAMUSCULAR; INTRAVENOUS EVERY 6 HOURS PRN
Status: DISCONTINUED | OUTPATIENT
Start: 2018-05-22 | End: 2018-05-23 | Stop reason: HOSPADM

## 2018-05-22 RX ADMIN — MELATONIN TAB 3 MG 3 MG: 3 TAB at 23:08

## 2018-05-22 RX ADMIN — RANITIDINE 150 MG: 150 TABLET, FILM COATED ORAL at 23:08

## 2018-05-22 RX ADMIN — HYDROMORPHONE HYDROCHLORIDE 1 MG: 1 INJECTION, SOLUTION INTRAMUSCULAR; INTRAVENOUS; SUBCUTANEOUS at 20:44

## 2018-05-22 RX ADMIN — DULOXETINE HYDROCHLORIDE 60 MG: 60 CAPSULE, DELAYED RELEASE ORAL at 23:08

## 2018-05-22 RX ADMIN — QUETIAPINE FUMARATE 25 MG: 25 TABLET ORAL at 23:08

## 2018-05-22 RX ADMIN — MONTELUKAST SODIUM 10 MG: 10 TABLET, FILM COATED ORAL at 23:08

## 2018-05-22 RX ADMIN — HYDROXYZINE HYDROCHLORIDE 50 MG: 50 TABLET, FILM COATED ORAL at 20:45

## 2018-05-22 NOTE — TELEPHONE ENCOUNTER
"  Reason for Disposition    Can't stand (bear weight) or walk     \"I was there (ER) last week for a dislocated hip(see epic). I dislocated it again this morning. I was in my recliner, dropped my cigarette , went to reach for it and it popped back out. I can't stand on it at all. I need to come back in.\" Denies other sx. Advised ER.    Additional Information    Negative: Looks like a broken bone or dislocated joint (e.g., crooked or deformed)    Negative: Sounds like a life-threatening emergency to the triager    Negative: Followed a hip injury    Negative: Leg pain is main symptom    Negative: Back pain radiating (shooting) into hip    Negative: [1] SEVERE pain (e.g., excruciating, unable to do any normal activities) AND [2] fever    Protocols used: HIP PAIN-ADULT-    "

## 2018-05-22 NOTE — TELEPHONE ENCOUNTER
"    [facility]:  Nursing Note  SITUATION                                                      Alethea Patel is a 37 year old female who calls with c/o possible dislocation of left hip.    BACKGROUND                                                      Patient is s/p closed reduction of left total hip arthroplasty and aspiration of left hip and trochanteric bursa on 5/18/2018.    Date of last clinic appointment: on 4/19/2018 with fellow.    Does patient have a future appointment scheduled?  Yes -  next appointment is on: 6/14/2018    Operative note reviewed in EPIC    ASSESSMENT      Patient states at 0845 this morning she \"popped her leg out\" reaching down to the floor to  a dropped item.  Patient has h/o hip dislocations with most recent reduction last week.    PLAN                                                      Nursing Interventions: In-basket staff message sent to REJI Morejon as notification of this call.  RECOMMENDED DISPOSITION: Call 911 - due to stairs in the home and need to stabilization of hip, patient was advised to call 911 and be seen in the ED today.  Patient states she will be seen at the [place] ED.  Will comply with recommendation: Yes    Patient/family can be reached at: N/A    If further questions/concerns or if symptoms do not improve, worsen or new symptoms develop, patient/family are advised to call [phone].    Mounika Vazquez LPN  "

## 2018-05-23 ENCOUNTER — ANESTHESIA (OUTPATIENT)
Dept: SURGERY | Facility: CLINIC | Age: 38
DRG: 565 | End: 2018-05-23
Payer: MEDICARE

## 2018-05-23 ENCOUNTER — APPOINTMENT (OUTPATIENT)
Dept: GENERAL RADIOLOGY | Facility: CLINIC | Age: 38
DRG: 565 | End: 2018-05-23
Attending: ORTHOPAEDIC SURGERY
Payer: MEDICARE

## 2018-05-23 ENCOUNTER — DOCUMENTATION ONLY (OUTPATIENT)
Dept: ORTHOPEDICS | Facility: CLINIC | Age: 38
End: 2018-05-23

## 2018-05-23 ENCOUNTER — ANESTHESIA EVENT (OUTPATIENT)
Dept: SURGERY | Facility: CLINIC | Age: 38
DRG: 565 | End: 2018-05-23
Payer: MEDICARE

## 2018-05-23 VITALS
TEMPERATURE: 98.6 F | HEIGHT: 69 IN | OXYGEN SATURATION: 96 % | SYSTOLIC BLOOD PRESSURE: 91 MMHG | DIASTOLIC BLOOD PRESSURE: 51 MMHG | BODY MASS INDEX: 26.96 KG/M2 | RESPIRATION RATE: 16 BRPM | WEIGHT: 182 LBS

## 2018-05-23 DIAGNOSIS — S73.005D DISLOCATION OF LEFT HIP, SUBSEQUENT ENCOUNTER: Primary | ICD-10-CM

## 2018-05-23 PROBLEM — S73.005A HIP DISLOCATION, LEFT (H): Status: ACTIVE | Noted: 2018-05-23

## 2018-05-23 LAB
ALBUMIN UR-MCNC: NEGATIVE MG/DL
APPEARANCE UR: CLEAR
BACTERIA SPEC CULT: NO GROWTH
BILIRUB UR QL STRIP: NEGATIVE
COLOR UR AUTO: YELLOW
GLUCOSE UR STRIP-MCNC: NEGATIVE MG/DL
HCG UR QL: NEGATIVE
HGB UR QL STRIP: ABNORMAL
HYALINE CASTS #/AREA URNS LPF: 3 /LPF (ref 0–2)
KETONES UR STRIP-MCNC: NEGATIVE MG/DL
LEUKOCYTE ESTERASE UR QL STRIP: ABNORMAL
MUCOUS THREADS #/AREA URNS LPF: PRESENT /LPF
NITRATE UR QL: NEGATIVE
PH UR STRIP: 6.5 PH (ref 5–7)
RBC #/AREA URNS AUTO: 4 /HPF (ref 0–2)
SOURCE: ABNORMAL
SP GR UR STRIP: 1.02 (ref 1–1.03)
SPECIMEN SOURCE: NORMAL
SQUAMOUS #/AREA URNS AUTO: <1 /HPF (ref 0–1)
UROBILINOGEN UR STRIP-MCNC: 2 MG/DL (ref 0–2)
WBC #/AREA URNS AUTO: 9 /HPF (ref 0–5)

## 2018-05-23 PROCEDURE — 99222 1ST HOSP IP/OBS MODERATE 55: CPT | Performed by: PHYSICIAN ASSISTANT

## 2018-05-23 PROCEDURE — 25000128 H RX IP 250 OP 636: Performed by: NURSE ANESTHETIST, CERTIFIED REGISTERED

## 2018-05-23 PROCEDURE — 40000170 ZZH STATISTIC PRE-PROCEDURE ASSESSMENT II: Performed by: ORTHOPAEDIC SURGERY

## 2018-05-23 PROCEDURE — 0SWBXJZ REVISION OF SYNTHETIC SUBSTITUTE IN LEFT HIP JOINT, EXTERNAL APPROACH: ICD-10-PCS | Performed by: ORTHOPAEDIC SURGERY

## 2018-05-23 PROCEDURE — 25000566 ZZH SEVOFLURANE, EA 15 MIN: Performed by: ORTHOPAEDIC SURGERY

## 2018-05-23 PROCEDURE — 25000125 ZZHC RX 250: Performed by: NURSE ANESTHETIST, CERTIFIED REGISTERED

## 2018-05-23 PROCEDURE — 37000009 ZZH ANESTHESIA TECHNICAL FEE, EACH ADDTL 15 MIN: Performed by: ORTHOPAEDIC SURGERY

## 2018-05-23 PROCEDURE — 40000278 XR SURGERY CARM FLUORO LESS THAN 5 MIN: Mod: TC

## 2018-05-23 PROCEDURE — A9270 NON-COVERED ITEM OR SERVICE: HCPCS | Mod: GY | Performed by: STUDENT IN AN ORGANIZED HEALTH CARE EDUCATION/TRAINING PROGRAM

## 2018-05-23 PROCEDURE — 36000055 ZZH SURGERY LEVEL 2 W FLUORO 1ST 30 MIN - UMMC: Performed by: ORTHOPAEDIC SURGERY

## 2018-05-23 PROCEDURE — 99232 SBSQ HOSP IP/OBS MODERATE 35: CPT | Performed by: INTERNAL MEDICINE

## 2018-05-23 PROCEDURE — 25000128 H RX IP 250 OP 636: Performed by: ANESTHESIOLOGY

## 2018-05-23 PROCEDURE — 71000014 ZZH RECOVERY PHASE 1 LEVEL 2 FIRST HR: Performed by: ORTHOPAEDIC SURGERY

## 2018-05-23 PROCEDURE — 81025 URINE PREGNANCY TEST: CPT | Performed by: ORTHOPAEDIC SURGERY

## 2018-05-23 PROCEDURE — 25000132 ZZH RX MED GY IP 250 OP 250 PS 637: Mod: GY | Performed by: STUDENT IN AN ORGANIZED HEALTH CARE EDUCATION/TRAINING PROGRAM

## 2018-05-23 PROCEDURE — 37000008 ZZH ANESTHESIA TECHNICAL FEE, 1ST 30 MIN: Performed by: ORTHOPAEDIC SURGERY

## 2018-05-23 PROCEDURE — 81001 URINALYSIS AUTO W/SCOPE: CPT | Performed by: INTERNAL MEDICINE

## 2018-05-23 PROCEDURE — 25000128 H RX IP 250 OP 636: Performed by: STUDENT IN AN ORGANIZED HEALTH CARE EDUCATION/TRAINING PROGRAM

## 2018-05-23 PROCEDURE — C9399 UNCLASSIFIED DRUGS OR BIOLOG: HCPCS | Performed by: NURSE ANESTHETIST, CERTIFIED REGISTERED

## 2018-05-23 PROCEDURE — 36000053 ZZH SURGERY LEVEL 2 EA 15 ADDTL MIN - UMMC: Performed by: ORTHOPAEDIC SURGERY

## 2018-05-23 PROCEDURE — 99207 ZZC CONSULT E&M CHANGED TO INITIAL LEVEL: CPT | Performed by: PHYSICIAN ASSISTANT

## 2018-05-23 RX ORDER — ONDANSETRON 2 MG/ML
4 INJECTION INTRAMUSCULAR; INTRAVENOUS EVERY 30 MIN PRN
Status: DISCONTINUED | OUTPATIENT
Start: 2018-05-23 | End: 2018-05-23 | Stop reason: HOSPADM

## 2018-05-23 RX ORDER — LIDOCAINE HYDROCHLORIDE 20 MG/ML
INJECTION, SOLUTION INFILTRATION; PERINEURAL PRN
Status: DISCONTINUED | OUTPATIENT
Start: 2018-05-23 | End: 2018-05-23

## 2018-05-23 RX ORDER — NALOXONE HYDROCHLORIDE 0.4 MG/ML
.1-.4 INJECTION, SOLUTION INTRAMUSCULAR; INTRAVENOUS; SUBCUTANEOUS
Status: DISCONTINUED | OUTPATIENT
Start: 2018-05-23 | End: 2018-05-23 | Stop reason: HOSPADM

## 2018-05-23 RX ORDER — PROPOFOL 10 MG/ML
INJECTION, EMULSION INTRAVENOUS PRN
Status: DISCONTINUED | OUTPATIENT
Start: 2018-05-23 | End: 2018-05-23

## 2018-05-23 RX ORDER — SODIUM CHLORIDE, SODIUM LACTATE, POTASSIUM CHLORIDE, CALCIUM CHLORIDE 600; 310; 30; 20 MG/100ML; MG/100ML; MG/100ML; MG/100ML
INJECTION, SOLUTION INTRAVENOUS CONTINUOUS
Status: DISCONTINUED | OUTPATIENT
Start: 2018-05-23 | End: 2018-05-23 | Stop reason: HOSPADM

## 2018-05-23 RX ORDER — NALOXONE HYDROCHLORIDE 0.4 MG/ML
.1-.4 INJECTION, SOLUTION INTRAMUSCULAR; INTRAVENOUS; SUBCUTANEOUS
Status: DISCONTINUED | OUTPATIENT
Start: 2018-05-23 | End: 2018-05-23

## 2018-05-23 RX ORDER — FENTANYL CITRATE 50 UG/ML
25-50 INJECTION, SOLUTION INTRAMUSCULAR; INTRAVENOUS
Status: DISCONTINUED | OUTPATIENT
Start: 2018-05-23 | End: 2018-05-23 | Stop reason: HOSPADM

## 2018-05-23 RX ORDER — BISACODYL 10 MG
10 SUPPOSITORY, RECTAL RECTAL DAILY
Status: DISCONTINUED | OUTPATIENT
Start: 2018-05-24 | End: 2018-05-23 | Stop reason: HOSPADM

## 2018-05-23 RX ORDER — LABETALOL HYDROCHLORIDE 5 MG/ML
10 INJECTION, SOLUTION INTRAVENOUS
Status: DISCONTINUED | OUTPATIENT
Start: 2018-05-23 | End: 2018-05-23 | Stop reason: HOSPADM

## 2018-05-23 RX ORDER — LIDOCAINE 40 MG/G
CREAM TOPICAL
Status: DISCONTINUED | OUTPATIENT
Start: 2018-05-23 | End: 2018-05-23 | Stop reason: HOSPADM

## 2018-05-23 RX ORDER — FENTANYL CITRATE 50 UG/ML
INJECTION, SOLUTION INTRAMUSCULAR; INTRAVENOUS PRN
Status: DISCONTINUED | OUTPATIENT
Start: 2018-05-23 | End: 2018-05-23

## 2018-05-23 RX ORDER — HYDRALAZINE HYDROCHLORIDE 20 MG/ML
2.5-5 INJECTION INTRAMUSCULAR; INTRAVENOUS EVERY 10 MIN PRN
Status: DISCONTINUED | OUTPATIENT
Start: 2018-05-23 | End: 2018-05-23 | Stop reason: HOSPADM

## 2018-05-23 RX ORDER — OXYCODONE HYDROCHLORIDE 30 MG/1
90 TABLET ORAL EVERY 4 HOURS PRN
Status: DISCONTINUED | OUTPATIENT
Start: 2018-05-23 | End: 2018-05-23 | Stop reason: HOSPADM

## 2018-05-23 RX ORDER — ONDANSETRON 4 MG/1
4 TABLET, ORALLY DISINTEGRATING ORAL EVERY 30 MIN PRN
Status: DISCONTINUED | OUTPATIENT
Start: 2018-05-23 | End: 2018-05-23 | Stop reason: HOSPADM

## 2018-05-23 RX ADMIN — GABAPENTIN 900 MG: 300 CAPSULE ORAL at 11:04

## 2018-05-23 RX ADMIN — OXYCODONE HYDROCHLORIDE 90 MG: 30 TABLET ORAL at 07:42

## 2018-05-23 RX ADMIN — LORAZEPAM 1 MG: 0.5 TABLET ORAL at 13:58

## 2018-05-23 RX ADMIN — ESCITALOPRAM OXALATE 20 MG: 20 TABLET ORAL at 11:06

## 2018-05-23 RX ADMIN — BUPROPION HYDROCHLORIDE 150 MG: 150 TABLET, FILM COATED, EXTENDED RELEASE ORAL at 00:38

## 2018-05-23 RX ADMIN — HYDROMORPHONE HYDROCHLORIDE 0.5 MG: 1 INJECTION, SOLUTION INTRAMUSCULAR; INTRAVENOUS; SUBCUTANEOUS at 10:05

## 2018-05-23 RX ADMIN — SENNOSIDES AND DOCUSATE SODIUM 2 TABLET: 8.6; 5 TABLET ORAL at 00:38

## 2018-05-23 RX ADMIN — LORAZEPAM 1 MG: 0.5 TABLET ORAL at 02:21

## 2018-05-23 RX ADMIN — METHADONE HYDROCHLORIDE 40 MG: 10 TABLET ORAL at 11:05

## 2018-05-23 RX ADMIN — ROCURONIUM BROMIDE 45 MG: 10 INJECTION INTRAVENOUS at 08:48

## 2018-05-23 RX ADMIN — DULOXETINE HYDROCHLORIDE 60 MG: 60 CAPSULE, DELAYED RELEASE ORAL at 11:05

## 2018-05-23 RX ADMIN — BUPROPION HYDROCHLORIDE 150 MG: 150 TABLET, FILM COATED, EXTENDED RELEASE ORAL at 11:05

## 2018-05-23 RX ADMIN — OMEPRAZOLE 20 MG: 20 CAPSULE, DELAYED RELEASE ORAL at 11:05

## 2018-05-23 RX ADMIN — SODIUM CHLORIDE, POTASSIUM CHLORIDE, SODIUM LACTATE AND CALCIUM CHLORIDE: 600; 310; 30; 20 INJECTION, SOLUTION INTRAVENOUS at 09:28

## 2018-05-23 RX ADMIN — BACLOFEN 20 MG: 20 TABLET ORAL at 00:37

## 2018-05-23 RX ADMIN — OXYCODONE HYDROCHLORIDE 90 MG: 30 TABLET ORAL at 02:21

## 2018-05-23 RX ADMIN — SENNOSIDES AND DOCUSATE SODIUM 2 TABLET: 8.6; 5 TABLET ORAL at 11:06

## 2018-05-23 RX ADMIN — PROPOFOL 150 MG: 10 INJECTION, EMULSION INTRAVENOUS at 08:48

## 2018-05-23 RX ADMIN — OXYCODONE HYDROCHLORIDE 90 MG: 30 TABLET ORAL at 20:29

## 2018-05-23 RX ADMIN — SODIUM CHLORIDE: 9 INJECTION, SOLUTION INTRAVENOUS at 00:08

## 2018-05-23 RX ADMIN — METHADONE HYDROCHLORIDE 40 MG: 10 TABLET ORAL at 18:01

## 2018-05-23 RX ADMIN — METHADONE HYDROCHLORIDE 40 MG: 10 TABLET ORAL at 00:38

## 2018-05-23 RX ADMIN — BACLOFEN 20 MG: 20 TABLET ORAL at 11:05

## 2018-05-23 RX ADMIN — FENTANYL CITRATE 50 MCG: 50 INJECTION, SOLUTION INTRAMUSCULAR; INTRAVENOUS at 08:48

## 2018-05-23 RX ADMIN — LIDOCAINE HYDROCHLORIDE 100 MG: 20 INJECTION, SOLUTION INFILTRATION; PERINEURAL at 08:48

## 2018-05-23 RX ADMIN — SUGAMMADEX 300 MG: 100 INJECTION, SOLUTION INTRAVENOUS at 09:06

## 2018-05-23 RX ADMIN — GABAPENTIN 900 MG: 300 CAPSULE ORAL at 15:01

## 2018-05-23 RX ADMIN — BACLOFEN 20 MG: 20 TABLET ORAL at 16:47

## 2018-05-23 RX ADMIN — SODIUM CHLORIDE, POTASSIUM CHLORIDE, SODIUM LACTATE AND CALCIUM CHLORIDE: 600; 310; 30; 20 INJECTION, SOLUTION INTRAVENOUS at 08:41

## 2018-05-23 RX ADMIN — OXYCODONE HYDROCHLORIDE 90 MG: 30 TABLET ORAL at 15:19

## 2018-05-23 RX ADMIN — B-COMPLEX W/ C & FOLIC ACID TAB 1 TABLET: TAB at 11:04

## 2018-05-23 RX ADMIN — LEVOTHYROXINE SODIUM 75 MCG: 75 TABLET ORAL at 11:06

## 2018-05-23 RX ADMIN — SUGAMMADEX 100 MG: 100 INJECTION, SOLUTION INTRAVENOUS at 09:11

## 2018-05-23 ASSESSMENT — ENCOUNTER SYMPTOMS: SEIZURES: 1

## 2018-05-23 ASSESSMENT — LIFESTYLE VARIABLES: TOBACCO_USE: 1

## 2018-05-23 NOTE — PROGRESS NOTES
[facility]-op worksheet received and forwarded to surgery scheduling. Patient has been scheduled for surgery for 5/25/18 with Dr. Saleem at [place] per Dariana Krause RN to Dr. Saleem.

## 2018-05-23 NOTE — OR NURSING
Called Dr. Mata many times to get post-op orders but she was busy. Asked if it was OK to send patient back to the floor when ready and she said it was fine.

## 2018-05-23 NOTE — CONSULTS
Inpatient Pain Management Service: Consultation      DATE OF CONSULT: May 23, 2018      REASON FOR PAIN CONSULTATION:  Alethea Patel is a 37 year old female I am seeing in consultation at the request of Cheko Saleem MD for evaluation and recommendations for her acute on chronic L hip pain s/p closed hip reduction due to dislocation of L hip prosthesis. Patient is very opioid tolerant.       CHIEF PAIN COMPLAINT: L hip pain      ASSESSMENT:   1.  acute on chronic L hip pain s/p closed hip reduction due to dislocation of L hip prosthesis on 5/23/18-she was sitting in a recliner and bent over to  a cigarette when the dislocation occur.  This is her 3rd left hip location in a month.  Last closed L hip reduction was on 5/18/18.   - underwent resurfacing hemiarthroplasty in 2005.   -right HAJA on 6/5/2007   -left HAJA on 4/3/18  2. Chronic pain-lower back,hip pain due to osteonecrosis and diffuse pain  3. Very Opioid tolerant due to chronic high dose opioid usage.  4. H/o anxiety and depression. On wellbutrin,  Cymbalta, lexapro, ativan 6mg/day.  5. H/o   Acute lymphoblastic leukemia, chemotherapy [id number]. In remission. But continues to be followed by [facility] who solely prescribes the patient's opioids.  6. H/o seizure-like activity x 1 episode, patient underwent neurology workup, unknown etiology.  7. H/o GERD and IBS  8. H/o opioid misuse- according to Dr. Johnny Armijo's (Pain Service provider) note on 6/12/12, the patient was applying heating pads over fentanyl patches to the point of burning her skin. Patient is  no longer on fentanyl patches.   Healed heating packs are present on abdomen.  9. Marijuana use- states that she uses illicit marijuana 1/week, however states that last use was 1 month ago.  States that her pain provider has discussed certifying medical cannabis but at this time she is NOT certified as she states that cost is a barrier. She has been noted to be restless when used with  Ativan and required ED visit on Jan 2018 (please see ED note 1/9/18)  10. Opioid induced constipation-has a bowel regimen at home.     -- Outpatient opioid requirements prior to admission:   reviewed    5/18/18  Gabapentin 300mg 9caps/day  #810/90days  4/27/18 oxycodone 30mg 16tabs/day  #480/30days  4/27/18  Methadone 10mg 12tabs/day #360/30days  4/26/18  Lorazepam 1mg 6tabs/day #180/30days        Primary Care Provider: [facility]  Chronic Pain Provider: Jasvir Justice [facility], [facility], pt states that he is a pain specialist.    TREATMENT RECOMMENDATIONS/PLAN:   1. Discontinue IV Dilaudid 0.3-0.5mg IV Q 2 hours PRN. Patient has not needed to use it.  2. Continue PTA dose of methadone 40mg PO Q 8 hours.  3. Continue with oxycodone 90mg PO Q 4 hours PRN.  (PTA, she takes 120mg PO Q 4 hours, however max 480mg/day or 16 tablets of 30mg oxycodone.   Upon discharge, do not provide prescriptions for methadone or oxycodone prescriptions as she has some tablets at home and is able to pick the prescriptions on 5/24/18 at 8 am.  4. Start menthol patch, 1 patch TD Q 8hours.   5. Continue with PTA dose of Ativan 1-2mg PO TID.  This is the patient's home dose, however concurrent use of chronic benzodiazepines and chronic opioids increases risk for respiratory depression  4 fold, consider possible adjustment outpatient by [facility], who is prescribing her chronic opioids and benzos.   6. Continue with PTA dose of balcofen 20mg PO 4x/daily.  7. Continue with PTA dose of tizanidine 2mg PO Q 6 hours PRN.  8. Continue with acetaminophen 650mg PO Q 4 hours PRN.  9. Defer NSAIDs to primary team, although pt has h/o GERD.  She states that this does not cause upset stomach like acetaminophen.  10. Continue bowel regimen per primary team to prevent opioid induced constipation.  11. If patient is discharged and has surgery planned, would recommend PAC evaluation for post operative pain management plan due to  high chronic opioid dosage.  12. Before next hospital admission, would recommend quantitative UDS for baseline and assess adherence .    Pain Service will Sign Off at this time.     Recommendations were discussed with orthopedics team and pain team  Plan was reviewed by the Pain Service consisting of Arturo Tijerina MD    Thank you for consulting the Inpatient Pain Management Service.   The above recommendations are to be acted upon at the primary team s discretion.    To reach us:  Mon - Friday 8 AM - 3 PM: Pager [phone] (Text Page)  After hours, weekends and holidays: Primary service should call [phone] for the on-call pain specialist    HISTORY OF PRESENT ILLNESS: Alethea Patel is a 37 year old female with history of ALL in remission since 2007, anxiety/depression, GERD, avascular necrosis in multiple joints. History of bilateral HAJA, and right TKA, anxiety and depression who is on extremely high chronic opioids.    She was admitted on 5/22/18 due to left prosthetic hip dislocation after bending over her recliner to  an item.  This is her 3rd dislocation.  She underwent closed L hip reduction in the OR on 5/23/18.    Today, 5/23/18, POD # 0 of closed L hip reduction which she states is helpful and pain score has decreased.  She states that her pain is very manageable with the current regimen.  She feels that she is ready to go home today and wants to do so.  States that she recovers better at home.    We discussed her high opioid regimen which is prescribed to her by pain specialist Jasvir Justice CNP for years.  She denies side effects except for constipation when she uses more opioids due to acute pain.  She endorses smoking 1/2ppd and illicit cannabis 1x/week.  Denies alcohol use and other illicit drugs.  She states that she takes her opioid medications as prescribed and does over use it.  However later on the interview, states that she is out of the oxycodone 30, #480/month which is 4  "days before her due date.  She later states that she has about \"12-14 tabs at home\" and opioid prescriptions at the pharmacy that she can  on 5/24/18 at 8am.  She does not need a prescriptions of opioids  upon discharge.  She is agreeable to returning to her PTA dose upon discharge.  Did encourage her to work with her outpatient pain provider to work to decrease her opioid due to its high dosage and risk of respiratory depression.  She states that she has a prescription for Narcan by her opioid prescriber but can not fill it due to cost.    PAIN HISTORY:   Location: L hip  Duration: constant  Pain intensity: 4-5/10 on VAS  Quality of the pain: sharp, pressure  Aggravating factors: movements  Relieving factors : rest, pain medications    CAPA (Clinically Aligned Pain Assessment)  Comfort (How is your pain?): Comfortably manageable  Change in Pain (Since your last medication/intervention?): Getting better  Pain Control (How are your pain treatments working?):  effective pain control  Functioning (Are you able to do activities to get better?) : Pain keeps me from doing most of what I need to do  Sleep (Does your pain management allow you to sleep or rest?): Awake with occasional pain       FUNCTIONAL STATUS:  Change:      improving  Oral intake:     NPO  Bowel function:    NO BM since 1 week ago  Activity level:     Bedrest  Mood:      happy      REVIEW OF 10 BODY SYSTEMS: 10 point ROS of systems including Constitutional, Eyes, Respiratory, Cardiovascular, Gastroenterology, Genitourinary, Integumentary, Musculoskeletal, Psychiatric were all negative except for pertinent positives noted in my HPI.       INPATIENT MEDICATIONS PERTINENT TO PAIN CONSULT:   Medications related to Pain Management (Future)    Start     Dose/Rate Route Frequency Ordered Stop    05/23/18 0800  [Auto Hold]  gabapentin (NEURONTIN) capsule 900 mg     (Auto Hold since 05/23/18 0811)    900 mg Oral 3 TIMES DAILY 05/22/18 2204      05/23/18 " 0745  lidocaine 1 % 1 mL      1 mL Other EVERY 1 HOUR PRN 05/23/18 0745      05/23/18 0011  [Auto Hold]  oxyCODONE IR (ROXICODONE) tablet 90 mg     (Auto Hold since 05/23/18 0811)    90 mg Oral EVERY 4 HOURS PRN 05/23/18 0012      05/22/18 2330  [Auto Hold]  methadone (DOLOPHINE) tablet 40 mg     (Auto Hold since 05/23/18 0811)    40 mg Oral EVERY 8 HOURS 05/22/18 2204 05/22/18 2315  [Auto Hold]  baclofen (LIORESAL) tablet 20 mg     (Auto Hold since 05/23/18 0811)    20 mg Oral 4 TIMES DAILY 05/22/18 2203 05/22/18 2315  [Auto Hold]  LORazepam (ATIVAN) tablet 1-2 mg     (Auto Hold since 05/23/18 0811)    1-2 mg Oral 3 TIMES DAILY 05/22/18 2204 05/22/18 2215  [Auto Hold]  DULoxetine (CYMBALTA) EC capsule 60 mg     (Auto Hold since 05/23/18 0811)    60 mg Oral 2 TIMES DAILY 05/22/18 2204 05/22/18 2215  [Auto Hold]  senna-docusate (SENOKOT-S;PERICOLACE) 8.6-50 MG per tablet 1 tablet     (Auto Hold since 05/23/18 0811)    1 tablet Oral 2 TIMES DAILY 05/22/18 2204 05/22/18 2215  [Auto Hold]  senna-docusate (SENOKOT-S;PERICOLACE) 8.6-50 MG per tablet 2 tablet     (Auto Hold since 05/23/18 0811)    2 tablet Oral 2 TIMES DAILY 05/22/18 2204 05/22/18 2203  [Auto Hold]  senna-docusate (SENOKOT-S;PERICOLACE) 8.6-50 MG per tablet 1 tablet     (Auto Hold since 05/23/18 0811)    1 tablet Oral 2 TIMES DAILY PRN 05/22/18 2204 05/22/18 2203  [Auto Hold]  senna-docusate (SENOKOT-S;PERICOLACE) 8.6-50 MG per tablet 2 tablet     (Auto Hold since 05/23/18 0811)    2 tablet Oral 2 TIMES DAILY PRN 05/22/18 2204 05/22/18 2203  [Auto Hold]  tiZANidine (ZANAFLEX) tablet 2 mg     (Auto Hold since 05/23/18 0811)    2 mg Oral EVERY 6 HOURS PRN 05/22/18 2204 05/22/18 2203  [Auto Hold]  acetaminophen (TYLENOL) tablet 650 mg     (Auto Hold since 05/23/18 0811)    650 mg Oral EVERY 4 HOURS PRN 05/22/18 2203 05/22/18 2203  [Auto Hold]  HYDROmorphone (PF) (DILAUDID) injection 0.3-0.5 mg     (Auto Hold  since 05/23/18 0811)    0.3-0.5 mg Intravenous EVERY 2 HOURS PRN 05/22/18 2204 05/22/18 2203  [Auto Hold]  magnesium hydroxide (MILK OF MAGNESIA) suspension 30 mL     (Auto Hold since 05/23/18 0811)    30 mL Oral DAILY PRN 05/22/18 2204 05/22/18 2203  [Auto Hold]  bisacodyl (DULCOLAX) Suppository 10 mg     (Auto Hold since 05/23/18 0811)    10 mg Rectal DAILY PRN 05/22/18 2203              CURRENT MEDICATIONS:   Current Facility-Administered Medications Ordered in Epic   Medication Dose Route Frequency Provider Last Rate Last Dose     [Auto Hold] acetaminophen (TYLENOL) tablet 650 mg  650 mg Oral Q4H PRN Roberto Delcid MD         [Auto Hold] albuterol (PROAIR HFA/PROVENTIL HFA/VENTOLIN HFA) Inhaler 2 puff  2 puff Inhalation Q6H PRN Roberto Delcid MD         [Auto Hold] baclofen (LIORESAL) tablet 20 mg  20 mg Oral 4x Daily Roberto Delcid MD   20 mg at 05/23/18 0037     [Auto Hold] bisacodyl (DULCOLAX) Suppository 10 mg  10 mg Rectal Daily PRN Roberto Delcid MD         [Auto Hold] buPROPion (WELLBUTRIN SR) 12 hr tablet 150 mg  150 mg Oral BID Roberto Delcid MD   150 mg at 05/23/18 0038     [Auto Hold] DULoxetine (CYMBALTA) EC capsule 60 mg  60 mg Oral BID Roberto Delcid MD   60 mg at 05/22/18 2308     [Auto Hold] escitalopram (LEXAPRO) tablet 20 mg  20 mg Oral Daily Roberto Delcid MD         [Auto Hold] fluticasone (FLONASE) 50 MCG/ACT spray 2 spray  2 spray Both Nostrils Daily PRN Roberto Delcid MD         [Auto Hold] gabapentin (NEURONTIN) capsule 900 mg  900 mg Oral TID Roberto Delcid MD         [Auto Hold] HYDROmorphone (PF) (DILAUDID) injection 0.3-0.5 mg  0.3-0.5 mg Intravenous Q2H PRN Roberto Delcid MD         lactated ringers infusion   Intravenous Continuous Marlyn Flores MD         [Auto Hold] levothyroxine (SYNTHROID/LEVOTHROID) tablet 75 mcg  75 mcg Oral Daily Roberto Delcid MD         lidocaine 1 % 1 mL  1 mL Other Q1H PRN Marlyn Flores MD          [Auto Hold] LORazepam (ATIVAN) tablet 1-2 mg  1-2 mg Oral TID Roberto Delcid MD   1 mg at 05/23/18 0221     [Auto Hold] magnesium hydroxide (MILK OF MAGNESIA) suspension 30 mL  30 mL Oral Daily PRN Roberto Delcid MD         [Auto Hold] melatonin tablet 3 mg  3 mg Oral At Bedtime Roberto Delcid MD   3 mg at 05/22/18 2308     [Auto Hold] methadone (DOLOPHINE) tablet 40 mg  40 mg Oral Q8H Roberto Delcid MD   40 mg at 05/23/18 0038     [Auto Hold] metoclopramide (REGLAN) tablet 10 mg  10 mg Oral Q6H PRN Roberto Delcid MD        Or     [Auto Hold] metoclopramide (REGLAN) injection 10 mg  10 mg Intravenous Q6H PRN Roberto Delcid MD         [Auto Hold] montelukast (SINGULAIR) tablet 10 mg  10 mg Oral At Bedtime Roberto Delcid MD   10 mg at 05/22/18 2308     [Auto Hold] naloxone (NARCAN) injection 0.1-0.4 mg  0.1-0.4 mg Intravenous Q2 Min PRN Roberto Delcid MD         [Auto Hold] omeprazole (priLOSEC) CR capsule 20 mg  20 mg Oral Daily Roberto Delcid MD         [Auto Hold] ondansetron (ZOFRAN-ODT) ODT tab 4 mg  4 mg Oral Q6H PRN Roberto Delcid MD        Or     [Auto Hold] ondansetron (ZOFRAN) injection 4 mg  4 mg Intravenous Q6H PRN Roberto Delcid MD         [Auto Hold] oxyCODONE IR (ROXICODONE) tablet 90 mg  90 mg Oral Q4H PRN Roberto Delcid MD   90 mg at 05/23/18 0742     [Auto Hold] prochlorperazine (COMPAZINE) injection 10 mg  10 mg Intravenous Q6H PRN Roberto Delcid MD        Or     [Auto Hold] prochlorperazine (COMPAZINE) tablet 10 mg  10 mg Oral Q6H PRN Roberto Delcid MD        Or     [Auto Hold] prochlorperazine (COMPAZINE) Suppository 25 mg  25 mg Rectal Q12H PRN Roberto Delcid MD         [Auto Hold] QUEtiapine (SEROquel) tablet 25 mg  25 mg Oral At Bedtime Roberto Delcid MD   25 mg at 05/22/18 2308     [Auto Hold] ranitidine (ZANTAC) tablet 150 mg  150 mg Oral At Bedtime Roberto Delcid MD   150 mg at 05/22/18 2308     [Auto Hold]  senna-docusate (SENOKOT-S;PERICOLACE) 8.6-50 MG per tablet 1 tablet  1 tablet Oral BID Roberto Delcid MD        Or     [Auto Hold] senna-docusate (SENOKOT-S;PERICOLACE) 8.6-50 MG per tablet 2 tablet  2 tablet Oral BID Roberto Delcid MD   2 tablet at 05/23/18 0038     [Auto Hold] senna-docusate (SENOKOT-S;PERICOLACE) 8.6-50 MG per tablet 1 tablet  1 tablet Oral BID PRN Roberto Delcid MD        Or     [Auto Hold] senna-docusate (SENOKOT-S;PERICOLACE) 8.6-50 MG per tablet 2 tablet  2 tablet Oral BID PRN Roberto Delcid MD         sodium chloride (PF) 0.9% PF flush 3 mL  3 mL Intracatheter Q1H PRN Marlyn Flores MD         sodium chloride (PF) 0.9% PF flush 3 mL  3 mL Intracatheter Q8H Marlyn Flores MD         sodium chloride 0.9% infusion   Intravenous Continuous Roberto Delcid  mL/hr at 05/23/18 0008       [Auto Hold] tiZANidine (ZANAFLEX) tablet 2 mg  2 mg Oral Q6H PRN Roberto Delcid MD         [Auto Hold] vitamin B complex with vitamin C (STRESS TAB) tablet 1 tablet  1 tablet Oral Daily Roberto Delcid MD         No current [facility]-ordered outpatient prescriptions on file.           HOME/PREVIOUS MEDICATIONS:   Prior to Admission medications    Medication Sig Start Date End Date Taking? Authorizing Provider   acetaminophen (TYLENOL) 325 MG tablet Take 2 tablets (650 mg) by mouth every 4 hours 5/18/18   Jeff Cifuentes PA-C   albuterol (PROAIR HFA/PROVENTIL HFA/VENTOLIN HFA) 108 (90 BASE) MCG/ACT Inhaler Inhale 2 puffs into the lungs every 6 hours as needed for shortness of breath / dyspnea or wheezing    Reported, Patient   B Complex Vitamins (B COMPLEX PO) Take 1 tablet by mouth daily Dose unknown; OTC    Reported, Patient   BACLOFEN PO Take 20 mg by mouth 4 times daily    Yes Reported, Patient   BuPROPion HCl (WELLBUTRIN SR PO) Take 150 mg by mouth 2 times daily   Yes Unknown, Entered By History   DULoxetine (CYMBALTA) 60 MG EC capsule Take 60 mg by mouth 2 times  daily    Yes Reported, Patient   Escitalopram Oxalate (LEXAPRO PO) Take 20 mg by mouth daily    Unknown, Entered By History   fluticasone (FLONASE) 50 MCG/ACT nasal spray Spray 2 sprays into both nostrils daily as needed for rhinitis or allergies    Reported, Patient   gabapentin (NEURONTIN) 300 MG capsule Take 900 mg by mouth 3 times daily     Reported, Patient   levothyroxine (SYNTHROID, LEVOTHROID) 75 MCG tablet Take 75 mcg by mouth daily    Reported, Patient   LORazepam (ATIVAN) 1 MG tablet Take 1-2 mg by mouth 3 times daily Max 5 tablets daily    Reported, Patient   METHADONE HCL PO Take 40 mg by mouth every 8 hours    Unknown, Entered By History   montelukast (SINGULAIR) 10 MG tablet Take 10 mg by mouth At Bedtime    Reported, Patient   Multiple Vitamins-Minerals (MULTIVITAMIN ADULT PO) Take 1 tablet by mouth daily    Unknown, Entered By History   Nutritional Supplements (MELATONIN PO) Take 3 mg by mouth At Bedtime     Reported, Patient   omeprazole (PRILOSEC OTC) 20 MG tablet Take 20 mg by mouth daily     Reported, Patient   order for DME Equipment being ordered: Bath Seat ()  Treatment Diagnosis: Limited independence in ADLs/mobility due to hip precautions and recent hip surgery 4/4/18   Cheko Saleem MD   order for DME [facility]  [facility]  [phone]     Directions: Advance as Tolerated and Instructed by MD 1/9/16   Cheko Saleem MD   oxyCODONE IR (ROXICODONE) 30 MG tablet Take 120 mg by mouth every 4 hours as needed for moderate to severe pain    Unknown, Entered By History   POTASSIUM PO Take 1 tablet by mouth daily Dose unknown; OTC    Unknown, Entered By History   Prochlorperazine Maleate (COMPAZINE PO) Take 10 mg by mouth every 6 hours as needed.      Reported, Patient   QUEtiapine (SEROQUEL) 25 MG tablet Take 25 mg by mouth At Bedtime     Reported, Patient   ranitidine (ZANTAC) 150 MG tablet Take 150 mg by mouth At Bedtime  3/6/18 3/6/19  Reported, Patient   senna-docusate  (SENOKOT-S;PERICOLACE) 8.6-50 MG per tablet Take 2 tablets by mouth 2 times daily 1/11/16  Yes Devante Broderick MD   TIZANIDINE HCL PO Take 2 mg by mouth every 6 hours as needed for muscle spasms    Unknown, Entered By History           PAST PAIN TREATMENT:   Chronic opioid management        ALLERGIES:    Allergies   Allergen Reactions     Chantix [Varenicline] Nausea and Vomiting     Other reaction(s): Vomiting     No Clinical Screening - See Comments      Other reaction(s): Unknown Reaction     Seasonal Allergies      Ciprofloxacin      Other reaction(s): Other  Interacts with other medication     Latex Itching and Rash     Other reaction(s): Other - Describe In Comment Field  Vaginal itching, burning  Other reaction(s): Intolerance            PAST MEDICAL AND PSYCHIATRIC HISTORY:    Past Medical History:   Diagnosis Date     Acute lymphoblastic leukemia in remission (H)      Anxiety      Depression      Depressive disorder      Gastro-oesophageal reflux disease      IBS (irritable bowel syndrome)      Osteonecrosis (H)      Osteonecrosis (H)            PAST SURGICAL HISTORY:   Past Surgical History:   Procedure Laterality Date     ARTHROPLASTY KNEE Left 1/8/2016    Procedure: ARTHROPLASTY KNEE;  Surgeon: Cheko Saleem MD;  Location: [facility] OR     ARTHROPLASTY PATELLO-FEMORAL (KNEE)  6/12/2012    Procedure: ARTHROPLASTY PATELLO-FEMORAL (KNEE);  Right Knee Patella Resurfacing;  Surgeon: Cheko Saleem MD;  Location: [facility] OR     ARTHROPLASTY REVISION HIP Left 4/3/2018    Procedure: ARTHROPLASTY REVISION HIP;  Conversion of Left Hip Rigo-Arthroplasty to Left Total Hip Replacement;  Surgeon: Cheko Saleem MD;  Location:  OR     C PELVIS/HIP JOINT SURGERY UNLISTED       CLOSED REDUCTION HIP Left 5/18/2018    Procedure: CLOSED REDUCTION HIP;  Closed Reduction Left Hip, and Aspiration Culture   Left Hip;  Surgeon: Cheko Saleem MD;  Location: [facility] OR     HIP SURGERY  5/31/2005    Left hip resurfacing  hemiarthroplasty     HIP SURGERY  6/5/07     INJECT BLOCK MEDIAL BRANCH CERVICAL/THORACIC/LUMBAR N/A 1/9/2016    Procedure: INJECT BLOCK MEDIAL BRANCH CERVICAL / THORACIC / LUMBAR;  Surgeon: GENERIC ANESTHESIA PROVIDER;  Location: [facility] OR     JOINT REPLACEMENT  2/2/2010    Rt TKA     KNEE SURGERY       ORTHOPEDIC SURGERY      right foot surgery           FAMILY HISTORY: family history includes Low Back Problems in her mother; Migraines in her son; Soft Tissue Cancer in her brother.      HEALTH & LIFESTYLE PRACTICES:   Tobacco:  reports that she has been smoking Cigarettes.  She started smoking about 20 years ago. She has a 10.00 pack-year smoking history. She has never used smokeless tobacco.  Alcohol:  reports that she does not drink alcohol.  Illicit drugs:  reports that she does not use illicit drugs.      SOCIAL HISTORY:  She is on SSDI.  Stopped working as a  in her mid 20s.  Lives with  and 15 y.o. son  Social History     Social History     Marital status:      Spouse name: N/A     Number of children: N/A     Years of education: N/A     Occupational History     Not on file.     Social History Main Topics     Smoking status: Current Every Day Smoker     Packs/day: 1.00     Years: 10.00     Types: Cigarettes     Start date: 5/29/1997     Smokeless tobacco: Never Used     Alcohol use No      Comment: very rare     Drug use: No     Sexual activity: Yes     Partners: Male     Birth control/ protection: None     Other Topics Concern     Not on file     Social History Narrative       LABORATORY VALUES:   Last Basic Metabolic Panel:  Lab Results   Component Value Date     05/22/2018      Lab Results   Component Value Date    POTASSIUM 4.3 05/22/2018     Lab Results   Component Value Date    CHLORIDE 108 05/22/2018     Lab Results   Component Value Date    APOLONIA 8.6 05/22/2018     Lab Results   Component Value Date    CO2 29 05/22/2018     Lab Results   Component Value Date    BUN 9  05/22/2018     Lab Results   Component Value Date    CR 0.89 05/22/2018     Lab Results   Component Value Date    GLC 99 05/22/2018       CBC results:  Lab Results   Component Value Date    WBC 9.2 05/22/2018     Lab Results   Component Value Date    HGB 11.3 05/22/2018     Lab Results   Component Value Date    HCT 34.7 05/22/2018     Lab Results   Component Value Date     05/22/2018       DIAGNOSTIC TESTS:       Labs above reviewed as well as additional relevant diagnostic studies from the EPIC record.       PHYSICAL EXAMINATION:  VITAL SIGNS:  B/P: 113/77, T: 97.9, P: Data Unavailable, R: 17    CONSTITUTIONAL/GENERAL APPEARANCE: Awake, alert, NAD.  EYES: EOMIs, no scleral icterus  ENT/NECK: Neck is supple, good neck ROM  RESPIRATORY:  CTA, non labored breathing.  CARDIOVASCULAR: RRR  GI:soft, non tender. Bowel sounds present    MUSCULOSKELETAL/BACK/SPINE/EXTREMITIES:  Moving UE well.  Plantar flexion and dorsal flexion 5/5 bilaterally.  Does not appear tender to palpation on bilateral hips.  Non tender midline lumbar spine.   GAIT: Bedrest at this time.  NEURO:  SILT to UE and LE  SKIN/VASCULAR EXAM:  Well perfused.  Dorsalis pedis present bilaterally.  PSYCHIATRIC/BEHAVIORAL/OBSERVATIONS:  No objective signs of pain observed during our interview. Very talkative and pleasant   Judgment/Insight - fair   Orientation - x3   Memory -good   Mood and affect - chatty, pleasant, appropriate             TIME SPENT: 60 minutes including 30 minutes of face-to-face time counseling her  about her pain management treatment options, and coordinating care with the primary team.    Jennifer Winn PA-C  May 23, 2018, 8:17 AM  Inpatient Pain Management Service

## 2018-05-23 NOTE — PLAN OF CARE
Problem: Patient Care Overview  Goal: Individualization & Mutuality  Pt received oxycodone before leaving the floor for surgery. Pt A&O x's 4. VSS. Afebrile. Pt was placed on pulse monitor and sats  in the mid 90s on RA. Lungs clear. Denies SOB, CP and nausea. NPO since midnight. Bowel sound active in all quadrants. No BM but passing gas. Jordan in place and drained large amount. Pain well managed with current regimen, pt noted to be sleepy was on monitor throughout the shift. CMS intact, denies N/T. PIV... Pt slept between care and is able to make needs known, call light with in reach. Will continue to monitor.

## 2018-05-23 NOTE — CONSULTS
Full consult pending    37F with hx of recurrent dislocation L HAJA, last reduction on 5/18/18 in OR, returns to recurrent L HAJA dislocation.      Admit to ortho  NPO at midnight  Preop labs ordered  Preop med consult placed  Plan for likely OR tomorrow for closed vs open reduction L HAJA    Mo Delcid MD MS  Orthopedic Surgery, PGY4  [phone]      DW Dr Garcia who agrees

## 2018-05-23 NOTE — PROGRESS NOTES
[facility], [place]   Internal Medicine Daily Note          Assessment and Plan:     37F with hx of recurrent dislocation L HAJA, last reduction on 5/18/18 in OR with recurrent L HAJA dislocation. Now S/P closed reduction in the OR    # Recurrent left hip dislocation:  This patient has a recurrent L hip dislocation and will require open revision surgery, how ever due to the lack of availability of open slots, closed reduction was pursued  now on an urgent basis to eliminate danger of neurologic complication to her lower extremity.      Post operative pain regimen as per pain team as follows:   Discontinue IV Dilaudid 0.3-0.5mg IV Q 2 hours PRN. Patient has not needed to use it.Continue PTA dose of methadone 40mg PO Q 8 hours.  Continue with oxycodone 90mg PO Q 4 hours PRN.  (PTA, she takes 120mg PO Q 4 hours, however max 480mg/day or 16 tablets of 30mg oxycodone.   Upon discharge, do not provide prescriptions for methadone or oxycodone prescriptions as she has some tablets at home and is able to pick the prescriptions on 5/24/18 at 8 a  Start menthol patch, 1 patch TD Q 8hours.   Continue with PTA dose of Ativan 1-2mg PO TID.  This is the patient's home dose, however concurrent use of chronic benzodiazepines and chronic opioids increases risk for respiratory depression 4 fold, consider possible adjustment outpatient by [facility], who is prescribing her chronic opioids and benzos.   Continue with PTA dose of balcofen 20mg PO 4x/daily.  Continue with PTA dose of tizanidine 2mg PO Q 6 hours PRN.  Continue with acetaminophen 650mg PO Q 4 hours PRN.  Defer NSAIDs to primary team, although pt has h/o GERD.  She states that this does not cause upset stomach like acetaminophen.  Continue bowel regimen per primary team to prevent opioid induced constipation.  If patient is discharged and has surgery planned, would recommend PAC evaluation for post operative pain management plan due to high  "chronic opioid dosage.  Before next hospital admission, would recommend quantitative UDS for baseline and assess adherence .     Monitor closely overnight      # Others:   Hypothyroidism: Continue PTA levothyroxine.   Depression, anxiety: Continue pta meds.   GERD; PPI, ranitidine.   Allergic symptoms: Singulair. Albuterol prn.           Consulting teams: Internal Medicine   Code status: Full   DVT Prophylaxis: PCDs   Gastric prophylaxis:PPI  Diet:Regular adult   Disposition: To be determined by primary team       Patient seen and examined with RN         Interval History:   Progress notes in the last 24 hrs by MDT team has been reviewed.   I met patient after her left hip relocation  Feels well. Already eating regular food.  No fevers or chills  No new complaints             Review of Systems:   A comprehensive review of systems was performed and found to be negative except: Those that are outlined in interval history              Medications:   I have reviewed this patient's current medications which are outlined in the \"current medication\" section of EPIC             Physical Exam:   Vitals were reviewed  Temp: 98.6  F (37  C) Temp src: Oral BP: 97/57   Heart Rate: 74 Resp: 18 SpO2: 96 % O2 Device: None (Room air) Oxygen Delivery: 2 LPM  Constitutional:   awake, alert, cooperative, no apparent distress, and appears stated age     Lungs:   No increased work of breathing, good air exchange, clear to auscultation bilaterally, no crackles or wheezing     Cardiovascular:   Normal apical impulse, regular rate and rhythm, normal S1 and S2, no S3 or S4, and no murmur noted     Abdomen:   No scars, normal bowel sounds, soft, non-distended, non-tender, no masses palpated, no hepatosplenomegally     Musculoskeletal:   Left hip covered with a race. No distal neurovascular deficit      Neurologic:   Awake, alert, oriented to name, place and time.  Cranial nerves II-XII are grossly intact.              Data:     Most recent " labs have been evaluated and relevant labs are outlined below :    BMP    Recent Labs  Lab 05/22/18 2057 05/17/18 2003    143   POTASSIUM 4.3 3.8   CHLORIDE 108 108   APOLONIA 8.6 8.0*   CO2 29 27   BUN 9 5*   CR 0.89 0.73   GLC 99 103*     CBC    Recent Labs  Lab 05/22/18 2057 05/17/18 2003   WBC 9.2 7.1   RBC 3.90 3.37*   HGB 11.3* 9.7*   HCT 34.7* 30.1*   MCV 89 89   MCH 29.0 28.8   MCHC 32.6 32.2   RDW 13.5 13.4    182     INR    Recent Labs  Lab 05/22/18 2057 05/17/18 2003   INR 0.97 1.13     LFTsNo lab results found in last 7 days.   PANCNo lab results found in last 7 days.      Dr FARA Colmenares MD  Hospitalist ( Internal medicine)  Pager: [phone]

## 2018-05-23 NOTE — PLAN OF CARE
Problem: Patient Care Overview  Goal: Individualization & Mutuality  Outcome: Improving  Patient A&O x4, lungs sound clear, Bowel sound active, Denied CP, lightheadedness, dizziness, numbness, tinging and SOB, iv fluid saline locked, drinking well and has montano catheter in placed, able to wiggle toes, hip brace on, pain tolerable and taking oxycodone for pain,sister in room, demonstrates the ability to use call light appropriately, will continue to monitor patient.

## 2018-05-23 NOTE — PLAN OF CARE
Problem: Patient Care Overview  Goal: Plan of Care/Patient Progress Review  Outcome: Improving  Focus: Physio  CORAL: Spoke with pt. About the importance of keeping abductor brace on at all times. She removed it prior to me entering the room. She is aware and states she is agreeable with this plan.

## 2018-05-23 NOTE — CONSULTS
Internal Medicine Consultation  [facility]    Alethea Patel MRN# [id number]   YOB: 1980 Age: 37 year old      Date of Admission: 5/22/2018    Primary care provider: [facility]ev[facility]    Requesting Provider : Roberto Seals    Reason for Consultation : Pre op evaluation and evaluation and recommendation, management of medical comorbidities.          Chief Complaint:      Left hip pain  Left hip recurrent dislocation    History of Present Illness:     History is obtained from the patient and medical record.     Alethea Patel is a 37 year old female with a history of hx of recurrent dislocation L HAJA, last reduction on 5/18/18 in OR, returns to recurrent L HAJA dislocation.  Admitted under orthopedic team.  Medicine consulted for preop evaluation and medical comanagement.    Patient states that this is her third dislocation within a month with her prosthetic left hip.  She states that she was sitting in her chair when she bent over to  her keys in her hip spontaneously dislocated.  She was just discharged approximately 5/18/18 s/p closed reduction of LTHA.     Patient denies any complication during prior procedures.  At current denies any other concern besides left hip pain.  Not able to bear weight on her left leg because of left hip pain for last few days.  Denies fever or chills. No cough or cp or sob. No LH or dizziness. No NVD. No dysuria or bowel complaint. No new sensory or motor complaint. No new rash.     Past medical history and past preop evaluation by her provider on march 30th reviewed.                Past Medical History:     Past Medical History:   Diagnosis Date     Acute lymphoblastic leukemia in remission (H)      Anxiety      Depression      Depressive disorder      Gastro-oesophageal reflux disease      IBS (irritable bowel syndrome)      Osteonecrosis (H)      Osteonecrosis (H)              Past  Surgical History:     Past Surgical History:   Procedure Laterality Date     ARTHROPLASTY KNEE Left 1/8/2016    Procedure: ARTHROPLASTY KNEE;  Surgeon: Cheko Saleem MD;  Location: [facility] OR     ARTHROPLASTY PATELLO-FEMORAL (KNEE)  6/12/2012    Procedure: ARTHROPLASTY PATELLO-FEMORAL (KNEE);  Right Knee Patella Resurfacing;  Surgeon: Cheko Saleem MD;  [facility] OR     ARTHROPLASTY REVISION HIP Left 4/3/2018    Procedure: ARTHROPLASTY REVISION HIP;  Conversion of Left Hip Rigo-Arthroplasty to Left Total Hip Replacement;  Surgeon: Cheko Saleem MD;  Location: [facility] OR     C PELVIS/HIP JOINT SURGERY UNLISTED       CLOSED REDUCTION HIP Left 5/18/2018    Procedure: CLOSED REDUCTION HIP;  Closed Reduction Left Hip, and Aspiration Culture   Left Hip;  Surgeon: Cheko Saleem MD;  Location: [facility] OR     HIP SURGERY  5/31/2005    Left hip resurfacing hemiarthroplasty     HIP SURGERY  6/5/07     INJECT BLOCK MEDIAL BRANCH CERVICAL/THORACIC/LUMBAR N/A 1/9/2016    Procedure: INJECT BLOCK MEDIAL BRANCH CERVICAL / THORACIC / LUMBAR;  Surgeon: GENERIC ANESTHESIA PROVIDER;  Location: [facility] OR     JOINT REPLACEMENT  2/2/2010    Rt TKA     KNEE SURGERY       ORTHOPEDIC SURGERY      right foot surgery             Social History:     Social History     Social History     Marital status:      Spouse name: N/A     Number of children: N/A     Years of education: N/A     Occupational History     Not on file.     Social History Main Topics     Smoking status: Current Every Day Smoker     Packs/day: 1.00     Years: 10.00     Types: Cigarettes     Start date: 5/29/1997     Smokeless tobacco: Never Used     Alcohol use No      Comment: very rare     Drug use: No     Sexual activity: Yes     Partners: Male     Birth control/ protection: None     Other Topics Concern     Not on file     Social History Narrative             Family History:   Reviewed.   Family History   Problem Relation Age of Onset     Migraines Son      Soft Tissue  Cancer Brother      Low Back Problems Mother              Immunizations:   There is no immunization history for the selected administration types on file for this patient.         Allergies:     Allergies   Allergen Reactions     Chantix [Varenicline] Nausea and Vomiting     Other reaction(s): Vomiting     No Clinical Screening - See Comments      Other reaction(s): Unknown Reaction     Seasonal Allergies      Ciprofloxacin      Other reaction(s): Other  Interacts with other medication     Latex Itching and Rash     Other reaction(s): Other - Describe In Comment Field  Vaginal itching, burning  Other reaction(s): Intolerance             Medications:     Prior to Admission medications    Medication Sig Start Date End Date Taking? Authorizing Provider   BACLOFEN PO Take 20 mg by mouth 4 times daily    Yes Reported, Patient   BuPROPion HCl (WELLBUTRIN SR PO) Take 150 mg by mouth 2 times daily   Yes Unknown, Entered By History   DULoxetine (CYMBALTA) 60 MG EC capsule Take 60 mg by mouth 2 times daily    Yes Reported, Patient   senna-docusate (SENOKOT-S;PERICOLACE) 8.6-50 MG per tablet Take 2 tablets by mouth 2 times daily 1/11/16  Yes Devante Broderick MD   acetaminophen (TYLENOL) 325 MG tablet Take 2 tablets (650 mg) by mouth every 4 hours 5/18/18   Jeff Cifuentes PA-C   albuterol (PROAIR HFA/PROVENTIL HFA/VENTOLIN HFA) 108 (90 BASE) MCG/ACT Inhaler Inhale 2 puffs into the lungs every 6 hours as needed for shortness of breath / dyspnea or wheezing    Reported, Patient   B Complex Vitamins (B COMPLEX PO) Take 1 tablet by mouth daily Dose unknown; OTC    Reported, Patient   Escitalopram Oxalate (LEXAPRO PO) Take 20 mg by mouth daily    Unknown, Entered By History   fluticasone (FLONASE) 50 MCG/ACT nasal spray Spray 2 sprays into both nostrils daily as needed for rhinitis or allergies    Reported, Patient   gabapentin (NEURONTIN) 300 MG capsule Take 900 mg by mouth 3 times daily     Reported, Patient    levothyroxine (SYNTHROID, LEVOTHROID) 75 MCG tablet Take 75 mcg by mouth daily    Reported, Patient   LORazepam (ATIVAN) 1 MG tablet Take 1-2 mg by mouth 3 times daily Max 5 tablets daily    Reported, Patient   METHADONE HCL PO Take 40 mg by mouth every 8 hours    Unknown, Entered By History   montelukast (SINGULAIR) 10 MG tablet Take 10 mg by mouth At Bedtime    Reported, Patient   Multiple Vitamins-Minerals (MULTIVITAMIN ADULT PO) Take 1 tablet by mouth daily    Unknown, Entered By History   Nutritional Supplements (MELATONIN PO) Take 3 mg by mouth At Bedtime     Reported, Patient   omeprazole (PRILOSEC OTC) 20 MG tablet Take 20 mg by mouth daily     Reported, Patient   order for DME [facility]  [facility]  [phone]     Directions: Advance as Tolerated and Instructed by MD 1/9/16   Cheko Saleem MD   order for DME Equipment being ordered: Bath Seat ()  Treatment Diagnosis: Limited independence in ADLs/mobility due to hip precautions and recent hip surgery 4/4/18   Cheko Saleem MD   oxyCODONE IR (ROXICODONE) 30 MG tablet Take 120 mg by mouth every 4 hours as needed for moderate to severe pain    Unknown, Entered By History   POTASSIUM PO Take 1 tablet by mouth daily Dose unknown; OTC    Unknown, Entered By History   Prochlorperazine Maleate (COMPAZINE PO) Take 10 mg by mouth every 6 hours as needed.      Reported, Patient   QUEtiapine (SEROQUEL) 25 MG tablet Take 25 mg by mouth At Bedtime     Reported, Patient   ranitidine (ZANTAC) 150 MG tablet Take 150 mg by mouth At Bedtime  3/6/18 3/6/19  Reported, Patient   TIZANIDINE HCL PO Take 2 mg by mouth every 6 hours as needed for muscle spasms    Unknown, Entered By History        Current Facility-Administered Medications   Medication     acetaminophen (TYLENOL) tablet 650 mg     albuterol (PROAIR HFA/PROVENTIL HFA/VENTOLIN HFA) Inhaler 2 puff     [START ON 5/23/2018] B Complex TABS     [START ON 5/23/2018] baclofen (LIORESAL) tablet 20 mg      bisacodyl (DULCOLAX) Suppository 10 mg     buPROPion (WELLBUTRIN SR) 12 hr tablet 150 mg     DULoxetine (CYMBALTA) EC capsule 60 mg     [START ON 5/23/2018] escitalopram (LEXAPRO) tablet 20 mg     fluticasone (FLONASE) 50 MCG/ACT spray 2 spray     [START ON 5/23/2018] gabapentin (NEURONTIN) capsule 900 mg     HYDROmorphone (PF) (DILAUDID) injection 0.3-0.5 mg     [START ON 5/23/2018] levothyroxine (SYNTHROID/LEVOTHROID) tablet 75 mcg     [START ON 5/23/2018] LORazepam (ATIVAN) tablet 1-2 mg     magnesium hydroxide (MILK OF MAGNESIA) suspension 30 mL     melatonin tablet 1 mg     melatonin tablet 3 mg     methadone (METHADOSE) solu-tab 40 mg     metoclopramide (REGLAN) tablet 10 mg    Or     metoclopramide (REGLAN) injection 10 mg     montelukast (SINGULAIR) tablet 10 mg     naloxone (NARCAN) injection 0.1-0.4 mg     [START ON 5/23/2018] omeprazole (priLOSEC OTC) EC tablet 20 mg     ondansetron (ZOFRAN-ODT) ODT tab 4 mg    Or     ondansetron (ZOFRAN) injection 4 mg     oxyCODONE IR (ROXICODONE) tablet 120 mg     [START ON 5/23/2018] potassium TABS     prochlorperazine (COMPAZINE) injection 10 mg    Or     prochlorperazine (COMPAZINE) tablet 10 mg    Or     prochlorperazine (COMPAZINE) Suppository 25 mg     prochlorperazine (COMPAZINE) tablet 10 mg     QUEtiapine (SEROquel) tablet 25 mg     ranitidine (ZANTAC) tablet 150 mg     senna-docusate (SENOKOT-S;PERICOLACE) 8.6-50 MG per tablet 1 tablet    Or     senna-docusate (SENOKOT-S;PERICOLACE) 8.6-50 MG per tablet 2 tablet     senna-docusate (SENOKOT-S;PERICOLACE) 8.6-50 MG per tablet 1 tablet    Or     senna-docusate (SENOKOT-S;PERICOLACE) 8.6-50 MG per tablet 2 tablet     senna-docusate (SENOKOT-S;PERICOLACE) 8.6-50 MG per tablet 2 tablet     sodium chloride 0.9% infusion     tiZANidine (ZANAFLEX) tablet 2 mg            Review of Systems:   The 10 point Review of Systems is negative other than noted in the HPI           Physical Exam:       Blood pressure 110/68,  "temperature 98.8  F (37.1  C), temperature source Oral, resp. rate 20, height 1.753 m (5' 9\"), weight 82.6 kg (182 lb), last menstrual period 2018, SpO2 94 %, not currently breastfeeding.    Temp (24hrs), Av.8  F (37.1  C), Min:98.8  F (37.1  C), Max:98.8  F (37.1  C)      Wt Readings from Last 5 Encounters:   18 82.6 kg (182 lb)   18 82.6 kg (182 lb)   18 75 kg (165 lb 5.5 oz)   17 74 kg (163 lb 1.6 oz)   17 72.5 kg (159 lb 14.4 oz)       No intake or output data in the 24 hours ending 18 2213    General: Alert, interactive, NAD  HEENT: AT/NC, anicteric, PERRL, Moist MM  Neck: Supple, no JVD or cervical LAD  Chest/Resp: Clear to auscultation bilaterally, no crackles or wheezes  Heart/CV: S1 S2 regular, no murmur.   Abdomen/ GI: Soft, nontender, nondistended. +BS.  No rebound or guarding.  Extremities/MSK: distal pulses 2+ and warm. L hip lateral area w dressing +. Rest per Ortho  Skin: Warm and dry, no jaundice or rash of heat burn (per patient) mostly on her abdomen and forearms.   Neuro: Alert & oriented x 3, Cns 2-12 grossly intact  Psych: Pleasant.            Data:     All laboratory data reviewed    LABS (Last four results)  CMP  Recent Labs  Lab 18    143   POTASSIUM 4.3 3.8   CHLORIDE 108 108   CO2 29 27   ANIONGAP 7 8   GLC 99 103*   BUN 9 5*   CR 0.89 0.73   GFRESTIMATED 70 88   GFRESTBLACK 85 >90   APOLONIA 8.6 8.0*     CBC  Recent Labs  Lab 18   WBC 9.2 7.1   RBC 3.90 3.37*   HGB 11.3* 9.7*   HCT 34.7* 30.1*   MCV 89 89   MCH 29.0 28.8   MCHC 32.6 32.2   RDW 13.5 13.4    182     INR  Recent Labs  Lab 18   INR 0.97 1.13     Arterial Blood GasNo lab results found in last 7 days.    Recent Results (from the past 48 hour(s))   XR Hip Port Left G/E 2 Views    Narrative    LEFT HIP PORTABLE TWO TO THREE VIEWS   2018 8:46 PM     HISTORY: Left hip reduction.    COMPARISON: " 4/19/2018.      Impression    IMPRESSION: Left hip dislocation. The left femoral head prosthesis is  subluxed superiorly with respect to the acetabular cup. No definite  fracture visualized.    SHAW RUDOLPH MD            Assessment and Recommendations:     37F with hx of recurrent dislocation L HAJA, last reduction on 5/18/18 in OR with recurrent L HAJA dislocation.       # Pre Op evaluation:     PMH history, preop evaluation reviewed.     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  She has significant history of chronic pain, anxiety disorder . She is on chronic high-dose opioid and benzodiazapine treatment.  She is an active smoker.  Denies use of illicit drugs. History of multiple joint replacements, tolerated these surgeries well without problems with anesthesia. No history of cardiovascular problems.  No bleeding or clotting disorder.  No history of sleep apnea.  Reports intermittent airway allergy  symptoms that she attributes to smoking, takes Singulair and albuterol inhaler as needed.     RCRI: No risk factor.  Low 0.4% risk of cardiac complications.  Procedure risk: intermediate risk.  closed vs open reduction L HAJA  METS: Activity limited at present given left hip dislocation.  Prior to recurrent dislocations she was ambulatory.    Routine preop lab work reviewed, unremarkable.    Recommend close Intra-Op and postop hemodynamic, pulmonary monitoring given history of smoking and chronic opiate, benzo use.  Judicious use of narcotics, benzos.   Consider pain consult if difficult management.   No other significant medical problem to preclude her from going to surgery.   Encourage IS  Bowel regimen while no narcotics.     Check Urine pregnancy test, UA.     # Others:   Hypothyroidism: Continue PTA levothyroxine.   Depression, anxiety: Continue pta meds.   GERD; PPI, ranitidine.   Allergic symptoms: Singulair. Albuterol prn.    Further including dvt  ppx post procedure, wound care, activity etc per Ortho.     D/w RN.     Thank you for this interesting consultation.    We are glad to follow along with you.      Conor Crawford MD   Hospitalist (Internal Medicine)  Pager: [phone]  5/22/2018

## 2018-05-23 NOTE — ANESTHESIA POSTPROCEDURE EVALUATION
Patient: Alethea Patel    Procedure(s):  Closed Reduction Left Hip - Wound Class: I-Clean    Diagnosis:Dislocated Left Hip Joint  Diagnosis Additional Information: No value filed.    Anesthesia Type:  No value filed.    Note:  Anesthesia Post Evaluation    Patient location during evaluation: PACU  Patient participation: Able to fully participate in evaluation  Level of consciousness: awake  Pain management: adequate  Airway patency: patent  Cardiovascular status: acceptable  Respiratory status: acceptable  Hydration status: acceptable  PONV: none       Comments: -Patient had  Normal response (4 twitches) on exam with nerve stimulator following extubation.        Last vitals:  Vitals:    05/23/18 1015 05/23/18 1039 05/23/18 1100   BP: 119/79 115/76 109/65   Resp: 10 16 16   Temp:  36.4  C (97.5  F) 37  C (98.6  F)   SpO2: 95% 98% 98%         Electronically Signed By: Monty Mata MD  May 23, 2018  11:43 AM

## 2018-05-23 NOTE — ED PROVIDER NOTES
"  History     Chief Complaint   Patient presents with     Hip Pain      left hip pain, patient report she had mutiples hip dislocation     HPI  Alethea Patel is a 37 year old female who presents emergency department with a dislocated left hip.  Patient states that this is her third dislocation within a month with her prosthetic left hip.  She states that she was sitting in her chair when she bent over to  her keys in her hip spontaneously dislocated.  She was just discharged approximately 5 days ago for a similar issue where she had a dislocation that required going to the operating room for a closed reduction.  She denies all other complaints at this time.    I have reviewed the Medications, Allergies, Past Medical and Surgical History, and Social History in the Epic system.    Review of Systems    Physical Exam   BP: 110/68  Heart Rate: 94  Temp: 98.8  F (37.1  C)  Resp: 20  Height: 175.3 cm (5' 9\")  Weight: 82.6 kg (182 lb)  SpO2: 94 %      Physical Exam   Constitutional: She is oriented to person, place, and time. She appears well-developed and well-nourished. No distress.   Patient is resting comfortably in no significant distress   HENT:   Head: Normocephalic and atraumatic.   Eyes: No scleral icterus.   Neck: Normal range of motion. Neck supple.   Cardiovascular: Normal rate and normal pulses.    Pulmonary/Chest: Effort normal.   Abdominal: Soft. There is no tenderness.   Musculoskeletal:   Obvious dislocation of left hip   Neurological: She is alert and oriented to person, place, and time.   Skin: Skin is warm and dry. No rash noted. She is not diaphoretic. No erythema. No pallor.       ED Course     ED Course     Procedures             Critical Care time:  none     Results for orders placed or performed during the hospital encounter of 05/22/18   XR Hip Port Left G/E 2 Views    Narrative    LEFT HIP PORTABLE TWO TO THREE VIEWS   5/22/2018 8:46 PM     HISTORY: Left hip reduction.    COMPARISON: " 4/19/2018.      Impression    IMPRESSION: Left hip dislocation. The left femoral head prosthesis is  subluxed superiorly with respect to the acetabular cup. No definite  fracture visualized.    SHAW RUDOLPH MD   Basic metabolic panel   Result Value Ref Range    Sodium 144 133 - 144 mmol/L    Potassium 4.3 3.4 - 5.3 mmol/L    Chloride 108 94 - 109 mmol/L    Carbon Dioxide 29 20 - 32 mmol/L    Anion Gap 7 3 - 14 mmol/L    Glucose 99 70 - 99 mg/dL    Urea Nitrogen 9 7 - 30 mg/dL    Creatinine 0.89 0.52 - 1.04 mg/dL    GFR Estimate 70 >60 mL/min/1.7m2    GFR Estimate If Black 85 >60 mL/min/1.7m2    Calcium 8.6 8.5 - 10.1 mg/dL   CBC with platelets differential   Result Value Ref Range    WBC 9.2 4.0 - 11.0 10e9/L    RBC Count 3.90 3.8 - 5.2 10e12/L    Hemoglobin 11.3 (L) 11.7 - 15.7 g/dL    Hematocrit 34.7 (L) 35.0 - 47.0 %    MCV 89 78 - 100 fl    MCH 29.0 26.5 - 33.0 pg    MCHC 32.6 31.5 - 36.5 g/dL    RDW 13.5 10.0 - 15.0 %    Platelet Count 226 150 - 450 10e9/L    Diff Method Automated Method     % Neutrophils 59.2 %    % Lymphocytes 32.3 %    % Monocytes 5.4 %    % Eosinophils 2.7 %    % Basophils 0.2 %    % Immature Granulocytes 0.2 %    Nucleated RBCs 0 0 /100    Absolute Neutrophil 5.5 1.6 - 8.3 10e9/L    Absolute Lymphocytes 3.0 0.8 - 5.3 10e9/L    Absolute Monocytes 0.5 0.0 - 1.3 10e9/L    Absolute Eosinophils 0.3 0.0 - 0.7 10e9/L    Absolute Basophils 0.0 0.0 - 0.2 10e9/L    Abs Immature Granulocytes 0.0 0 - 0.4 10e9/L    Absolute Nucleated RBC 0.0    INR   Result Value Ref Range    INR 0.97 0.86 - 1.14   ABO/Rh type and screen   Result Value Ref Range    ABO A     RH(D) Pos     Antibody Screen Neg     Test Valid Only At          [facility]    Specimen Expires 05/25/2018                Labs Ordered and Resulted from Time of ED Arrival Up to the Time of Departure from the ED   CBC WITH PLATELETS DIFFERENTIAL - Abnormal; Notable for the following:        Result  Value    Hemoglobin 11.3 (*)     Hematocrit 34.7 (*)     All other components within normal limits   BASIC METABOLIC PANEL   INR   ABO/RH TYPE AND SCREEN     Medications   albuterol (PROAIR HFA/PROVENTIL HFA/VENTOLIN HFA) Inhaler 2 puff (not administered)   vitamin B complex with vitamin C (STRESS TAB) tablet 1 tablet (not administered)   baclofen (LIORESAL) tablet 20 mg (not administered)   buPROPion (WELLBUTRIN SR) 12 hr tablet 150 mg (not administered)   DULoxetine (CYMBALTA) EC capsule 60 mg (not administered)   escitalopram (LEXAPRO) tablet 20 mg (not administered)   fluticasone (FLONASE) 50 MCG/ACT spray 2 spray (not administered)   gabapentin (NEURONTIN) capsule 900 mg (not administered)   levothyroxine (SYNTHROID/LEVOTHROID) tablet 75 mcg (not administered)   LORazepam (ATIVAN) tablet 1-2 mg (not administered)   methadone (METHADOSE) solu-tab 40 mg (not administered)   montelukast (SINGULAIR) tablet 10 mg (not administered)   melatonin tablet 3 mg (not administered)   omeprazole (priLOSEC) CR capsule 20 mg (not administered)   oxyCODONE IR (ROXICODONE) tablet 120 mg (not administered)   potassium TABS (not administered)   prochlorperazine (COMPAZINE) tablet 10 mg (not administered)   QUEtiapine (SEROquel) tablet 25 mg (not administered)   ranitidine (ZANTAC) tablet 150 mg (not administered)   senna-docusate (SENOKOT-S;PERICOLACE) 8.6-50 MG per tablet 2 tablet (not administered)   tiZANidine (ZANAFLEX) tablet 2 mg (not administered)   naloxone (NARCAN) injection 0.1-0.4 mg (not administered)   melatonin tablet 1 mg (not administered)   sodium chloride 0.9% infusion (not administered)   acetaminophen (TYLENOL) tablet 650 mg (not administered)   HYDROmorphone (PF) (DILAUDID) injection 0.3-0.5 mg (not administered)   senna-docusate (SENOKOT-S;PERICOLACE) 8.6-50 MG per tablet 1 tablet (not administered)     Or   senna-docusate (SENOKOT-S;PERICOLACE) 8.6-50 MG per tablet 2 tablet (not administered)    senna-docusate (SENOKOT-S;PERICOLACE) 8.6-50 MG per tablet 1 tablet (not administered)     Or   senna-docusate (SENOKOT-S;PERICOLACE) 8.6-50 MG per tablet 2 tablet (not administered)   magnesium hydroxide (MILK OF MAGNESIA) suspension 30 mL (not administered)   bisacodyl (DULCOLAX) Suppository 10 mg (not administered)   ondansetron (ZOFRAN-ODT) ODT tab 4 mg (not administered)     Or   ondansetron (ZOFRAN) injection 4 mg (not administered)   prochlorperazine (COMPAZINE) injection 10 mg (not administered)     Or   prochlorperazine (COMPAZINE) tablet 10 mg (not administered)     Or   prochlorperazine (COMPAZINE) Suppository 25 mg (not administered)   metoclopramide (REGLAN) tablet 10 mg (not administered)     Or   metoclopramide (REGLAN) injection 10 mg (not administered)   HYDROmorphone (DILAUDID) injection 1 mg (1 mg Intravenous Given 5/22/18 2044)   hydrOXYzine (ATARAX) tablet 50 mg (50 mg Oral Given 5/22/18 2045)              Assessments & Plan (with Medical Decision Making)   This is a 37-year-old female patient coming into emergency room with a dislocation of her left hip.  This is the third dislocation in the month.  Her physical exam shows no obvious dislocation of the left hip without complications and she has good pulses throughout.  At this time she was provided with IV Dilaudid and Atarax.  I did discuss the case with the orthopedic resident and at this time they will admit the patient to their service for continued care management.    I have reviewed the nursing notes.    I have reviewed the findings, diagnosis, plan and need for follow up with the patient.    Current Discharge Medication List          Final diagnoses:   Dislocated hip, left, sequela       5/22/2018   UR 8A     Dannie Saldaña MD  05/22/18 2410

## 2018-05-23 NOTE — ANESTHESIA CARE TRANSFER NOTE
Patient: Alethea Patel    Procedure(s):  Closed Reduction Left Hip - Wound Class: I-Clean    Diagnosis: Dislocated Left Hip Joint  Diagnosis Additional Information: No value filed.    Anesthesia Type:   No value filed.     Note:  Airway :Face Mask  Patient transferred to:PACU  Comments: Pt to PACU, VSS.  Report to RN, questions answered. Handoff Report: Identifed the Patient, Identified the Reponsible Provider, Reviewed the pertinent medical history, Discussed the surgical course, Reviewed Intra-OP anesthesia mangement and issues during anesthesia, Set expectations for post-procedure period and Allowed opportunity for questions and acknowledgement of understanding      Vitals: (Last set prior to Anesthesia Care Transfer)    CRNA VITALS  5/23/2018 0853 - 5/23/2018 0932      5/23/2018             Resp Rate (observed): (!)  2                Electronically Signed By: VALENTIN Santos CRNA  May 23, 2018  9:32 AM

## 2018-05-23 NOTE — PLAN OF CARE
Problem: Patient Care Overview  Goal: Plan of Care/Patient Progress Review  Outcome: Improving  Focus: Return from PACU  D: Stable, sleepy and requesting morning meds. Pain controlled. Abductor pillow between legs. Jordan patent. P: Continue to monitor.

## 2018-05-23 NOTE — PROGRESS NOTES
Patient is requesting to talk to Dr. Saleem. Paged and no call back yet- patient is threatening that she will leave AMA if  does not call back and tell her the plan of care. Informed patient that Dr. Saleem was paged. Will continue to monitor patient.

## 2018-05-23 NOTE — ED TRIAGE NOTES
Pt report she had multiple hip surgery. 3 hip dislocation in three 3 weeks. Last surgery 5/3. Patient report last intake 1pm today.

## 2018-05-23 NOTE — OP NOTE
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       POSTOPERATIVE DIAGNOSIS:  same    PROCEDURE PERFORMED:     1.  Closed reduction of left total hip arthroplasty. .       INDICATIONS:  This patient who has had a total hip arthroplasty performed and has had 2 acute dislocations.      DESCRIPTION OF PROCEDURE:  After induction of general anesthesia, the patient was placed on the operating table in the lateral decubitus position.  With traction, adduction and internal rotation, the hip was then reduced without difficulty in a closed manner.     Cheko Saleem MD  [facility] Family Professor  Oncology and Adult Reconstructive Surgery  Dept Orthopaedic Surgery, [facility]  [phone] office, [phone] pager  [email or web address]

## 2018-05-23 NOTE — ED NOTES
[facility], [place]   ED Nurse to Floor Handoff     Alethea Patel is a 37 year old female who speaks English and lives with a spouse,  in a home  They arrived in the ED by ambulance from home    ED Chief Complaint: Hip Pain ( left hip pain, patient report she had mutiples hip dislocation)    ED Dx;   Final diagnoses:   Dislocated hip, left, sequela         Needed?: No    Allergies:   Allergies   Allergen Reactions     Chantix [Varenicline] Nausea and Vomiting     Other reaction(s): Vomiting     No Clinical Screening - See Comments      Other reaction(s): Unknown Reaction     Seasonal Allergies      Ciprofloxacin      Other reaction(s): Other  Interacts with other medication     Latex Itching and Rash     Other reaction(s): Other - Describe In Comment Field  Vaginal itching, burning  Other reaction(s): Intolerance   .  Past Medical Hx:   Past Medical History:   Diagnosis Date     Acute lymphoblastic leukemia in remission (H)      Anxiety      Depression      Depressive disorder      Gastro-oesophageal reflux disease      IBS (irritable bowel syndrome)      Osteonecrosis (H)      Osteonecrosis (H)       Baseline Mental status: WDL  Current Mental Status changes: at basesline    Infection present or suspected this encounter: no  Sepsis suspected: No  Isolation type: No active isolations     Activity level - Baseline/Home:  Assistance of one with walker.   Activity Level - Current:   Patient had recent hip surgery, requires assistance. Didn't get out of bed while in ER    Bariatric equipment needed?: No    In the ED these meds were given:   Medications   HYDROmorphone (DILAUDID) injection 1 mg (1 mg Intravenous Given 5/22/18 2044)   hydrOXYzine (ATARAX) tablet 50 mg (50 mg Oral Given 5/22/18 2045)       Drips running?  No    Home pump  No    Current LDAs  Peripheral IV 05/22/18 Right Upper forearm (Active)   Number of days:0       Incision/Surgical Site 04/03/18 Left Hip  "(Active)   Number of days:49       Labs results:   Labs Ordered and Resulted from Time of ED Arrival Up to the Time of Departure from the ED   CBC WITH PLATELETS DIFFERENTIAL - Abnormal; Notable for the following:        Result Value    Hemoglobin 11.3 (*)     Hematocrit 34.7 (*)     All other components within normal limits   BASIC METABOLIC PANEL   INR   ABO/RH TYPE AND SCREEN       Imaging Studies:   Recent Results (from the past 24 hour(s))   XR Hip Port Left G/E 2 Views    Narrative    LEFT HIP PORTABLE TWO TO THREE VIEWS   5/22/2018 8:46 PM     HISTORY: Left hip reduction.    COMPARISON: 4/19/2018.      Impression    IMPRESSION: Left hip dislocation. The left femoral head prosthesis is  subluxed superiorly with respect to the acetabular cup. No definite  fracture visualized.    SHAW RUDOLPH MD       Recent vital signs:   /68  Temp 98.8  F (37.1  C) (Oral)  Resp 20  Ht 1.753 m (5' 9\")  Wt 82.6 kg (182 lb)  LMP 05/01/2018  SpO2 94%  Breastfeeding? No  BMI 26.88 kg/m2    Cardiac Rhythm: Normal Sinus  Pt needs tele? No  Skin/wound Issues: None    Code Status: Full Code    Pain control: fair    Nausea control: pt had none    Abnormal labs/tests/findings requiring intervention: xray left hip dislocation      Family present during ED course? No   Family Comments/Social Situation comments: na    Tasks needing completion: None    Winnie Negrete RN  [facility]--[phone] [place] [facility]  [phone] [facility]    "

## 2018-05-23 NOTE — OR NURSING
PACU to Inpatient Nursing Handoff    Patient Alethea Patel is a 37 year old female who speaks English.   Procedure Procedure(s):  Closed Reduction Left Hip - Wound Class: I-Clean   Surgeon(s) Primary: Cheko Saleem MD     Allergies   Allergen Reactions     Chantix [Varenicline] Nausea and Vomiting     Other reaction(s): Vomiting     No Clinical Screening - See Comments      Other reaction(s): Unknown Reaction     Seasonal Allergies      Ciprofloxacin      Other reaction(s): Other  Interacts with other medication     Latex Itching and Rash     Other reaction(s): Other - Describe In Comment Field  Vaginal itching, burning  Other reaction(s): Intolerance       Isolation  No active isolations     Past Medical History   has a past medical history of Acute lymphoblastic leukemia in remission (H); Anxiety; Depression; Depressive disorder; Gastro-oesophageal reflux disease; IBS (irritable bowel syndrome); Osteonecrosis (H); and Osteonecrosis (H).    Anesthesia General   Dermatome Level     Preop Meds Not applicable   Nerve block Not applicable   Intraop Meds fentanyl (Sublimaze): 50 mcg total   Local Meds No   Antibiotics Not applicable     Pain Patient Currently in Pain: yes  Comfort: tolerable with discomfort  Pain Control: partially effective   PACU meds  hydromorphone (Dilaudid): 0.5 mg (total dose) last given at 1005    PCA / epidural No   Capnography     Telemetry ECG Rhythm: Normal sinus rhythm   Inpatient Telemetry Monitor Ordered? No        Labs Glucose Lab Results   Component Value Date    GLC 99 05/22/2018       Hgb Lab Results   Component Value Date    HGB 11.3 05/22/2018       INR Lab Results   Component Value Date    INR 0.97 05/22/2018      PACU Imaging Not applicable     Wound/Incision Incision/Surgical Site 04/03/18 Left Hip (Active)   Number of days:50      CMS Peripheral Neurovascular WDL: WDL (05/23/18 0959)  LLE Temperature: cool (05/23/18 0959)  LLE Color: no discoloration (05/23/18 0700)  LLE  Sensation: no tingling;no numbness (05/23/18 0700)   Equipment abductor pillow   Other LDA       IV Access Peripheral IV 05/22/18 Right Upper forearm (Active)   Site Assessment WDL 5/23/2018  9:58 AM   Line Status Infusing 5/23/2018  9:58 AM   Phlebitis Scale 0-->no symptoms 5/23/2018  9:58 AM   Infiltration Scale 0 5/23/2018  7:00 AM   Extravasation? No 5/23/2018  7:00 AM   Number of days:1      Blood Products Not applicable EBL 0 mL   Intake/Output Date 05/23/18 0700 - 05/24/18 0659   Shift 3008-9347 6310-9633 3590-0659 24 Hour Total   I  N  T  A  K  E   I.V. 1000   1000    Shift Total  (mL/kg) 1000  (12.11)   1000  (12.11)   O  U  T  P  U  T   Urine 1100   1100    Shift Total  (mL/kg) 1100  (13.32)   1100  (13.32)   Weight (kg) 82.55 82.55 82.55 82.55        Drains / Montano Urethral Catheter Straight-tip 16 fr (Active)   Tube Description UTV 5/23/2018  9:58 AM   Catheter Care Done 5/23/2018  7:00 AM   Collection Container Standard 5/23/2018  9:58 AM   Securement Method Securing device (Describe) 5/23/2018  9:58 AM   Rationale for Continued Use Other (Comment) 5/23/2018  9:30 AM   Number of days:1      Time of void PreOp Void Prior to Procedure:  (montano catheter) (05/23/18 0808)    PostOp Voided (mL): 1100 mL (05/23/18 0719)    Diapered? No   Bladder Scan     PO    ice chips     Vitals    B/P: 117/77  T: 96.8  F (36  C)    Temp src: Oral  P:       Heart Rate: 80 (05/23/18 1000)     R: 9  O2:  SpO2: 92 %    O2 Device: None (Room air) (05/23/18 1000)    Oxygen Delivery: 10 LPM (05/23/18 0945)         Family/support present none   Patient belongings Patient Belongings: none  Disposition of Belongings: Other (see comment) (in patient's room)   Patient transported on bed   DC meds/scripts (obs/outpt) Not applicable   Inpatient Pain Meds Released? Yes       Special needs/considerations None   Tasks needing completion None       Francisca Mccrary, STEVEN  ASCOM [id number]

## 2018-05-23 NOTE — PLAN OF CARE
Problem: Patient Care Overview  Goal: Plan of Care/Patient Progress Review  Outcome: No Change  Pt arrived from ED @ 2200 accompanied by her friend/caregiver. Able to scoot from cart to bed with assist x 1 to lift LLE. Denies any numbness/tingling. CMS intact aside from inability to move leg. Alert and oriented. Denies any chest pain or SOB. No n/v. Provided with box lunch. Notified she will be NPO @ midnight and is agreeable to plan. Stated she needs montano and was not willing to attempt bed pan or SIC. Moonlighter and ortho on call notified. Verbal order obtained from . Dose of oxycodone also confirmed with  and . PIV saline locked. Friend present at bedside and is involved in care. Montano being placed by RN flyer. Update given to night nurse.    At change of shift, charge nurse noted in med rec that it states pt is only to get max of 16 30 mg tabs oxycodone/day. Night nurse discussed with pt, and pt was agreeable to taking 90 mg q 4 hrs instead of 120 mg. Discussed with  who provided verbal order to modify. Bedside report completed with night nurse @ 2330. At this time, pt was noted to be lethargic despite receiving no pain medicine yet. Only sedating med given was scheduled seroquel @ 2308. Night nurse will continue to monitor for concerns.

## 2018-05-24 ENCOUNTER — ANESTHESIA EVENT (OUTPATIENT)
Dept: SURGERY | Facility: CLINIC | Age: 38
DRG: 467 | End: 2018-05-24
Payer: MEDICARE

## 2018-05-24 ASSESSMENT — LIFESTYLE VARIABLES: TOBACCO_USE: 1

## 2018-05-24 ASSESSMENT — ENCOUNTER SYMPTOMS: SEIZURES: 1

## 2018-05-24 NOTE — PROGRESS NOTES
Attending note:    I called and spoke with pt who left AMA last night.  She was notified of her revision L HAJA surgery tomorrow.  I asked her to come to hospital at 11am in case we're able to do her surgery earlier than the anticipated time of mid to late afternoon.  She was instructed to be NPO after 2400.      I discussed the risks, benefits, alternatives regarding the proposed surgery with the patient who both demonstrated understanding and indicated a desire to proceed with the surgery as planned.      Cheko Saleem MD  [facility] Family Professor  Oncology and Adult Reconstructive Surgery  Dept Orthopaedic Surgery, [facility]  [phone] office, [phone] pager  [email or web address]

## 2018-05-24 NOTE — PLAN OF CARE
Problem: Patient Care Overview  Goal: Plan of Care/Patient Progress Review  Pt insisted to go AMA after talking to on call resident Rosemary. Educated pt and sister regarding risk of going AMA but still wanted to go AMA. Jordan Catheter and PIV removed. Pt signed AMA form and attached it on her chart. Pt left around 2030 per transport.

## 2018-05-24 NOTE — DISCHARGE SUMMARY
ORTHOPAEDIC DISCHARGE SUMMARY     Date of Admission: 5/22/18  Left AMA on : 5/23/18  Disposition: Home  Staff Physician: Cheko Saleem MD  Primary Care Provider: [facility]    DISCHARGE DIAGNOSIS:  Left Total Hip Multiple Dislocations     PROCEDURES: Procedure(s):  Closed reduction left hip on 5/23/18    BRIEF HISTORY:  Admitted on 5/22/18 with dislocated left HAJA, underwent closed reduction on 5/23/18, left AMA on 5/23/18    HOSPITAL COURSE:    Surgery was uncomplicated. Alethea Patel has done well post-operatively. Medicine was consulted post operatively to aid in management of medical comorbidities. See final recommendations below. The patient received routine nursing cares and is medically stable. Vital signs are stable. Patient left AMA on 5/23/18.    DVT prophylaxis:  Asa 325mg daily for 4 weeks  PT Progress: He has met PT/OT goals for safe mobility.    Pain Meds:  He was weaned off all IV pain meds by discharge.  Inpatient Events: No significant events or complications.     Discharge orders and instructions as below.    FOLLOWUP:      Future Appointments  Date Time Provider Department Center   6/14/2018 9:00 AM Cheko Saleem MD [facility]       Appointments on [facility]. Call [phone] if you haven't heard regarding these appointments within 7 days of discharge.    PLANNED DISCHARGE ORDERS:        Patient's Medications   New Prescriptions    No medications on file   Previous Medications    ACETAMINOPHEN (TYLENOL) 325 MG TABLET    Take 2 tablets (650 mg) by mouth every 4 hours    ALBUTEROL (PROAIR HFA/PROVENTIL HFA/VENTOLIN HFA) 108 (90 BASE) MCG/ACT INHALER    Inhale 2 puffs into the lungs every 6 hours as needed for shortness of breath / dyspnea or wheezing    B COMPLEX VITAMINS (B COMPLEX PO)    Take 1 tablet by mouth daily Dose unknown; OTC    BACLOFEN PO    Take 20 mg by mouth 4 times daily     BUPROPION HCL (WELLBUTRIN SR PO)    Take 150  mg by mouth 2 times daily    DULOXETINE (CYMBALTA) 60 MG EC CAPSULE    Take 60 mg by mouth 2 times daily     ESCITALOPRAM OXALATE (LEXAPRO PO)    Take 20 mg by mouth daily    FLUTICASONE (FLONASE) 50 MCG/ACT NASAL SPRAY    Spray 2 sprays into both nostrils daily as needed for rhinitis or allergies    GABAPENTIN (NEURONTIN) 300 MG CAPSULE    Take 900 mg by mouth 3 times daily     LEVOTHYROXINE (SYNTHROID, LEVOTHROID) 75 MCG TABLET    Take 75 mcg by mouth daily    LORAZEPAM (ATIVAN) 1 MG TABLET    Take 1-2 mg by mouth 3 times daily Max 5 tablets daily    METHADONE HCL PO    Take 40 mg by mouth every 8 hours    MONTELUKAST (SINGULAIR) 10 MG TABLET    Take 10 mg by mouth At Bedtime    MULTIPLE VITAMINS-MINERALS (MULTIVITAMIN ADULT PO)    Take 1 tablet by mouth daily    NUTRITIONAL SUPPLEMENTS (MELATONIN PO)    Take 3 mg by mouth At Bedtime     OMEPRAZOLE (PRILOSEC OTC) 20 MG TABLET    Take 20 mg by mouth daily     ORDER FOR DME    [facility]  [facility]  [phone]     Directions: Advance as Tolerated and Instructed by MD    ORDER FOR DME    Equipment being ordered: Bath Seat ()  Treatment Diagnosis: Limited independence in ADLs/mobility due to hip precautions and recent hip surgery    OXYCODONE IR (ROXICODONE) 30 MG TABLET    Take 120 mg by mouth every 4 hours as needed for moderate to severe pain    POTASSIUM PO    Take 1 tablet by mouth daily Dose unknown; OTC    PROCHLORPERAZINE MALEATE (COMPAZINE PO)    Take 10 mg by mouth every 6 hours as needed.      QUETIAPINE (SEROQUEL) 25 MG TABLET    Take 25 mg by mouth At Bedtime     RANITIDINE (ZANTAC) 150 MG TABLET    Take 150 mg by mouth At Bedtime     SENNA-DOCUSATE (SENOKOT-S;PERICOLACE) 8.6-50 MG PER TABLET    Take 2 tablets by mouth 2 times daily    TIZANIDINE HCL PO    Take 2 mg by mouth every 6 hours as needed for muscle spasms   Modified Medications    No medications on file   Discontinued Medications    No medications on file       No discharge procedures  on file.      Mo Delcid MD 05/24/18  Orthopaedic Surgery Resident, PGY-4  Pager: [phone]

## 2018-05-24 NOTE — ANESTHESIA PREPROCEDURE EVALUATION
Anesthesia Evaluation     . Pt has had prior anesthetic. Type: General    No history of anesthetic complications          ROS/MED HX    ENT/Pulmonary:     (+)tobacco use, Current use , . .    Neurologic:     (+)seizures last seizure: 2014     Cardiovascular:  - neg cardiovascular ROS       METS/Exercise Tolerance:  >4 METS   Hematologic:  - neg hematologic  ROS       Musculoskeletal:   (+) , , other musculoskeletal- hip dislocation      GI/Hepatic:     (+) GERD Asymptomatic on medication,       Renal/Genitourinary:  - ROS Renal section negative       Endo:  - neg endo ROS       Psychiatric:     (+) psychiatric history anxiety and depression      Infectious Disease:  - neg infectious disease ROS       Malignancy:   (+) Malignancy History of Lymphoma/Leukemia          Other:                                    ANESTHESIA PREOP EVALUATION    Procedure: Procedure(s):  Revision Left Total Hip Arthroplasty      (latex allergy)  - Wound Class:     HPI: Alethea Patel is a 37 year old female who is presenting for above stated procedure.    PMHx/PSHx/ROS:  Past Medical History:   Diagnosis Date     Acute lymphoblastic leukemia in remission (H)      Anxiety      Depression      Depressive disorder      Gastro-oesophageal reflux disease      IBS (irritable bowel syndrome)      Osteonecrosis (H)      Osteonecrosis (H)        Past Surgical History:   Procedure Laterality Date     ARTHROPLASTY KNEE Left 1/8/2016    Procedure: ARTHROPLASTY KNEE;  Surgeon: Cheko Saleem MD;  [facility]     ARTHROPLASTY PATELLO-FEMORAL (KNEE)  6/12/2012    Procedure: ARTHROPLASTY PATELLO-FEMORAL (KNEE);  Right Knee Patella Resurfacing;  Surgeon: Cheko Saleem MD;  [facility]     ARTHROPLASTY REVISION HIP Left 4/3/2018    Procedure: ARTHROPLASTY REVISION HIP;  Conversion of Left Hip Rigo-Arthroplasty to Left Total Hip Replacement;  Surgeon: Cheko Saleme MD;  Location[facility] OR     C PELVIS/HIP JOINT SURGERY UNLISTED       CLOSED  REDUCTION HIP Left 5/18/2018    Procedure: CLOSED REDUCTION HIP;  Closed Reduction Left Hip, and Aspiration Culture   Left Hip;  Surgeon: Cheko Saleem MD;  [facility]     CLOSED REDUCTION HIP Left 5/23/2018    Procedure: CLOSED REDUCTION HIP;  Closed Reduction Left Hip;  Surgeon: Cheko Saleem MD;  [facility]     HIP SURGERY  5/31/2005    Left hip resurfacing hemiarthroplasty     HIP SURGERY  6/5/07     INJECT BLOCK MEDIAL BRANCH CERVICAL/THORACIC/LUMBAR N/A 1/9/2016    Procedure: INJECT BLOCK MEDIAL BRANCH CERVICAL / THORACIC / LUMBAR;  Surgeon: GENERIC ANESTHESIA PROVIDER;  Location[facility] OR     JOINT REPLACEMENT  2/2/2010    Rt TKA     KNEE SURGERY       ORTHOPEDIC SURGERY      right foot surgery       ROS as stated above    Soc Hx:   Social History   Substance Use Topics     Smoking status: Current Every Day Smoker     Packs/day: 1.00     Years: 10.00     Types: Cigarettes     Start date: 5/29/1997     Smokeless tobacco: Never Used     Alcohol use No      Comment: very rare       Allergies:   Allergies   Allergen Reactions     Chantix [Varenicline] Nausea and Vomiting     Other reaction(s): Vomiting     No Clinical Screening - See Comments      Other reaction(s): Unknown Reaction     Seasonal Allergies      Ciprofloxacin      Other reaction(s): Other  Interacts with other medication     Latex Itching and Rash     Other reaction(s): Other - Describe In Comment Field  Vaginal itching, burning  Other reaction(s): Intolerance       Meds:   No prescriptions prior to admission.       Current Outpatient Prescriptions   Medication Sig Dispense Refill     albuterol (PROAIR HFA/PROVENTIL HFA/VENTOLIN HFA) 108 (90 BASE) MCG/ACT Inhaler Inhale 2 puffs into the lungs every 6 hours as needed for shortness of breath / dyspnea or wheezing       IBUPROFEN PO Take 200 mg by mouth every 6 hours as needed for moderate pain       acetaminophen (TYLENOL) 325 MG tablet Take 2 tablets (650 mg) by mouth every 4 hours 70  tablet 0     B Complex Vitamins (B COMPLEX PO) Take 1 tablet by mouth daily Dose unknown; OTC       BACLOFEN PO Take 20 mg by mouth 4 times daily        BuPROPion HCl (WELLBUTRIN SR PO) Take 150 mg by mouth 2 times daily       DULoxetine (CYMBALTA) 60 MG EC capsule Take 60 mg by mouth 2 times daily        Escitalopram Oxalate (LEXAPRO PO) Take 20 mg by mouth daily       fluticasone (FLONASE) 50 MCG/ACT nasal spray Spray 2 sprays into both nostrils daily as needed for rhinitis or allergies       gabapentin (NEURONTIN) 300 MG capsule Take 900 mg by mouth 3 times daily        levothyroxine (SYNTHROID, LEVOTHROID) 75 MCG tablet Take 75 mcg by mouth daily       LORazepam (ATIVAN) 1 MG tablet Take 1-2 mg by mouth 3 times daily Max 5 tablets daily       METHADONE HCL PO Take 40 mg by mouth every 8 hours       montelukast (SINGULAIR) 10 MG tablet Take 10 mg by mouth At Bedtime       Multiple Vitamins-Minerals (MULTIVITAMIN ADULT PO) Take 1 tablet by mouth daily       Nutritional Supplements (MELATONIN PO) Take 3 mg by mouth At Bedtime        omeprazole (PRILOSEC OTC) 20 MG tablet Take 20 mg by mouth daily        order for DME Equipment being ordered: Bath Seat ()  Treatment Diagnosis: Limited independence in ADLs/mobility due to hip precautions and recent hip surgery 1 each 0     order for DME [facility]  [facility]  [phone]     Directions: Advance as Tolerated and Instructed by MD 1 Device 0     oxyCODONE IR (ROXICODONE) 30 MG tablet Take 120 mg by mouth every 4 hours as needed for moderate to severe pain       POTASSIUM PO Take 1 tablet by mouth daily Dose unknown; OTC       Prochlorperazine Maleate (COMPAZINE PO) Take 10 mg by mouth every 6 hours as needed.         QUEtiapine (SEROQUEL) 25 MG tablet Take 25 mg by mouth At Bedtime        ranitidine (ZANTAC) 150 MG tablet Take 150 mg by mouth At Bedtime        senna-docusate (SENOKOT-S;PERICOLACE) 8.6-50 MG per tablet Take 2 tablets by mouth 2 times daily 30  tablet 0     TIZANIDINE HCL PO Take 2 mg by mouth every 6 hours as needed for muscle spasms         Physical Exam:  VS:      , Weight   Wt Readings from Last 2 Encounters:   05/22/18 82.6 kg (182 lb)   05/17/18 82.6 kg (182 lb)       Labs:    BMP:  Recent Labs   Lab Test  05/22/18 2057   NA  144   POTASSIUM  4.3   CHLORIDE  108   CO2  29   BUN  9   CR  0.89   GLC  99   APOLONIA  8.6     LFTs:   Recent Labs   Lab Test  01/10/16   0634   PROTTOTAL  5.3*   ALBUMIN  2.5*   BILITOTAL  0.2   ALKPHOS  64   AST  11   ALT  15     CBC:   Recent Labs   Lab Test  05/22/18 2057   WBC  9.2   RBC  3.90   HGB  11.3*   HCT  34.7*   MCV  89   MCH  29.0   MCHC  32.6   RDW  13.5   PLT  226     Coags:  Recent Labs   Lab Test  05/22/18 2057   INR  0.97           Patient discussed with Staff Anesthesiologist.    Salvatore Street  Anesthesia Resident CA-2  Pager [phone]  May 24, 2018, 6:19 PM    Anesthesia Plan      History & Physical Review  History and physical reviewed and following examination; no interval change.    ASA Status:  3 .        Plan for General, ETT, Epidural and Spinal with Intravenous induction. Maintenance will be Balanced.    PONV prophylaxis:  Ondansetron (or other 5HT-3) and Dexamethasone or Solumedrol  Additional equipment: 2nd IV   Alethea Patel is a 37 year old female with multiple hip dislocations requiring revision of hip arthroplasty with Dr. Saleem on 5/25/2018. PMH significant for previous uncomplicated recent anesthetics.       Postoperative Care  Postoperative pain management:  IV analgesics.      Consents                          .

## 2018-05-25 ENCOUNTER — HOSPITAL ENCOUNTER (INPATIENT)
Facility: CLINIC | Age: 38
LOS: 3 days | Discharge: HOME HEALTH | DRG: 467 | End: 2018-05-28
Attending: ORTHOPAEDIC SURGERY | Admitting: ORTHOPAEDIC SURGERY
Payer: MEDICARE

## 2018-05-25 ENCOUNTER — APPOINTMENT (OUTPATIENT)
Dept: GENERAL RADIOLOGY | Facility: CLINIC | Age: 38
DRG: 467 | End: 2018-05-25
Attending: ORTHOPAEDIC SURGERY
Payer: MEDICARE

## 2018-05-25 ENCOUNTER — ANESTHESIA (OUTPATIENT)
Dept: SURGERY | Facility: CLINIC | Age: 38
DRG: 467 | End: 2018-05-25
Payer: MEDICARE

## 2018-05-25 DIAGNOSIS — Z98.890 STATUS POST HIP SURGERY: Primary | ICD-10-CM

## 2018-05-25 LAB
ALBUMIN UR-MCNC: NEGATIVE MG/DL
APPEARANCE UR: CLEAR
BILIRUB UR QL STRIP: NEGATIVE
COLOR UR AUTO: YELLOW
GLUCOSE BLDC GLUCOMTR-MCNC: 103 MG/DL (ref 70–99)
GLUCOSE UR STRIP-MCNC: NEGATIVE MG/DL
HCG UR QL: NEGATIVE
HGB UR QL STRIP: NEGATIVE
KETONES UR STRIP-MCNC: NEGATIVE MG/DL
LEUKOCYTE ESTERASE UR QL STRIP: NEGATIVE
MUCOUS THREADS #/AREA URNS LPF: PRESENT /LPF
NITRATE UR QL: NEGATIVE
PH UR STRIP: 5.5 PH (ref 5–7)
RBC #/AREA URNS AUTO: 1 /HPF (ref 0–2)
SOURCE: ABNORMAL
SP GR UR STRIP: 1.01 (ref 1–1.03)
SQUAMOUS #/AREA URNS AUTO: 1 /HPF (ref 0–1)
UROBILINOGEN UR STRIP-MCNC: NORMAL MG/DL (ref 0–2)
WBC #/AREA URNS AUTO: 1 /HPF (ref 0–5)

## 2018-05-25 PROCEDURE — 25000132 ZZH RX MED GY IP 250 OP 250 PS 637: Mod: GY | Performed by: ORTHOPAEDIC SURGERY

## 2018-05-25 PROCEDURE — 25000125 ZZHC RX 250: Performed by: STUDENT IN AN ORGANIZED HEALTH CARE EDUCATION/TRAINING PROGRAM

## 2018-05-25 PROCEDURE — 25000128 H RX IP 250 OP 636: Performed by: STUDENT IN AN ORGANIZED HEALTH CARE EDUCATION/TRAINING PROGRAM

## 2018-05-25 PROCEDURE — A9270 NON-COVERED ITEM OR SERVICE: HCPCS | Mod: GY | Performed by: ORTHOPAEDIC SURGERY

## 2018-05-25 PROCEDURE — 25000128 H RX IP 250 OP 636: Performed by: ORTHOPAEDIC SURGERY

## 2018-05-25 PROCEDURE — 87070 CULTURE OTHR SPECIMN AEROBIC: CPT | Performed by: ORTHOPAEDIC SURGERY

## 2018-05-25 PROCEDURE — 25000128 H RX IP 250 OP 636: Performed by: ANESTHESIOLOGY

## 2018-05-25 PROCEDURE — 81025 URINE PREGNANCY TEST: CPT | Performed by: ANESTHESIOLOGY

## 2018-05-25 PROCEDURE — 25000125 ZZHC RX 250: Performed by: ANESTHESIOLOGY

## 2018-05-25 PROCEDURE — C1713 ANCHOR/SCREW BN/BN,TIS/BN: HCPCS | Performed by: ORTHOPAEDIC SURGERY

## 2018-05-25 PROCEDURE — 00000146 ZZHCL STATISTIC GLUCOSE BY METER IP

## 2018-05-25 PROCEDURE — 25000132 ZZH RX MED GY IP 250 OP 250 PS 637: Mod: GY | Performed by: STUDENT IN AN ORGANIZED HEALTH CARE EDUCATION/TRAINING PROGRAM

## 2018-05-25 PROCEDURE — 99221 1ST HOSP IP/OBS SF/LOW 40: CPT | Performed by: INTERNAL MEDICINE

## 2018-05-25 PROCEDURE — 87075 CULTR BACTERIA EXCEPT BLOOD: CPT | Performed by: ORTHOPAEDIC SURGERY

## 2018-05-25 PROCEDURE — 71000015 ZZH RECOVERY PHASE 1 LEVEL 2 EA ADDTL HR: Performed by: ORTHOPAEDIC SURGERY

## 2018-05-25 PROCEDURE — 36000070 ZZH SURGERY LEVEL 5 EA 15 ADDTL MIN - UMMC: Performed by: ORTHOPAEDIC SURGERY

## 2018-05-25 PROCEDURE — 25800025 ZZH RX 258: Performed by: ORTHOPAEDIC SURGERY

## 2018-05-25 PROCEDURE — 25000132 ZZH RX MED GY IP 250 OP 250 PS 637: Mod: GY | Performed by: INTERNAL MEDICINE

## 2018-05-25 PROCEDURE — 81001 URINALYSIS AUTO W/SCOPE: CPT | Performed by: ANESTHESIOLOGY

## 2018-05-25 PROCEDURE — 36000068 ZZH SURGERY LEVEL 5 1ST 30 MIN - UMMC: Performed by: ORTHOPAEDIC SURGERY

## 2018-05-25 PROCEDURE — 71000014 ZZH RECOVERY PHASE 1 LEVEL 2 FIRST HR: Performed by: ORTHOPAEDIC SURGERY

## 2018-05-25 PROCEDURE — 27210794 ZZH OR GENERAL SUPPLY STERILE: Performed by: ORTHOPAEDIC SURGERY

## 2018-05-25 PROCEDURE — 0SRB019 REPLACEMENT OF LEFT HIP JOINT WITH METAL SYNTHETIC SUBSTITUTE, CEMENTED, OPEN APPROACH: ICD-10-PCS | Performed by: ORTHOPAEDIC SURGERY

## 2018-05-25 PROCEDURE — 37000009 ZZH ANESTHESIA TECHNICAL FEE, EACH ADDTL 15 MIN: Performed by: ORTHOPAEDIC SURGERY

## 2018-05-25 PROCEDURE — 27210995 ZZH RX 272: Performed by: ORTHOPAEDIC SURGERY

## 2018-05-25 PROCEDURE — 40000986 XR PELVIS AND HIP LEFT 1 VIEW

## 2018-05-25 PROCEDURE — 12000001 ZZH R&B MED SURG/OB UMMC

## 2018-05-25 PROCEDURE — A9270 NON-COVERED ITEM OR SERVICE: HCPCS | Mod: GY | Performed by: INTERNAL MEDICINE

## 2018-05-25 PROCEDURE — 40000170 ZZH STATISTIC PRE-PROCEDURE ASSESSMENT II: Performed by: ORTHOPAEDIC SURGERY

## 2018-05-25 PROCEDURE — 87176 TISSUE HOMOGENIZATION CULTR: CPT | Performed by: ORTHOPAEDIC SURGERY

## 2018-05-25 PROCEDURE — 0SPB0JZ REMOVAL OF SYNTHETIC SUBSTITUTE FROM LEFT HIP JOINT, OPEN APPROACH: ICD-10-PCS | Performed by: ORTHOPAEDIC SURGERY

## 2018-05-25 PROCEDURE — A9270 NON-COVERED ITEM OR SERVICE: HCPCS | Mod: GY | Performed by: STUDENT IN AN ORGANIZED HEALTH CARE EDUCATION/TRAINING PROGRAM

## 2018-05-25 PROCEDURE — 37000008 ZZH ANESTHESIA TECHNICAL FEE, 1ST 30 MIN: Performed by: ORTHOPAEDIC SURGERY

## 2018-05-25 DEVICE — IMP SCR BIOM G7 ACETABULAR DOME 6.5X30MM LOW PRO 010000999: Type: IMPLANTABLE DEVICE | Site: HIP | Status: FUNCTIONAL

## 2018-05-25 DEVICE — IMP SCR BIOM G7 ACETABULAR DOME 6.5X35MM LOW PRO 010001000: Type: IMPLANTABLE DEVICE | Site: HIP | Status: FUNCTIONAL

## 2018-05-25 DEVICE — IMPLANTABLE DEVICE: Type: IMPLANTABLE DEVICE | Site: HIP | Status: FUNCTIONAL

## 2018-05-25 RX ORDER — GABAPENTIN 300 MG/1
900 CAPSULE ORAL 3 TIMES DAILY
Status: DISCONTINUED | OUTPATIENT
Start: 2018-05-25 | End: 2018-05-28 | Stop reason: HOSPADM

## 2018-05-25 RX ORDER — MORPHINE SULFATE 1 MG/ML
200 INJECTION, SOLUTION EPIDURAL; INTRATHECAL; INTRAVENOUS ONCE
Status: COMPLETED | OUTPATIENT
Start: 2018-05-25 | End: 2018-05-25

## 2018-05-25 RX ORDER — SODIUM CHLORIDE, SODIUM LACTATE, POTASSIUM CHLORIDE, CALCIUM CHLORIDE 600; 310; 30; 20 MG/100ML; MG/100ML; MG/100ML; MG/100ML
INJECTION, SOLUTION INTRAVENOUS CONTINUOUS
Status: DISCONTINUED | OUTPATIENT
Start: 2018-05-25 | End: 2018-05-28 | Stop reason: HOSPADM

## 2018-05-25 RX ORDER — MAGNESIUM HYDROXIDE 1200 MG/15ML
LIQUID ORAL PRN
Status: DISCONTINUED | OUTPATIENT
Start: 2018-05-25 | End: 2018-05-25 | Stop reason: HOSPADM

## 2018-05-25 RX ORDER — DEXAMETHASONE SODIUM PHOSPHATE 4 MG/ML
INJECTION, SOLUTION INTRA-ARTICULAR; INTRALESIONAL; INTRAMUSCULAR; INTRAVENOUS; SOFT TISSUE PRN
Status: DISCONTINUED | OUTPATIENT
Start: 2018-05-25 | End: 2018-05-25

## 2018-05-25 RX ORDER — FENTANYL CITRATE 50 UG/ML
25-50 INJECTION, SOLUTION INTRAMUSCULAR; INTRAVENOUS
Status: DISCONTINUED | OUTPATIENT
Start: 2018-05-25 | End: 2018-05-25 | Stop reason: HOSPADM

## 2018-05-25 RX ORDER — NALOXONE HYDROCHLORIDE 0.4 MG/ML
.1-.4 INJECTION, SOLUTION INTRAMUSCULAR; INTRAVENOUS; SUBCUTANEOUS
Status: DISCONTINUED | OUTPATIENT
Start: 2018-05-25 | End: 2018-05-25

## 2018-05-25 RX ORDER — OXYCODONE HYDROCHLORIDE 30 MG/1
120 TABLET ORAL EVERY 4 HOURS PRN
Status: DISCONTINUED | OUTPATIENT
Start: 2018-05-25 | End: 2018-05-25

## 2018-05-25 RX ORDER — BACLOFEN 20 MG/1
20 TABLET ORAL 4 TIMES DAILY
Status: DISCONTINUED | OUTPATIENT
Start: 2018-05-25 | End: 2018-05-28 | Stop reason: HOSPADM

## 2018-05-25 RX ORDER — FLUTICASONE PROPIONATE 50 MCG
2 SPRAY, SUSPENSION (ML) NASAL DAILY PRN
Status: DISCONTINUED | OUTPATIENT
Start: 2018-05-25 | End: 2018-05-28 | Stop reason: HOSPADM

## 2018-05-25 RX ORDER — LEVOTHYROXINE SODIUM 75 UG/1
75 TABLET ORAL DAILY
Status: DISCONTINUED | OUTPATIENT
Start: 2018-05-25 | End: 2018-05-28 | Stop reason: HOSPADM

## 2018-05-25 RX ORDER — BUPROPION HYDROCHLORIDE 150 MG/1
150 TABLET, EXTENDED RELEASE ORAL 2 TIMES DAILY
Status: DISCONTINUED | OUTPATIENT
Start: 2018-05-25 | End: 2018-05-25

## 2018-05-25 RX ORDER — LIDOCAINE 40 MG/G
CREAM TOPICAL
Status: DISCONTINUED | OUTPATIENT
Start: 2018-05-25 | End: 2018-05-25

## 2018-05-25 RX ORDER — DULOXETIN HYDROCHLORIDE 60 MG/1
60 CAPSULE, DELAYED RELEASE ORAL 2 TIMES DAILY
Status: DISCONTINUED | OUTPATIENT
Start: 2018-05-25 | End: 2018-05-25

## 2018-05-25 RX ORDER — PROCHLORPERAZINE MALEATE 10 MG
10 TABLET ORAL EVERY 6 HOURS PRN
Status: DISCONTINUED | OUTPATIENT
Start: 2018-05-25 | End: 2018-05-28 | Stop reason: HOSPADM

## 2018-05-25 RX ORDER — AMOXICILLIN 250 MG
2 CAPSULE ORAL 2 TIMES DAILY
Status: DISCONTINUED | OUTPATIENT
Start: 2018-05-25 | End: 2018-05-28 | Stop reason: HOSPADM

## 2018-05-25 RX ORDER — CEFAZOLIN SODIUM 1 G/3ML
INJECTION, POWDER, FOR SOLUTION INTRAMUSCULAR; INTRAVENOUS PRN
Status: DISCONTINUED | OUTPATIENT
Start: 2018-05-25 | End: 2018-05-25

## 2018-05-25 RX ORDER — ACETAMINOPHEN 325 MG/1
650 TABLET ORAL EVERY 4 HOURS PRN
Status: DISCONTINUED | OUTPATIENT
Start: 2018-05-28 | End: 2018-05-28 | Stop reason: HOSPADM

## 2018-05-25 RX ORDER — HYDROMORPHONE HYDROCHLORIDE 1 MG/ML
.5-.8 INJECTION, SOLUTION INTRAMUSCULAR; INTRAVENOUS; SUBCUTANEOUS
Status: DISCONTINUED | OUTPATIENT
Start: 2018-05-25 | End: 2018-05-28 | Stop reason: HOSPADM

## 2018-05-25 RX ORDER — METHADONE HYDROCHLORIDE 10 MG/ML
20 INJECTION, SOLUTION INTRAMUSCULAR; INTRAVENOUS; SUBCUTANEOUS ONCE
Status: COMPLETED | OUTPATIENT
Start: 2018-05-25 | End: 2018-05-25

## 2018-05-25 RX ORDER — PROPOFOL 10 MG/ML
INJECTION, EMULSION INTRAVENOUS PRN
Status: DISCONTINUED | OUTPATIENT
Start: 2018-05-25 | End: 2018-05-25

## 2018-05-25 RX ORDER — BUPROPION HYDROCHLORIDE 150 MG/1
300 TABLET, EXTENDED RELEASE ORAL AT BEDTIME
Status: DISCONTINUED | OUTPATIENT
Start: 2018-05-25 | End: 2018-05-28 | Stop reason: HOSPADM

## 2018-05-25 RX ORDER — LORAZEPAM 1 MG/1
1-2 TABLET ORAL 3 TIMES DAILY
Status: DISCONTINUED | OUTPATIENT
Start: 2018-05-25 | End: 2018-05-28 | Stop reason: HOSPADM

## 2018-05-25 RX ORDER — ONDANSETRON 2 MG/ML
4 INJECTION INTRAMUSCULAR; INTRAVENOUS EVERY 30 MIN PRN
Status: DISCONTINUED | OUTPATIENT
Start: 2018-05-25 | End: 2018-05-25 | Stop reason: HOSPADM

## 2018-05-25 RX ORDER — NALBUPHINE HYDROCHLORIDE 10 MG/ML
2.5-5 INJECTION, SOLUTION INTRAMUSCULAR; INTRAVENOUS; SUBCUTANEOUS EVERY 6 HOURS PRN
Status: DISCONTINUED | OUTPATIENT
Start: 2018-05-25 | End: 2018-05-28 | Stop reason: HOSPADM

## 2018-05-25 RX ORDER — ALBUTEROL SULFATE 90 UG/1
2 AEROSOL, METERED RESPIRATORY (INHALATION) EVERY 6 HOURS PRN
Status: DISCONTINUED | OUTPATIENT
Start: 2018-05-25 | End: 2018-05-28 | Stop reason: HOSPADM

## 2018-05-25 RX ORDER — KETAMINE HYDROCHLORIDE 10 MG/ML
INJECTION INTRAMUSCULAR; INTRAVENOUS PRN
Status: DISCONTINUED | OUTPATIENT
Start: 2018-05-25 | End: 2018-05-25

## 2018-05-25 RX ORDER — GABAPENTIN 100 MG/1
300 CAPSULE ORAL ONCE
Status: COMPLETED | OUTPATIENT
Start: 2018-05-25 | End: 2018-05-25

## 2018-05-25 RX ORDER — LIDOCAINE 40 MG/G
CREAM TOPICAL
Status: DISCONTINUED | OUTPATIENT
Start: 2018-05-25 | End: 2018-05-28 | Stop reason: HOSPADM

## 2018-05-25 RX ORDER — DULOXETIN HYDROCHLORIDE 60 MG/1
120 CAPSULE, DELAYED RELEASE ORAL AT BEDTIME
Status: DISCONTINUED | OUTPATIENT
Start: 2018-05-25 | End: 2018-05-28 | Stop reason: HOSPADM

## 2018-05-25 RX ORDER — SODIUM CHLORIDE, SODIUM LACTATE, POTASSIUM CHLORIDE, CALCIUM CHLORIDE 600; 310; 30; 20 MG/100ML; MG/100ML; MG/100ML; MG/100ML
INJECTION, SOLUTION INTRAVENOUS CONTINUOUS
Status: DISCONTINUED | OUTPATIENT
Start: 2018-05-25 | End: 2018-05-25 | Stop reason: HOSPADM

## 2018-05-25 RX ORDER — OXYCODONE HYDROCHLORIDE 30 MG/1
120 TABLET ORAL EVERY 4 HOURS PRN
Status: DISCONTINUED | OUTPATIENT
Start: 2018-05-25 | End: 2018-05-28 | Stop reason: HOSPADM

## 2018-05-25 RX ORDER — IBUPROFEN 200 MG
200 TABLET ORAL EVERY 6 HOURS PRN
Status: DISCONTINUED | OUTPATIENT
Start: 2018-05-25 | End: 2018-05-28 | Stop reason: HOSPADM

## 2018-05-25 RX ORDER — ONDANSETRON 2 MG/ML
INJECTION INTRAMUSCULAR; INTRAVENOUS PRN
Status: DISCONTINUED | OUTPATIENT
Start: 2018-05-25 | End: 2018-05-25

## 2018-05-25 RX ORDER — LIDOCAINE 40 MG/G
CREAM TOPICAL
Status: DISCONTINUED | OUTPATIENT
Start: 2018-05-25 | End: 2018-05-25 | Stop reason: HOSPADM

## 2018-05-25 RX ORDER — ESCITALOPRAM OXALATE 20 MG/1
20 TABLET ORAL DAILY
Status: DISCONTINUED | OUTPATIENT
Start: 2018-05-25 | End: 2018-05-28 | Stop reason: HOSPADM

## 2018-05-25 RX ORDER — MORPHINE SULFATE 1 MG/ML
INJECTION, SOLUTION EPIDURAL; INTRATHECAL; INTRAVENOUS PRN
Status: DISCONTINUED | OUTPATIENT
Start: 2018-05-25 | End: 2018-05-25

## 2018-05-25 RX ORDER — FENTANYL CITRATE 50 UG/ML
INJECTION, SOLUTION INTRAMUSCULAR; INTRAVENOUS PRN
Status: DISCONTINUED | OUTPATIENT
Start: 2018-05-25 | End: 2018-05-25

## 2018-05-25 RX ORDER — WARFARIN SODIUM 5 MG/1
10 TABLET ORAL
Status: COMPLETED | OUTPATIENT
Start: 2018-05-25 | End: 2018-05-25

## 2018-05-25 RX ORDER — ACETAMINOPHEN 325 MG/1
975 TABLET ORAL EVERY 8 HOURS
Status: DISCONTINUED | OUTPATIENT
Start: 2018-05-25 | End: 2018-05-28 | Stop reason: HOSPADM

## 2018-05-25 RX ORDER — PROPOFOL 10 MG/ML
INJECTION, EMULSION INTRAVENOUS CONTINUOUS PRN
Status: DISCONTINUED | OUTPATIENT
Start: 2018-05-25 | End: 2018-05-25

## 2018-05-25 RX ORDER — BISACODYL 10 MG
10 SUPPOSITORY, RECTAL RECTAL DAILY
Status: DISCONTINUED | OUTPATIENT
Start: 2018-05-26 | End: 2018-05-28 | Stop reason: HOSPADM

## 2018-05-25 RX ORDER — QUETIAPINE FUMARATE 25 MG/1
25 TABLET, FILM COATED ORAL AT BEDTIME
Status: DISCONTINUED | OUTPATIENT
Start: 2018-05-25 | End: 2018-05-28 | Stop reason: HOSPADM

## 2018-05-25 RX ORDER — ACETAMINOPHEN 325 MG/1
975 TABLET ORAL ONCE
Status: DISCONTINUED | OUTPATIENT
Start: 2018-05-25 | End: 2018-05-25 | Stop reason: HOSPADM

## 2018-05-25 RX ORDER — ONDANSETRON 4 MG/1
4 TABLET, ORALLY DISINTEGRATING ORAL EVERY 30 MIN PRN
Status: DISCONTINUED | OUTPATIENT
Start: 2018-05-25 | End: 2018-05-25 | Stop reason: HOSPADM

## 2018-05-25 RX ORDER — TIZANIDINE 2 MG/1
2 TABLET ORAL EVERY 6 HOURS PRN
Status: DISCONTINUED | OUTPATIENT
Start: 2018-05-25 | End: 2018-05-28 | Stop reason: HOSPADM

## 2018-05-25 RX ORDER — TETRACAINE HCL 10 MG/ML
INJECTION SUBARACHNOID PRN
Status: DISCONTINUED | OUTPATIENT
Start: 2018-05-25 | End: 2018-05-25

## 2018-05-25 RX ORDER — CEFAZOLIN SODIUM 1 G/3ML
1 INJECTION, POWDER, FOR SOLUTION INTRAMUSCULAR; INTRAVENOUS EVERY 8 HOURS
Status: COMPLETED | OUTPATIENT
Start: 2018-05-25 | End: 2018-05-26

## 2018-05-25 RX ORDER — SODIUM CHLORIDE, SODIUM LACTATE, POTASSIUM CHLORIDE, CALCIUM CHLORIDE 600; 310; 30; 20 MG/100ML; MG/100ML; MG/100ML; MG/100ML
INJECTION, SOLUTION INTRAVENOUS CONTINUOUS PRN
Status: DISCONTINUED | OUTPATIENT
Start: 2018-05-25 | End: 2018-05-25

## 2018-05-25 RX ORDER — HYDROMORPHONE HYDROCHLORIDE 1 MG/ML
.3-.5 INJECTION, SOLUTION INTRAMUSCULAR; INTRAVENOUS; SUBCUTANEOUS EVERY 5 MIN PRN
Status: DISCONTINUED | OUTPATIENT
Start: 2018-05-25 | End: 2018-05-25 | Stop reason: HOSPADM

## 2018-05-25 RX ORDER — METHADONE HYDROCHLORIDE 10 MG/1
40 TABLET ORAL EVERY 8 HOURS
Status: DISCONTINUED | OUTPATIENT
Start: 2018-05-25 | End: 2018-05-28 | Stop reason: HOSPADM

## 2018-05-25 RX ORDER — NALOXONE HYDROCHLORIDE 0.4 MG/ML
.1-.4 INJECTION, SOLUTION INTRAMUSCULAR; INTRAVENOUS; SUBCUTANEOUS
Status: DISCONTINUED | OUTPATIENT
Start: 2018-05-25 | End: 2018-05-28 | Stop reason: HOSPADM

## 2018-05-25 RX ADMIN — PROPOFOL 80 MG: 10 INJECTION, EMULSION INTRAVENOUS at 13:10

## 2018-05-25 RX ADMIN — GABAPENTIN 300 MG: 300 CAPSULE ORAL at 12:20

## 2018-05-25 RX ADMIN — PHENYLEPHRINE HYDROCHLORIDE 0.4 MCG: 10 INJECTION, SOLUTION INTRAMUSCULAR; INTRAVENOUS; SUBCUTANEOUS at 13:58

## 2018-05-25 RX ADMIN — CEFAZOLIN 1 G: 1 INJECTION, POWDER, FOR SOLUTION INTRAMUSCULAR; INTRAVENOUS at 13:35

## 2018-05-25 RX ADMIN — ONDANSETRON 4 MG: 2 INJECTION INTRAMUSCULAR; INTRAVENOUS at 14:39

## 2018-05-25 RX ADMIN — KETAMINE HCL-NACL SOLN PREF SY 50 MG/5ML-0.9% (10MG/ML) 10 MG: 10 SOLUTION PREFILLED SYRINGE at 13:30

## 2018-05-25 RX ADMIN — BACLOFEN 20 MG: 20 TABLET ORAL at 19:12

## 2018-05-25 RX ADMIN — BUPROPION HYDROCHLORIDE 300 MG: 150 TABLET, FILM COATED, EXTENDED RELEASE ORAL at 22:36

## 2018-05-25 RX ADMIN — Medication 10 MG: at 14:52

## 2018-05-25 RX ADMIN — KETAMINE HCL-NACL SOLN PREF SY 50 MG/5ML-0.9% (10MG/ML) 5 MG: 10 SOLUTION PREFILLED SYRINGE at 14:39

## 2018-05-25 RX ADMIN — OXYCODONE HYDROCHLORIDE 120 MG: 30 TABLET ORAL at 19:54

## 2018-05-25 RX ADMIN — SODIUM CHLORIDE, POTASSIUM CHLORIDE, SODIUM LACTATE AND CALCIUM CHLORIDE: 600; 310; 30; 20 INJECTION, SOLUTION INTRAVENOUS at 12:52

## 2018-05-25 RX ADMIN — DEXMEDETOMIDINE HYDROCHLORIDE 0.7 MCG/KG/HR: 100 INJECTION, SOLUTION INTRAVENOUS at 13:15

## 2018-05-25 RX ADMIN — PROPOFOL 50 MCG/KG/MIN: 10 INJECTION, EMULSION INTRAVENOUS at 13:22

## 2018-05-25 RX ADMIN — ESCITALOPRAM OXALATE 20 MG: 20 TABLET ORAL at 19:53

## 2018-05-25 RX ADMIN — PROPOFOL 50 MCG/KG/MIN: 10 INJECTION, EMULSION INTRAVENOUS at 13:25

## 2018-05-25 RX ADMIN — WARFARIN SODIUM 10 MG: 5 TABLET ORAL at 19:12

## 2018-05-25 RX ADMIN — RANITIDINE 150 MG: 150 TABLET, FILM COATED ORAL at 22:37

## 2018-05-25 RX ADMIN — TETRACAINE HCL 1.3 ML: 10 INJECTION SUBARACHNOID at 13:10

## 2018-05-25 RX ADMIN — OMEPRAZOLE 20 MG: 20 CAPSULE, DELAYED RELEASE ORAL at 19:52

## 2018-05-25 RX ADMIN — MIDAZOLAM 1 MG: 1 INJECTION INTRAMUSCULAR; INTRAVENOUS at 13:43

## 2018-05-25 RX ADMIN — KETAMINE HCL-NACL SOLN PREF SY 50 MG/5ML-0.9% (10MG/ML) 35 MG: 10 SOLUTION PREFILLED SYRINGE at 13:18

## 2018-05-25 RX ADMIN — KETAMINE HCL-NACL SOLN PREF SY 50 MG/5ML-0.9% (10MG/ML) 5 MG: 10 SOLUTION PREFILLED SYRINGE at 14:30

## 2018-05-25 RX ADMIN — CEFAZOLIN 1 G: 1 INJECTION, POWDER, FOR SOLUTION INTRAMUSCULAR; INTRAVENOUS at 20:54

## 2018-05-25 RX ADMIN — QUETIAPINE FUMARATE 25 MG: 25 TABLET ORAL at 22:37

## 2018-05-25 RX ADMIN — PHENYLEPHRINE HYDROCHLORIDE: 10 INJECTION, SOLUTION INTRAMUSCULAR; INTRAVENOUS; SUBCUTANEOUS at 14:16

## 2018-05-25 RX ADMIN — SODIUM CHLORIDE, POTASSIUM CHLORIDE, SODIUM LACTATE AND CALCIUM CHLORIDE: 600; 310; 30; 20 INJECTION, SOLUTION INTRAVENOUS at 22:39

## 2018-05-25 RX ADMIN — SODIUM CHLORIDE, POTASSIUM CHLORIDE, SODIUM LACTATE AND CALCIUM CHLORIDE: 600; 310; 30; 20 INJECTION, SOLUTION INTRAVENOUS at 18:02

## 2018-05-25 RX ADMIN — DEXAMETHASONE SODIUM PHOSPHATE 6 MG: 4 INJECTION, SOLUTION INTRAMUSCULAR; INTRAVENOUS at 13:46

## 2018-05-25 RX ADMIN — TIZANIDINE 2 MG: 2 TABLET ORAL at 21:14

## 2018-05-25 RX ADMIN — KETAMINE HCL-NACL SOLN PREF SY 50 MG/5ML-0.9% (10MG/ML) 5 MG: 10 SOLUTION PREFILLED SYRINGE at 14:00

## 2018-05-25 RX ADMIN — LEVOTHYROXINE SODIUM 75 MCG: 75 TABLET ORAL at 19:53

## 2018-05-25 RX ADMIN — FENTANYL CITRATE 50 MCG: 50 INJECTION, SOLUTION INTRAMUSCULAR; INTRAVENOUS at 13:44

## 2018-05-25 RX ADMIN — LORAZEPAM 1 MG: 1 TABLET ORAL at 20:55

## 2018-05-25 RX ADMIN — METHADONE HYDROCHLORIDE 40 MG: 10 TABLET ORAL at 22:37

## 2018-05-25 RX ADMIN — Medication 200 MCG: at 13:10

## 2018-05-25 RX ADMIN — SENNOSIDES AND DOCUSATE SODIUM 2 TABLET: 8.6; 5 TABLET ORAL at 19:53

## 2018-05-25 RX ADMIN — CEFAZOLIN 1 G: 1 INJECTION, POWDER, FOR SOLUTION INTRAMUSCULAR; INTRAVENOUS at 13:34

## 2018-05-25 RX ADMIN — DULOXETINE HYDROCHLORIDE 120 MG: 60 CAPSULE, DELAYED RELEASE ORAL at 22:36

## 2018-05-25 RX ADMIN — GABAPENTIN 900 MG: 300 CAPSULE ORAL at 22:47

## 2018-05-25 RX ADMIN — PHENYLEPHRINE HYDROCHLORIDE: 10 INJECTION, SOLUTION INTRAMUSCULAR; INTRAVENOUS; SUBCUTANEOUS at 14:49

## 2018-05-25 RX ADMIN — OXYCODONE HYDROCHLORIDE 120 MG: 30 TABLET ORAL at 23:55

## 2018-05-25 RX ADMIN — PHENYLEPHRINE HYDROCHLORIDE 0.2 MCG/KG/MIN: 10 INJECTION, SOLUTION INTRAMUSCULAR; INTRAVENOUS; SUBCUTANEOUS at 13:41

## 2018-05-25 RX ADMIN — Medication 10 MG: at 14:43

## 2018-05-25 NOTE — PROGRESS NOTES
Brief Orthopaedic Op Note    Date of Service: 05/25/2018    Attending Surgeon: Cheko Saleem MD  Resident Surgeons: BEVERLEY Nash,   Assistants: none    Pre-Op Diagnosis: Left Total Hip Multiple Dislocations   Post-Op Diagnosis: same  Procedures: Procedure(s):  ARTHROPLASTY REVISION HIP    EBL: 100 mL  Anesthesia: General endotracheal anesthesia  Blood Transfusion:  none administered  Fluids: (See anesthesia record)  Urine Output: See Anesthesia Record  Drains: hemovac  Specimens: None  Implants: See dictated operative report for full details    Findings: none  Complications:  None  Condition: Stable to PACU  Comments: See Full Dictated Operative Report for Full Details         Assessment and Plan:    Alethea Patel is a 37 year old female status-post revision acetabular component left hip    - wbat, posterior hip precautions  - pt  - pain management: home meds ordered. Please discuss pain management with pain team as patient is on extra-ordinary amounts of narcotics  - abx x 3 days          Ronald Nash  PGY-4, Orthopaedic Surgery  [phone]

## 2018-05-25 NOTE — ANESTHESIA PROCEDURE NOTES
Spinal/LP Procedure Note    Spinal Block  Staff:     Anesthesiologist:  PATRICIA PINEDA    Resident/CRNA:  LOLLY LÓPEZ    Spinal/LP performed by resident/CRNA in presence of a teaching physician.    Location: In OR BEFORE Induction  Procedure Start/Stop Times:      5/25/2018 1:02 PM     5/25/2018 1:11 PM    patient identified, IV checked, site marked, risks and benefits discussed, informed consent, monitors and equipment checked, pre-op evaluation, at physician/surgeon's request and post-op pain management      Correct Patient: Yes      Correct Position: Yes      Correct Site: Yes      Correct Procedure: Yes      Correct Laterality:  Yes    Site Marked:  Yes  Procedure:     Procedure:  Intrathecal    ASA:  3    Diagnosis:  Operative pain control    Position:  Sitting    Sterile Prep: chloraprep, mask and sterile gloves      Insertion site:  L3-4    Approach:  Midline    Needle Type:  Quincke    Needle gauge (G):  25    Local Skin Infiltration:  1% lidocaine    amount (ml):  2    Needle Length (in):  3.5    Introducer used: Yes      Introducer gauge:  20 G    Attempts:  1    Redirects:  1    CSF:  Clear    Paresthesias:  No    Time injected:  13:10

## 2018-05-25 NOTE — PHARMACY-ANTICOAGULATION SERVICE
Clinical Pharmacy - Warfarin Dosing Consult     Pharmacy has been consulted to manage this patient s warfarin therapy.  Indication: DVT/PE Prophylaxis  Therapy Goal: INR 2-2.5  Provider/Team: Stiven Soto Prior to Admission: Yes  Warfarin PTA Regimen: 4/26-4/28 was prescribed 10mg daily & 4/29-5/1 was prescribed 7.5mg  Significant drug interactions: ibuprofen  Recent documented change in oral intake/nutrition: Unknown    INR   Date Value Ref Range Status   05/22/2018 0.97 0.86 - 1.14 Final   05/17/2018 1.13 0.86 - 1.14 Final       Recommend warfarin 10 mg today.  Pharmacy will monitor Aletheaturner Patel daily and order warfarin doses to achieve specified goal.      Please contact pharmacy as soon as possible if the warfarin needs to be held for a procedure or if the warfarin goals change.

## 2018-05-25 NOTE — ANESTHESIA POSTPROCEDURE EVALUATION
Patient: Alethea Patel    Procedure(s):  Revision Left Total Hip Arthroplasty  - Wound Class: I-Clean    Diagnosis:Left Total Hip Multiple Dislocations   Diagnosis Additional Information: No value filed.    Anesthesia Type:  General, Spinal    Note:  Anesthesia Post Evaluation    Patient location during evaluation: PACU  Patient participation: Able to fully participate in evaluation  Level of consciousness: sleepy but conscious and awake  Pain management: adequate  Airway patency: patent  Cardiovascular status: acceptable  Respiratory status: acceptable  Hydration status: acceptable     Anesthetic complications: None          Last vitals:  Vitals:    05/25/18 1615 05/25/18 1630 05/25/18 1645   BP: 93/52 (!) 89/64 92/58   Pulse:      Resp: 10 11 11   Temp:  36.9  C (98.5  F)    SpO2: 96% 95% 95%         Electronically Signed By: Jesica Christopher MD  May 25, 2018  5:02 PM

## 2018-05-25 NOTE — IP AVS SNAPSHOT
[facility]    [address]    [facility] [address]    Phone:  [phone]                                       After Visit Summary   5/25/2018    Alethea Patel    MRN: [id number]           After Visit Summary Signature Page     I have received my discharge instructions, and my questions have been answered. I have discussed any challenges I see with this plan with the nurse or doctor.    ..........................................................................................................................................  Patient/Patient Representative Signature      ..........................................................................................................................................  Patient Representative Print Name and Relationship to Patient    ..................................................               ................................................  Date                                            Time    ..........................................................................................................................................  Reviewed by Signature/Title    ...................................................              ..............................................  Date                                                            Time

## 2018-05-25 NOTE — OR NURSING
Recovering in pacu post op revision LTHA under tetracaine spinal and MAC  Pt is able to open eyes momentarily then falls back to sleep  Difficult to elicit a verbal response, pt does nod to answer.  Pt is unable to move feet, does  hands.  Senses cold to suprapubic area spinal level T12  Pt's sister is at the bedside and cares for pt as caregiver at home.  Pt's  is not here but is preparing to go to a camping trip over the weekend.

## 2018-05-25 NOTE — OR NURSING
PACU to Inpatient Nursing Handoff    Patient Alethea Patel is a 37 year old female who speaks English.   Procedure Procedure(s):  Revision Left Total Hip Arthroplasty  - Wound Class: I-Clean   Surgeon(s) Primary: Cheko Saleem MD  Resident - Assisting: Ronald Nash MD     Allergies   Allergen Reactions     Chantix [Varenicline] Nausea and Vomiting     Other reaction(s): Vomiting     No Clinical Screening - See Comments      Other reaction(s): Unknown Reaction     Seasonal Allergies      Ciprofloxacin      Other reaction(s): Other  Interacts with other medication     Latex Itching and Rash     Other reaction(s): Other - Describe In Comment Field  Vaginal itching, burning  Other reaction(s): Intolerance       Isolation  [unfilled]     Past Medical History   has a past medical history of Acute lymphoblastic leukemia in remission (H); Anxiety; Depression; Depressive disorder; Gastro-oesophageal reflux disease; IBS (irritable bowel syndrome); Osteonecrosis (H); and Osteonecrosis (H).    Anesthesia Spinal   Dermatome Level     Preop Meds gabapentin (Neurontin) - time given: 1220  oxycodone (Oxycontin) - time given: 0800  Methadone 40 po 5/24 2000. Ativan 1 mg po 0800 - time given: 0800   Nerve block Not applicable   Intraop Meds dexamethasone (Decadron)  fentanyl (Sublimaze): 50 mcg total  ketamine (Ketalar): 60 mg given  ketorolac (Toradol): last given at 1449  intrathecal morphine (Duramorph): last given at 1310  ondansetron (Zofran): last given at 1439  Methadone 20mg IV at 1452   Local Meds Yes - Local Cocktail (morphine, ropivacaine, epinephrine, Toradol)   Antibiotics cefazolin (Ancef) - last given at 1335     Pain Patient Currently in Pain: denies  Comfort: tolerable with discomfort  Pain Control: partially effective   PACU meds  Not applicable   PCA / epidural No   Capnography     Telemetry ECG Rhythm: Sinus rhythm   Inpatient Telemetry Monitor Ordered? No        Labs Glucose Lab Results    Component Value Date    GLC 99 05/22/2018       Hgb Lab Results   Component Value Date    HGB 11.3 05/22/2018       INR Lab Results   Component Value Date    INR 0.97 05/22/2018      PACU Imaging Completed     Wound/Incision Incision/Surgical Site 04/03/18 Left Hip (Active)   Number of days:52       Incision/Surgical Site 05/25/18 Left Hip (Active)   Incision Assessment UTV 5/25/2018  4:00 PM   Closure LYNNE 5/25/2018  4:00 PM   Incision Drainage Amount None 5/25/2018  4:00 PM   Incision Care Ice applied 5/25/2018  4:00 PM   Dressing Intervention Clean, dry, intact 5/25/2018  4:00 PM   Number of days:0      CMS Peripheral Neurovascular WDL: extremity temperature (Group);pulses (Group);sensation (Group) (05/25/18 1517)  All Extremities Temperature: cool (05/25/18 1644)  All Extremities Color: no discoloration (05/25/18 1644)  All Extremities Sensation: other (see comments) (LYNNE DT sedation) (05/25/18 1610)  LLE Temperature: cool (05/25/18 1644)  LLE Color: no discoloration (05/25/18 1644)  LLE Sensation: numbness present (does not sense my touching the foot.) (05/25/18 1644)   Equipment ice pack, abductor pillow and Capnography   Other LDA       IV Access Peripheral IV 05/25/18 Right Hand (Active)   Site Assessment WDL 5/25/2018  4:00 PM   Line Status Infusing 5/25/2018  4:00 PM   Phlebitis Scale 0-->no symptoms 5/25/2018  4:00 PM   Infiltration Scale 0 5/25/2018 12:49 PM   Number of days:0      Blood Products Not applicable  mL   Intake/Output Date 05/25/18 0700 - 05/26/18 0659   Shift 8163-6353 1448-7166 8464-9937 24 Hour Total   I  N  T  A  K  E   P.O.  30  30    I.V. 225 375  600    Shift Total  (mL/kg) 225  (3.09) 405  (5.56)  630  (8.65)   O  U  T  P  U  T   Urine 250 270  520    Blood  100  100    Shift Total  (mL/kg) 250  (3.43) 370  (5.08)  620  (8.52)   Weight (kg) 72.8 72.8 72.8 72.8        Drains / Jordan Closed/Suction Drain Left Hip Bulb 10 Serbian (Active)   Site Description WDL 5/25/2018  4:00 PM    Dressing Status Normal: Clean, Dry & Intact 5/25/2018  4:00 PM   Drainage Appearance Bloody/Bright Red 5/25/2018  4:00 PM   Status To bulb suction 5/25/2018  4:00 PM   Number of days:0       Urethral Catheter Double-lumen;Non-latex;Straight-tip 16 fr (Active)   Collection Container Standard;Patent 5/25/2018  4:00 PM   Securement Method Securing device (Describe) 5/25/2018  4:00 PM   Urine Output 170 mL 5/25/2018  4:00 PM   Number of days:0      Time of void PreOp Void Prior to Procedure: 1140 (05/25/18 1252)    PostOp      Diapered? No   Bladder Scan     PO 30 mL (05/25/18 1600)  tolerating sips     Vitals    B/P: 92/58  T: 98.5  F (36.9  C)    Temp src: Oral  P:  Pulse: 108 (05/25/18 1140)    Heart Rate: 84 (05/25/18 1645)     R: 11  O2:  SpO2: 95 %    O2 Device: Nasal cannula (05/25/18 1517)    Oxygen Delivery: 3 LPM (05/25/18 1517)         Family/support present Sibling Lela   Patient belongings Patient Belongings: clothing;shoes;dental appliance/dentures (Purse with sister)  Disposition of Belongings: Locker   Patient transported on bed   DC meds/scripts (obs/outpt) Not applicable   Inpatient Pain Meds Released? Yes       Special needs/considerations family stated pt had WBC's in UA and would like to know what will be done about that.   Tasks needing completion None       Deena Schmidt RN  ASCOM [id number]

## 2018-05-25 NOTE — ANESTHESIA CARE TRANSFER NOTE
Patient: Alethea Patel    Procedure(s):  Revision Left Total Hip Arthroplasty  - Wound Class: I-Clean    Diagnosis: Left Total Hip Multiple Dislocations   Diagnosis Additional Information: No value filed.    Anesthesia Type:   General, Spinal     Note:  Airway :Nasal Cannula  Patient transferred to:PACU  Comments: Strong SV, VSS. Report to RN.Handoff Report: Identifed the Patient, Identified the Reponsible Provider, Reviewed the pertinent medical history, Discussed the surgical course, Reviewed Intra-OP anesthesia mangement and issues during anesthesia, Set expectations for post-procedure period and Allowed opportunity for questions and acknowledgement of understanding      Vitals: (Last set prior to Anesthesia Care Transfer)    CRNA VITALS  5/25/2018 1443 - 5/25/2018 1519      5/25/2018             Temp: 36.6  C (97.9  F)                Electronically Signed By: VALENTIN Wilburn CRNA  May 25, 2018  3:19 PM

## 2018-05-25 NOTE — OR NURSING
Dr Christopher was here and gave her sign out for pt to go to the floor  Report and transfer of care given to Chacorta brown RN on 8th floor.

## 2018-05-25 NOTE — H&P
Attending note:    Patient admitted for revision L HAJA due to recurrent dislocation.  No changes to her health since last admission 48 hours ago.  See last H and P of mine and hospitalist note of 5/22/18.    MD Blas Rhoades Family Professor  Oncology and Adult Reconstructive Surgery  Dept Orthopaedic Surgery, [facility]  [phone] office, [phone] pager  [email or web address]

## 2018-05-25 NOTE — IP AVS SNAPSHOT
MRN:[id number]                      After Visit Summary   5/25/2018    Alethea Patel    MRN: [id number]           Thank you!     Thank you for choosing [place] for your care. Our goal is always to provide you with excellent care. Hearing back from our patients is one way we can continue to improve our services. Please take a few minutes to complete the written survey that you may receive in the mail after you visit with us. Thank you!        Patient Information     Date Of Birth          1980        Designated Caregiver       Most Recent Value    Caregiver    Will someone help with your care after discharge? yes    Name of designated caregiver Lela Piña    Phone number of caregiver [phone]    Caregiver address [address] [place] [address] [address] [facility] [address]      About your hospital stay     You were admitted on:  May 25, 2018 You last received care in the:  [facility] 8[facility]    You were discharged on:  May 28, 2018        Reason for your hospital stay       You were admitted following your total hip arthroplasty                  Who to Call     For medical emergencies, please call 911.  For non-urgent questions about your medical care, please call your primary care provider or clinic, [phone]  For questions related to your surgery, please call your surgery clinic        Attending Provider     Provider Specialty    Cheko Saleem MD Orthopedics       Primary Care Provider Office Phone # Fax #    [facility] [phone]      After Care Instructions     Diet       Return to your pre surgery diet            Discharge Instructions       TOTAL HIP ARTHROPLASTY POST OPERATIVE DISCHARGE INSTRUCTIONS    FOLLOW UP APPOINTMENT  You are scheduled for a post operative appointment with Dr. Saleem approximately four weeks after surgery.     Your follow up appointments will be at the location that you regularly see Dr. Saleem:    [facility]  [facility]  [address] [place] [id number]  [phone]    Physical therapy:   A prescription for physical therapy will be provided at the time of discharge.      ACTIVITY  Weight bearing status:   You may bear weight on your operative extremity as tolerated, using assistive devices (walker, cane) as needed. As you begin to feel more comfortable ambulating, you may gradually transition from a walker to a cane. Eventually, you should wean from all assistive devices. Although we would like you to discontinue use of assistive devices as soon as possible, do not transition until you have worked with your physical therapist to achieve safe balance and comfort.     Posterior hip precautions:  You must maintain the following posterior hip precautions for the next 12 weeks with no exceptions:  1) no hip flexion >90 degrees (no bending down at the waist)  2) no internal rotation past neutral (no pivoting)  3) no adduction past midline (no crossing your legs)    Exercises:   Perform the following exercises at least three times per day for the first four weeks after surgery to prevent complications, such as blood clots in your legs:  1) Point and flex your feet  2) Move your ankle around in big circles  3) Wiggle your toes   Also, perform thigh muscle tightening exercises for 10 to 15 minutes at least three times per day for the first four weeks after surgery.    Athletic Activities:  Activities such as swimming, bicycling, jogging, running, and stop-and-go sports should be avoided until permitted by your provider.    Driving:  Driving is not permitted until directed by your provider. Typically, driving is restricted for three to four weeks after right knee surgery and three weeks after left knee surgery. Under no circumstance are you permitted to drive while using narcotic pain medications.    Return to Work:  You may return to work when directed by your provider. Typically, patients with desk/sitting  jobs can return to work within two weeks while patients with heavy labor jobs can return to work around three months after surgery.      COMFORT AND PAIN MANAGEMENT  Icing:  An ice pack will be provided to control swelling and discomfort after surgery. Place a thin towel on your skin and apply the ice pack overtop. You may apply ice for 20 minutes as often as two times per hour.    Pain Medications:  You will be discharged with acetaminophen (Tylenol) and a narcotic medication for pain management after surgery. Acetaminophen can help to effectively manage pain when used as prescribed, however, do not exceed the maximum daily dose of 3000 mg. The narcotic pain medication should be reserved for severe, breakthrough pain. Take the narcotic medication as prescribed and use only as often as necessary.       ANTICOAGULATION  Take warfarin as prescribed for a total of four weeks after surgery. This medication will require weekly INR checks (INR goal 1.5-2.5).       WOUND CARE AND SHOWERING  Wound care:  You have a clean Aquacel dressing on your surgical wound. This dressing should stay in place for ten days after surgery to allow the incision to heal. After ten days, you may change the dressing. Please use sterile 4x4 gauze dressings with tape over top to secure the dressing. If the Aquacel dressing becomes wet or saturated with wound drainage, it is appropriate to change the dressing before ten days. If drainage persists from the incision site to the extent that it is frequently saturating the dressing, please contact Dr. Saleem's clinic. Your surgical incision was closed with a clear knotless suture and tails were left at the margins of the incision. These tails can be left in place until your follow up appointment. The steri strips (small white tape that is directly on the incision areas) should be left in place until they fall off or are removed at your first office visit.    Showering:  You may shower immediately after  surgery with the Aquacel dressing in place. You may allow water to run over the dressing, but do not to soak or submerge your wound underwater. Do not pick or scratch the incision.    Tub Bathing:  Tub bathing, swimming, or any other activities that cause your incision to be submerged should be avoided until allowed by your provider. Typically, patients are allowed to return to these activities four weeks after surgery.      CONTACTING YOUR PHYSICIAN:  You may experience symptoms that require follow-up before your scheduled appointment. Please contact Dr. Saleem's office if you experience:  1) Pain that persists or worsens in the first few days after surgery  2) Excessive redness or drainage of cloudy or bloody material from the wounds (clear red tinted fluid and some mild drainage should be expected) or drainage of any kind five days after surgery  3) A temperature elevation greater than 101.5    4) Pain, swelling or redness in your calf  5) Numbness or weakness in your leg or foot      Regular business hours (Monday - Friday, 8am - 5pm):  [facility]: [phone]    If you have any other concerns during normal business hours, please contact Dr. Saleem's nurse, Dariana Krause RN, at [phone] during normal business hours 8 am - 5 pm (leave message as needed). If your concern is urgent, please page Dariana Krause RN at [phone] and key in your 10 digit phone number followed by the # sign after the beep.     After hours and weekends:  [facility] on call Orthopedic resident: [phone]    If you have an emergent concern, contact the Emergency Department at the [place] - [phone] - or [place] - [phone] - [facility].                  Your next 10 appointments already scheduled     Jun 14, 2018  9:00 AM CDT   (Arrive by 8:45 AM)   Return Visit with Cheko Saleem MD   [facility] ([facility])    [address]  [address] [facility] [id number] [phone]              Additional Services     Home care nursing referral       Please do INR checks as well in addition to other     Your provider has ordered home care nursing services. If you have not been contacted within 2 days of your discharge please call the inpatient department phone number at [phone] .                  Warfarin Instruction     You have started taking a medicine called warfarin. This is a blood-thinning medicine (anticoagulant). It helps prevent and treat blood clots.      Before leaving the hospital, make sure you know how much to take and how long to take it.      You will need regular blood tests to make sure your blood is clotting safely. It is very important to see your doctor for regular blood tests.    Talk to your doctor before taking any new medicine (this includes over-the-counter drugs and herbal products). Many medicines can interact with warfarin. This may cause more bleeding or too much clotting.     Eating a lot of vitamin K--found in green, leafy vegetables--can change the way warfarin works in your body. Do NOT avoid these foods. Instead, try to eat the same amount each day.     Bleeding is the most common side-effect of warfarin. You may notice bleeding gums, a bloody nose, bruises and bleeding longer when you cut yourself. See a doctor at once if:   o You cough up blood  o You find blood in your stool (poop)  o You have a deep cut, or a cut that bleeds longer than 10 minutes   o You have a bad cut, hard fall, accident or hit your head (go to urgent care or the emergency room).    For women who can get pregnant: This medicine can harm an unborn baby. Be very careful not to get pregnant while taking this medicine. If you think you might be pregnant, call your doctor right away.    For more information, read \"Guide to Warfarin Therapy,  the booklet you received in the hospital.        Additional Information     " "If you use hormonal birth control (such as the pill, patch, ring or implants): You'll need a second form of birth control for 7 days (condoms, a diaphragm or contraceptive foam). While in the hospital, you received a medicine called Bridion. Your normal birth control will not work as well for a week after taking this medicine.          Pending Results     Date and Time Order Name Status Description    5/25/2018 1404 Tissue Culture Aerobic Bacterial Preliminary     5/25/2018 1404 Anaerobic bacterial culture Preliminary     5/25/2018 1402 Tissue Culture Aerobic Bacterial Preliminary     5/25/2018 1402 Fluid Culture Aerobic Bacterial Preliminary     5/25/2018 1402 Anaerobic bacterial culture Preliminary     5/25/2018 1402 Anaerobic bacterial culture Preliminary             Statement of Approval     Ordered          05/28/18 1005  I have reviewed and agree with all the recommendations and orders detailed in this document.  EFFECTIVE NOW     Approved and electronically signed by:  oRnald Nash MD             Admission Information     Date & Time Provider Department Dept. Phone    5/25/2018 Cheko Saleem MD UR 8A [phone]      Your Vitals Were     Blood Pressure Pulse Temperature Respirations Height Weight    105/64 (BP Location: Left leg) 94 98.8  F (37.1  C) (Oral) 16 1.753 m (5' 9.02\") 72.8 kg (160 lb 7.9 oz)    Last Period Pulse Oximetry BMI (Body Mass Index)             05/01/2018 94% 23.69 kg/m2         [facility] Information     [facility] gives you secure access to your electronic health record. If you see a primary care provider, you can also send messages to your care team and make appointments. If you have questions, please call your primary care clinic.  If you do not have a primary care provider, please call [phone] and they will assist you.        Care EveryWhere ID     This is your Care EveryWhere ID. This could be used by other organizations to access your [place] [facility] medical " records  [id number]        Equal Access to Services     [facility] : Hadii dayron gamez elsaines Mccann, wamartinezda sylwia, qakarolinadonaldo ritchiemaclay grey, anabella silva. So [facility] [phone].    ATENCIÓN: Si habla español, tiene a parisi disposición servicios gratuitos de asistencia lingüística. Llame al [phone].    We comply with applicable federal civil rights laws and [place] laws. We do not discriminate on the basis of race, color, national origin, age, disability, sex, sexual orientation, or gender identity.               Review of your medicines      START taking        Dose / Directions    warfarin 5 MG tablet   Commonly known as:  COUMADIN   Indication:  inr=1.50   Used for:  Status post hip surgery        Dose:  5 mg   Take 1 tablet (5 mg) by mouth daily   Quantity:  30 tablet   Refills:  0         CONTINUE these medicines which have NOT CHANGED        Dose / Directions    albuterol 108 (90 BASE) MCG/ACT Inhaler   Commonly known as:  PROAIR HFA/PROVENTIL HFA/VENTOLIN HFA        Dose:  2 puff   Inhale 2 puffs into the lungs every 6 hours as needed for shortness of breath / dyspnea or wheezing   Refills:  0       B COMPLEX PO        Dose:  1 tablet   Take 1 tablet by mouth daily Dose unknown; OTC   Refills:  0       BACLOFEN PO        Dose:  20 mg   Take 20 mg by mouth 4 times daily   Refills:  0       COMPAZINE PO        Dose:  10 mg   Take 10 mg by mouth every 6 hours as needed.   Refills:  0       DULoxetine 60 MG EC capsule   Commonly known as:  CYMBALTA        Dose:  60 mg   Take 60 mg by mouth 2 times daily   Refills:  0       fluticasone 50 MCG/ACT spray   Commonly known as:  FLONASE        Dose:  2 spray   Spray 2 sprays into both nostrils daily as needed for rhinitis or allergies   Refills:  0       IBUPROFEN PO        Dose:  200 mg   Take 200 mg by mouth every 6 hours as needed for moderate pain   Refills:  0       levothyroxine 75 MCG tablet   Commonly known as:   SYNTHROID/LEVOTHROID        Dose:  75 mcg   Take 75 mcg by mouth daily   Refills:  0       LEXAPRO PO        Dose:  20 mg   Take 20 mg by mouth daily   Refills:  0       LORazepam 1 MG tablet   Commonly known as:  ATIVAN        Dose:  1-2 mg   Take 1-2 mg by mouth 3 times daily Max 5 tablets daily   Refills:  0       MAGNESIUM OXIDE PO        Dose:  200 mg   Take 200 mg by mouth daily   Refills:  0       MELATONIN PO        Dose:  3 mg   Take 3 mg by mouth At Bedtime   Refills:  0       METHADONE HCL PO        Dose:  40 mg   Take 40 mg by mouth every 8 hours   Refills:  0       montelukast 10 MG tablet   Commonly known as:  SINGULAIR        Dose:  10 mg   Take 10 mg by mouth At Bedtime   Refills:  0       MULTIVITAMIN ADULT PO        Dose:  1 tablet   Take 1 tablet by mouth daily   Refills:  0       NEURONTIN 300 MG capsule   Generic drug:  gabapentin        Dose:  900 mg   Take 900 mg by mouth 3 times daily   Refills:  0       order for DME   Used for:  Status post total right knee replacement        [facility] [phone]   Directions: Advance as Tolerated and Instructed by MD   Quantity:  1 Device   Refills:  0       order for DME   Used for:  Status post hip surgery        Equipment being ordered: Bath Seat () Treatment Diagnosis: Limited independence in ADLs/mobility due to hip precautions and recent hip surgery   Quantity:  1 each   Refills:  0       POTASSIUM PO        Dose:  1 tablet   Take 1 tablet by mouth daily Dose unknown; OTC   Refills:  0       priLOSEC OTC 20 MG tablet   Generic drug:  omeprazole        Dose:  20 mg   Take 20 mg by mouth daily   Refills:  0       QUEtiapine 25 MG tablet   Commonly known as:  SEROquel        Dose:  25 mg   Take 25 mg by mouth At Bedtime   Refills:  0       ranitidine 150 MG tablet   Commonly known as:  ZANTAC        Dose:  150 mg   Take 150 mg by mouth At Bedtime   Refills:  0       ROXICODONE 30 MG tablet   Generic drug:  oxyCODONE IR        Dose:   120 mg   Take 120 mg by mouth every 4 hours as needed for moderate to severe pain   Refills:  0       senna-docusate 8.6-50 MG per tablet   Commonly known as:  SENOKOT-S;PERICOLACE   Used for:  Status post total left knee replacement        Dose:  2 tablet   Take 2 tablets by mouth 2 times daily   Quantity:  30 tablet   Refills:  0       TIZANIDINE HCL PO        Dose:  2 mg   Take 2 mg by mouth every 6 hours as needed for muscle spasms   Refills:  0       WELLBUTRIN SR PO        Dose:  150 mg   Take 150 mg by mouth 2 times daily   Refills:  0            Where to get your medicines      These medications were sent to [facility] [place], [place] - [address]  [address] [place] [address], [facility] [address]     Phone:  [phone]     warfarin 5 MG tablet                Protect others around you: Learn how to safely use, store and throw away your medicines at www.disposemymeds.org.             Medication List: This is a list of all your medications and when to take them. Check marks below indicate your daily home schedule. Keep this list as a reference.      Medications           Morning Afternoon Evening Bedtime As Needed    albuterol 108 (90 BASE) MCG/ACT Inhaler   Commonly known as:  PROAIR HFA/PROVENTIL HFA/VENTOLIN HFA   Inhale 2 puffs into the lungs every 6 hours as needed for shortness of breath / dyspnea or wheezing                                B COMPLEX PO   Take 1 tablet by mouth daily Dose unknown; OTC                                BACLOFEN PO   Take 20 mg by mouth 4 times daily   Last time this was given:  20 mg on 5/28/2018  9:20 AM                                COMPAZINE PO   Take 10 mg by mouth every 6 hours as needed.                                DULoxetine 60 MG EC capsule   Commonly known as:  CYMBALTA   Take 60 mg by mouth 2 times daily   Last time this was given:  120 mg on 5/27/2018  9:10 PM                                fluticasone 50 MCG/ACT spray   Commonly known as:   FLONASE   Spray 2 sprays into both nostrils daily as needed for rhinitis or allergies                                IBUPROFEN PO   Take 200 mg by mouth every 6 hours as needed for moderate pain                                levothyroxine 75 MCG tablet   Commonly known as:  SYNTHROID/LEVOTHROID   Take 75 mcg by mouth daily   Last time this was given:  75 mcg on 5/28/2018  9:21 AM                                LEXAPRO PO   Take 20 mg by mouth daily   Last time this was given:  20 mg on 5/28/2018  9:20 AM                                LORazepam 1 MG tablet   Commonly known as:  ATIVAN   Take 1-2 mg by mouth 3 times daily Max 5 tablets daily   Last time this was given:  1 mg on 5/28/2018  9:20 AM                                MAGNESIUM OXIDE PO   Take 200 mg by mouth daily   Last time this was given:  200 mg on 5/28/2018  9:21 AM                                MELATONIN PO   Take 3 mg by mouth At Bedtime                                METHADONE HCL PO   Take 40 mg by mouth every 8 hours   Last time this was given:  40 mg on 5/28/2018  5:28 AM                                montelukast 10 MG tablet   Commonly known as:  SINGULAIR   Take 10 mg by mouth At Bedtime                                MULTIVITAMIN ADULT PO   Take 1 tablet by mouth daily                                NEURONTIN 300 MG capsule   Take 900 mg by mouth 3 times daily   Last time this was given:  900 mg on 5/28/2018  9:20 AM   Generic drug:  gabapentin                                order for DME   [facility] [phone]   Directions: Advance as Tolerated and Instructed by MD                                order for DME   Equipment being ordered: Bath Seat () Treatment Diagnosis: Limited independence in ADLs/mobility due to hip precautions and recent hip surgery                                POTASSIUM PO   Take 1 tablet by mouth daily Dose unknown; OTC                                priLOSEC OTC 20 MG tablet   Take 20 mg by mouth  daily   Generic drug:  omeprazole                                QUEtiapine 25 MG tablet   Commonly known as:  SEROquel   Take 25 mg by mouth At Bedtime   Last time this was given:  25 mg on 5/27/2018  9:10 PM                                ranitidine 150 MG tablet   Commonly known as:  ZANTAC   Take 150 mg by mouth At Bedtime   Last time this was given:  150 mg on 5/27/2018  9:10 PM                                ROXICODONE 30 MG tablet   Take 120 mg by mouth every 4 hours as needed for moderate to severe pain   Last time this was given:  120 mg on 5/28/2018  9:35 AM   Generic drug:  oxyCODONE IR                                senna-docusate 8.6-50 MG per tablet   Commonly known as:  SENOKOT-S;PERICOLACE   Take 2 tablets by mouth 2 times daily   Last time this was given:  2 tablets on 5/28/2018  9:21 AM                                TIZANIDINE HCL PO   Take 2 mg by mouth every 6 hours as needed for muscle spasms   Last time this was given:  2 mg on 5/25/2018  9:14 PM                                warfarin 5 MG tablet   Commonly known as:  COUMADIN   Take 1 tablet (5 mg) by mouth daily   Last time this was given:  5 mg on 5/27/2018  5:01 PM                                WELLBUTRIN SR PO   Take 150 mg by mouth 2 times daily   Last time this was given:  300 mg on 5/27/2018  9:09 PM

## 2018-05-25 NOTE — OR NURSING
Pt stated that she was at [facility]  Able to move her toes very slightly bilaterally.   Says she feels touch to the right foot but not the left.  Pt is taking sips of water diana well.

## 2018-05-26 LAB
HGB BLD-MCNC: 10.3 G/DL (ref 11.7–15.7)
INR PPP: 1.05 (ref 0.86–1.14)

## 2018-05-26 PROCEDURE — 25000132 ZZH RX MED GY IP 250 OP 250 PS 637: Mod: GY | Performed by: ORTHOPAEDIC SURGERY

## 2018-05-26 PROCEDURE — 36415 COLL VENOUS BLD VENIPUNCTURE: CPT | Performed by: ORTHOPAEDIC SURGERY

## 2018-05-26 PROCEDURE — A9270 NON-COVERED ITEM OR SERVICE: HCPCS | Mod: GY | Performed by: ORTHOPAEDIC SURGERY

## 2018-05-26 PROCEDURE — 12000001 ZZH R&B MED SURG/OB UMMC

## 2018-05-26 PROCEDURE — 82947 ASSAY GLUCOSE BLOOD QUANT: CPT | Performed by: INTERNAL MEDICINE

## 2018-05-26 PROCEDURE — 85610 PROTHROMBIN TIME: CPT | Performed by: ORTHOPAEDIC SURGERY

## 2018-05-26 PROCEDURE — A9270 NON-COVERED ITEM OR SERVICE: HCPCS | Mod: GY | Performed by: INTERNAL MEDICINE

## 2018-05-26 PROCEDURE — 99232 SBSQ HOSP IP/OBS MODERATE 35: CPT | Performed by: INTERNAL MEDICINE

## 2018-05-26 PROCEDURE — 25000132 ZZH RX MED GY IP 250 OP 250 PS 637: Mod: GY | Performed by: INTERNAL MEDICINE

## 2018-05-26 PROCEDURE — 85018 HEMOGLOBIN: CPT | Performed by: ORTHOPAEDIC SURGERY

## 2018-05-26 PROCEDURE — 25000128 H RX IP 250 OP 636: Performed by: ORTHOPAEDIC SURGERY

## 2018-05-26 RX ORDER — WARFARIN SODIUM 7.5 MG/1
7.5 TABLET ORAL
Status: COMPLETED | OUTPATIENT
Start: 2018-05-26 | End: 2018-05-26

## 2018-05-26 RX ORDER — CEFAZOLIN SODIUM 1 G/3ML
1 INJECTION, POWDER, FOR SOLUTION INTRAMUSCULAR; INTRAVENOUS EVERY 8 HOURS
Status: DISCONTINUED | OUTPATIENT
Start: 2018-05-26 | End: 2018-05-28 | Stop reason: HOSPADM

## 2018-05-26 RX ADMIN — METHADONE HYDROCHLORIDE 40 MG: 10 TABLET ORAL at 06:01

## 2018-05-26 RX ADMIN — OXYCODONE HYDROCHLORIDE 120 MG: 30 TABLET ORAL at 16:31

## 2018-05-26 RX ADMIN — GABAPENTIN 900 MG: 300 CAPSULE ORAL at 08:00

## 2018-05-26 RX ADMIN — GABAPENTIN 900 MG: 300 CAPSULE ORAL at 19:14

## 2018-05-26 RX ADMIN — GABAPENTIN 900 MG: 300 CAPSULE ORAL at 13:58

## 2018-05-26 RX ADMIN — RANITIDINE 150 MG: 150 TABLET, FILM COATED ORAL at 21:41

## 2018-05-26 RX ADMIN — LORAZEPAM 1 MG: 1 TABLET ORAL at 19:14

## 2018-05-26 RX ADMIN — OMEPRAZOLE 20 MG: 20 CAPSULE, DELAYED RELEASE ORAL at 08:00

## 2018-05-26 RX ADMIN — BACLOFEN 20 MG: 20 TABLET ORAL at 16:31

## 2018-05-26 RX ADMIN — BACLOFEN 20 MG: 20 TABLET ORAL at 12:27

## 2018-05-26 RX ADMIN — SENNOSIDES AND DOCUSATE SODIUM 2 TABLET: 8.6; 5 TABLET ORAL at 19:15

## 2018-05-26 RX ADMIN — LORAZEPAM 1 MG: 1 TABLET ORAL at 13:58

## 2018-05-26 RX ADMIN — METHADONE HYDROCHLORIDE 40 MG: 10 TABLET ORAL at 13:58

## 2018-05-26 RX ADMIN — CEFAZOLIN 1 G: 1 INJECTION, POWDER, FOR SOLUTION INTRAMUSCULAR; INTRAVENOUS at 19:16

## 2018-05-26 RX ADMIN — CEFAZOLIN 1 G: 1 INJECTION, POWDER, FOR SOLUTION INTRAMUSCULAR; INTRAVENOUS at 11:03

## 2018-05-26 RX ADMIN — SENNOSIDES AND DOCUSATE SODIUM 2 TABLET: 8.6; 5 TABLET ORAL at 08:00

## 2018-05-26 RX ADMIN — ESCITALOPRAM OXALATE 20 MG: 20 TABLET ORAL at 08:01

## 2018-05-26 RX ADMIN — OXYCODONE HYDROCHLORIDE 120 MG: 30 TABLET ORAL at 22:17

## 2018-05-26 RX ADMIN — BACLOFEN 20 MG: 20 TABLET ORAL at 08:01

## 2018-05-26 RX ADMIN — CEFAZOLIN 1 G: 1 INJECTION, POWDER, FOR SOLUTION INTRAMUSCULAR; INTRAVENOUS at 05:52

## 2018-05-26 RX ADMIN — LEVOTHYROXINE SODIUM 75 MCG: 75 TABLET ORAL at 08:00

## 2018-05-26 RX ADMIN — OXYCODONE HYDROCHLORIDE 120 MG: 30 TABLET ORAL at 04:32

## 2018-05-26 RX ADMIN — DULOXETINE HYDROCHLORIDE 120 MG: 60 CAPSULE, DELAYED RELEASE ORAL at 21:40

## 2018-05-26 RX ADMIN — LORAZEPAM 1 MG: 1 TABLET ORAL at 08:01

## 2018-05-26 RX ADMIN — BUPROPION HYDROCHLORIDE 300 MG: 150 TABLET, FILM COATED, EXTENDED RELEASE ORAL at 21:39

## 2018-05-26 RX ADMIN — OXYCODONE HYDROCHLORIDE 120 MG: 30 TABLET ORAL at 12:27

## 2018-05-26 RX ADMIN — Medication 200 MG: at 08:01

## 2018-05-26 RX ADMIN — OXYCODONE HYDROCHLORIDE 120 MG: 30 TABLET ORAL at 08:35

## 2018-05-26 RX ADMIN — METHADONE HYDROCHLORIDE 40 MG: 10 TABLET ORAL at 21:40

## 2018-05-26 RX ADMIN — WARFARIN SODIUM 7.5 MG: 7.5 TABLET ORAL at 18:07

## 2018-05-26 RX ADMIN — BACLOFEN 20 MG: 20 TABLET ORAL at 19:14

## 2018-05-26 RX ADMIN — QUETIAPINE FUMARATE 25 MG: 25 TABLET ORAL at 21:41

## 2018-05-26 NOTE — PROGRESS NOTES
"Ortho Progress Note     S: No acute overnight events. Pain controlled. Denies N/v/f/c.     O:  /68  Pulse 94  Temp 98.1  F (36.7  C) (Oral)  Resp 15  Ht 1.753 m (5' 9.02\")  Wt 72.8 kg (160 lb 7.9 oz)  LMP 05/01/2018  SpO2 94%  BMI 23.69 kg/m2  Gen: A&Ox3, no acute distress  CV: 2+ dp/pt pulses, capillary refill < 2sec  Resp: breathing equal and non-labored, no wheezing  Extremities: Dressing dry. Fires ehl, fhl,ta,gsc. SILT in sp/dp/sumeet/saph/tn. Toes wwp.     Hemoglobin   Date Value Ref Range Status   05/26/2018 10.3 (L) 11.7 - 15.7 g/dL Final   05/22/2018 11.3 (L) 11.7 - 15.7 g/dL Final     No components found for: PLATELETS  Hematocrit   Date Value Ref Range Status   05/22/2018 34.7 (L) 35.0 - 47.0 % Final   05/17/2018 30.1 (L) 35.0 - 47.0 % Final     WBC   Date Value Ref Range Status   05/22/2018 9.2 4.0 - 11.0 10e9/L Final   05/17/2018 7.1 4.0 - 11.0 10e9/L Final     CRP Inflammation   Date Value Ref Range Status   05/22/2018 10.7 (H) 0.0 - 8.0 mg/L Final         Your basic metabolic panel results:  Sodium   Date Value Ref Range Status   05/22/2018 144 133 - 144 mmol/L Final       (Sodium/Salt)  Potassium   Date Value Ref Range Status   05/22/2018 4.3 3.4 - 5.3 mmol/L Final     Glucose   Date Value Ref Range Status   05/22/2018 99 70 - 99 mg/dL Final       (Glucose/Blood Sugar/Diabetic Screen)  Calcium   Date Value Ref Range Status   05/22/2018 8.6 8.5 - 10.1 mg/dL Final       (Calcium)  Creatinine   Date Value Ref Range Status   05/22/2018 0.89 0.52 - 1.04 mg/dL Final       (Kidney Function)            A/P: S/p Revision  Left acetabular component on 5/25. Doing well.     -Weight Bearing Status: WBAT, posterior hip precautions  -Anticoagulation: warfarin x 4 weeks  - ABX x 24 hours  -Pain control: IV/Orals  -Activity: Up with assist  -Diet: ADAT  -Disposition: Pending medical clearance and therapy, likely to home on POD 2-3 pending negative cx      Ronald Nash  PGY-4, Orthopaedic " Surgery  [phone]

## 2018-05-26 NOTE — PLAN OF CARE
Problem: Patient Care Overview  Goal: Plan of Care/Patient Progress Review  Attempted PT evaluation this AM, but pt out of her room, will attempt PT evaluation later this AM as schedule allows.

## 2018-05-26 NOTE — PLAN OF CARE
Problem: Patient Care Overview  Goal: Plan of Care/Patient Progress Review  PT orders received for PT evaluation and treatment: s/p HAJA.  Per OT pt does not have any PT needs.  Pt was able to get out of bed and mobilize around the room safely per OT and review hip precautions.  Pt does have stairs at home but pt did not want to review those and felt comfortable doing them at home.  PT evaluation cancelled and PT orders completed.  Please re-consult if pt has any PT needs.

## 2018-05-26 NOTE — PLAN OF CARE
Problem: Patient Care Overview  Goal: Plan of Care/Patient Progress Review  OT:  Discussed therapy with patient/sister.  Patient able to complete supine to sit and ambulation in room with Mod I- provided patient with walker for safety while inpatient.  Patient/sister pleasant and report feeling comfortable with mobility, stairs with crutch and ADLs with AE and declined need to review.  Patient has all AE needs at home.  Reviewed hip precautions.  No inpatient or home OT/PT needs identified.  Will complete orders.

## 2018-05-26 NOTE — CONSULTS
HOSPITALIST CONSULTATION     REQUESTING PHYSICIAN: Cheko Saleem MD    REASON FOR CONSULTATION: Evaluation, Recommendations and co management of Medical Comorbidities.     ASSESSMENT & PLAN :     Alethea Patel is a 37 year old female admitted on 5/25/2018 following procedure, s/p following Orthopedic Procedure 5/25/2018 by Dr Saleem.     Pre-Op Diagnosis: Left Total Hip Multiple Dislocations   Post-Op Diagnosis: same  Procedures: Procedure(s):  ARTHROPLASTY REVISION HIP     EBL: 100 mL  Anesthesia: General endotracheal anesthesia  Blood Transfusion:  none administered  Fluids: (See anesthesia record)  Urine Output: See Anesthesia Record  Drains: hemovac  Specimens: None  Implants: See dictated operative report for full details     Findings: none  Complications:  None  Condition: Stable to PACU     PACU course reviewed. Initially sleepy hard to stay awake, at current alert and interactive. Bp soft, but asymptomatic. Pain controlled on current regimen. No other concern.      Pre op eval and PMH reviewed.     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  She has significant history of chronic pain, anxiety disorder . She is on chronic high-dose opioid and benzodiazapine treatment.  She is an active smoker.  Denies use of illicit drugs. History of multiple joint replacements. Recurrent L hip dislocations. Reports intermittent airway allergy  symptoms that she attributes to smoking, takes Singulair and albuterol inhaler as needed.       ROS/MED HX     ENT/Pulmonary:     (+)tobacco use, Current use , . .    Neurologic:     (+)seizures last seizure: 2014     Cardiovascular:  - neg cardiovascular ROS     METS/Exercise Tolerance:  >4 METS   Hematologic:  - neg hematologic  ROS     Musculoskeletal:   (+) , , other musculoskeletal- hip dislocation    GI/Hepatic:     (+) GERD Asymptomatic on medication,     Renal/Genitourinary:  -  ROS Renal section negative     Endo:  - neg endo ROS     Psychiatric:     (+) psychiatric history anxiety and depression    Infectious Disease:  - neg infectious disease ROS     Malignancy:   (+) Malignancy History of Lymphoma/Leukemia         Other:      # Orthopedics: POD # 0  Hemodynamics: stable.   continue on IV fluids, until adequate PO.   Monitor hgb for anemia of acute blood loss. Transfuse for hgb <7.0    Analgesia: Per Orthopedic team and/or Pain team. Controlled on Current regimen. Chronic pain, narcotic (high dose oxycodone, methadone, plus baclofen,tizanidine, gabapentin, Cymbalta)  benzo (for anxiety) dependence.     DVT prophylaxis:- Per orthopedic team  Activity per orthopedic team.   Antibiotics and wound care as per primary team.   Encourage Incentive spirometry to prevent atelectasis   Minimize use of narcotics as able. Consider bowel regimen with narcotics.       # Others:   Hypothyroidism: Continue PTA levothyroxine.   Depression, anxiety: Continue pta meds, including wellbutrin. Seroquel. Lorazepam. Lexapro.   GERD; PPI, ranitidine.   Allergic symptoms: Singulair. Albuterol prn.  Tobacco abuse: Encourage cessation. Declines nicotine replacement protocol.      No other medical concern at current.     D/w RN.     Thank you for letting us get involved in care of Ms Patel.  Please page with any questions.    Hospitalist team to follow.     Conor Crawford MD  House Physician  Pager: [phone]    5/25/2018        CHIEF COMPLAINT: Post Op, doing well.     HISTORY OF PRESENT ILLNESS: Obtained from the patient and chart review including Pre op evaluation, procedure note.    37 year old year old female  with above discussed medical problems s/p L HAJA (see above) admitted on 5/25/2018  for post op care and monitoring  (for further details for indication of surgery and operative note, please refer to Cheko Saleem MD note). Medicine consulted to evaluate, recommend and/or co manage medical co  morbidities.   No documented symptomatic hypotension, hypoxemia or other significant complications intra or post operative. Rest as above.   Currently: Incisional Pain controlled. Denies any chest pain, shortness of breath or LH or palpitations. Denies any nausea, vomiting or pain abdomen. No fever or chills. Denies any dysuria.  Denies any new rash. Anxiety: stable.   Medical issues as discussed above.   Denies any other medical concern.     PAST MEDICAL HISTORY:   Past Medical History:   Diagnosis Date     Acute lymphoblastic leukemia in remission (H)      Anxiety      Depression      Depressive disorder      Gastro-oesophageal reflux disease      IBS (irritable bowel syndrome)      Osteonecrosis (H)      Osteonecrosis (H)        PAST SURGICAL HISTORY:   Past Surgical History:   Procedure Laterality Date     ARTHROPLASTY KNEE Left 1/8/2016    Procedure: ARTHROPLASTY KNEE;  Surgeon: Cheko Saleem MD;  [facility]: [facility]     ARTHROPLASTY PATELLO-FEMORAL (KNEE)  6/12/2012    Procedure: ARTHROPLASTY PATELLO-FEMORAL (KNEE);  Right Knee Patella Resurfacing;  Surgeon: Cheko Saleem MD;  [facility]     ARTHROPLASTY REVISION HIP Left 4/3/2018    Procedure: ARTHROPLASTY REVISION HIP;  Conversion of Left Hip Rigo-Arthroplasty to Left Total Hip Replacement;  Surgeon: Cheko Saleem MD;  [facility]: [facility]     C PELVIS/HIP JOINT SURGERY UNLISTED       CLOSED REDUCTION HIP Left 5/18/2018    Procedure: CLOSED REDUCTION HIP;  Closed Reduction Left Hip, and Aspiration Culture   Left Hip;  Surgeon: Cheko Saleem MD;  [facility]     CLOSED REDUCTION HIP Left 5/23/2018    Procedure: CLOSED REDUCTION HIP;  Closed Reduction Left Hip;  Surgeon: Cheko Saleem MD;  [facility]     HIP SURGERY  5/31/2005    Left hip resurfacing hemiarthroplasty     HIP SURGERY  6/5/07     INJECT BLOCK MEDIAL BRANCH CERVICAL/THORACIC/LUMBAR N/A 1/9/2016    Procedure: INJECT BLOCK MEDIAL BRANCH CERVICAL / THORACIC / LUMBAR;  Surgeon:  GENERIC ANESTHESIA PROVIDER;  Location: [facility]     JOINT REPLACEMENT  2/2/2010    Rt TKA     KNEE SURGERY       ORTHOPEDIC SURGERY      right foot surgery       FH: reviewed.     Family History   Problem Relation Age of Onset     Migraines Son      Soft Tissue Cancer Brother      Low Back Problems Mother         SH: reviewed.     Social History     Social History     Marital status:      Spouse name: N/A     Number of children: N/A     Years of education: N/A     Social History Main Topics     Smoking status: Current Every Day Smoker     Packs/day: 1.00     Years: 10.00     Types: Cigarettes     Start date: 5/29/1997     Smokeless tobacco: Never Used     Alcohol use No      Comment: very rare     Drug use: No     Sexual activity: Yes     Partners: Male     Birth control/ protection: None     Other Topics Concern     None     Social History Narrative       ALLERGIES:   Allergies   Allergen Reactions     Chantix [Varenicline] Nausea and Vomiting     Other reaction(s): Vomiting     No Clinical Screening - See Comments      Other reaction(s): Unknown Reaction     Seasonal Allergies      Ciprofloxacin      Other reaction(s): Other  Interacts with other medication     Latex Itching and Rash     Other reaction(s): Other - Describe In Comment Field  Vaginal itching, burning  Other reaction(s): Intolerance         HOME MEDICATIONS:     Prior to Admission medications    Medication Sig Start Date End Date Taking? Authorizing Provider   albuterol (PROAIR HFA/PROVENTIL HFA/VENTOLIN HFA) 108 (90 BASE) MCG/ACT Inhaler Inhale 2 puffs into the lungs every 6 hours as needed for shortness of breath / dyspnea or wheezing   Yes Reported, Patient   B Complex Vitamins (B COMPLEX PO) Take 1 tablet by mouth daily Dose unknown; OTC   Yes Reported, Patient   BACLOFEN PO Take 20 mg by mouth 4 times daily    Yes Reported, Patient   BuPROPion HCl (WELLBUTRIN SR PO) Take 150 mg by mouth 2 times daily   Yes Unknown, Entered By History    DULoxetine (CYMBALTA) 60 MG EC capsule Take 60 mg by mouth 2 times daily    Yes Reported, Patient   Escitalopram Oxalate (LEXAPRO PO) Take 20 mg by mouth daily   Yes Unknown, Entered By History   fluticasone (FLONASE) 50 MCG/ACT nasal spray Spray 2 sprays into both nostrils daily as needed for rhinitis or allergies   Yes Reported, Patient   gabapentin (NEURONTIN) 300 MG capsule Take 900 mg by mouth 3 times daily    Yes Reported, Patient   IBUPROFEN PO Take 200 mg by mouth every 6 hours as needed for moderate pain   Yes Reported, Patient   levothyroxine (SYNTHROID, LEVOTHROID) 75 MCG tablet Take 75 mcg by mouth daily   Yes Reported, Patient   LORazepam (ATIVAN) 1 MG tablet Take 1-2 mg by mouth 3 times daily Max 5 tablets daily   Yes Reported, Patient   MAGNESIUM OXIDE PO Take 200 mg by mouth daily   Yes Reported, Patient   METHADONE HCL PO Take 40 mg by mouth every 8 hours   Yes Unknown, Entered By History   montelukast (SINGULAIR) 10 MG tablet Take 10 mg by mouth At Bedtime   Yes Reported, Patient   Multiple Vitamins-Minerals (MULTIVITAMIN ADULT PO) Take 1 tablet by mouth daily   Yes Unknown, Entered By History   Nutritional Supplements (MELATONIN PO) Take 3 mg by mouth At Bedtime    Yes Reported, Patient   omeprazole (PRILOSEC OTC) 20 MG tablet Take 20 mg by mouth daily    Yes Reported, Patient   oxyCODONE IR (ROXICODONE) 30 MG tablet Take 120 mg by mouth every 4 hours as needed for moderate to severe pain   Yes Unknown, Entered By History   Prochlorperazine Maleate (COMPAZINE PO) Take 10 mg by mouth every 6 hours as needed.     Yes Reported, Patient   QUEtiapine (SEROQUEL) 25 MG tablet Take 25 mg by mouth At Bedtime    Yes Reported, Patient   ranitidine (ZANTAC) 150 MG tablet Take 150 mg by mouth At Bedtime  3/6/18 3/6/19 Yes Reported, Patient   senna-docusate (SENOKOT-S;PERICOLACE) 8.6-50 MG per tablet Take 2 tablets by mouth 2 times daily 1/11/16  Yes Devante Broderick MD   TIZANIDINE HCL PO Take 2 mg by  mouth every 6 hours as needed for muscle spasms   Yes Unknown, Entered By History   order for DME Equipment being ordered: Bath Seat ()  Treatment Diagnosis: Limited independence in ADLs/mobility due to hip precautions and recent hip surgery 4/4/18   Cheko Saleem MD   order for DME [facility]  [facility]  [phone]     Directions: Advance as Tolerated and Instructed by MD 1/9/16   Cheko Saleem MD   POTASSIUM PO Take 1 tablet by mouth daily Dose unknown; OTC    Unknown, Entered By History       CURRENT MEDICATIONS:    Current Facility-Administered Medications   Medication     [START ON 5/28/2018] acetaminophen (TYLENOL) tablet 650 mg     acetaminophen (TYLENOL) tablet 975 mg     albuterol (PROAIR HFA/PROVENTIL HFA/VENTOLIN HFA) Inhaler 2 puff     baclofen (LIORESAL) tablet 20 mg     benzocaine-menthol (CEPACOL) 15-3.6 MG lozenge 1-2 lozenge     [START ON 5/26/2018] bisacodyl (DULCOLAX) Suppository 10 mg     buPROPion (WELLBUTRIN SR) 12 hr tablet 300 mg     ceFAZolin (ANCEF) 1 g vial to attach to  ml bag for ADULT or 50 ml bag for PEDS     DULoxetine (CYMBALTA) EC capsule 120 mg     escitalopram (LEXAPRO) tablet 20 mg     fluticasone (FLONASE) 50 MCG/ACT spray 2 spray     gabapentin (NEURONTIN) capsule 900 mg     HYDROmorphone (PF) (DILAUDID) injection 0.5-0.8 mg     ibuprofen (ADVIL/MOTRIN) tablet 200 mg     lactated ringers infusion     levothyroxine (SYNTHROID/LEVOTHROID) tablet 75 mcg     lidocaine (LMX4) kit     lidocaine 1 % 1 mL     LORazepam (ATIVAN) tablet 1-2 mg     [START ON 5/26/2018] magnesium oxide (MAG-OX) half-tab 200 mg     medication instruction     methadone (DOLOPHINE) tablet 40 mg     nalbuphine (NUBAIN) injection 2.5-5 mg     naloxone (NARCAN) injection 0.1-0.4 mg     omeprazole (priLOSEC) CR capsule 20 mg     Opioid plan postpartum - medication instruction     oxyCODONE IR (ROXICODONE) tablet 120 mg     prochlorperazine (COMPAZINE) tablet 10 mg     QUEtiapine (SEROquel)  "tablet 25 mg     ranitidine (ZANTAC) tablet 150 mg     senna-docusate (SENOKOT-S;PERICOLACE) 8.6-50 MG per tablet 2 tablet     sodium chloride (PF) 0.9% PF flush 3 mL     sodium chloride (PF) 0.9% PF flush 3 mL     tiZANidine (ZANAFLEX) tablet 2 mg     Warfarin Therapy Reminder (Check START DATE - warfarin may be starting in the FUTURE)         ROS: 10 point ROS neg other than the symptoms noted above in the HPI.    PHYSICAL EXAMINATION:     /62  Pulse 108  Temp 97.3  F (36.3  C) (Oral)  Resp 16  Ht 1.753 m (5' 9.02\")  Wt 72.8 kg (160 lb 7.9 oz)  LMP 2018  SpO2 94%  BMI 23.69 kg/m2  Temp (24hrs), Av.1  F (36.7  C), Min:97.3  F (36.3  C), Max:98.6  F (37  C)      BMI= Body mass index is 23.69 kg/(m^2).      Intake/Output Summary (Last 24 hours) at 18 2134  Last data filed at 18 1710   Gross per 24 hour   Intake              875 ml   Output              665 ml   Net              210 ml       General: Alert, interactive, NAD.   HEENT: AT/NC. PERRLA. Anicteric.Moist MM.    Neck: Supple. No JVD. No Lymphadenopathy.  Heart/CVS: Normal S1 and S2. Regular. No m/r/g .   Chest/Respi: Non labored breathing. CTA BL.   Abdomen/GI: Soft, non distended, non tender. No g/r/r.   Extremities/MSK: Distal pulses 2+, well perfused. Rest per ortho.   Neuro: Alert and oriented x4. Cranial nerves II-XII are grossly intact.    Skin:  No new rash over exposed areas.       LABORATORY DATA: reviewed.     Recent Results (from the past 24 hour(s))   Glucose by meter    Collection Time: 18 11:58 AM   Result Value Ref Range    Glucose 103 (H) 70 - 99 mg/dL   HCG qualitative urine    Collection Time: 18 12:00 PM   Result Value Ref Range    HCG Qual Urine Negative NEG^Negative   UA with Microscopic reflex to Culture    Collection Time: 18 12:00 PM   Result Value Ref Range    Color Urine Yellow     Appearance Urine Clear     Glucose Urine Negative NEG^Negative mg/dL    Bilirubin Urine Negative " NEG^Negative    Ketones Urine Negative NEG^Negative mg/dL    Specific Gravity Urine 1.013 1.003 - 1.035    Blood Urine Negative NEG^Negative    pH Urine 5.5 5.0 - 7.0 pH    Protein Albumin Urine Negative NEG^Negative mg/dL    Urobilinogen mg/dL Normal 0.0 - 2.0 mg/dL    Nitrite Urine Negative NEG^Negative    Leukocyte Esterase Urine Negative NEG^Negative    Source Midstream Urine     WBC Urine 1 0 - 5 /HPF    RBC Urine 1 0 - 2 /HPF    Squamous Epithelial /HPF Urine 1 0 - 1 /HPF    Mucous Urine Present (A) NEG^Negative /LPF   Anaerobic bacterial culture    Collection Time: 05/25/18  1:57 PM   Result Value Ref Range    Specimen Description Left Hip SUPERFICIAL Fluid SPECIMEN 1     Special Requests Received in anaerobic tubes.     Culture Micro PENDING    Fluid Culture Aerobic Bacterial    Collection Time: 05/25/18  1:57 PM   Result Value Ref Range    Specimen Description Left Hip SUPERFICIAL Fluid SPECIMEN 1     Culture Micro PENDING        Recent Results (from the past 24 hour(s))   XR Pelvis w Hip Left 1 View    Narrative    EXAM: XR PELVIS AND HIP LEFT 1 VIEW  5/25/2018 4:04 PM      HISTORY: postop;     COMPARISON: [id number]    FINDINGS: Portable AP pelvis and crosstable lateral left hip  radiographs were obtained.    Total right hip arthroplasty without evidence of hardware  complication.    New postsurgical changes of revision of total left hip arthroplasty.  Components appear well seated. Postsurgical subcutaneous gas. Surgical  drain noted.    Pelvic phlebolith.       Impression    IMPRESSION: Postsurgical changes of revision total left hip  arthroplasty. Stable appearance of total right hip arthroplasty.    MD Conor MOE MD (Joe)

## 2018-05-26 NOTE — PROGRESS NOTES
[facility], [place]   Internal Medicine Daily Note          Assessment and Plan:   Alethea Patel is a 37 year old female admitted on 5/25/2018 following procedure, s/p following Orthopedic Procedure 5/25/2018 by Dr Saleem.        # Orthopedics: POD # 1  Hemodynamics: stable.   Stop IV fluids.  Monitor hgb for anemia of acute blood loss. Transfuse for hgb <7.0. Post op Hgb at 10.3     Analgesia: Per Orthopedic team and/or Pain team. Controlled on Current regimen. Chronic pain, narcotic (high dose oxycodone, methadone, plus baclofen,tizanidine, gabapentin, Cymbalta)  benzo (for anxiety) dependence.      DVT prophylaxis:- Per orthopedic team  Activity per orthopedic team.   Antibiotics and wound care as per primary team.   Encourage Incentive spirometry to prevent atelectasis   Minimize use of narcotics as able. Consider bowel regimen with narcotics.         # Others:   Hypothyroidism: Continue PTA levothyroxine.   Depression, anxiety: Continue pta meds, including wellbutrin. Seroquel. Lorazepam. Lexapro.   GERD; PPI, ranitidine.   Allergic symptoms: Singulair. Albuterol prn.  Tobacco abuse: Encourage cessation. Declines nicotine replacement protocol.           Consulting teams: Internal medicine   Code status: Full   DVT Prophylaxis: Warfarin   Gastric prophylaxis: he blocker   Diet: Regular adult  Disposition: to be determined by primary team       Patient seen and examined with RN         Interval History:   Progress notes in the last 24 hrs by MDT team has been reviewed. Alethea feels well. I met her 3 days ago when she had a dislocation of her hip  Currently post op right hip replacement  Already up and about independently  No fever or chills  Eating and drinking well  Pain well controlled          Review of Systems:   A comprehensive review of systems was performed and found to be negative except: Those that are outlined in interval history              Medications:   I have reviewed  "this patient's current medications which are outlined in the \"current medication\" section of EPIC             Physical Exam:   Vitals were reviewed  Temp: 98.1  F (36.7  C) Temp src: Oral BP: 113/68 Pulse: 94 Heart Rate: 89 Resp: 15 SpO2: 94 % O2 Device: None (Room air) Oxygen Delivery: 2 LPM  Constitutional:   awake, alert, cooperative, no apparent distress, and appears stated age     Lungs:   No increased work of breathing, good air exchange, clear to auscultation bilaterally, no crackles or wheezing     Cardiovascular:   Normal apical impulse, regular rate and rhythm, normal S1 and S2, no S3 or S4, and no murmur noted     Abdomen:   No scars, normal bowel sounds, soft, non-distended, non-tender, no masses palpated, no hepatosplenomegally     Musculoskeletal:   Left hip covered with dressings. No distal neurovascular deficit      Neurologic:   Awake, alert, oriented to name, place and time.  Cranial nerves II-XII are grossly intact.              Data:     Most recent labs have been evaluated and relevant labs are outlined below :    BMP    Recent Labs  Lab 05/22/18 2057      POTASSIUM 4.3   CHLORIDE 108   APOLONIA 8.6   CO2 29   BUN 9   CR 0.89   GLC 99     CBC    Recent Labs  Lab 05/26/18 0638 05/22/18 2057   WBC  --  9.2   RBC  --  3.90   HGB 10.3* 11.3*   HCT  --  34.7*   MCV  --  89   MCH  --  29.0   MCHC  --  32.6   RDW  --  13.5   PLT  --  226     INR    Recent Labs  Lab 05/26/18 0638 05/22/18 2057   INR 1.05 0.97     LFTsNo lab results found in last 7 days.   PANCNo lab results found in last 7 days.      Dr FARA Colmenares MD  Hospitalist ( Internal medicine)  Pager: [phone]        "
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
## 2018-05-26 NOTE — OP NOTE
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       PROCEDURE PERFORMED:  Revision of left total hip arthroplasty.      SURGEON: Cheko Saleem MD      ASSISTANT: Ronald Nash MD      COMPONENTS USED:  Acetabular component - Biomet G7 OsseoTi highly porous acetabular cup, 56 mm outer diameter with elevated rim high wall liner, 32 mm diameter.  Femoral component - Biomet Neeta taper stem with 36 mm diameter cobalt-chrome head and standard neck length.      INDICATIONS:  This patient has had a previous revision hip arthroplasty after prior surface replacement.  She developed recurrent dislocation.  It was elected to proceed with revision and repositioning of the acetabular cup and enlarging the head size.      DESCRIPTION OF PROCEDURE:  The patient was placed on the operating table in lateral decubitus position.  Previous posterolateral exposure was then performed, taken down through the subcutaneous tissues. Prior to opening the tensor fascia franchesca, I then irrigated the wound with saline solution thoroughly to further sterilize it.  Cultures were taken of subcutaneous fluid as well as 2 additional samples of joint tissue.  I then opened up the fascia franchesca and split the gluteus micheal fibers.  The hip joint was encountered.  Intraoperative inspection revealed that a posterior dislocation was occurring with the hip flexed to 45 degrees and the knee adducted and internally rotated.  This occurred despite the position of the high wall liner.  I elected to revise the cup.      The existing liner was disengaged from the metal shell.  I then used the insertion device to grasp the metal shell and loosen it up from the underlying acetabular bed.  It was well fixed.  Having loosened this up, I then proceeded with extraction.  Once this was  accomplished, I then cleansed the area of the acetabulum and reamed it to a 55 mm outer diameter.  A real 56 mm diameter G7 highly porous OsseoTi cup was then impacted in 10 degrees of additional anteversion.  A high wall liner was also placed with the elevated rim at the 3 o'clock position.  I then proceeded with assessment and elected to use a femoral component with a 36 mm diameter head and standard neck length.  This restored the patient's leg lengths to symmetry and also minimized the risk of dislocation.  The hip was stable with flexion to 90 degrees and internal rotation to 80 degrees, as well as with the hip flexed to 45 degrees and adducted and maximally internally rotated.  Furthermore, with the hip in full extension and external rotation of 45 degrees, the hip remained stable.  I felt this was sufficient for stability and elected to place the real components.        Once this was completed, the wound was then closed by reapposing the soft tissues.  A drain was placed in the deep portion of the wound.  Soft tissues were reapposed with interrupted Ethibond sutures.  A periarticular block with 0.25% bupivacaine with epinephrine was applied to the surrounding soft tissues.  The wound was then closed in layers with absorbable sutures.  Sterile dressing was applied.      Postoperative plan will be for the patient to be weightbearing as tolerated.  Posterior hip joint precautions will be employed.  Warfarin anticoagulation with a target INR of 2.0 x 4 weeks will be prescribed.         NANCY FERNANDO MD             D: 2018   T: 2018   MT:       Name:     ELIZABETH ZAPATA   MRN:      [id number]        Account:        [id number]   :      1980           Procedure Date: 2018      Document: [id number]

## 2018-05-26 NOTE — PLAN OF CARE
Approx 1930 pt states she does not want to be on CAPNO monitoring any longer. Education provided, needs reinforcement minimal evidence of learning. She agrees to continuous pulse oximetry monitoring. Stable on room air in mid to 90's
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
## 2018-05-27 LAB
HGB BLD-MCNC: 10.5 G/DL (ref 11.7–15.7)
INR PPP: 1.3 (ref 0.86–1.14)

## 2018-05-27 PROCEDURE — 25000132 ZZH RX MED GY IP 250 OP 250 PS 637: Mod: GY | Performed by: ORTHOPAEDIC SURGERY

## 2018-05-27 PROCEDURE — 99232 SBSQ HOSP IP/OBS MODERATE 35: CPT | Performed by: INTERNAL MEDICINE

## 2018-05-27 PROCEDURE — 25000128 H RX IP 250 OP 636: Performed by: ORTHOPAEDIC SURGERY

## 2018-05-27 PROCEDURE — 12000001 ZZH R&B MED SURG/OB UMMC

## 2018-05-27 PROCEDURE — 25000132 ZZH RX MED GY IP 250 OP 250 PS 637: Mod: GY | Performed by: INTERNAL MEDICINE

## 2018-05-27 PROCEDURE — A9270 NON-COVERED ITEM OR SERVICE: HCPCS | Mod: GY | Performed by: ORTHOPAEDIC SURGERY

## 2018-05-27 PROCEDURE — 85018 HEMOGLOBIN: CPT | Performed by: ORTHOPAEDIC SURGERY

## 2018-05-27 PROCEDURE — A9270 NON-COVERED ITEM OR SERVICE: HCPCS | Mod: GY | Performed by: INTERNAL MEDICINE

## 2018-05-27 PROCEDURE — 36415 COLL VENOUS BLD VENIPUNCTURE: CPT | Performed by: ORTHOPAEDIC SURGERY

## 2018-05-27 PROCEDURE — 85610 PROTHROMBIN TIME: CPT | Performed by: ORTHOPAEDIC SURGERY

## 2018-05-27 RX ORDER — WARFARIN SODIUM 5 MG/1
5 TABLET ORAL
Status: COMPLETED | OUTPATIENT
Start: 2018-05-27 | End: 2018-05-27

## 2018-05-27 RX ADMIN — OMEPRAZOLE 20 MG: 20 CAPSULE, DELAYED RELEASE ORAL at 08:42

## 2018-05-27 RX ADMIN — BUPROPION HYDROCHLORIDE 300 MG: 150 TABLET, FILM COATED, EXTENDED RELEASE ORAL at 21:09

## 2018-05-27 RX ADMIN — BACLOFEN 20 MG: 20 TABLET ORAL at 12:03

## 2018-05-27 RX ADMIN — OXYCODONE HYDROCHLORIDE 120 MG: 30 TABLET ORAL at 04:39

## 2018-05-27 RX ADMIN — ACETAMINOPHEN 975 MG: 325 TABLET ORAL at 17:01

## 2018-05-27 RX ADMIN — LORAZEPAM 1 MG: 1 TABLET ORAL at 07:12

## 2018-05-27 RX ADMIN — OXYCODONE HYDROCHLORIDE 120 MG: 30 TABLET ORAL at 16:57

## 2018-05-27 RX ADMIN — CEFAZOLIN 1 G: 1 INJECTION, POWDER, FOR SOLUTION INTRAMUSCULAR; INTRAVENOUS at 16:56

## 2018-05-27 RX ADMIN — METHADONE HYDROCHLORIDE 40 MG: 10 TABLET ORAL at 22:02

## 2018-05-27 RX ADMIN — CEFAZOLIN 1 G: 1 INJECTION, POWDER, FOR SOLUTION INTRAMUSCULAR; INTRAVENOUS at 01:05

## 2018-05-27 RX ADMIN — BACLOFEN 20 MG: 20 TABLET ORAL at 16:56

## 2018-05-27 RX ADMIN — METHADONE HYDROCHLORIDE 40 MG: 10 TABLET ORAL at 07:08

## 2018-05-27 RX ADMIN — RANITIDINE 150 MG: 150 TABLET, FILM COATED ORAL at 21:10

## 2018-05-27 RX ADMIN — BACLOFEN 20 MG: 20 TABLET ORAL at 19:27

## 2018-05-27 RX ADMIN — CEFAZOLIN 1 G: 1 INJECTION, POWDER, FOR SOLUTION INTRAMUSCULAR; INTRAVENOUS at 08:44

## 2018-05-27 RX ADMIN — OXYCODONE HYDROCHLORIDE 120 MG: 30 TABLET ORAL at 12:49

## 2018-05-27 RX ADMIN — QUETIAPINE FUMARATE 25 MG: 25 TABLET ORAL at 21:10

## 2018-05-27 RX ADMIN — LEVOTHYROXINE SODIUM 75 MCG: 75 TABLET ORAL at 07:12

## 2018-05-27 RX ADMIN — WARFARIN SODIUM 5 MG: 5 TABLET ORAL at 17:01

## 2018-05-27 RX ADMIN — LORAZEPAM 1 MG: 1 TABLET ORAL at 19:28

## 2018-05-27 RX ADMIN — GABAPENTIN 900 MG: 300 CAPSULE ORAL at 19:27

## 2018-05-27 RX ADMIN — Medication 200 MG: at 08:41

## 2018-05-27 RX ADMIN — GABAPENTIN 900 MG: 300 CAPSULE ORAL at 08:41

## 2018-05-27 RX ADMIN — ESCITALOPRAM OXALATE 20 MG: 20 TABLET ORAL at 08:41

## 2018-05-27 RX ADMIN — BACLOFEN 20 MG: 20 TABLET ORAL at 08:41

## 2018-05-27 RX ADMIN — DULOXETINE HYDROCHLORIDE 120 MG: 60 CAPSULE, DELAYED RELEASE ORAL at 21:10

## 2018-05-27 RX ADMIN — SENNOSIDES AND DOCUSATE SODIUM 2 TABLET: 8.6; 5 TABLET ORAL at 19:28
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
## 2018-05-27 NOTE — PROGRESS NOTES
[facility], [place]   Internal Medicine Daily Note          Assessment and Plan:   Alethea Patel is a 37 year old female admitted on 5/25/2018 following procedure, s/p following Orthopedic Procedure 5/25/2018 by Dr Saleem.        # Orthopedics: POD # 2  Hemodynamics: stable.   Monitor hgb for anemia of acute blood loss. Transfuse for hgb <7.0. Post op Hgb at 10.5     Analgesia: Per Orthopedic team and/or Pain team. Controlled on Current regimen. Chronic pain, narcotic (high dose oxycodone, methadone, plus baclofen,tizanidine, gabapentin, Cymbalta)  benzo (for anxiety) dependence.      DVT prophylaxis:- Per orthopedic team  Activity per orthopedic team.   Antibiotics and wound care as per primary team. ( Cefazolin 1 g Q 8 hrs for 3 days)   Encourage Incentive spirometry to prevent atelectasis   Minimize use of narcotics as able. Consider bowel regimen with narcotics.         # Others:   Hypothyroidism: Continue PTA levothyroxine.   Depression, anxiety: Continue pta meds, including wellbutrin. Seroquel. Lorazepam. Lexapro.   GERD; PPI, ranitidine.   Allergic symptoms: Singulair. Albuterol prn.  Tobacco abuse: Encourage cessation. Declines nicotine replacement protocol.           Consulting teams: Internal medicine   Code status: Full   DVT Prophylaxis: Warfarin   Gastric prophylaxis: he blocker   Diet: Regular adult  Disposition: to be determined by primary team       Patient seen and examined with RN         Interval History:   Progress notes in the last 24 hrs by MDT team has been reviewed.   Alethea feels well this morning  Non new complaints  Eating and drinking well  No fevers or chills  Hoping to discharge from the hospital after 1 more day of IV antibiotics            Review of Systems:   A comprehensive review of systems was performed and found to be negative except: Those that are outlined in interval history              Medications:   I have reviewed this patient's current  "medications which are outlined in the \"current medication\" section of EPIC             Physical Exam:   Vitals were reviewed  Temp: 98  F (36.7  C) Temp src: Oral BP: 101/61 Pulse: 94 Heart Rate: 94 Resp: 14 SpO2: 93 % O2 Device: None (Room air)    Constitutional:   awake, alert, cooperative, no apparent distress, and appears stated age     Lungs:   No increased work of breathing, good air exchange, clear to auscultation bilaterally, no crackles or wheezing     Cardiovascular:   Normal apical impulse, regular rate and rhythm, normal S1 and S2, no S3 or S4, and no murmur noted     Abdomen:   No scars, normal bowel sounds, soft, non-distended, non-tender, no masses palpated, no hepatosplenomegally     Musculoskeletal:   Left hip covered with dressings. No distal neurovascular deficit      Neurologic:   Awake, alert, oriented to name, place and time.  Cranial nerves II-XII are grossly intact.              Data:     Most recent labs have been evaluated and relevant labs are outlined below :    BMP    Recent Labs  Lab 05/22/18 2057      POTASSIUM 4.3   CHLORIDE 108   APOLONIA 8.6   CO2 29   BUN 9   CR 0.89   GLC 99     CBC    Recent Labs  Lab 05/27/18 0551 05/22/18 2057   WBC  --   --  9.2   RBC  --   --  3.90   HGB 10.5*  < > 11.3*   HCT  --   --  34.7*   MCV  --   --  89   MCH  --   --  29.0   MCHC  --   --  32.6   RDW  --   --  13.5   PLT  --   --  226   < > = values in this interval not displayed.  INR    Recent Labs  Lab 05/27/18  0551 05/26/18  0638 05/22/18 2057   INR 1.30* 1.05 0.97     LFTsNo lab results found in last 7 days.   PANCNo lab results found in last 7 days.      Dr FARA Colmenares MD  Hospitalist ( Internal medicine)  Pager: [phone]        "

## 2018-05-27 NOTE — PLAN OF CARE
Problem: Patient Care Overview  Goal: Plan of Care/Patient Progress Review  Pt. Sleepy at times, but arousable. VSS. Afebrile. Lung sounds CTA. Maintaining sats on RA. IS encouraged. Bowel sounds active, LBM 5/24. PP+ DP+. CMS and neuro's are intact. Denies numbness and tingling in all extremities. Denies nausea, shortness of breath, and chest pain. L hip pain managed w/ scheduled meds, and prn Oxycodone. Voids spontaneously without difficulty via BSC. Tolerating regular diet. L hip incisional dressing is intact. Pt up with SBA. PIV is patent and SL between abx. PCD's on BLE's. Bilateral heels are elevated off the bed. Call light is within reach, pt able to make needs known. Pt's sister is at bedside. Pt off unit x2 overnight, once for over an hour and outside at 0555 this am. Will continue to monitor.

## 2018-05-28 VITALS
BODY MASS INDEX: 23.77 KG/M2 | OXYGEN SATURATION: 94 % | HEIGHT: 69 IN | SYSTOLIC BLOOD PRESSURE: 105 MMHG | DIASTOLIC BLOOD PRESSURE: 64 MMHG | HEART RATE: 94 BPM | WEIGHT: 160.5 LBS | RESPIRATION RATE: 16 BRPM | TEMPERATURE: 98.8 F

## 2018-05-28 LAB — INR PPP: 1.5 (ref 0.86–1.14)

## 2018-05-28 PROCEDURE — 25000128 H RX IP 250 OP 636: Performed by: ORTHOPAEDIC SURGERY

## 2018-05-28 PROCEDURE — 36415 COLL VENOUS BLD VENIPUNCTURE: CPT | Performed by: ORTHOPAEDIC SURGERY

## 2018-05-28 PROCEDURE — 85610 PROTHROMBIN TIME: CPT | Performed by: ORTHOPAEDIC SURGERY

## 2018-05-28 PROCEDURE — A9270 NON-COVERED ITEM OR SERVICE: HCPCS | Mod: GY | Performed by: ORTHOPAEDIC SURGERY

## 2018-05-28 PROCEDURE — 99232 SBSQ HOSP IP/OBS MODERATE 35: CPT | Performed by: INTERNAL MEDICINE

## 2018-05-28 PROCEDURE — 25000132 ZZH RX MED GY IP 250 OP 250 PS 637: Mod: GY | Performed by: ORTHOPAEDIC SURGERY

## 2018-05-28 RX ORDER — WARFARIN SODIUM 5 MG/1
5 TABLET ORAL DAILY
Qty: 30 TABLET | Refills: 0 | Status: ON HOLD | OUTPATIENT
Start: 2018-05-28 | End: 2024-01-31

## 2018-05-28 RX ORDER — WARFARIN SODIUM 5 MG/1
5 TABLET ORAL
Status: DISCONTINUED | OUTPATIENT
Start: 2018-05-28 | End: 2018-05-28 | Stop reason: HOSPADM

## 2018-05-28 RX ADMIN — LEVOTHYROXINE SODIUM 75 MCG: 75 TABLET ORAL at 09:21

## 2018-05-28 RX ADMIN — ACETAMINOPHEN 975 MG: 325 TABLET ORAL at 09:20

## 2018-05-28 RX ADMIN — METHADONE HYDROCHLORIDE 40 MG: 10 TABLET ORAL at 05:28

## 2018-05-28 RX ADMIN — OMEPRAZOLE 20 MG: 20 CAPSULE, DELAYED RELEASE ORAL at 09:21

## 2018-05-28 RX ADMIN — GABAPENTIN 900 MG: 300 CAPSULE ORAL at 09:20

## 2018-05-28 RX ADMIN — CEFAZOLIN 1 G: 1 INJECTION, POWDER, FOR SOLUTION INTRAMUSCULAR; INTRAVENOUS at 09:24

## 2018-05-28 RX ADMIN — Medication 200 MG: at 09:21

## 2018-05-28 RX ADMIN — OXYCODONE HYDROCHLORIDE 120 MG: 30 TABLET ORAL at 09:35

## 2018-05-28 RX ADMIN — OXYCODONE HYDROCHLORIDE 120 MG: 30 TABLET ORAL at 00:54

## 2018-05-28 RX ADMIN — BACLOFEN 20 MG: 20 TABLET ORAL at 09:20

## 2018-05-28 RX ADMIN — OXYCODONE HYDROCHLORIDE 120 MG: 30 TABLET ORAL at 05:28

## 2018-05-28 RX ADMIN — LORAZEPAM 1 MG: 1 TABLET ORAL at 09:20

## 2018-05-28 RX ADMIN — CEFAZOLIN 1 G: 1 INJECTION, POWDER, FOR SOLUTION INTRAMUSCULAR; INTRAVENOUS at 00:54

## 2018-05-28 RX ADMIN — ESCITALOPRAM OXALATE 20 MG: 20 TABLET ORAL at 09:20

## 2018-05-28 RX ADMIN — SENNOSIDES AND DOCUSATE SODIUM 2 TABLET: 8.6; 5 TABLET ORAL at 09:21

## 2018-05-28 NOTE — DISCHARGE SUMMARY
ORTHOPAEDIC DISCHARGE SUMMARY     Date of Admission: 5/25/2018  Date of Discharge: 5/28/2018  Disposition: Home  Staff Physician: Cheko Mcknight MD  Primary Care Provider: [facility]    DISCHARGE DIAGNOSIS:  Left Total Hip Multiple Dislocations     PROCEDURES: Procedure(s):  ARTHROPLASTY REVISION HIP on 5/25/2018    BRIEF HISTORY:  Hx of hip replacement with dislocation    HOSPITAL COURSE:    Surgery was uncomplicated. Alethea Patel has done well post-operatively. Medicine was consulted post operatively to aid in management of medical comorbidities. See final recommendations below. The patient received routine nursing cares and is medically stable. Vital signs are stable. The patient is tolerating a regular diet without GI distress/nausea or vomiting. Voiding spontaneously. All PT/OT goals have been met for safe mobility. Pain is now controlled on oral medications which will be available on discharge. Stool softeners have been used while taking pain medications to help prevent constipation. Alethea Patel is deemed medically safe to discharge.     Antibiotics:   given periop and 24 hours postop.   DVT prophylaxis:  warfarin daily for  4 weeks INR goal 2-2.5  PT Progress: Has met PT/OT goals for safe mobility.   Pain Meds:  Weaned off all IV pain meds by discharge.  Inpatient Events: No significant events or complications.     Discharge orders and instructions as below.    FOLLOWUP:    Follow up with Dr. mcknight at 4 weeks postoperatively.  Future Appointments  Date Time Provider Department Center   6/14/2018 9:00 AM Cheko Mcknight MD [facility]       Appointments on [facility]. Call [phone] if you haven't heard regarding these appointments within 7 days of discharge.    PLANNED DISCHARGE ORDERS:           Current Discharge Medication List      START taking these medications    Details   warfarin (COUMADIN) 5 MG tablet Take 1 tablet (5 mg) by mouth  daily  Qty: 30 tablet, Refills: 0    Comments: INR goal 2-2.5  Associated Diagnoses: Status post hip surgery         CONTINUE these medications which have NOT CHANGED    Details   albuterol (PROAIR HFA/PROVENTIL HFA/VENTOLIN HFA) 108 (90 BASE) MCG/ACT Inhaler Inhale 2 puffs into the lungs every 6 hours as needed for shortness of breath / dyspnea or wheezing      B Complex Vitamins (B COMPLEX PO) Take 1 tablet by mouth daily Dose unknown; OTC      BACLOFEN PO Take 20 mg by mouth 4 times daily       BuPROPion HCl (WELLBUTRIN SR PO) Take 150 mg by mouth 2 times daily      DULoxetine (CYMBALTA) 60 MG EC capsule Take 60 mg by mouth 2 times daily       Escitalopram Oxalate (LEXAPRO PO) Take 20 mg by mouth daily      fluticasone (FLONASE) 50 MCG/ACT nasal spray Spray 2 sprays into both nostrils daily as needed for rhinitis or allergies      gabapentin (NEURONTIN) 300 MG capsule Take 900 mg by mouth 3 times daily       IBUPROFEN PO Take 200 mg by mouth every 6 hours as needed for moderate pain      levothyroxine (SYNTHROID, LEVOTHROID) 75 MCG tablet Take 75 mcg by mouth daily      LORazepam (ATIVAN) 1 MG tablet Take 1-2 mg by mouth 3 times daily Max 5 tablets daily      MAGNESIUM OXIDE PO Take 200 mg by mouth daily      METHADONE HCL PO Take 40 mg by mouth every 8 hours      montelukast (SINGULAIR) 10 MG tablet Take 10 mg by mouth At Bedtime      Multiple Vitamins-Minerals (MULTIVITAMIN ADULT PO) Take 1 tablet by mouth daily      Nutritional Supplements (MELATONIN PO) Take 3 mg by mouth At Bedtime       omeprazole (PRILOSEC OTC) 20 MG tablet Take 20 mg by mouth daily       oxyCODONE IR (ROXICODONE) 30 MG tablet Take 120 mg by mouth every 4 hours as needed for moderate to severe pain      Prochlorperazine Maleate (COMPAZINE PO) Take 10 mg by mouth every 6 hours as needed.        QUEtiapine (SEROQUEL) 25 MG tablet Take 25 mg by mouth At Bedtime       ranitidine (ZANTAC) 150 MG tablet Take 150 mg by mouth At Bedtime        senna-docusate (SENOKOT-S;PERICOLACE) 8.6-50 MG per tablet Take 2 tablets by mouth 2 times daily  Qty: 30 tablet, Refills: 0    Associated Diagnoses: Status post total left knee replacement      TIZANIDINE HCL PO Take 2 mg by mouth every 6 hours as needed for muscle spasms      !! order for DME Equipment being ordered: Bath Seat ()  Treatment Diagnosis: Limited independence in ADLs/mobility due to hip precautions and recent hip surgery  Qty: 1 each, Refills: 0    Associated Diagnoses: Status post hip surgery      !! order for DME CPM  [facility]  [phone]     Directions: Advance as Tolerated and Instructed by MD  Qty: 1 Device, Refills: 0    Associated Diagnoses: Status post total right knee replacement      POTASSIUM PO Take 1 tablet by mouth daily Dose unknown; OTC       !! - Potential duplicate medications found. Please discuss with provider.            Discharge Procedure Orders  Reason for your hospital stay   Order Comments: You were admitted following your total hip arthroplasty     Discharge Instructions   Order Comments: TOTAL HIP ARTHROPLASTY POST OPERATIVE DISCHARGE INSTRUCTIONS    FOLLOW UP APPOINTMENT  You are scheduled for a post operative appointment with Dr. Saleem approximately four weeks after surgery.     Your follow up appointments will be at the location that you regularly see Dr. Saleem:    [facility]  [address] [place] [address]  [phone]    Physical therapy:   A prescription for physical therapy will be provided at the time of discharge.      ACTIVITY  Weight bearing status:   You may bear weight on your operative extremity as tolerated, using assistive devices (walker, cane) as needed. As you begin to feel more comfortable ambulating, you may gradually transition from a walker to a cane. Eventually, you should wean from all assistive devices. Although we would like you to discontinue use of assistive  devices as soon as possible, do not transition until you have worked with your physical therapist to achieve safe balance and comfort.     Posterior hip precautions:  You must maintain the following posterior hip precautions for the next 12 weeks with no exceptions:  1) no hip flexion >90 degrees (no bending down at the waist)  2) no internal rotation past neutral (no pivoting)  3) no adduction past midline (no crossing your legs)    Exercises:   Perform the following exercises at least three times per day for the first four weeks after surgery to prevent complications, such as blood clots in your legs:  1) Point and flex your feet  2) Move your ankle around in big circles  3) Wiggle your toes   Also, perform thigh muscle tightening exercises for 10 to 15 minutes at least three times per day for the first four weeks after surgery.    Athletic Activities:  Activities such as swimming, bicycling, jogging, running, and stop-and-go sports should be avoided until permitted by your provider.    Driving:  Driving is not permitted until directed by your provider. Typically, driving is restricted for three to four weeks after right knee surgery and three weeks after left knee surgery. Under no circumstance are you permitted to drive while using narcotic pain medications.    Return to Work:  You may return to work when directed by your provider. Typically, patients with desk/sitting jobs can return to work within two weeks while patients with heavy labor jobs can return to work around three months after surgery.      COMFORT AND PAIN MANAGEMENT  Icing:  An ice pack will be provided to control swelling and discomfort after surgery. Place a thin towel on your skin and apply the ice pack overtop. You may apply ice for 20 minutes as often as two times per hour.    Pain Medications:  You will be discharged with acetaminophen (Tylenol) and a narcotic medication for pain management after surgery. Acetaminophen can help to  effectively manage pain when used as prescribed, however, do not exceed the maximum daily dose of 3000 mg. The narcotic pain medication should be reserved for severe, breakthrough pain. Take the narcotic medication as prescribed and use only as often as necessary.       ANTICOAGULATION  Take warfarin as prescribed for a total of four weeks after surgery. This medication will require weekly INR checks (INR goal 1.5-2.5).       WOUND CARE AND SHOWERING  Wound care:  You have a clean Aquacel dressing on your surgical wound. This dressing should stay in place for ten days after surgery to allow the incision to heal. After ten days, you may change the dressing. Please use sterile 4x4 gauze dressings with tape over top to secure the dressing. If the Aquacel dressing becomes wet or saturated with wound drainage, it is appropriate to change the dressing before ten days. If drainage persists from the incision site to the extent that it is frequently saturating the dressing, please contact Dr. Saleem's clinic. Your surgical incision was closed with a clear knotless suture and tails were left at the margins of the incision. These tails can be left in place until your follow up appointment. The steri strips (small white tape that is directly on the incision areas) should be left in place until they fall off or are removed at your first office visit.    Showering:  You may shower immediately after surgery with the Aquacel dressing in place. You may allow water to run over the dressing, but do not to soak or submerge your wound underwater. Do not pick or scratch the incision.    Tub Bathing:  Tub bathing, swimming, or any other activities that cause your incision to be submerged should be avoided until allowed by your provider. Typically, patients are allowed to return to these activities four weeks after surgery.      CONTACTING YOUR PHYSICIAN:  You may experience symptoms that require follow-up before your scheduled appointment.  Please contact Dr. Saleem's office if you experience:  1) Pain that persists or worsens in the first few days after surgery  2) Excessive redness or drainage of cloudy or bloody material from the wounds (clear red tinted fluid and some mild drainage should be expected) or drainage of any kind five days after surgery  3) A temperature elevation greater than 101.5    4) Pain, swelling or redness in your calf  5) Numbness or weakness in your leg or foot      Regular business hours (Monday - Friday, 8am - 5pm):  [facility]: [phone]    If you have any other concerns during normal business hours, please contact Dr. Saleem's nurse, Dariana Krause RN, at [phone] during normal business hours 8 am - 5 pm (leave message as needed). If your concern is urgent, please page Dariana Krause RN at [phone] and key in your 10 digit phone number followed by the # sign after the beep.     After hours and weekends:  [facility] on call Orthopedic resident: [phone]    If you have an emergent concern, contact the Emergency Department at the [place] - [phone] - or [place] - [phone] - [facility].     Diet   Order Comments: Return to your pre surgery diet   Order Specific Question Answer Comments   Is discharge order? Yes          Ronald Nash MD  PGY-4 Orthopaedic Surgery

## 2018-05-28 NOTE — PROGRESS NOTES
"Ortho Progress Note     S: No acute overnight events. Pain controlled. Denies N/v/f/c.     O:  /64 (BP Location: Left leg)  Pulse 94  Temp 98.8  F (37.1  C) (Oral)  Resp 16  Ht 1.753 m (5' 9.02\")  Wt 72.8 kg (160 lb 7.9 oz)  LMP 05/01/2018  SpO2 94%  BMI 23.69 kg/m2  Gen: A&Ox3, no acute distress  CV: 2+ dp/pt pulses, capillary refill < 2sec  Resp: breathing equal and non-labored, no wheezing  Extremities: Dressing dry. Fires ehl, fhl,ta,gsc. SILT in sp/dp/sumeet/saph/tn. Toes wwp.     Hemoglobin   Date Value Ref Range Status   05/27/2018 10.5 (L) 11.7 - 15.7 g/dL Final   05/26/2018 10.3 (L) 11.7 - 15.7 g/dL Final     No components found for: PLATELETS  Hematocrit   Date Value Ref Range Status   05/22/2018 34.7 (L) 35.0 - 47.0 % Final   05/17/2018 30.1 (L) 35.0 - 47.0 % Final     WBC   Date Value Ref Range Status   05/22/2018 9.2 4.0 - 11.0 10e9/L Final   05/17/2018 7.1 4.0 - 11.0 10e9/L Final     CRP Inflammation   Date Value Ref Range Status   05/22/2018 10.7 (H) 0.0 - 8.0 mg/L Final         Your basic metabolic panel results:  Sodium   Date Value Ref Range Status   05/22/2018 144 133 - 144 mmol/L Final       (Sodium/Salt)  Potassium   Date Value Ref Range Status   05/22/2018 4.3 3.4 - 5.3 mmol/L Final     Glucose   Date Value Ref Range Status   05/22/2018 99 70 - 99 mg/dL Final       (Glucose/Blood Sugar/Diabetic Screen)  Calcium   Date Value Ref Range Status   05/22/2018 8.6 8.5 - 10.1 mg/dL Final       (Calcium)  Creatinine   Date Value Ref Range Status   05/22/2018 0.89 0.52 - 1.04 mg/dL Final       (Kidney Function)            A/P: S/p Revision  Left acetabular component on 5/25. Doing well.     -Weight Bearing Status: WBAT, posterior hip precautions  -Anticoagulation: warfarin x 4 weeks  - ABX x 24 hours  -Pain control: IV/Orals  -Activity: Up with assist  -Diet: ADAT  -Disposition: Pending medical clearance and therapy, likely to home on POD 2-3 pending negative cx, likely today      Ronald" Saad  PGY-4, Orthopaedic Surgery  [phone]

## 2018-05-28 NOTE — PLAN OF CARE
Problem: Hip Arthroplasty (Total, Partial) (Adult)  Goal: Signs and Symptoms of Listed Potential Problems Will be Absent, Minimized or Managed (Hip Arthroplasty)  Signs and symptoms of listed potential problems will be absent, minimized or managed by discharge/transition of care (reference Hip Arthroplasty (Total, Partial) (Adult) CPG).   Outcome: No Change    VS: Bp soft, trending but WDL (pt baseline) Tachardic this shift (low 100's).   O2: Stable on room air mid to upper 90s   Output: Voids without issue in bathroom/commode    Last BM: 5.24.18 per pt report - accepting of senna declines other interventions - education provided no evidence of learning needs reinforcement   Activity: Up with standby assist/ walker and wheel chair - leaves unit frequently to smoke   Skin: Incisions and burns to abdomen pre existing from excessive heating pad use   Pain: Managed with PRN oxycodone 120mg every 4hrs   CMS: Intact denies numbness and tingling throughout   Dressing: Scant drainage to Aquacel it is not expanding    Diet: Regular, appetite is good - tolerates oral intake   LDA: PIV SL between abx treatments   Equipment: Hip pillow - pt declines use   Plan: Continue to manage pain and administer abx. Will dc home when stable   Additional Info: Pt left unit 2x this shift 2840-2150 and [id number]. Each time plan of care was reviewed with pt - she states she understands risks and potential consequences of leaving - Education provided No evidence of learning needs reinforcement. Sister is in room and is reporting she is pt's PCA.

## 2018-05-28 NOTE — PLAN OF CARE
Problem: Patient Care Overview  Goal: Plan of Care/Patient Progress Review  Outcome: Adequate for Discharge Date Met: 05/28/18  Pt is A&O. VSS and WNL. Pain well controlled with PRN oxycodone. Drsg CDI. CMS intact to LLE. Denies any numbness/tingling. No chest pain or SOB. Lungs CTA. BS present and active x 4. Tolerating diet with no n/v. Up with SBA and walker. PIV removed d/t pain @ insertion site once abx were started. Per ortho, ok to stop last infusion and dc home as planned. Pt dc'd home with sister @ 1200. Provided with prescription for coumadin and home care order for INR checks. Education provided on all discharge instructions with extra emphasis placed on importance of hip precautions. Pt verbalized understanding and declined having additional questions or concerns.

## 2018-05-28 NOTE — PLAN OF CARE
Problem: Patient Care Overview  Goal: Plan of Care/Patient Progress Review  Outcome: Improving  A/Ox's 4. Pt rated pain as tolerable. Oxycodone given for pain control. Dressing CDI. CMS to baseline. Tolerated regular diet. Denied any nausea, CP, SOB, lightheadedness or dizziness. Voiding without pain or difficulty. Passing flatus. Up with SBA. Pt went outside x2 times to smoke. Resting in bed at this time with call light in reach. Able to make needs known. Continue to monitor.

## 2018-05-28 NOTE — PROGRESS NOTES
"[facility], [place]   Internal Medicine Daily Note          Assessment and Plan:   Alethea Patel is a 37 year old female admitted on 5/25/2018 following procedure, s/p following Orthopedic Procedure 5/25/2018 by Dr Saleem.        # Orthopedics: POD # 3  Hemodynamics: stable.   Monitor hgb for anemia of acute blood loss. Transfuse for hgb <7.0. Post op Hgb at 10.5     Analgesia: Per Orthopedic team and/or Pain team. Controlled on Current regimen. Chronic pain, narcotic (high dose oxycodone, methadone, plus baclofen,tizanidine, gabapentin, Cymbalta)  benzo (for anxiety) dependence.      DVT prophylaxis:- Per orthopedic team  Activity per orthopedic team.   Antibiotics and wound care as per primary team. ( Cefazolin 1 g Q 8 hrs for 3 days) Las day today  Encourage Incentive spirometry to prevent atelectasis   Minimize use of narcotics as able. Consider bowel regimen with narcotics.         # Others:   Hypothyroidism: Continue PTA levothyroxine.   Depression, anxiety: Continue pta meds, including wellbutrin. Seroquel. Lorazepam. Lexapro.   GERD; PPI, ranitidine.   Allergic symptoms: Singulair. Albuterol prn.  Tobacco abuse: Encourage cessation. Declines nicotine replacement protocol.           Consulting teams: Internal medicine   Code status: Full   DVT Prophylaxis: Warfarin   Gastric prophylaxis: he blocker   Diet: Regular adult  Disposition: Medically stable to discharge home today       Patient seen and examined with RN         Interval History:   Progress notes in the last 24 hrs by MDT team has been reviewed.   Patient feels well   Desperate to discharge from the hospital   Denies chest pain/ SOB  No fevers or chills             Review of Systems:   A comprehensive review of systems was performed and found to be negative except: Those that are outlined in interval history              Medications:   I have reviewed this patient's current medications which are outlined in the \"current " "medication\" section of EPIC             Physical Exam:   Vitals were reviewed  Temp: 98.8  F (37.1  C) Temp src: Oral BP: 105/64   Heart Rate: 91 Resp: 16 SpO2: 94 % O2 Device: None (Room air)    Constitutional:   awake, alert, cooperative, no apparent distress, and appears stated age     Lungs:   No increased work of breathing, good air exchange, clear to auscultation bilaterally, no crackles or wheezing     Cardiovascular:   Normal apical impulse, regular rate and rhythm, normal S1 and S2, no S3 or S4, and no murmur noted     Abdomen:   No scars, normal bowel sounds, soft, non-distended, non-tender, no masses palpated, no hepatosplenomegally     Musculoskeletal:   Left hip covered with dressings. No distal neurovascular deficit      Neurologic:   Awake, alert, oriented to name, place and time.  Cranial nerves II-XII are grossly intact.              Data:     Most recent labs have been evaluated and relevant labs are outlined below :    BMP    Recent Labs  Lab 05/22/18 2057      POTASSIUM 4.3   CHLORIDE 108   APOLONIA 8.6   CO2 29   BUN 9   CR 0.89   GLC 99     CBC    Recent Labs  Lab 05/27/18 0551 05/22/18 2057   WBC  --   --  9.2   RBC  --   --  3.90   HGB 10.5*  < > 11.3*   HCT  --   --  34.7*   MCV  --   --  89   MCH  --   --  29.0   MCHC  --   --  32.6   RDW  --   --  13.5   PLT  --   --  226   < > = values in this interval not displayed.  INR    Recent Labs  Lab 05/28/18  0637 05/27/18 0551 05/26/18 0638 05/22/18 2057   INR 1.50* 1.30* 1.05 0.97     LFTsNo lab results found in last 7 days.   PANCNo lab results found in last 7 days.      Dr FARA Colmenares MD  Hospitalist ( Internal medicine)  Pager: [phone]        "

## 2018-05-29 ENCOUNTER — TELEPHONE (OUTPATIENT)
Dept: ORTHOPEDICS | Facility: CLINIC | Age: 38
End: 2018-05-29

## 2018-05-29 NOTE — TELEPHONE ENCOUNTER
[facility] Call Center    Phone Message    May a detailed message be left on voicemail: yes    Reason for Call: Order(s): Home Care Orders: Skilled Nursing:  Two times a week for four weeks.  One time a week for five weeks.  3 PRN's.  What can patient take for pain in-between her Oxycodone Tylenol or Ibuprofen.    Action Taken: Message routed to:  [facility] ortho

## 2018-05-30 LAB
BACTERIA SPEC CULT: NO GROWTH
SPECIMEN SOURCE: NORMAL

## 2018-05-30 NOTE — TELEPHONE ENCOUNTER
[facility] Call Center    Phone Message    May a detailed message be left on voicemail: yes    Reason for Call: Other: Other: Shikha, pt's homecare nurse requesting call back to discuss pt taking Acetaminophen. Shikha stated that it is giving the pt stomach aches. Shikha is looking for another recommendation. Shikha can be reached at [phone]     Action Taken: Message routed to:  [facility] ortho clinic

## 2018-05-31 NOTE — TELEPHONE ENCOUNTER
[facility] Call Center    Phone Message    May a detailed message be left on voicemail: yes    Reason for Call: Other: Alethea is currently taking Ibuprofen.  Requesting Clarification- Pt should not be taking any medication in the NSAID category? Is this statement Correct or Incorrect?  Henny can be reached at [phone]     Action Taken: Message routed to:  [facility] ump ortho

## 2018-06-01 LAB
BACTERIA SPEC CULT: NORMAL
Lab: NORMAL
SPECIMEN SOURCE: NORMAL

## 2018-06-04 DIAGNOSIS — Z96.643 S/P BILATERAL REVISION OF TOTAL HIP REPLACEMENT: Primary | ICD-10-CM

## 2018-06-04 LAB — INR PPP: 1.4

## 2018-06-04 NOTE — TELEPHONE ENCOUNTER
[facility] Call Center  Phone Message  May a detailed message be left on voicemail: yes    Reason for Call: Order(s): Other:   Reason for requested: Pt takes Coumodin, and [facility] currently has not orders to manageer her INR.  Date needed: asap  Provider name: Dr. Saleem      Reason for Call: Symptoms or Concerns     If patient has red-flag symptoms, warm transfer to triage line    Current symptom or concern: Incision site is draining. Incision on the left hip.  Draing at the most superior site.  Has saturated through a dressing for the past 24 hours.    Symptoms have been present for: 24 hour(s)    Has patient previously been seen for this? Yes    By : Dr. Saleem    Date: last seen DOS 5.25.18    Are there any new or worsening symptoms? No      Action Taken: Message routed to:  [facility] ortho       6/4/18 -   Henny, from  home care, had called to ask for a Coumadin dosage for Alethea.  No referrals had been made with Medication Monitoring at the time of Gemini's discharge.  Her INR was 1.4 today.  I spoke with our resident, Ronald Nash, who recommended that Alethea at 5 mg. Of Coumadin tonight.  We will place a referral to the Coumadin Clinic to follow Alethea's INRs.     She was seen in the [place] ER yesterday with bloody drainage from her incision (she had changed the dressing 5 times yesterday).   The ER physician thought she had a hematoma that needed to drain.  Henny stated that the incision appeared to be intact with no signs of infection.  Home care returns on Thursday.  I will call Alethea on Wednesday to see how she is doing.  She may need to return to clinic on Thursday to see Dr. Saleem for a wound check.

## 2018-06-05 ENCOUNTER — ANTICOAGULATION THERAPY VISIT (OUTPATIENT)
Dept: ANTICOAGULATION | Facility: CLINIC | Age: 38
End: 2018-06-05

## 2018-06-05 DIAGNOSIS — Z79.01 LONG-TERM (CURRENT) USE OF ANTICOAGULANTS: ICD-10-CM

## 2018-06-05 DIAGNOSIS — Z96.642 AFTERCARE FOLLOWING LEFT HIP JOINT REPLACEMENT SURGERY: ICD-10-CM

## 2018-06-05 DIAGNOSIS — Z47.1 AFTERCARE FOLLOWING LEFT HIP JOINT REPLACEMENT SURGERY: ICD-10-CM

## 2018-06-05 NOTE — MR AVS SNAPSHOT
Alethea Patel   6/5/2018   Anticoagulation Therapy Visit    Description:  37 year old female   Provider:  Phyllis Bustamante, RN   Department:  [facility]           INR as of 6/5/2018     Today's INR 1.40! (6/4/2018)      Anticoagulation Summary as of 6/5/2018     INR goal 2.0-2.5   Today's INR 1.40! (6/4/2018)   Full warfarin instructions 6/5: 7.5 mg; 6/6: 7.5 mg; Otherwise No maintenance plan   Next INR check 6/7/2018    Indications   Long-term (current) use of anticoagulants [Z79.01] [Z79.01]  Aftercare following joint replacement [Z47.1] [Z47.1]         June 2018 Details    Sun Mon Tue Wed Thu Fri Sat          1               2                 3               4               5      7.5 mg   See details      6      7.5 mg         7            8               9                 10               11               12               13               14               15               16                 17               18               19               20               21               22               23                 24               25               26               27               28               29               30                Date Details   06/05 This INR check       Date of next INR:  6/7/2018         How to take your warfarin dose     To take:  7.5 mg Take 1.5 of the 5 mg tablets.

## 2018-06-05 NOTE — PROGRESS NOTES
Message left for Henny [facility] Nurse to call us back. Received a vm from Dariana in Ortho about pt being referred to us. By the looks of D/C orders pt has been taking 5mg of Warfarin daily. INR 1.40 was called into triage and resident gave orders for pt to take a 5mg dose last night. Orders put in for Dr. Saleem to sign. Will await phone call from Henny to give more orders for pt.

## 2018-06-07 ENCOUNTER — ANTICOAGULATION THERAPY VISIT (OUTPATIENT)
Dept: ANTICOAGULATION | Facility: CLINIC | Age: 38
End: 2018-06-07

## 2018-06-07 DIAGNOSIS — Z96.642 AFTERCARE FOLLOWING LEFT HIP JOINT REPLACEMENT SURGERY: ICD-10-CM

## 2018-06-07 DIAGNOSIS — Z79.01 LONG-TERM (CURRENT) USE OF ANTICOAGULANTS: ICD-10-CM

## 2018-06-07 DIAGNOSIS — Z47.1 AFTERCARE FOLLOWING LEFT HIP JOINT REPLACEMENT SURGERY: ICD-10-CM

## 2018-06-07 LAB — INR PPP: 2.7

## 2018-06-07 NOTE — MR AVS SNAPSHOT
Alethea Patel   6/7/2018   Anticoagulation Therapy Visit    Description:  37 year old female   Provider:  Phyllis Bustamante, RN   Department:  [facility]           INR as of 6/7/2018     Today's INR 2.70!      Anticoagulation Summary as of 6/7/2018     INR goal 2.0-2.5   Today's INR 2.70!   Full warfarin instructions 6/7: 5 mg; 6/8: 7.5 mg; 6/9: 5 mg; 6/10: 5 mg; Otherwise No maintenance plan   Next INR check 6/11/2018    Indications   Long-term (current) use of anticoagulants [Z79.01] [Z79.01]  Aftercare following joint replacement [Z47.1] [Z47.1]         June 2018 Details    Sun Mon Tue Wed Thu Fri Sat          1               2                 3               4               5               6               7      5 mg   See details      8      7.5 mg         9      5 mg           10      5 mg         11            12               13               14               15               16                 17               18               19               20               21               22               23                 24               25               26               27               28               29               30                Date Details   06/07 This INR check       Date of next INR:  6/11/2018         How to take your warfarin dose     To take:  5 mg Take 1 of the 5 mg tablets.    To take:  7.5 mg Take 1.5 of the 5 mg tablets.

## 2018-06-07 NOTE — PROGRESS NOTES
ANTICOAGULATION FOLLOW-UP CLINIC VISIT    Patient Name:  Alethea Patel  Date:  6/7/2018  Contact Type:  Telephone    SUBJECTIVE:     Patient Findings     Positives No Problem Findings           OBJECTIVE    INR   Date Value Ref Range Status   06/07/2018 2.70  Final     Comment:     Home Care        ASSESSMENT / PLAN  INR assessment THER    Recheck INR In: 4 DAYS    INR Location Homecare INR      Anticoagulation Summary as of 6/7/2018     INR goal 2.0-2.5   Today's INR 2.70!   Warfarin maintenance plan No maintenance plan   Full warfarin instructions 6/7: 5 mg; 6/8: 7.5 mg; 6/9: 5 mg; 6/10: 5 mg; Otherwise No maintenance plan   Next INR check 6/11/2018   Priority INR   Target end date 6/22/2018    Indications   Long-term (current) use of anticoagulants [Z79.01] [Z79.01]  Aftercare following joint replacement [Z47.1] [Z47.1]         Anticoagulation Episode Summary     INR check location     Preferred lab     Send INR reminders to [facility]    Comments Left Total Hip Replacement Start 5/25 End 6/22      Anticoagulation Care Providers     Provider Role Specialty Phone number    Cheko Saleem MD Responsible Orthopedics [phone]            See the Encounter Report to view Anticoagulation Flowsheet and Dosing Calendar (Go to Encounters tab in chart review, and find the Anticoagulation Therapy Visit)    Spoke with DAYRON Banuelos. Gave them new warfarin recommendation.  No changes in health, medication, or diet. No missed doses, no falls. No signs or symptoms of bleed or clotting.     Phyllis Bustamante RN

## 2018-06-08 LAB
BACTERIA SPEC CULT: NORMAL
Lab: NORMAL
SPECIMEN SOURCE: NORMAL

## 2018-06-11 ENCOUNTER — ANTICOAGULATION THERAPY VISIT (OUTPATIENT)
Dept: ANTICOAGULATION | Facility: CLINIC | Age: 38
End: 2018-06-11

## 2018-06-11 DIAGNOSIS — Z47.1 AFTERCARE FOLLOWING LEFT HIP JOINT REPLACEMENT SURGERY: ICD-10-CM

## 2018-06-11 DIAGNOSIS — Z79.01 LONG-TERM (CURRENT) USE OF ANTICOAGULANTS: ICD-10-CM

## 2018-06-11 DIAGNOSIS — Z96.642 AFTERCARE FOLLOWING LEFT HIP JOINT REPLACEMENT SURGERY: ICD-10-CM

## 2018-06-11 LAB — INR POINT OF CARE: 3 (ref 0.86–1.14)

## 2018-06-11 NOTE — PROGRESS NOTES
ANTICOAGULATION FOLLOW-UP CLINIC VISIT    Patient Name:  Alethea Patel  Date:  6/11/2018  Contact Type:  Telephone    SUBJECTIVE:     Patient Findings     Positives Med error    Comments Pt took exact opposite of what we recommended.  Dosing plan updated   Next INR to be done when pt returns to the  for MD appointment.  Home care nurse Marge to be called on 6/14 with the INR result & dosing plan & date for next INR to be done           OBJECTIVE    INR Protime   Date Value Ref Range Status   06/11/2018 3.0 (A) 0.86 - 1.14 Final       ASSESSMENT / PLAN  INR assessment SUPRA    Recheck INR In: 3 DAYS    INR Location Homecare INR      Anticoagulation Summary as of 6/11/2018     INR goal 2.0-2.5   Today's INR 3.0!   Warfarin maintenance plan No maintenance plan   Full warfarin instructions 6/11: 5 mg; 6/12: 5 mg; 6/13: 5 mg; Otherwise No maintenance plan   Next INR check 6/14/2018   Priority INR   Target end date 6/22/2018    Indications   Long-term (current) use of anticoagulants [Z79.01] [Z79.01]  Aftercare following joint replacement [Z47.1] [Z47.1]         Anticoagulation Episode Summary     INR check location     Preferred lab     Send INR reminders to  [facility]    Comments Left Total Hip Replacement Start 5/25 End 6/22      Anticoagulation Care Providers     Provider Role Specialty Phone number    Cheko Saleem MD Responsible Orthopedics [phone]            See the Encounter Report to view Anticoagulation Flowsheet and Dosing Calendar (Go to Encounters tab in chart review, and find the Anticoagulation Therapy Visit)    Spoke with patient's home care nurse Marge. Gave them their new warfarin recommendation.  No changes in health, medication, or diet. No missed doses, no falls. No signs or symptoms of bleed or clotting.  See above notation       Cari Mai RN

## 2018-06-11 NOTE — MR AVS SNAPSHOT
Alethea Patel   6/11/2018   Anticoagulation Therapy Visit    Description:  37 year old female   Provider:  Cari Mai, RN   Department:  [facility]           INR as of 6/11/2018     Today's INR 3.0!      Anticoagulation Summary as of 6/11/2018     INR goal 2.0-2.5   Today's INR 3.0!   Full warfarin instructions 6/11: 5 mg; 6/12: 5 mg; 6/13: 5 mg; Otherwise No maintenance plan   Next INR check 6/14/2018    Indications   Long-term (current) use of anticoagulants [Z79.01] [Z79.01]  Aftercare following joint replacement [Z47.1] [Z47.1]         June 2018 Details    Sun Mon Tue Wed Thu Fri Sat          1               2                 3               4               5               6               7               8               9                 10               11      5 mg   See details      12      5 mg         13      5 mg         14            15               16                 17               18               19               20               21               22               23                 24               25               26               27               28               29               30                Date Details   06/11 This INR check       Date of next INR:  6/14/2018         How to take your warfarin dose     To take:  5 mg Take 1 of the 5 mg tablets.

## 2018-06-14 ENCOUNTER — RADIANT APPOINTMENT (OUTPATIENT)
Dept: GENERAL RADIOLOGY | Facility: CLINIC | Age: 38
End: 2018-06-14
Attending: ORTHOPAEDIC SURGERY
Payer: MEDICARE

## 2018-06-14 ENCOUNTER — OFFICE VISIT (OUTPATIENT)
Dept: ORTHOPEDICS | Facility: CLINIC | Age: 38
End: 2018-06-14
Payer: MEDICARE

## 2018-06-14 VITALS — BODY MASS INDEX: 24.72 KG/M2 | HEIGHT: 69 IN | WEIGHT: 166.9 LBS

## 2018-06-14 DIAGNOSIS — Z96.649 S/P REVISION OF TOTAL HIP: Primary | ICD-10-CM

## 2018-06-14 DIAGNOSIS — Z98.890 STATUS POST HIP SURGERY: ICD-10-CM

## 2018-06-14 NOTE — LETTER
6/14/2018       RE: Alethea Patel  [address]  [place] [address]     Dear Colleague,    Thank you for referring your patient, Alethea Patel, to the [facility] at [facility]. Please see a copy of my visit note below.        Orthopaedic Oncology and Adult Reconstructive (joint replacement) Surgery   Clinic visit -  Cheko Saleem M.D.    DIAGNOSIS:   1. Multi-focal osteonecrosis.  2. Lumbar disc herniation  3. Acute Lymphoblastic Leukemia, chemotherapy [id number]   4. Chronic nicotine abuse    SURGERIES:  1. 2005, L femoral head resurfacing hemiarthroplasty  2. 6/5/2007, R HAJA  3. 2/2/2010, right total knee arthroplasty. No patella resurfacing  4. 6/12/2012, R patella resurfacing  5. 1/8/2016, L TKA (Stiven) [facility]  6. 4/3/2018, Revision L surface replacement bartolo to HAJA (Stiven) [facility]  7. 4/19/18 , 5/18/2018, 5/23/2018  Closed reductions L HAJA  8. 5/25/2018, Revision L HAJA to larger head size and cup repositioning (Stiven) [facility]. Cultures (-) x 3    Postoperative hip replacement follow-up clinic visit    Alethea Patel is doing well after last surgery listed above.    Preoperative hip pain is   mostly resolved    Analgesic medication: Oxycodone    EXAM:  Incision healing, clean and dry.  Pain free hip motion.  Range of motion limits not tested given early post-op status.  Leg/ankle edema:  none  Femoral, peroneal and tibial nerve function: normal  Gait:Antalgic  Level pelvis when standing.    Intraoperative cultures: all negative     IMAGING:  Radiographs demonstrate the hip implant in satisfactory position without evidence of any complication.        IMPRESSION:  Excellent outcome, to date, after hip replacement.  No complications evident.     PLAN:    Continue range of motion exercises and stretching.    Hip abductor and quadriceps strengthening exercises.    Weight bearing status: as tolerated    Discontinue DVT prophylaxis meds (i.e.,  warfarin or ASA) unless required for another indication.    Refill of: oxycodone per pain clinic.  Dose should be reduced as possible.    Patient given instructions re: prophylactic antibiotic recommendations during dental work.    Next follow-up visit at 1 year after surgery or sooner as needed.       Cheko Saleem M.D.  [employer] Professor  Oncology and Adult Reconstructive Surgery  Dept Orthopaedic Surgery, [facility]  [phone] office  [email or web address]    HOOS Hip Dysfunction & Osteoarthritis Outcome Questionnaire    Hip Dysfunction & Osteoarthritis Outcome Score (HOOS), English Version LK 2.0 (Hayder Jasmine, Tameka VIEYRA, 2003) 4/19/2018   S1. Do you feel grinding, hear clicking or any other type of noise from your hip? Sometimes   S2. Difficulties spreading legs wide apart None   S3. Difficulties to stride out when walking Severe   S4. How severe is your hip joint stiffness after first wakening in the morning? Severe   S5. How severe is your hip stiffness after sitting, lying or resting LATER IN THE DAY? Severe   Symptom Count 5   Symptom Sum 11   Symptom Mean 2.2   Symptom Subscale Score 45   P1. How often is your hip painful? Always   P2. Straightening your hip fully Extreme   P3. Bending your hip FULLY Extreme   P4. Walking on a flat surface Extreme   P5. Going up or down stairs Severe   P6. At night while in bed Severe   P7. Sitting or lying Severe   P8. Standing upright Extreme   P9. Walking on a hard surface (asphalt, concrete, etc.) Severe   P10. Walking on an uneven surface Extreme   Pain Count 10   Pain Sum 36   Pain Mean 3.6   Pain Subscale Score 10   A1. Descending stairs Extreme   A2. Ascending stairs Extreme   A3. Rising from sitting Extreme   A4. Standing Extreme   A5. Bending to the floor/ an object Extreme   A6. Walking on a flat surface Extreme   A7. Getting in/out of car Extreme   A8. Going shopping Extreme   A9. Putting on socks/stockings Extreme   A10. Rising  from bed Extreme   A11. Taking off socks/stockings Extreme   A12. Lying in bed (turning over, maintaining hip position) Extreme   A13. Getting in/out of bed Extreme   A14. Sitting Extreme   A15. Getting on/off toilet Extreme   A16. Heavy domestic duties (moving heavy boxes, scrubbing floors, etc.) Extreme   A17. Light domestic duties (cooking, dusting, etc.) Extreme   ADL Count 17   ADL Sum 68   ADL Mean 4   ADL Subscale Score 0   SP1. Squatting Extreme   SP2. Running Extreme   SP3. Twisting/pivoting on loaded leg Extreme   SP4. Walking on uneven surface Extreme   Sports/Rec Count 4   Sports/Rec Sum 16   Sports/Rec Mean 4   Sports/Rec Subscale Score 0   Q1. How often are you aware of your hip problem? Constantly   Q2. Have you modified you life style to avoid activities potentially damaging to your hip? Totally   Q3. How much are you troubled with lack of confidence in your hip? Extremely   Q4. In general, how much difficulty do you have with your hip? Extreme   QOL Count 4   QOL Sum 16   QOL Mean 4   Quality of Life Subscale Score 0                        Again, thank you for allowing me to participate in the care of your patient.      Sincerely,    Cheko Saleem MD

## 2018-06-14 NOTE — MR AVS SNAPSHOT
"              After Visit Summary   6/14/2018    Alethea Patel    MRN: [id number]           Patient Information     Date Of Birth          1980        Visit Information        Provider Department      6/14/2018 9:00 AM Cheko Saleem MD [facility]        Today's Diagnoses     S/P revision of total hip    -  1       Follow-ups after your visit        Who to contact     Please call your clinic at [phone] to:    Ask questions about your health    Make or cancel appointments    Discuss your medicines    Learn about your test results    Speak to your doctor            Additional Information About Your Visit        MyChart Information     [employer] gives you secure access to your electronic health record. If you see a primary care provider, you can also send messages to your care team and make appointments. If you have questions, please call your primary care clinic.  If you do not have a primary care provider, please call [phone] and they will assist you.      [employer] is an electronic gateway that provides easy, online access to your medical records. With [employer], you can request a clinic appointment, read your test results, renew a prescription or communicate with your care team.     To access your existing account, please contact your [facility] or call [phone] for assistance.        Care EveryWhere ID     This is your Care EveryWhere ID. This could be used by other organizations to access your [facility] medical records  [id number]        Your Vitals Were     Height BMI (Body Mass Index)                1.753 m (5' 9\") 24.65 kg/m2           Blood Pressure from Last 3 Encounters:   05/28/18 105/64   05/23/18 91/51   05/18/18 128/80    Weight from Last 3 Encounters:   06/14/18 75.7 kg (166 lb 14.4 oz)   05/25/18 72.8 kg (160 lb 7.9 oz)   05/22/18 82.6 kg (182 lb)              Today, you had the following     No orders found for display       Primary " Care Provider Office Phone # Fax #    [facility] [phone] [address] [facility] [address]        Equal Access to Services     EMILIANO MARTINEZ : Hadii dayron ku hadnoyo Soshantalali, waaxda luqadaha, qaybta kaalmada adeisiahda, anabella bai laTomythony silva. So [facility] [phone].    ATENCIÓN: Si habla español, tiene a parisi disposición servicios gratuitos de asistencia lingüística. Llame al [phone].    We comply with applicable federal civil rights laws and [place] laws. We do not discriminate on the basis of race, color, national origin, age, disability, sex, sexual orientation, or gender identity.            Thank you!     Thank you for choosing [facility]  for your care. Our goal is always to provide you with excellent care. Hearing back from our patients is one way we can continue to improve our services. Please take a few minutes to complete the written survey that you may receive in the mail after your visit with us. Thank you!             Your Updated Medication List - Protect others around you: Learn how to safely use, store and throw away your medicines at www.disposemymeds.org.          This list is accurate as of 6/14/18 10:25 AM.  Always use your most recent med list.                   Brand Name Dispense Instructions for use Diagnosis    albuterol 108 (90 BASE) MCG/ACT Inhaler    PROAIR HFA/PROVENTIL HFA/VENTOLIN HFA     Inhale 2 puffs into the lungs every 6 hours as needed for shortness of breath / dyspnea or wheezing        B COMPLEX PO      Take 1 tablet by mouth daily Dose unknown; OTC        BACLOFEN PO      Take 20 mg by mouth 4 times daily        COMPAZINE PO      Take 10 mg by mouth every 6 hours as needed.        DULoxetine 60 MG EC capsule    CYMBALTA     Take 60 mg by mouth 2 times daily        fluticasone 50 MCG/ACT spray    FLONASE     Spray 2 sprays into both nostrils daily as needed for rhinitis or allergies         IBUPROFEN PO      Take 200 mg by mouth every 6 hours as needed for moderate pain        levothyroxine 75 MCG tablet    SYNTHROID/LEVOTHROID     Take 75 mcg by mouth daily        LEXAPRO PO      Take 20 mg by mouth daily        LORazepam 1 MG tablet    ATIVAN     Take 1-2 mg by mouth 3 times daily Max 5 tablets daily        MAGNESIUM OXIDE PO      Take 200 mg by mouth daily        MELATONIN PO      Take 3 mg by mouth At Bedtime        METHADONE HCL PO      Take 40 mg by mouth every 8 hours        montelukast 10 MG tablet    SINGULAIR     Take 10 mg by mouth At Bedtime        MULTIVITAMIN ADULT PO      Take 1 tablet by mouth daily        NEURONTIN 300 MG capsule   Generic drug:  gabapentin      Take 900 mg by mouth 3 times daily        order for DME     1 Device    [facility] [phone]   Directions: Advance as Tolerated and Instructed by MD    Status post total right knee replacement       order for DME     1 each    Equipment being ordered: Bath Seat () Treatment Diagnosis: Limited independence in ADLs/mobility due to hip precautions and recent hip surgery    Status post hip surgery       POTASSIUM PO      Take 1 tablet by mouth daily Dose unknown; OTC        priLOSEC OTC 20 MG tablet   Generic drug:  omeprazole      Take 20 mg by mouth daily        QUEtiapine 25 MG tablet    SEROquel     Take 25 mg by mouth At Bedtime        ranitidine 150 MG tablet    ZANTAC     Take 150 mg by mouth At Bedtime        ROXICODONE 30 MG tablet   Generic drug:  oxyCODONE IR      Take 120 mg by mouth every 4 hours as needed for moderate to severe pain        senna-docusate 8.6-50 MG per tablet    SENOKOT-S;PERICOLACE    30 tablet    Take 2 tablets by mouth 2 times daily    Status post total left knee replacement       TIZANIDINE HCL PO      Take 2 mg by mouth every 6 hours as needed for muscle spasms        warfarin 5 MG tablet    COUMADIN    30 tablet    Take 1 tablet (5 mg) by mouth daily    Status post hip surgery        WELLBUTRIN SR PO      Take 150 mg by mouth 2 times daily

## 2018-06-14 NOTE — PROGRESS NOTES
Orthopaedic Oncology and Adult Reconstructive (joint replacement) Surgery   Clinic visit -  Cheko Saleem M.D.    DIAGNOSIS:   1. Multi-focal osteonecrosis.  2. Lumbar disc herniation  3. Acute Lymphoblastic Leukemia, chemotherapy [id number]   4. Chronic nicotine abuse    SURGERIES:  1. 2005, L femoral head resurfacing hemiarthroplasty  2. 6/5/2007, R HAJA  3. 2/2/2010, right total knee arthroplasty. No patella resurfacing  4. 6/12/2012, R patella resurfacing  5. 1/8/2016, L TKA (Stiven) [facility]  6. 4/3/2018, Revision L surface replacement bartolo to HAJA (Stiven) [facility]  7. 4/19/18 , 5/18/2018, 5/23/2018  Closed reductions L HAJA  8. 5/25/2018, Revision L HAJA to larger head size and cup repositioning (Stiven) [facility]. Cultures (-) x 3    Postoperative hip replacement follow-up clinic visit    Alethea Patel is doing well after last surgery listed above.    Preoperative hip pain is   mostly resolved    Analgesic medication: Oxycodone    EXAM:  Incision healing, clean and dry.  Pain free hip motion.  Range of motion limits not tested given early post-op status.  Leg/ankle edema:  none  Femoral, peroneal and tibial nerve function: normal  Gait:Antalgic  Level pelvis when standing.    Intraoperative cultures: all negative     IMAGING:  Radiographs demonstrate the hip implant in satisfactory position without evidence of any complication.        IMPRESSION:  Excellent outcome, to date, after hip replacement.  No complications evident.     PLAN:    Continue range of motion exercises and stretching.    Hip abductor and quadriceps strengthening exercises.    Weight bearing status: as tolerated    Discontinue DVT prophylaxis meds (i.e., warfarin or ASA) unless required for another indication.    Refill of: oxycodone per pain clinic.  Dose should be reduced as possible.    Patient given instructions re: prophylactic antibiotic recommendations during dental work.    Next follow-up visit at 1 year after surgery or sooner as needed.        Cheko Saleem M.D.  [employer]  Oncology and Adult Reconstructive Surgery  Dept Orthopaedic Surgery, [facility]  [phone] office  [email or web address]    HOOS Hip Dysfunction & Osteoarthritis Outcome Questionnaire    Hip Dysfunction & Osteoarthritis Outcome Score (HOOS), English Version LK 2.0 (Hayder Jasmine Mannevik E., 2003) 4/19/2018   S1. Do you feel grinding, hear clicking or any other type of noise from your hip? Sometimes   S2. Difficulties spreading legs wide apart None   S3. Difficulties to stride out when walking Severe   S4. How severe is your hip joint stiffness after first wakening in the morning? Severe   S5. How severe is your hip stiffness after sitting, lying or resting LATER IN THE DAY? Severe   Symptom Count 5   Symptom Sum 11   Symptom Mean 2.2   Symptom Subscale Score 45   P1. How often is your hip painful? Always   P2. Straightening your hip fully Extreme   P3. Bending your hip FULLY Extreme   P4. Walking on a flat surface Extreme   P5. Going up or down stairs Severe   P6. At night while in bed Severe   P7. Sitting or lying Severe   P8. Standing upright Extreme   P9. Walking on a hard surface (asphalt, concrete, etc.) Severe   P10. Walking on an uneven surface Extreme   Pain Count 10   Pain Sum 36   Pain Mean 3.6   Pain Subscale Score 10   A1. Descending stairs Extreme   A2. Ascending stairs Extreme   A3. Rising from sitting Extreme   A4. Standing Extreme   A5. Bending to the floor/ an object Extreme   A6. Walking on a flat surface Extreme   A7. Getting in/out of car Extreme   A8. Going shopping Extreme   A9. Putting on socks/stockings Extreme   A10. Rising from bed Extreme   A11. Taking off socks/stockings Extreme   A12. Lying in bed (turning over, maintaining hip position) Extreme   A13. Getting in/out of bed Extreme   A14. Sitting Extreme   A15. Getting on/off toilet Extreme   A16. Heavy domestic duties (moving heavy boxes, scrubbing floors, etc.)  Extreme   A17. Light domestic duties (cooking, dusting, etc.) Extreme   ADL Count 17   ADL Sum 68   ADL Mean 4   ADL Subscale Score 0   SP1. Squatting Extreme   SP2. Running Extreme   SP3. Twisting/pivoting on loaded leg Extreme   SP4. Walking on uneven surface Extreme   Sports/Rec Count 4   Sports/Rec Sum 16   Sports/Rec Mean 4   Sports/Rec Subscale Score 0   Q1. How often are you aware of your hip problem? Constantly   Q2. Have you modified you life style to avoid activities potentially damaging to your hip? Totally   Q3. How much are you troubled with lack of confidence in your hip? Extremely   Q4. In general, how much difficulty do you have with your hip? Extreme   QOL Count 4   QOL Sum 16   QOL Mean 4   Quality of Life Subscale Score 0

## 2018-06-14 NOTE — NURSING NOTE
"Chief Complaint   Patient presents with     Surgical Followup     Revision Left Total Hip Arthroplasty DOS: 5/25/2018       37 year old  1980    Ht 1.753 m (5' 9\")  Wt 75.7 kg (166 lb 14.4 oz)  BMI 24.65 kg/m2        [facility] [address] [place], [place] - [facility] [address]    Allergies   Allergen Reactions     Chantix [Varenicline] Nausea and Vomiting     Other reaction(s): Vomiting     No Clinical Screening - See Comments      Other reaction(s): Unknown Reaction     Seasonal Allergies      Ciprofloxacin Other (See Comments)     Interacts with other medication  Other reaction(s): Dizziness  Other reaction(s): Other  Interacts with other medication     Latex Itching and Rash     Other reaction(s): Other - Describe In Comment Field  Vaginal itching, burning  Other reaction(s): Intolerance     Current Outpatient Prescriptions   Medication     albuterol (PROAIR HFA/PROVENTIL HFA/VENTOLIN HFA) 108 (90 BASE) MCG/ACT Inhaler     B Complex Vitamins (B COMPLEX PO)     BACLOFEN PO     BuPROPion HCl (WELLBUTRIN SR PO)     DULoxetine (CYMBALTA) 60 MG EC capsule     Escitalopram Oxalate (LEXAPRO PO)     fluticasone (FLONASE) 50 MCG/ACT nasal spray     gabapentin (NEURONTIN) 300 MG capsule     IBUPROFEN PO     levothyroxine (SYNTHROID, LEVOTHROID) 75 MCG tablet     LORazepam (ATIVAN) 1 MG tablet     MAGNESIUM OXIDE PO     METHADONE HCL PO     montelukast (SINGULAIR) 10 MG tablet     Multiple Vitamins-Minerals (MULTIVITAMIN ADULT PO)     Nutritional Supplements (MELATONIN PO)     omeprazole (PRILOSEC OTC) 20 MG tablet     order for DME     order for DME     oxyCODONE IR (ROXICODONE) 30 MG tablet     POTASSIUM PO     Prochlorperazine Maleate (COMPAZINE PO)     QUEtiapine (SEROQUEL) 25 MG tablet     ranitidine (ZANTAC) 150 MG tablet     senna-docusate (SENOKOT-S;PERICOLACE) 8.6-50 MG per tablet     TIZANIDINE HCL PO     warfarin (COUMADIN) 5 MG tablet     No current " facility-administered medications for this visit.          Questionnaires:  HOOS Hip Dysfunction & Osteoarthritis Outcome Questionnaire    Hip Dysfunction & Osteoarthritis Outcome Score (HOOS), English Version LK 2.0 (Tono PAGE, Hayder DINH, Tameka VIEYRA, 2003) 4/19/2018   S1. Do you feel grinding, hear clicking or any other type of noise from your hip? Sometimes   S2. Difficulties spreading legs wide apart None   S3. Difficulties to stride out when walking Severe   S4. How severe is your hip joint stiffness after first wakening in the morning? Severe   S5. How severe is your hip stiffness after sitting, lying or resting LATER IN THE DAY? Severe   Symptom Count 5   Symptom Sum 11   Symptom Mean 2.2   Symptom Subscale Score 45   P1. How often is your hip painful? Always   P2. Straightening your hip fully Extreme   P3. Bending your hip FULLY Extreme   P4. Walking on a flat surface Extreme   P5. Going up or down stairs Severe   P6. At night while in bed Severe   P7. Sitting or lying Severe   P8. Standing upright Extreme   P9. Walking on a hard surface (asphalt, concrete, etc.) Severe   P10. Walking on an uneven surface Extreme   Pain Count 10   Pain Sum 36   Pain Mean 3.6   Pain Subscale Score 10   A1. Descending stairs Extreme   A2. Ascending stairs Extreme   A3. Rising from sitting Extreme   A4. Standing Extreme   A5. Bending to the floor/ an object Extreme   A6. Walking on a flat surface Extreme   A7. Getting in/out of car Extreme   A8. Going shopping Extreme   A9. Putting on socks/stockings Extreme   A10. Rising from bed Extreme   A11. Taking off socks/stockings Extreme   A12. Lying in bed (turning over, maintaining hip position) Extreme   A13. Getting in/out of bed Extreme   A14. Sitting Extreme   A15. Getting on/off toilet Extreme   A16. Heavy domestic duties (moving heavy boxes, scrubbing floors, etc.) Extreme   A17. Light domestic duties (cooking, dusting, etc.) Extreme   ADL Count 17   ADL Sum 68   ADL  Mean 4   ADL Subscale Score 0   SP1. Squatting Extreme   SP2. Running Extreme   SP3. Twisting/pivoting on loaded leg Extreme   SP4. Walking on uneven surface Extreme   Sports/Rec Count 4   Sports/Rec Sum 16   Sports/Rec Mean 4   Sports/Rec Subscale Score 0   Q1. How often are you aware of your hip problem? Constantly   Q2. Have you modified you life style to avoid activities potentially damaging to your hip? Totally   Q3. How much are you troubled with lack of confidence in your hip? Extremely   Q4. In general, how much difficulty do you have with your hip? Extreme   QOL Count 4   QOL Sum 16   QOL Mean 4   Quality of Life Subscale Score 0                Promis 10 Assessment    PROMIS 10 4/19/2018   In general, would you say your health is: Poor   In general, would you say your quality of life is: Poor   In general, how would you rate your physical health? Poor   In general, how would you rate your mental health, including your mood and your ability to think? Poor   In general, how would you rate your satisfaction with your social activities and relationships? Poor   In general, please rate how well you carry out your usual social activities and roles Poor   To what extent are you able to carry out your everyday physical activities such as walking, climbing stairs, carrying groceries, or moving a chair? Not at all   How often have you been bothered by emotional problems such as feeling anxious, depressed or irritable? Always   How would you rate your fatigue on average? Severe   How would you rate your pain on average?   0 = No Pain  to  10 = Worst Imaginable Pain 10   In general, would you say your health is: 1   In general, would you say your quality of life is: 1   In general, how would you rate your physical health? 1   In general, how would you rate your mental health, including your mood and your ability to think? 1   In general, how would you rate your satisfaction with your social activities and  relationships? 1   In general, please rate how well you carry out your usual social activities and roles. (This includes activities at home, at work and in your community, and responsibilities as a parent, child, spouse, employee, friend, etc.) 1   To what extent are you able to carry out your everyday physical activities such as walking, climbing stairs, carrying groceries, or moving a chair? 1   In the past 7 days, how often have you been bothered by emotional problems such as feeling anxious, depressed, or irritable? 5   In the past 7 days, how would you rate your fatigue on average? 4   In the past 7 days, how would you rate your pain on average, where 0 means no pain, and 10 means worst imaginable pain? 10   Global Mental Health Score 4   Global Physical Health Score 5   PROMIS TOTAL - SUBSCORES 9   Some recent data might be hidden

## 2018-06-18 ENCOUNTER — ANTICOAGULATION THERAPY VISIT (OUTPATIENT)
Dept: ANTICOAGULATION | Facility: CLINIC | Age: 38
End: 2018-06-18

## 2018-06-18 DIAGNOSIS — Z47.1 AFTERCARE FOLLOWING LEFT HIP JOINT REPLACEMENT SURGERY: ICD-10-CM

## 2018-06-18 DIAGNOSIS — Z79.01 LONG-TERM (CURRENT) USE OF ANTICOAGULANTS: ICD-10-CM

## 2018-06-18 DIAGNOSIS — Z96.642 AFTERCARE FOLLOWING LEFT HIP JOINT REPLACEMENT SURGERY: ICD-10-CM

## 2018-06-18 NOTE — MR AVS SNAPSHOT
Alethea Patel   6/18/2018   Anticoagulation Therapy Visit    Description:  37 year old female   Provider:  Kai Oliver RN   [facility]           INR as of 6/18/2018     Today's INR No new INR was available at the time of this encounter.      Anticoagulation Summary as of 6/18/2018     INR goal 2.0-2.5   Today's INR No new INR was available at the time of this encounter.   Full warfarin instructions No maintenance plan   Next INR check     Indications   Long-term (current) use of anticoagulants [Z79.01] [Z79.01]  Aftercare following joint replacement [Z47.1] [Z47.1]         Anticoagulation Episode Summary     Resolved date 6/18/2018    Resolved reason Therapy  Complete

## 2018-06-18 NOTE — PROGRESS NOTES
6/18/18   Spoke to HEBERT Inman.  Patient end date has arrived.  The following plan on 6/14 states patient no longer required to take Coumadin.  Resolved episode.  Inactivated patient from [facility] Anticoagulation monitoring service.  Kai Oliver RN    PLAN:    Continue range of motion exercises and stretching.    Hip abductor and quadriceps strengthening exercises.    Weight bearing status: as tolerated    Discontinue DVT prophylaxis meds (i.e., warfarin or ASA) unless required for another indication.    Refill of: oxycodone per pain clinic.  Dose should be reduced as possible.    Patient given instructions re: prophylactic antibiotic recommendations during dental work.    Next follow-up visit at 1 year after surgery or sooner as needed.         Cheko Saleem M.D.  [employer] Professor  Oncology and Adult Reconstructive Surgery  Dept Orthopaedic Surgery, [facility]  [phone] office

## 2018-06-19 NOTE — DISCHARGE SUMMARY
ORTHOPAEDIC DISCHARGE SUMMARY     Date of Admission: 5/22/2018  Date of Discharge: 5/23/2018  9:04 PM  Disposition: Home  Staff Physician: No att. providers found  Primary Care Provider: Jeevan Boyce [facility]    DISCHARGE DIAGNOSIS:  Dislocated Left Hip Joint    PROCEDURES: Procedure(s):  CLOSED REDUCTION HIP on 5/22/2018 - 5/23/2018    BRIEF HISTORY:  h of multiple discloations     HOSPITAL COURSE:    Surgery was uncomplicated. Alethea Patel has done well post-operatively. Medicine was consulted post operatively to aid in management of medical comorbidities. See final recommendations below. The patient received routine nursing cares and is medically stable. Vital signs are stable. The patient is tolerating a regular diet without GI distress/nausea or vomiting. Voiding spontaneously. All PT/OT goals have been met for safe mobility. Pain is now controlled on oral medications which will be available on discharge. Stool softeners have been used while taking pain medications to help prevent constipation. Alethea Patel is deemed medically safe to discharge.     Antibiotics:   given periop and 24 hours postop.   DVT prophylaxis:  mechanical daily  PT Progress: Has met PT/OT goals for safe mobility.    Pain Meds:  Weaned off all IV pain meds by discharge.  Inpatient Events: No significant events or complications.     Discharge orders and instructions as below.    FOLLOWUP:    Follow up with Dr. molina at 2 weeks postoperatively.  No future appointments.    Appointments on [facility]. Call [phone] if you haven't heard regarding these appointments within 7 days of discharge.    PLANNED DISCHARGE ORDERS:           Discharge Medication List as of 5/23/2018  9:17 PM      CONTINUE these medications which have NOT CHANGED    Details   albuterol (PROAIR HFA/PROVENTIL HFA/VENTOLIN HFA) 108 (90 BASE) MCG/ACT Inhaler Inhale 2 puffs into the lungs every 6 hours as needed for  shortness of breath / dyspnea or wheezing, Historical      B Complex Vitamins (B COMPLEX PO) Take 1 tablet by mouth daily Dose unknown; OTC, Historical      BACLOFEN PO Take 20 mg by mouth 4 times daily , Historical      BuPROPion HCl (WELLBUTRIN SR PO) Take 150 mg by mouth 2 times daily, Historical      DULoxetine (CYMBALTA) 60 MG EC capsule Take 60 mg by mouth 2 times daily , Historical      Escitalopram Oxalate (LEXAPRO PO) Take 20 mg by mouth daily, Historical      fluticasone (FLONASE) 50 MCG/ACT nasal spray Spray 2 sprays into both nostrils daily as needed for rhinitis or allergies, Historical      gabapentin (NEURONTIN) 300 MG capsule Take 900 mg by mouth 3 times daily , Historical      levothyroxine (SYNTHROID, LEVOTHROID) 75 MCG tablet Take 75 mcg by mouth daily, Historical      LORazepam (ATIVAN) 1 MG tablet Take 1-2 mg by mouth 3 times daily Max 5 tablets daily, Historical      METHADONE HCL PO Take 40 mg by mouth every 8 hours, Historical      montelukast (SINGULAIR) 10 MG tablet Take 10 mg by mouth At Bedtime, Historical      Multiple Vitamins-Minerals (MULTIVITAMIN ADULT PO) Take 1 tablet by mouth daily, Historical      Nutritional Supplements (MELATONIN PO) Take 3 mg by mouth At Bedtime , Historical      omeprazole (PRILOSEC OTC) 20 MG tablet Take 20 mg by mouth daily , Historical      !! order for DME Equipment being ordered: Bath Seat ()  Treatment Diagnosis: Limited independence in ADLs/mobility due to hip precautions and recent hip surgeryDisp-1 each, R-0, Local Print      !! order for DME [facility]  [facility]  [phone]     Directions: Advance as Tolerated and Instructed by MDDisp-1 Device, R-0, Local Print      oxyCODONE IR (ROXICODONE) 30 MG tablet Take 120 mg by mouth every 4 hours as needed for moderate to severe pain, Historical      POTASSIUM PO Take 1 tablet by mouth daily Dose unknown; OTC, Historical      Prochlorperazine Maleate (COMPAZINE PO) Take 10 mg by mouth every 6  hours as needed.  , Historical      QUEtiapine (SEROQUEL) 25 MG tablet Take 25 mg by mouth At Bedtime , Historical      ranitidine (ZANTAC) 150 MG tablet Take 150 mg by mouth At Bedtime , Historical      senna-docusate (SENOKOT-S;PERICOLACE) 8.6-50 MG per tablet Take 2 tablets by mouth 2 times daily, Disp-30 tablet, R-0, E-Prescribe      TIZANIDINE HCL PO Take 2 mg by mouth every 6 hours as needed for muscle spasms, Historical      acetaminophen (TYLENOL) 325 MG tablet Take 2 tablets (650 mg) by mouth every 4 hours, Disp-70 tablet, R-0, E-Prescribe       !! - Potential duplicate medications found. Please discuss with provider.          No discharge procedures on file.    Ronald Nash MD  PGY-4 Orthopaedic Surgery

## 2018-07-12 ENCOUNTER — APPOINTMENT (OUTPATIENT)
Dept: GENERAL RADIOLOGY | Facility: CLINIC | Age: 38
End: 2018-07-12
Attending: EMERGENCY MEDICINE
Payer: MEDICARE

## 2018-07-12 ENCOUNTER — HOSPITAL ENCOUNTER (OUTPATIENT)
Facility: CLINIC | Age: 38
Setting detail: OBSERVATION
Discharge: LEFT BEFORE DISCHARGE | End: 2018-07-13
Attending: EMERGENCY MEDICINE | Admitting: ORTHOPAEDIC SURGERY
Payer: MEDICARE

## 2018-07-12 ENCOUNTER — ANESTHESIA EVENT (OUTPATIENT)
Dept: SURGERY | Facility: CLINIC | Age: 38
End: 2018-07-12
Payer: MEDICARE

## 2018-07-12 ENCOUNTER — ANESTHESIA (OUTPATIENT)
Dept: SURGERY | Facility: CLINIC | Age: 38
End: 2018-07-12
Payer: MEDICARE

## 2018-07-12 ENCOUNTER — APPOINTMENT (OUTPATIENT)
Dept: GENERAL RADIOLOGY | Facility: CLINIC | Age: 38
End: 2018-07-12
Attending: ORTHOPAEDIC SURGERY
Payer: MEDICARE
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
## 2018-07-12 DIAGNOSIS — T84.021A DISLOCATION OF INTERNAL LEFT HIP PROSTHESIS, INITIAL ENCOUNTER (H): ICD-10-CM

## 2018-07-12 PROBLEM — Z98.890 STATUS POST CLOSED REDUCTION OF DISLOCATED TOTAL HIP PROSTHESIS: Status: ACTIVE | Noted: 2018-07-12

## 2018-07-12 LAB
ABO + RH BLD: NORMAL
ABO + RH BLD: NORMAL
ANION GAP SERPL CALCULATED.3IONS-SCNC: 6 MMOL/L (ref 3–14)
BASOPHILS # BLD AUTO: 0 10E9/L (ref 0–0.2)
BASOPHILS NFR BLD AUTO: 0.2 %
BLD GP AB SCN SERPL QL: NORMAL
BLOOD BANK CMNT PATIENT-IMP: NORMAL
BUN SERPL-MCNC: 6 MG/DL (ref 7–30)
CALCIUM SERPL-MCNC: 8.3 MG/DL (ref 8.5–10.1)
CHLORIDE SERPL-SCNC: 109 MMOL/L (ref 94–109)
CO2 SERPL-SCNC: 27 MMOL/L (ref 20–32)
CREAT SERPL-MCNC: 0.82 MG/DL (ref 0.52–1.04)
DIFFERENTIAL METHOD BLD: ABNORMAL
EOSINOPHIL # BLD AUTO: 0.2 10E9/L (ref 0–0.7)
EOSINOPHIL NFR BLD AUTO: 2 %
ERYTHROCYTE [DISTWIDTH] IN BLOOD BY AUTOMATED COUNT: 14.3 % (ref 10–15)
GFR SERPL CREATININE-BSD FRML MDRD: 77 ML/MIN/1.7M2
GLUCOSE SERPL-MCNC: 97 MG/DL (ref 70–99)
HCG SERPL QL: NEGATIVE
HCT VFR BLD AUTO: 35.2 % (ref 35–47)
HGB BLD-MCNC: 11.3 G/DL (ref 11.7–15.7)
IMM GRANULOCYTES # BLD: 0 10E9/L (ref 0–0.4)
IMM GRANULOCYTES NFR BLD: 0.2 %
INR PPP: 0.95 (ref 0.86–1.14)
LYMPHOCYTES # BLD AUTO: 3 10E9/L (ref 0.8–5.3)
LYMPHOCYTES NFR BLD AUTO: 36.2 %
MCH RBC QN AUTO: 29 PG (ref 26.5–33)
MCHC RBC AUTO-ENTMCNC: 32.1 G/DL (ref 31.5–36.5)
MCV RBC AUTO: 91 FL (ref 78–100)
MONOCYTES # BLD AUTO: 0.4 10E9/L (ref 0–1.3)
MONOCYTES NFR BLD AUTO: 4.4 %
NEUTROPHILS # BLD AUTO: 4.7 10E9/L (ref 1.6–8.3)
NEUTROPHILS NFR BLD AUTO: 57 %
NRBC # BLD AUTO: 0 10*3/UL
NRBC BLD AUTO-RTO: 0 /100
PLATELET # BLD AUTO: 267 10E9/L (ref 150–450)
POTASSIUM SERPL-SCNC: 3.8 MMOL/L (ref 3.4–5.3)
RBC # BLD AUTO: 3.89 10E12/L (ref 3.8–5.2)
SODIUM SERPL-SCNC: 142 MMOL/L (ref 133–144)
SPECIMEN EXP DATE BLD: NORMAL
WBC # BLD AUTO: 8.2 10E9/L (ref 4–11)

## 2018-07-12 PROCEDURE — 25000128 H RX IP 250 OP 636

## 2018-07-12 PROCEDURE — 25000128 H RX IP 250 OP 636: Performed by: NURSE ANESTHETIST, CERTIFIED REGISTERED

## 2018-07-12 PROCEDURE — 99156 MOD SED OTH PHYS/QHP 5/>YRS: CPT | Performed by: EMERGENCY MEDICINE

## 2018-07-12 PROCEDURE — 99285 EMERGENCY DEPT VISIT HI MDM: CPT | Mod: 25 | Performed by: EMERGENCY MEDICINE

## 2018-07-12 PROCEDURE — 36000051 ZZH SURGERY LEVEL 2 1ST 30 MIN - UMMC: Performed by: ORTHOPAEDIC SURGERY

## 2018-07-12 PROCEDURE — 71000014 ZZH RECOVERY PHASE 1 LEVEL 2 FIRST HR: Performed by: ORTHOPAEDIC SURGERY

## 2018-07-12 PROCEDURE — 40000986 XR HIP PORT LT 1 VW

## 2018-07-12 PROCEDURE — 96376 TX/PRO/DX INJ SAME DRUG ADON: CPT | Performed by: EMERGENCY MEDICINE

## 2018-07-12 PROCEDURE — 71000015 ZZH RECOVERY PHASE 1 LEVEL 2 EA ADDTL HR: Performed by: ORTHOPAEDIC SURGERY

## 2018-07-12 PROCEDURE — 25000566 ZZH SEVOFLURANE, EA 15 MIN: Performed by: ORTHOPAEDIC SURGERY

## 2018-07-12 PROCEDURE — 40000170 ZZH STATISTIC PRE-PROCEDURE ASSESSMENT II: Performed by: ORTHOPAEDIC SURGERY

## 2018-07-12 PROCEDURE — 96361 HYDRATE IV INFUSION ADD-ON: CPT | Performed by: EMERGENCY MEDICINE

## 2018-07-12 PROCEDURE — 96374 THER/PROPH/DIAG INJ IV PUSH: CPT | Performed by: EMERGENCY MEDICINE

## 2018-07-12 PROCEDURE — 96375 TX/PRO/DX INJ NEW DRUG ADDON: CPT | Performed by: EMERGENCY MEDICINE

## 2018-07-12 PROCEDURE — 37000008 ZZH ANESTHESIA TECHNICAL FEE, 1ST 30 MIN: Performed by: ORTHOPAEDIC SURGERY

## 2018-07-12 PROCEDURE — 84703 CHORIONIC GONADOTROPIN ASSAY: CPT | Performed by: EMERGENCY MEDICINE

## 2018-07-12 PROCEDURE — 25000125 ZZHC RX 250: Performed by: NURSE ANESTHETIST, CERTIFIED REGISTERED

## 2018-07-12 PROCEDURE — 25000132 ZZH RX MED GY IP 250 OP 250 PS 637: Mod: GY | Performed by: STUDENT IN AN ORGANIZED HEALTH CARE EDUCATION/TRAINING PROGRAM

## 2018-07-12 PROCEDURE — 99156 MOD SED OTH PHYS/QHP 5/>YRS: CPT | Mod: Z6 | Performed by: EMERGENCY MEDICINE

## 2018-07-12 PROCEDURE — 85025 COMPLETE CBC W/AUTO DIFF WBC: CPT | Performed by: EMERGENCY MEDICINE

## 2018-07-12 PROCEDURE — 40000280 XR SURGERY CARM FLUORO GREATER THAN 5 MIN: Mod: TC

## 2018-07-12 PROCEDURE — 86901 BLOOD TYPING SEROLOGIC RH(D): CPT | Performed by: EMERGENCY MEDICINE

## 2018-07-12 PROCEDURE — 12000001 ZZH R&B MED SURG/OB UMMC

## 2018-07-12 PROCEDURE — 51702 INSERT TEMP BLADDER CATH: CPT | Performed by: EMERGENCY MEDICINE

## 2018-07-12 PROCEDURE — 99157 MOD SED OTHER PHYS/QHP EA: CPT | Performed by: EMERGENCY MEDICINE

## 2018-07-12 PROCEDURE — 73502 X-RAY EXAM HIP UNI 2-3 VIEWS: CPT

## 2018-07-12 PROCEDURE — 25000128 H RX IP 250 OP 636: Performed by: STUDENT IN AN ORGANIZED HEALTH CARE EDUCATION/TRAINING PROGRAM

## 2018-07-12 PROCEDURE — A9270 NON-COVERED ITEM OR SERVICE: HCPCS | Mod: GY | Performed by: STUDENT IN AN ORGANIZED HEALTH CARE EDUCATION/TRAINING PROGRAM

## 2018-07-12 PROCEDURE — 25000128 H RX IP 250 OP 636: Performed by: EMERGENCY MEDICINE

## 2018-07-12 PROCEDURE — 36000053 ZZH SURGERY LEVEL 2 EA 15 ADDTL MIN - UMMC: Performed by: ORTHOPAEDIC SURGERY

## 2018-07-12 PROCEDURE — C9399 UNCLASSIFIED DRUGS OR BIOLOG: HCPCS | Performed by: NURSE ANESTHETIST, CERTIFIED REGISTERED

## 2018-07-12 PROCEDURE — 85610 PROTHROMBIN TIME: CPT | Performed by: EMERGENCY MEDICINE

## 2018-07-12 PROCEDURE — 37000009 ZZH ANESTHESIA TECHNICAL FEE, EACH ADDTL 15 MIN: Performed by: ORTHOPAEDIC SURGERY

## 2018-07-12 PROCEDURE — 86850 RBC ANTIBODY SCREEN: CPT | Performed by: EMERGENCY MEDICINE

## 2018-07-12 PROCEDURE — 86900 BLOOD TYPING SEROLOGIC ABO: CPT | Performed by: EMERGENCY MEDICINE

## 2018-07-12 PROCEDURE — 80048 BASIC METABOLIC PNL TOTAL CA: CPT | Performed by: EMERGENCY MEDICINE

## 2018-07-12 PROCEDURE — 99157 MOD SED OTHER PHYS/QHP EA: CPT | Mod: Z6 | Performed by: EMERGENCY MEDICINE

## 2018-07-12 PROCEDURE — 27210794 ZZH OR GENERAL SUPPLY STERILE: Performed by: ORTHOPAEDIC SURGERY

## 2018-07-12 RX ORDER — SODIUM CHLORIDE, SODIUM LACTATE, POTASSIUM CHLORIDE, CALCIUM CHLORIDE 600; 310; 30; 20 MG/100ML; MG/100ML; MG/100ML; MG/100ML
INJECTION, SOLUTION INTRAVENOUS CONTINUOUS
Status: DISCONTINUED | OUTPATIENT
Start: 2018-07-12 | End: 2018-07-13 | Stop reason: HOSPADM

## 2018-07-12 RX ORDER — TIZANIDINE 2 MG/1
2 TABLET ORAL ONCE
Status: DISCONTINUED | OUTPATIENT
Start: 2018-07-12 | End: 2018-07-12 | Stop reason: HOSPADM

## 2018-07-12 RX ORDER — OXYCODONE HYDROCHLORIDE 30 MG/1
120 TABLET ORAL EVERY 4 HOURS PRN
Status: DISCONTINUED | OUTPATIENT
Start: 2018-07-12 | End: 2018-07-13 | Stop reason: HOSPADM

## 2018-07-12 RX ORDER — FLUTICASONE PROPIONATE 50 MCG
2 SPRAY, SUSPENSION (ML) NASAL DAILY PRN
Status: DISCONTINUED | OUTPATIENT
Start: 2018-07-12 | End: 2018-07-13 | Stop reason: HOSPADM

## 2018-07-12 RX ORDER — GABAPENTIN 300 MG/1
900 CAPSULE ORAL ONCE
Status: COMPLETED | OUTPATIENT
Start: 2018-07-12 | End: 2018-07-12

## 2018-07-12 RX ORDER — AMOXICILLIN 250 MG
2 CAPSULE ORAL 2 TIMES DAILY
Status: DISCONTINUED | OUTPATIENT
Start: 2018-07-12 | End: 2018-07-13 | Stop reason: HOSPADM

## 2018-07-12 RX ORDER — PLANT STANOL ESTER 450 MG
2.5 TABLET ORAL DAILY
Status: DISCONTINUED | OUTPATIENT
Start: 2018-07-13 | End: 2018-07-13 | Stop reason: HOSPADM

## 2018-07-12 RX ORDER — SODIUM CHLORIDE 9 MG/ML
INJECTION, SOLUTION INTRAVENOUS
Status: COMPLETED
Start: 2018-07-12 | End: 2018-07-12

## 2018-07-12 RX ORDER — IBUPROFEN 200 MG
200 TABLET ORAL EVERY 6 HOURS PRN
Status: DISCONTINUED | OUTPATIENT
Start: 2018-07-12 | End: 2018-07-13 | Stop reason: HOSPADM

## 2018-07-12 RX ORDER — ONDANSETRON 4 MG/1
4 TABLET, ORALLY DISINTEGRATING ORAL EVERY 30 MIN PRN
Status: DISCONTINUED | OUTPATIENT
Start: 2018-07-12 | End: 2018-07-13 | Stop reason: HOSPADM

## 2018-07-12 RX ORDER — BACLOFEN 20 MG/1
20 TABLET ORAL ONCE
Status: DISCONTINUED | OUTPATIENT
Start: 2018-07-12 | End: 2018-07-12 | Stop reason: HOSPADM

## 2018-07-12 RX ORDER — ESCITALOPRAM OXALATE 20 MG/1
20 TABLET ORAL DAILY
Status: DISCONTINUED | OUTPATIENT
Start: 2018-07-13 | End: 2018-07-13 | Stop reason: HOSPADM

## 2018-07-12 RX ORDER — FENTANYL CITRATE 50 UG/ML
INJECTION, SOLUTION INTRAMUSCULAR; INTRAVENOUS PRN
Status: DISCONTINUED | OUTPATIENT
Start: 2018-07-12 | End: 2018-07-12

## 2018-07-12 RX ORDER — LIDOCAINE 40 MG/G
CREAM TOPICAL
Status: DISCONTINUED | OUTPATIENT
Start: 2018-07-12 | End: 2018-07-13 | Stop reason: HOSPADM

## 2018-07-12 RX ORDER — PROPOFOL 10 MG/ML
INJECTION, EMULSION INTRAVENOUS
Status: COMPLETED
Start: 2018-07-12 | End: 2018-07-12

## 2018-07-12 RX ORDER — NALOXONE HYDROCHLORIDE 0.4 MG/ML
.1-.4 INJECTION, SOLUTION INTRAMUSCULAR; INTRAVENOUS; SUBCUTANEOUS
Status: DISCONTINUED | OUTPATIENT
Start: 2018-07-12 | End: 2018-07-13 | Stop reason: HOSPADM

## 2018-07-12 RX ORDER — MULTIPLE VITAMINS W/ MINERALS TAB 9MG-400MCG
1 TAB ORAL DAILY
Status: DISCONTINUED | OUTPATIENT
Start: 2018-07-13 | End: 2018-07-13 | Stop reason: HOSPADM

## 2018-07-12 RX ORDER — FENTANYL CITRATE 50 UG/ML
50 INJECTION, SOLUTION INTRAMUSCULAR; INTRAVENOUS EVERY 5 MIN PRN
Status: DISCONTINUED | OUTPATIENT
Start: 2018-07-12 | End: 2018-07-13

## 2018-07-12 RX ORDER — LIDOCAINE HYDROCHLORIDE 20 MG/ML
INJECTION, SOLUTION INFILTRATION; PERINEURAL PRN
Status: DISCONTINUED | OUTPATIENT
Start: 2018-07-12 | End: 2018-07-12

## 2018-07-12 RX ORDER — MORPHINE SULFATE 4 MG/ML
4 INJECTION, SOLUTION INTRAMUSCULAR; INTRAVENOUS ONCE
Status: COMPLETED | OUTPATIENT
Start: 2018-07-12 | End: 2018-07-12

## 2018-07-12 RX ORDER — GABAPENTIN 300 MG/1
900 CAPSULE ORAL 3 TIMES DAILY
Status: DISCONTINUED | OUTPATIENT
Start: 2018-07-13 | End: 2018-07-13 | Stop reason: HOSPADM

## 2018-07-12 RX ORDER — FENTANYL CITRATE 50 UG/ML
INJECTION, SOLUTION INTRAMUSCULAR; INTRAVENOUS
Status: DISCONTINUED
Start: 2018-07-12 | End: 2018-07-12 | Stop reason: HOSPADM

## 2018-07-12 RX ORDER — LANOLIN ALCOHOL/MO/W.PET/CERES
3 CREAM (GRAM) TOPICAL AT BEDTIME
Status: DISCONTINUED | OUTPATIENT
Start: 2018-07-12 | End: 2018-07-13 | Stop reason: HOSPADM

## 2018-07-12 RX ORDER — AMOXICILLIN 250 MG
1 CAPSULE ORAL 2 TIMES DAILY
Status: DISCONTINUED | OUTPATIENT
Start: 2018-07-12 | End: 2018-07-12

## 2018-07-12 RX ORDER — BACLOFEN 20 MG/1
20 TABLET ORAL 4 TIMES DAILY
Status: DISCONTINUED | OUTPATIENT
Start: 2018-07-13 | End: 2018-07-13 | Stop reason: HOSPADM

## 2018-07-12 RX ORDER — PROPOFOL 10 MG/ML
INJECTION, EMULSION INTRAVENOUS PRN
Status: DISCONTINUED | OUTPATIENT
Start: 2018-07-12 | End: 2018-07-12

## 2018-07-12 RX ORDER — PROCHLORPERAZINE MALEATE 10 MG
10 TABLET ORAL EVERY 6 HOURS PRN
Status: DISCONTINUED | OUTPATIENT
Start: 2018-07-12 | End: 2018-07-13 | Stop reason: HOSPADM

## 2018-07-12 RX ORDER — BUPROPION HYDROCHLORIDE 150 MG/1
150 TABLET, EXTENDED RELEASE ORAL 2 TIMES DAILY
Status: DISCONTINUED | OUTPATIENT
Start: 2018-07-12 | End: 2018-07-13 | Stop reason: HOSPADM

## 2018-07-12 RX ORDER — TIZANIDINE 2 MG/1
2 TABLET ORAL EVERY 6 HOURS PRN
Status: DISCONTINUED | OUTPATIENT
Start: 2018-07-12 | End: 2018-07-13 | Stop reason: HOSPADM

## 2018-07-12 RX ORDER — MONTELUKAST SODIUM 10 MG/1
10 TABLET ORAL AT BEDTIME
Status: DISCONTINUED | OUTPATIENT
Start: 2018-07-12 | End: 2018-07-13 | Stop reason: HOSPADM

## 2018-07-12 RX ORDER — LORAZEPAM 0.5 MG/1
1-2 TABLET ORAL 3 TIMES DAILY
Status: DISCONTINUED | OUTPATIENT
Start: 2018-07-13 | End: 2018-07-13 | Stop reason: HOSPADM

## 2018-07-12 RX ORDER — SODIUM CHLORIDE 9 MG/ML
INJECTION, SOLUTION INTRAVENOUS CONTINUOUS PRN
Status: DISCONTINUED | OUTPATIENT
Start: 2018-07-12 | End: 2018-07-12

## 2018-07-12 RX ORDER — HYDROMORPHONE HYDROCHLORIDE 1 MG/ML
0.5 INJECTION, SOLUTION INTRAMUSCULAR; INTRAVENOUS; SUBCUTANEOUS ONCE
Status: COMPLETED | OUTPATIENT
Start: 2018-07-12 | End: 2018-07-12

## 2018-07-12 RX ORDER — ONDANSETRON 2 MG/ML
INJECTION INTRAMUSCULAR; INTRAVENOUS PRN
Status: DISCONTINUED | OUTPATIENT
Start: 2018-07-12 | End: 2018-07-12

## 2018-07-12 RX ORDER — ONDANSETRON 2 MG/ML
4 INJECTION INTRAMUSCULAR; INTRAVENOUS EVERY 30 MIN PRN
Status: DISCONTINUED | OUTPATIENT
Start: 2018-07-12 | End: 2018-07-13 | Stop reason: HOSPADM

## 2018-07-12 RX ORDER — METHADONE HYDROCHLORIDE 10 MG/1
40 TABLET ORAL EVERY 8 HOURS
Status: DISCONTINUED | OUTPATIENT
Start: 2018-07-12 | End: 2018-07-13 | Stop reason: HOSPADM

## 2018-07-12 RX ORDER — DULOXETIN HYDROCHLORIDE 60 MG/1
60 CAPSULE, DELAYED RELEASE ORAL 2 TIMES DAILY
Status: DISCONTINUED | OUTPATIENT
Start: 2018-07-12 | End: 2018-07-13 | Stop reason: HOSPADM

## 2018-07-12 RX ORDER — NALOXONE HYDROCHLORIDE 0.4 MG/ML
.1-.4 INJECTION, SOLUTION INTRAMUSCULAR; INTRAVENOUS; SUBCUTANEOUS
Status: DISCONTINUED | OUTPATIENT
Start: 2018-07-12 | End: 2018-07-12

## 2018-07-12 RX ORDER — AMOXICILLIN 250 MG
2 CAPSULE ORAL 2 TIMES DAILY
Status: DISCONTINUED | OUTPATIENT
Start: 2018-07-12 | End: 2018-07-12

## 2018-07-12 RX ORDER — FENTANYL CITRATE 50 UG/ML
100 INJECTION, SOLUTION INTRAMUSCULAR; INTRAVENOUS
Status: DISCONTINUED | OUTPATIENT
Start: 2018-07-12 | End: 2018-07-13 | Stop reason: HOSPADM

## 2018-07-12 RX ORDER — QUETIAPINE FUMARATE 25 MG/1
25 TABLET, FILM COATED ORAL AT BEDTIME
Status: DISCONTINUED | OUTPATIENT
Start: 2018-07-12 | End: 2018-07-13 | Stop reason: HOSPADM

## 2018-07-12 RX ORDER — HYDROMORPHONE HYDROCHLORIDE 1 MG/ML
.4-.6 INJECTION, SOLUTION INTRAMUSCULAR; INTRAVENOUS; SUBCUTANEOUS EVERY 10 MIN PRN
Status: DISCONTINUED | OUTPATIENT
Start: 2018-07-12 | End: 2018-07-13

## 2018-07-12 RX ORDER — LEVOTHYROXINE SODIUM 75 UG/1
75 TABLET ORAL DAILY
Status: DISCONTINUED | OUTPATIENT
Start: 2018-07-13 | End: 2018-07-13 | Stop reason: HOSPADM

## 2018-07-12 RX ORDER — FENTANYL CITRATE 50 UG/ML
100 INJECTION, SOLUTION INTRAMUSCULAR; INTRAVENOUS ONCE
Status: COMPLETED | OUTPATIENT
Start: 2018-07-12 | End: 2018-07-12

## 2018-07-12 RX ORDER — ALBUTEROL SULFATE 90 UG/1
2 AEROSOL, METERED RESPIRATORY (INHALATION) EVERY 6 HOURS PRN
Status: DISCONTINUED | OUTPATIENT
Start: 2018-07-12 | End: 2018-07-13 | Stop reason: HOSPADM

## 2018-07-12 RX ADMIN — GABAPENTIN 900 MG: 300 CAPSULE ORAL at 19:10

## 2018-07-12 RX ADMIN — HYDROMORPHONE HYDROCHLORIDE 0.5 MG: 1 INJECTION, SOLUTION INTRAMUSCULAR; INTRAVENOUS; SUBCUTANEOUS at 18:18

## 2018-07-12 RX ADMIN — PROPOFOL 120 MG: 10 INJECTION, EMULSION INTRAVENOUS at 19:34

## 2018-07-12 RX ADMIN — PROPOFOL: 10 INJECTION, EMULSION INTRAVENOUS at 17:29

## 2018-07-12 RX ADMIN — HYDROMORPHONE HYDROCHLORIDE 1 MG: 1 INJECTION, SOLUTION INTRAMUSCULAR; INTRAVENOUS; SUBCUTANEOUS at 19:32

## 2018-07-12 RX ADMIN — DEXMEDETOMIDINE HYDROCHLORIDE 8 MCG: 100 INJECTION, SOLUTION INTRAVENOUS at 20:00

## 2018-07-12 RX ADMIN — MIDAZOLAM 2 MG: 1 INJECTION INTRAMUSCULAR; INTRAVENOUS at 19:27

## 2018-07-12 RX ADMIN — FENTANYL CITRATE 50 MCG: 50 INJECTION, SOLUTION INTRAMUSCULAR; INTRAVENOUS at 19:27

## 2018-07-12 RX ADMIN — ROCURONIUM BROMIDE 50 MG: 10 INJECTION INTRAVENOUS at 19:35

## 2018-07-12 RX ADMIN — SODIUM CHLORIDE 1000 ML: 9 INJECTION, SOLUTION INTRAVENOUS at 18:00

## 2018-07-12 RX ADMIN — MORPHINE SULFATE 4 MG: 4 INJECTION, SOLUTION INTRAMUSCULAR; INTRAVENOUS at 15:20

## 2018-07-12 RX ADMIN — LIDOCAINE HYDROCHLORIDE 70 MG: 20 INJECTION, SOLUTION INFILTRATION; PERINEURAL at 19:32

## 2018-07-12 RX ADMIN — MORPHINE SULFATE 4 MG: 4 INJECTION, SOLUTION INTRAMUSCULAR; INTRAVENOUS at 16:26

## 2018-07-12 RX ADMIN — PROPOFOL 30 MG: 10 INJECTION, EMULSION INTRAVENOUS at 19:36

## 2018-07-12 RX ADMIN — FENTANYL CITRATE 100 MCG: 50 INJECTION, SOLUTION INTRAMUSCULAR; INTRAVENOUS at 17:34

## 2018-07-12 RX ADMIN — SODIUM CHLORIDE: 9 INJECTION, SOLUTION INTRAVENOUS at 19:30

## 2018-07-12 RX ADMIN — SODIUM CHLORIDE, POTASSIUM CHLORIDE, SODIUM LACTATE AND CALCIUM CHLORIDE: 600; 310; 30; 20 INJECTION, SOLUTION INTRAVENOUS at 22:48

## 2018-07-12 RX ADMIN — LIDOCAINE HYDROCHLORIDE 20 MG: 20 INJECTION, SOLUTION INFILTRATION; PERINEURAL at 20:07

## 2018-07-12 RX ADMIN — ONDANSETRON 4 MG: 2 INJECTION INTRAMUSCULAR; INTRAVENOUS at 20:02

## 2018-07-12 RX ADMIN — SUGAMMADEX 200 MG: 100 INJECTION, SOLUTION INTRAVENOUS at 20:01

## 2018-07-12 ASSESSMENT — ACTIVITIES OF DAILY LIVING (ADL)
RETIRED_COMMUNICATION: 0-->UNDERSTANDS/COMMUNICATES WITHOUT DIFFICULTY
DRESS: 2-->ASSISTIVE PERSON
BATHING: 2-->ASSISTIVE PERSON
AMBULATION: 0-->INDEPENDENT
FALL_HISTORY_WITHIN_LAST_SIX_MONTHS: YES
RETIRED_EATING: 2-->ASSISTIVE PERSON
COGNITION: 0 - NO COGNITION ISSUES REPORTED
SWALLOWING: 0-->SWALLOWS FOODS/LIQUIDS WITHOUT DIFFICULTY
TOILETING: 0-->INDEPENDENT
NUMBER_OF_TIMES_PATIENT_HAS_FALLEN_WITHIN_LAST_SIX_MONTHS: 1
TRANSFERRING: 0-->INDEPENDENT

## 2018-07-12 ASSESSMENT — ENCOUNTER SYMPTOMS
PSYCHIATRIC NEGATIVE: 1
ARTHRALGIAS: 1
FLANK PAIN: 0
FEVER: 0
SHORTNESS OF BREATH: 0
EYES NEGATIVE: 1
SEIZURES: 1
NECK PAIN: 0
NAUSEA: 0
HEADACHES: 0
VOMITING: 0
ABDOMINAL PAIN: 0

## 2018-07-12 ASSESSMENT — LIFESTYLE VARIABLES: TOBACCO_USE: 1

## 2018-07-12 NOTE — ED NOTES
Bed: ED18  Expected date:   Expected time:   Means of arrival:   Comments:  Jeevan [id number] 39 yo female from home left hip dislocation

## 2018-07-12 NOTE — ED NOTES
Handoff report to STEVEN Carmona.  Informed of course of ED stay and plan of care.  Kristine verbalized understanding.

## 2018-07-12 NOTE — H&P
[facility]  ORTHOPAEDIC SURGERY CONSULT - HISTORY AND PHYSICAL    DATE OF CONSULT: 7/12/2018 6:33 PM    REQUESTING PROVIDER: Tomeka Isaacs MD,  CC: left hip pain    DATE OF INJURY: 07/12/18    HISTORY OF PRESENT ILLNESS:   The orthopaedic surgery service was consulted by Tomeka Doyle MD for evaluation and treatment recommendations of left prosthetic hip dislocation.    Alethea Patel is a 38 year old s/p revision of left HAJA due to recurrent dislocations on 5/25/2018 with Dr. Saleem presenting with left hip pain.  Patient reports she was  reclining on a chair, her abduction pillow was removed/fell out when she was about to get up, patient's dog jumped on her legs and that is when she felt a click in her hip.  She expresses pain at the left groin area. Dr. Saleem recently saw patient on 6/14/18, and she was progressing well postsurgically.     Denies numbness, tingling, or weakness to the affected extremities.  Denies fevers, chills, nausea, vomiting, diarrhea, constipation, chest pain, shortness of breath.    PAST MEDICAL HISTORY:   Past Medical History:   Diagnosis Date     Acute lymphoblastic leukemia in remission (H)      Anxiety      Depression      Depressive disorder      Gastro-oesophageal reflux disease      IBS (irritable bowel syndrome)      Osteonecrosis (H)      Osteonecrosis (H)        PAST SURGICAL HISTORY:    Past Surgical History:   Procedure Laterality Date     ARTHROPLASTY KNEE Left 1/8/2016    Procedure: ARTHROPLASTY KNEE;  Surgeon: Cheko Saleem MD;  Location: [facility]     ARTHROPLASTY PATELLO-FEMORAL (KNEE)  6/12/2012    Procedure: ARTHROPLASTY PATELLO-FEMORAL (KNEE);  Right Knee Patella Resurfacing;  Surgeon: Cheko Saleem MD;  [facility]     ARTHROPLASTY REVISION HIP Left 4/3/2018    Procedure: ARTHROPLASTY REVISION HIP;  Conversion of Left Hip Rigo-Arthroplasty to Left Total Hip Replacement;  Surgeon: Cheko Saleem MD;  [facility]     ARTHROPLASTY  REVISION HIP Left 5/25/2018    Procedure: ARTHROPLASTY REVISION HIP;  Revision Left Total Hip Arthroplasty ;  Surgeon: Cheko Saleem MD;  [facility] OR     C PELVIS/HIP JOINT SURGERY UNLISTED       CLOSED REDUCTION HIP Left 5/18/2018    Procedure: CLOSED REDUCTION HIP;  Closed Reduction Left Hip, and Aspiration Culture   Left Hip;  Surgeon: Cheko Saleem MD;  [facility]     CLOSED REDUCTION HIP Left 5/23/2018    Procedure: CLOSED REDUCTION HIP;  Closed Reduction Left Hip;  Surgeon: Cheko Saleem MD;  [facility]     HIP SURGERY  5/31/2005    Left hip resurfacing hemiarthroplasty     HIP SURGERY  6/5/07     INJECT BLOCK MEDIAL BRANCH CERVICAL/THORACIC/LUMBAR N/A 1/9/2016    Procedure: INJECT BLOCK MEDIAL BRANCH CERVICAL / THORACIC / LUMBAR;  Surgeon: GENERIC ANESTHESIA PROVIDER;  Location: [facility] OR     JOINT REPLACEMENT  2/2/2010    Rt TKA     KNEE SURGERY       ORTHOPEDIC SURGERY      right foot surgery       MEDICATIONS:   Prior to Admission medications    Medication Sig Last Dose Taking? Auth Provider   albuterol (PROAIR HFA/PROVENTIL HFA/VENTOLIN HFA) 108 (90 BASE) MCG/ACT Inhaler Inhale 2 puffs into the lungs every 6 hours as needed for shortness of breath / dyspnea or wheezing   Reported, Patient   B Complex Vitamins (B COMPLEX PO) Take 1 tablet by mouth daily Dose unknown; OTC   Reported, Patient   BACLOFEN PO Take 20 mg by mouth 4 times daily    Reported, Patient   BuPROPion HCl (WELLBUTRIN SR PO) Take 150 mg by mouth 2 times daily   Unknown, Entered By History   DULoxetine (CYMBALTA) 60 MG EC capsule Take 60 mg by mouth 2 times daily    Reported, Patient   Escitalopram Oxalate (LEXAPRO PO) Take 20 mg by mouth daily   Unknown, Entered By History   fluticasone (FLONASE) 50 MCG/ACT nasal spray Spray 2 sprays into both nostrils daily as needed for rhinitis or allergies   Reported, Patient   gabapentin (NEURONTIN) 300 MG capsule Take 900 mg by mouth 3 times daily    Reported, Patient   IBUPROFEN  PO Take 200 mg by mouth every 6 hours as needed for moderate pain   Reported, Patient   levothyroxine (SYNTHROID, LEVOTHROID) 75 MCG tablet Take 75 mcg by mouth daily   Reported, Patient   LORazepam (ATIVAN) 1 MG tablet Take 1-2 mg by mouth 3 times daily Max 5 tablets daily   Reported, Patient   MAGNESIUM OXIDE PO Take 200 mg by mouth daily   Reported, Patient   METHADONE HCL PO Take 40 mg by mouth every 8 hours   Unknown, Entered By History   montelukast (SINGULAIR) 10 MG tablet Take 10 mg by mouth At Bedtime   Reported, Patient   Multiple Vitamins-Minerals (MULTIVITAMIN ADULT PO) Take 1 tablet by mouth daily   Unknown, Entered By History   Nutritional Supplements (MELATONIN PO) Take 3 mg by mouth At Bedtime    Reported, Patient   omeprazole (PRILOSEC OTC) 20 MG tablet Take 20 mg by mouth daily    Reported, Patient   order for DME Equipment being ordered: Bath Seat ()  Treatment Diagnosis: Limited independence in ADLs/mobility due to hip precautions and recent hip surgery   Cheko Saleem MD   order for DME [facility]  [facility]  [phone]     Directions: Advance as Tolerated and Instructed by Cheko Ireland MD   oxyCODONE IR (ROXICODONE) 30 MG tablet Take 120 mg by mouth every 4 hours as needed for moderate to severe pain   Unknown, Entered By History   POTASSIUM PO Take 1 tablet by mouth daily Dose unknown; OTC   Unknown, Entered By History   Prochlorperazine Maleate (COMPAZINE PO) Take 10 mg by mouth every 6 hours as needed.     Reported, Patient   QUEtiapine (SEROQUEL) 25 MG tablet Take 25 mg by mouth At Bedtime    Reported, Patient   ranitidine (ZANTAC) 150 MG tablet Take 150 mg by mouth At Bedtime    Reported, Patient   senna-docusate (SENOKOT-S;PERICOLACE) 8.6-50 MG per tablet Take 2 tablets by mouth 2 times daily   Devante Broderick MD   TIZANIDINE HCL PO Take 2 mg by mouth every 6 hours as needed for muscle spasms   Unknown, Entered By History   warfarin (COUMADIN) 5 MG tablet Take 1  tablet (5 mg) by mouth daily   Ronald Nash MD       ALLERGIES:   Chantix [varenicline]; No clinical screening - see comments; Seasonal allergies; Ciprofloxacin; and Latex    SOCIAL HISTORY:   Social History     Social History     Marital status:      Spouse name: N/A     Number of children: N/A     Years of education: N/A     Occupational History     Not on file.     Social History Main Topics     Smoking status: Current Every Day Smoker     Packs/day: 1.00     Years: 10.00     Types: Cigarettes     Start date: 5/29/1997     Smokeless tobacco: Never Used     Alcohol use No      Comment: very rare     Drug use: No     Sexual activity: Yes     Partners: Male     Birth control/ protection: None     Other Topics Concern     Not on file     Social History Narrative       FAMILY HISTORY:  Family History   Problem Relation Age of Onset     Migraines Son      Soft Tissue Cancer Brother      Low Back Problems Mother      REVIEW OF SYSTEMS:    as noted above in the HPI.     PHYSICAL EXAM:   Vitals:    07/12/18 1754 07/12/18 1755 07/12/18 1800 07/12/18 1805   BP:  120/80 127/79 (!) 144/95   Pulse:       Resp: 19  18 8   Temp:       TempSrc:       SpO2: 100%  100% 99%   Weight:         General: Awake, alert, appropriate, following commands, in severe pain, drowsy from pain medications  Lungs: Breathing comfortably and nonlabored on room air.  Heart/Cardiovascular: Regular pulse, no peripheral cyanosis.    Left Lower Extremity: No deformity, skin intact, chronic stria present b/l groin area. significant tenderness to palpation over asis, hip joint otherwise no signifcant tenderness over knee, leg, ankle/foot. ROM limited by pain, Motor intact distally at GSC/EHL/FHL/knee flexion. SILT sp/dp/tibial/saph/sural nerves. DP pulses palpable, 2+, toes warm and well perfused.       LABS:  Hemoglobin   Date Value Ref Range Status   07/12/2018 11.3 (L) 11.7 - 15.7 g/dL Final   05/27/2018 10.5 (L) 11.7 - 15.7 g/dL Final      WBC   Date Value Ref Range Status   07/12/2018 8.2 4.0 - 11.0 10e9/L Final     Platelet Count   Date Value Ref Range Status   07/12/2018 267 150 - 450 10e9/L Final     INR   Date Value Ref Range Status   07/12/2018 0.95 0.86 - 1.14 Final     Creatinine   Date Value Ref Range Status   07/12/2018 0.82 0.52 - 1.04 mg/dL Final     Glucose   Date Value Ref Range Status   07/12/2018 97 70 - 99 mg/dL Final       IMAGING:  X-ray of Left hip: dislocated left hip, no acute fractures    IMPRESSION:   Alethea Patel is a 38 year old s/p revision of left HAJA due to recurrent dislocations on 5/25/2018 with Dr. Saleem presenting with left hip dislocation.    RECOMMENDATIONS:   Closed reduction under sedation was attempted in the ED. Patient failed closed reduction in the ED.  Patient was closed reduced in the OR under anesthesia.     - Admit to orthopaedic surgery  - Anticoagulation/DVT Prophylaxis: mechanical  - Antibiotics/Tetanus: none  - X-rays/Imaging: x-rays prior to discharge  - Activity: as tolerated  - Weight bearing: non-weight bearing on lower extremities, keep abduction pillow in place until patient is in a brace. Brace settings: thigh pads b/l, 20 deg abduction, 0-40 degrees flexion b/l.  - Pain control: oxycodone, methadone on board,   - Diet: as tolerated  - Follow-up: w/ Dr. Saleem pending  - Disposition: pending brace placement and pain control    Assessment and Plan discussed with Dr. Saleem and Dr. Hill.    Justin Colbert MD  Orthopaedic Surgery Resident, PGY1   Pager: [phone]    For questions about this patient, please contact me at my pager.    Attending MD (Dr. Cheko Saleem) Attestation :  I performed a history and physical exam of the patient and discussed the patient's management with the resident. I reviewed the resident's note and agree with the documented findings and plan of care.    I discussed the risks, benefits, alternatives regarding the proposed surgery with the patient who both  demonstrated understanding and indicated a desire to proceed with the surgery as planned.    MD Blas Rhoades Family Professor  Oncology and Adult Reconstructive Surgery  Dept Orthopaedic Surgery, [facility]  [phone] office, [phone] pager  [email or web address]

## 2018-07-12 NOTE — ED NOTES
For sedation:    1716:  50 mg Fentanyl  1718:  70 mg Propofol  1720:  40 mg Propofol  1724:  20 mg Propofol  1729:  25 mg Fentanyl  1734:  25 mg Fentanyl  1741:  70 mg Propofol  1746:  25 mg Fentanyl  1747:  40 mg Propofol  1750:  40 mg Propofol  1751:  25 mg Fentanyl    Totals:  240 Propofol  150 Fentanyl

## 2018-07-12 NOTE — ED PROVIDER NOTES
History     Chief Complaint   Patient presents with     Hip Pain     left hip: multiple surgeries: past hx leukemia: bilat hip replacments: left hip revision in June HPI  Alethea Patel is a 38 year old female history of ALL, methadone dependence, osteonecrosis, bilateral total hip arthroplasty, multiple left hip dislocations with closed reductions (4/1918, 5/18/18, 5/23/18), and revision of left total hip arthroplasty to larger head size and cup repositioning who presents to the Emergency Department today for evaluation of left hip pain. The patient states that she was taking a nap with a pillow between her legs to support her hip. She states that the pillow fell out during her nap and then her dog, a 150 pound husky,  pushed her feet off of her bed and she states she felt her hip pop out. She states that it feels as though it was dislocated. She did not fall and denies trauma.  She denies chest pain or shortness of breath.  The patient ate last night.  She did not wake up until 12:15 PM today.  She did have several sips of Coke prior to arrival to the ED.  Social: Here with friend, smoker. On chronic methadone--40 mg TID     I have reviewed the Medications, Allergies, Past Medical and Surgical History, and Social History in the Medical Simulation system.    Past Medical History:   Diagnosis Date     Acute lymphoblastic leukemia in remission (H)      Anxiety      Depression      Depressive disorder      Gastro-oesophageal reflux disease      IBS (irritable bowel syndrome)      Osteonecrosis (H)      Osteonecrosis (H)        Past Surgical History:   Procedure Laterality Date     ARTHROPLASTY KNEE Left 1/8/2016    Procedure: ARTHROPLASTY KNEE;  Surgeon: Cheko Saleem MD;  [facility]: [facility]     ARTHROPLASTY PATELLO-FEMORAL (KNEE)  6/12/2012    Procedure: ARTHROPLASTY PATELLO-FEMORAL (KNEE);  Right Knee Patella Resurfacing;  Surgeon: Cheko Saleem MD;  [facility]     ARTHROPLASTY REVISION HIP Left 4/3/2018     Procedure: ARTHROPLASTY REVISION HIP;  Conversion of Left Hip Rigo-Arthroplasty to Left Total Hip Replacement;  Surgeon: Cheko Saleem MD;  [facility]     ARTHROPLASTY REVISION HIP Left 5/25/2018    Procedure: ARTHROPLASTY REVISION HIP;  Revision Left Total Hip Arthroplasty ;  Surgeon: Cheko Saleem MD;  [facility]     C PELVIS/HIP JOINT SURGERY UNLISTED       CLOSED REDUCTION HIP Left 5/18/2018    Procedure: CLOSED REDUCTION HIP;  Closed Reduction Left Hip, and Aspiration Culture   Left Hip;  Surgeon: Cheko Saleem MD;  [facility]     CLOSED REDUCTION HIP Left 5/23/2018    Procedure: CLOSED REDUCTION HIP;  Closed Reduction Left Hip;  Surgeon: Cheko Saleem MD;  [facility]     HIP SURGERY  5/31/2005    Left hip resurfacing hemiarthroplasty     HIP SURGERY  6/5/07     INJECT BLOCK MEDIAL BRANCH CERVICAL/THORACIC/LUMBAR N/A 1/9/2016    Procedure: INJECT BLOCK MEDIAL BRANCH CERVICAL / THORACIC / LUMBAR;  Surgeon: GENERIC ANESTHESIA PROVIDER;  Location: [facility]     JOINT REPLACEMENT  2/2/2010    Rt TKA     KNEE SURGERY       ORTHOPEDIC SURGERY      right foot surgery       Family History   Problem Relation Age of Onset     Migraines Son      Soft Tissue Cancer Brother      Low Back Problems Mother        Social History   Substance Use Topics     Smoking status: Current Every Day Smoker     Packs/day: 1.00     Years: 10.00     Types: Cigarettes     Start date: 5/29/1997     Smokeless tobacco: Never Used     Alcohol use No      Comment: very rare       Current Facility-Administered Medications   Medication     fentaNYL (PF) (SUBLIMAZE) 100 MCG/2ML injection     fentaNYL (PF) (SUBLIMAZE) injection 100 mcg     HYDROmorphone (PF) (DILAUDID) injection 0.5 mg     Current Outpatient Prescriptions   Medication     albuterol (PROAIR HFA/PROVENTIL HFA/VENTOLIN HFA) 108 (90 BASE) MCG/ACT Inhaler     B Complex Vitamins (B COMPLEX PO)     BACLOFEN PO     BuPROPion HCl (WELLBUTRIN SR PO)     DULoxetine  (CYMBALTA) 60 MG EC capsule     Escitalopram Oxalate (LEXAPRO PO)     fluticasone (FLONASE) 50 MCG/ACT nasal spray     gabapentin (NEURONTIN) 300 MG capsule     IBUPROFEN PO     levothyroxine (SYNTHROID, LEVOTHROID) 75 MCG tablet     LORazepam (ATIVAN) 1 MG tablet     MAGNESIUM OXIDE PO     METHADONE HCL PO     montelukast (SINGULAIR) 10 MG tablet     Multiple Vitamins-Minerals (MULTIVITAMIN ADULT PO)     Nutritional Supplements (MELATONIN PO)     omeprazole (PRILOSEC OTC) 20 MG tablet     order for DME     order for DME     oxyCODONE IR (ROXICODONE) 30 MG tablet     POTASSIUM PO     Prochlorperazine Maleate (COMPAZINE PO)     QUEtiapine (SEROQUEL) 25 MG tablet     ranitidine (ZANTAC) 150 MG tablet     senna-docusate (SENOKOT-S;PERICOLACE) 8.6-50 MG per tablet     TIZANIDINE HCL PO     warfarin (COUMADIN) 5 MG tablet        Allergies   Allergen Reactions     Chantix [Varenicline] Nausea and Vomiting     Other reaction(s): Vomiting     No Clinical Screening - See Comments      Other reaction(s): Unknown Reaction     Seasonal Allergies      Ciprofloxacin Other (See Comments)     Interacts with other medication  Other reaction(s): Dizziness  Other reaction(s): Other  Interacts with other medication     Latex Itching and Rash     Other reaction(s): Other - Describe In Comment Field  Vaginal itching, burning  Other reaction(s): Intolerance      Review of Systems   Constitutional: Negative for fever.   Eyes: Negative.    Respiratory: Negative for shortness of breath.    Cardiovascular: Negative for chest pain.   Gastrointestinal: Negative for abdominal pain, nausea and vomiting.   Genitourinary: Negative for flank pain.   Musculoskeletal: Positive for arthralgias (left hip). Negative for neck pain.        Hip dislocation   Neurological: Negative for headaches.   Psychiatric/Behavioral: Negative.    All other systems reviewed and are negative.    Physical Exam   BP: 121/78  Pulse: 87  Heart Rate: 82  Temp: 98.7  F (37.1   C)  Resp: 16  Weight: 76.3 kg (168 lb 3.2 oz)  SpO2: 97 %    Physical Exam  Physical Exam   Constitutional:   well nourished, well developed, appears uncomfortable  HENT:   Head: Normocephalic and atraumatic.   Neck:   no adenopathy, no bony tenderness  Cardiovascular: regular rate and rhythm without murmurs or gallops  Pulmonary/Chest: Clear to auscultation bilaterally, with no wheezes or retractions. No respiratory distress.  GI: Soft with good bowel sounds.  Non-tender, non-distended  Back:  No bony or CVA tenderness   Pelvis: stable  Musculoskeletal: Left lower extremity is foreshortened and internally rotated, tender over the left hip and hip dislocated, patient has healed surgical scars over both knees.  She is neurovascular and light touch intact with good distal pulses.  Skin: Skin is warm and dry. No rash noted.   Neurological: alert and oriented to person, place, and time. Nonfocal exam  Psychiatric:  normal mood and affect.   ED Course   3:05 PM  The patient was seen and examined by Dr. Isaacs in Room 18.    ED Course     Procedures       [facility] Procedure Note        Sedation:      Performed by: Tomeka Isaacs  Authorized by: Tomeka Isaacs    Pre-Procedure Assessment done at 1530.    Expected Level:  Deep Sedation    Indication:  Sedation is required to allow for joint reduction    Consent obtained from patient after discussing the risks, benefits and alternatives.    PO Intake:  Appropriately NPO for procedure    ASA Class:  Class 2 - MILD SYSTEMIC DISEASE, NO ACUTE PROBLEMS, NO FUNCTIONAL LIMITATIONS.    Mallampati:  Grade 2:  Soft palate, base of uvula, tonsillar pillars, and portion of posterior pharyngeal wall visible    Lungs: Lungs Clear with good breath sounds bilaterally.     Heart: Normal heart sounds and rate    Focused history and physical completed prior to procedure. I have reviewed the lab findings, diagnostic data, medications, and the plan for sedation. I have determined  this patient to be an appropriate candidate for the planned sedation and procedure and have reassessed the patient IMMEDIATELY PRIOR to sedation and procedure.      Sedation Post Procedure Summary:    Prior to the start of the procedure and with procedural staff participation, I verbally confirmed the patient s identity using two indicators, relevant allergies, that the procedure was appropriate and matched the consent or emergent situation, and that the correct equipment/implants were available. Immediately prior to starting the procedure I conducted the Time Out with the procedural staff and re-confirmed the patient s name, procedure, and site/side. (The Joint Commission universal protocol was followed.)  Yes      Sedatives: Fentanyl and Propofol    Vital signs, airway, End Tidal CO2 and pulse oximetry were monitored and remained stable throughout the procedure and sedation was maintained until the procedure was complete.  The patient was monitored by staff until sedation discharge criteria were met.    Patient tolerance: Patient tolerated the procedure well with no immediate complications.    Time of sedation in minutes:  30 minutes from beginning to end of physician one to one monitoring.          Critical Care time:  none         Results for orders placed or performed during the hospital encounter of 07/12/18 (from the past 24 hour(s))   XR Pelvis w Hip Left G/E 2 Views    Narrative    XR PELVIS AND HIP LEFT 2 VIEWS 7/12/2018 3:42 PM    COMPARISON: 6/14/2018    HISTORY: Arthroplasty, likely dislocated.      Impression    IMPRESSION: Bilateral total hip arthroplasties, dislocated on the  left. No fractures are seen.    KIARA STINSON MD   CBC with platelets differential   Result Value Ref Range    WBC 8.2 4.0 - 11.0 10e9/L    RBC Count 3.89 3.8 - 5.2 10e12/L    Hemoglobin 11.3 (L) 11.7 - 15.7 g/dL    Hematocrit 35.2 35.0 - 47.0 %    MCV 91 78 - 100 fl    MCH 29.0 26.5 - 33.0 pg    MCHC 32.1 31.5 - 36.5 g/dL     RDW 14.3 10.0 - 15.0 %    Platelet Count 267 150 - 450 10e9/L    Diff Method Automated Method     % Neutrophils 57.0 %    % Lymphocytes 36.2 %    % Monocytes 4.4 %    % Eosinophils 2.0 %    % Basophils 0.2 %    % Immature Granulocytes 0.2 %    Nucleated RBCs 0 0 /100    Absolute Neutrophil 4.7 1.6 - 8.3 10e9/L    Absolute Lymphocytes 3.0 0.8 - 5.3 10e9/L    Absolute Monocytes 0.4 0.0 - 1.3 10e9/L    Absolute Eosinophils 0.2 0.0 - 0.7 10e9/L    Absolute Basophils 0.0 0.0 - 0.2 10e9/L    Abs Immature Granulocytes 0.0 0 - 0.4 10e9/L    Absolute Nucleated RBC 0.0    INR   Result Value Ref Range    INR 0.95 0.86 - 1.14   Basic metabolic panel   Result Value Ref Range    Sodium 142 133 - 144 mmol/L    Potassium 3.8 3.4 - 5.3 mmol/L    Chloride 109 94 - 109 mmol/L    Carbon Dioxide 27 20 - 32 mmol/L    Anion Gap 6 3 - 14 mmol/L    Glucose 97 70 - 99 mg/dL    Urea Nitrogen 6 (L) 7 - 30 mg/dL    Creatinine 0.82 0.52 - 1.04 mg/dL    GFR Estimate 77 >60 mL/min/1.7m2    GFR Estimate If Black >90 >60 mL/min/1.7m2    Calcium 8.3 (L) 8.5 - 10.1 mg/dL   ABO/Rh type and screen   Result Value Ref Range    ABO A     RH(D) Pos     Antibody Screen Neg     Test Valid Only At          [facility]    Specimen Expires 07/15/2018            Labs Ordered and Resulted from Time of ED Arrival Up to the Time of Departure from the ED   CBC WITH PLATELETS DIFFERENTIAL - Abnormal; Notable for the following:        Result Value    Hemoglobin 11.3 (*)     All other components within normal limits   BASIC METABOLIC PANEL - Abnormal; Notable for the following:     Urea Nitrogen 6 (*)     Calcium 8.3 (*)     All other components within normal limits   INR   INDWELLING URINARY CATHETER (CRANE)   DISCONTINUE INDWELLING URINARY CATHETER (CRANE)   BLADDER SCAN   ABO/RH TYPE AND SCREEN            Assessments & Plan (with Medical Decision Making)       I have reviewed the nursing notes.  Emergency Department  course:  The patient was seen and examined at 1503 pm. I consulted Orthopedics at 1510 pm, shortly after evaluating the patient.   Chart review shows she has had the following surgeries:  SURGERIES:  1. 2005, L femoral head resurfacing hemiarthroplasty  2. 6/5/2007, R HAJA  3. 2/2/2010, right total knee arthroplasty. No patella resurfacing  4. 6/12/2012, R patella resurfacing  5. 1/8/2016, L TKA ([facility]) [facility]  6. 4/3/2018, Revision L surface replacement bartolo to HAJA ([facility]) [facility]  7. 4/19/18 , 5/18/2018, 5/23/2018  Closed reductions L HAJA  8. 5/25/2018, Revision L HAJA to larger head size and cup repositioning ([facility]) [facility]. Cultures (-) x 3  X-ray of left hip and pelvis shows bilateral total hip arthroplasties which are dislocated on the left.  There were no fractures seen.  Laboratory studies show an unremarkable CBC and basic metabolic panel.  Hemoglobin is low at 11.3.  INR is 0.95.  Type and screen was obtained.  The patient is a positive.  I treated the patient with repeated doses of narcotics IV in the ED.  She was also made n.p.o.  PROCEDURE NOTE: Procedural sedation for dislocation reduction:  Please see details in procedure note above.  I performed a procedural sedation and orthopedics attempted the fracture reduction.  Please see orthopedic note for details.  During the procedure, the patient received a total of 150 mcg of fentanyl IV and 240 mg of propofol IV.  She was sedated adequately.  She occasionally required additional oxygen by blow-by but her oxygen saturations and end-tidal CO2 remained normal.  However, despite multiple efforts, the procedure was unsuccessful.  Part of the procedure was done with repeat x-rays of the hip during the procedure itself.  These x-rays show continued left hip dislocation.  The patient is a 38-year-old female here with a a prostatic left hip dislocation.  Despite multiple attempts, the this was not able to be relocated in the ED.  She has required substantial amounts of  narcotic pain medications during her ED course. She has had difficulty with reduction of the dislocations in the past.  She will be admitted to the orthopedic service under the care of Dr. Saleem and be taken to the operating room for dislocation reduction.     I have reviewed the findings, diagnosis, plan and need for follow up with the patient.    New Prescriptions    No medications on file       Final diagnoses:   Dislocation of internal left hip prosthesis, initial encounter (H)   I, Shaq Rodriguez, am serving as a trained medical scribe to document services personally performed by Tomeka Isaacs MD, based on the provider's statements to me.   ITomeka MD, was physically present and have reviewed and verified the accuracy of this note documented by Shaq Rodriguez.   This note was created in part by the use of Dragon voice recognition dictation system. Inadvertent grammatical errors and typographical errors may still exist.  Tomeka Isaacs MD      7/12/2018   [facility] EMERGENCY DEPARTMENT     Tomeka Isaacs MD  07/12/18 [id number]

## 2018-07-12 NOTE — ANESTHESIA PREPROCEDURE EVALUATION
Anesthesia Evaluation     . Pt has had prior anesthetic. Type: General    No history of anesthetic complications          ROS/MED HX    ENT/Pulmonary:     (+)tobacco use (last cigarette 1230pm), Current use Intermittent asthma Last exacerbation: 2 mo ago,Treatment: Inhaler prn,  , . .    Neurologic:     (+)seizures last seizure: 2014     Cardiovascular:  - neg cardiovascular ROS       METS/Exercise Tolerance:  >4 METS   Hematologic:  - neg hematologic  ROS       Musculoskeletal:   (+) , , other musculoskeletal- hip dislocation      GI/Hepatic:     (+) GERD Asymptomatic on medication,       Renal/Genitourinary:  - ROS Renal section negative       Endo:  - neg endo ROS       Psychiatric:     (+) psychiatric history anxiety and depression      Infectious Disease:  - neg infectious disease ROS       Malignancy:   (+) Malignancy History of Lymphoma/Leukemia  Lymph CA Remission status post,         Other:    (+) No chance of pregnancy H/O Chronic Pain,H/O chronic opiod use ,                    Physical Exam  Normal systems: cardiovascular and pulmonary    Airway   Mallampati: II  TM distance: >3 FB  Neck ROM: full    Dental   (+) upper dentures and lower dentures    Cardiovascular   Rhythm and rate: regular and normal  (-) no murmur    Pulmonary    breath sounds clear to auscultation                        ANESTHESIA PREOP EVALUATION    Procedure: Procedure(s):  Closed Reduction of Left Hip   C-Arm Needed  Wixon Bed Needed - Wound Class: I-Clean  *Possible Open Reduction Fixation Of Left Hip  - Wound Class: I-Clean    HPI: Alethea Patel is a 37 year old female who is presenting for above stated procedure.    PMHx/PSHx/ROS:  Past Medical History:   Diagnosis Date     Acute lymphoblastic leukemia in remission (H)      Anxiety      Depression      Depressive disorder      Gastro-oesophageal reflux disease      IBS (irritable bowel syndrome)      Osteonecrosis (H)      Osteonecrosis (H)        Past Surgical History:    Procedure Laterality Date     ARTHROPLASTY KNEE Left 1/8/2016    Procedure: ARTHROPLASTY KNEE;  Surgeon: Cheko Saleem MD;  [facility]     ARTHROPLASTY PATELLO-FEMORAL (KNEE)  6/12/2012    Procedure: ARTHROPLASTY PATELLO-FEMORAL (KNEE);  Right Knee Patella Resurfacing;  Surgeon: Cehko Saleem MD;  [facility]     ARTHROPLASTY REVISION HIP Left 4/3/2018    Procedure: ARTHROPLASTY REVISION HIP;  Conversion of Left Hip Rigo-Arthroplasty to Left Total Hip Replacement;  Surgeon: Cheko Saleem MD;  [facility]     ARTHROPLASTY REVISION HIP Left 5/25/2018    Procedure: ARTHROPLASTY REVISION HIP;  Revision Left Total Hip Arthroplasty ;  Surgeon: Cheko Saleem MD;  [facility]     C PELVIS/HIP JOINT SURGERY UNLISTED       CLOSED REDUCTION HIP Left 5/18/2018    Procedure: CLOSED REDUCTION HIP;  Closed Reduction Left Hip, and Aspiration Culture   Left Hip;  Surgeon: Cheko Saleem MD;  [facility]     CLOSED REDUCTION HIP Left 5/23/2018    Procedure: CLOSED REDUCTION HIP;  Closed Reduction Left Hip;  Surgeon: Cheko Saleem MD;  [facility]     HIP SURGERY  5/31/2005    Left hip resurfacing hemiarthroplasty     HIP SURGERY  6/5/07     INJECT BLOCK MEDIAL BRANCH CERVICAL/THORACIC/LUMBAR N/A 1/9/2016    Procedure: INJECT BLOCK MEDIAL BRANCH CERVICAL / THORACIC / LUMBAR;  Surgeon: GENERIC ANESTHESIA PROVIDER;  [facility]     JOINT REPLACEMENT  2/2/2010    Rt TKA     KNEE SURGERY       ORTHOPEDIC SURGERY      right foot surgery       ROS as stated above    Soc Hx:   Social History   Substance Use Topics     Smoking status: Current Every Day Smoker     Packs/day: 1.00     Years: 10.00     Types: Cigarettes     Start date: 5/29/1997     Smokeless tobacco: Never Used     Alcohol use No      Comment: very rare       Allergies:   Allergies   Allergen Reactions     Chantix [Varenicline] Nausea and Vomiting     Other reaction(s): Vomiting     No Clinical Screening - See Comments      Other  reaction(s): Unknown Reaction     Seasonal Allergies      Ciprofloxacin Other (See Comments)     Interacts with other medication  Other reaction(s): Dizziness  Other reaction(s): Other  Interacts with other medication     Latex Itching and Rash     Other reaction(s): Other - Describe In Comment Field  Vaginal itching, burning  Other reaction(s): Intolerance       Meds:     (Not in a hospital admission)    Current Outpatient Prescriptions   Medication Sig Dispense Refill     albuterol (PROAIR HFA/PROVENTIL HFA/VENTOLIN HFA) 108 (90 BASE) MCG/ACT Inhaler Inhale 2 puffs into the lungs every 6 hours as needed for shortness of breath / dyspnea or wheezing       B Complex Vitamins (B COMPLEX PO) Take 1 tablet by mouth daily Dose unknown; OTC       BACLOFEN PO Take 20 mg by mouth 4 times daily        BuPROPion HCl (WELLBUTRIN SR PO) Take 150 mg by mouth 2 times daily       DULoxetine (CYMBALTA) 60 MG EC capsule Take 60 mg by mouth 2 times daily        Escitalopram Oxalate (LEXAPRO PO) Take 20 mg by mouth daily       fluticasone (FLONASE) 50 MCG/ACT nasal spray Spray 2 sprays into both nostrils daily as needed for rhinitis or allergies       gabapentin (NEURONTIN) 300 MG capsule Take 900 mg by mouth 3 times daily        IBUPROFEN PO Take 200 mg by mouth every 6 hours as needed for moderate pain       levothyroxine (SYNTHROID, LEVOTHROID) 75 MCG tablet Take 75 mcg by mouth daily       LORazepam (ATIVAN) 1 MG tablet Take 1-2 mg by mouth 3 times daily Max 5 tablets daily       MAGNESIUM OXIDE PO Take 200 mg by mouth daily       METHADONE HCL PO Take 40 mg by mouth every 8 hours       montelukast (SINGULAIR) 10 MG tablet Take 10 mg by mouth At Bedtime       Multiple Vitamins-Minerals (MULTIVITAMIN ADULT PO) Take 1 tablet by mouth daily       Nutritional Supplements (MELATONIN PO) Take 3 mg by mouth At Bedtime        omeprazole (PRILOSEC OTC) 20 MG tablet Take 20 mg by mouth daily        order for DME Equipment being ordered:  [employer] ()  Treatment Diagnosis: Limited independence in ADLs/mobility due to hip precautions and recent hip surgery 1 each 0     order for DME [facility]  [facility]  [phone]     Directions: Advance as Tolerated and Instructed by MD 1 Device 0     oxyCODONE IR (ROXICODONE) 30 MG tablet Take 120 mg by mouth every 4 hours as needed for moderate to severe pain       POTASSIUM PO Take 1 tablet by mouth daily Dose unknown; OTC       Prochlorperazine Maleate (COMPAZINE PO) Take 10 mg by mouth every 6 hours as needed.         QUEtiapine (SEROQUEL) 25 MG tablet Take 25 mg by mouth At Bedtime        ranitidine (ZANTAC) 150 MG tablet Take 150 mg by mouth At Bedtime        senna-docusate (SENOKOT-S;PERICOLACE) 8.6-50 MG per tablet Take 2 tablets by mouth 2 times daily 30 tablet 0     TIZANIDINE HCL PO Take 2 mg by mouth every 6 hours as needed for muscle spasms       warfarin (COUMADIN) 5 MG tablet Take 1 tablet (5 mg) by mouth daily 30 tablet 0       Physical Exam:  VS: Temp:  [37.1  C (98.7  F)] 37.1  C (98.7  F)  Pulse:  [87] 87  Heart Rate:  [] 91  Resp:  [7-27] 8  BP: (118-148)/() 144/95  SpO2:  [97 %-100 %] 99 %   99%, Weight   Wt Readings from Last 2 Encounters:   07/12/18 76.3 kg (168 lb 3.2 oz)   06/14/18 75.7 kg (166 lb 14.4 oz)       Labs:    BMP:  Recent Labs   Lab Test  05/22/18 2057   NA  144   POTASSIUM  4.3   CHLORIDE  108   CO2  29   BUN  9   CR  0.89   GLC  99   APOLONIA  8.6     LFTs:   Recent Labs   Lab Test  01/10/16   0634   PROTTOTAL  5.3*   ALBUMIN  2.5*   BILITOTAL  0.2   ALKPHOS  64   AST  11   ALT  15     CBC:   Recent Labs   Lab Test  05/22/18 2057   WBC  9.2   RBC  3.90   HGB  11.3*   HCT  34.7*   MCV  89   MCH  29.0   MCHC  32.6   RDW  13.5   PLT  226     Coags:  Recent Labs   Lab Test  05/22/18 2057   INR  0.97           Patient discussed with Staff Anesthesiologist.    Salvatore Street  Anesthesia Resident CA-2  Pager [phone]  May 24, 2018, 6:19 PM    Anesthesia  Plan      History & Physical Review  History and physical reviewed and following examination; no interval change.    ASA Status:  3 .    NPO Status:  > 8 hours and > 2 hours (clears 430pm, solids yesterday)    Plan for General and ETT with Intravenous induction. Maintenance will be Balanced.    PONV prophylaxis:  Ondansetron (or other 5HT-3) and Dexamethasone or Solumedrol  Discussed risks of anesthesia including nausea, vomiting, sore throat, dental damage, neurologic complications, cardiopulmonary complications, blood transfusion, and serious complications.    Alethea Patel is a 38 year old female with multiple hip dislocations requiring revision of hip arthroplasty with Dr. Saleem on 5/25/2018, now with hip dislocation. PMH significant for previous uncomplicated recent anesthetics.     Patient has received fentanyl, hydromorphone, and morphine in ED. Reports that none of them helped, and she is having muscle spasms. Will administer home gabapentin, tizanidine, baclofen as doses have been missed and hopefully these will help with muscle spasms. Took home morphine this afternoon.       Postoperative Care  Postoperative pain management:  Multi-modal analgesia.      Consents  Anesthetic plan, risks, benefits and alternatives discussed with:  Patient.  Use of blood products discussed: Yes.   Use of blood products discussed with Patient.  Consented to blood products.  .                          .

## 2018-07-13 VITALS
SYSTOLIC BLOOD PRESSURE: 113 MMHG | RESPIRATION RATE: 12 BRPM | BODY MASS INDEX: 24.84 KG/M2 | DIASTOLIC BLOOD PRESSURE: 78 MMHG | TEMPERATURE: 98.4 F | WEIGHT: 168.2 LBS | HEART RATE: 87 BPM | OXYGEN SATURATION: 96 %

## 2018-07-13 PROCEDURE — A9270 NON-COVERED ITEM OR SERVICE: HCPCS | Mod: GY

## 2018-07-13 PROCEDURE — 25000132 ZZH RX MED GY IP 250 OP 250 PS 637: Mod: GY

## 2018-07-13 PROCEDURE — 99231 SBSQ HOSP IP/OBS SF/LOW 25: CPT | Performed by: INTERNAL MEDICINE

## 2018-07-13 PROCEDURE — 99207 ZZC CONSULT E&M CHANGED TO SUBSEQUENT LEVEL: CPT | Performed by: INTERNAL MEDICINE

## 2018-07-13 RX ADMIN — B-COMPLEX W/ C & FOLIC ACID TAB 1 TABLET: TAB at 09:00

## 2018-07-13 RX ADMIN — OXYCODONE HYDROCHLORIDE 120 MG: 30 TABLET ORAL at 06:13

## 2018-07-13 RX ADMIN — GABAPENTIN 900 MG: 300 CAPSULE ORAL at 14:25

## 2018-07-13 RX ADMIN — OXYCODONE HYDROCHLORIDE 120 MG: 30 TABLET ORAL at 14:24

## 2018-07-13 RX ADMIN — Medication 2.5 MEQ: at 09:00

## 2018-07-13 RX ADMIN — DULOXETINE HYDROCHLORIDE 60 MG: 60 CAPSULE, DELAYED RELEASE ORAL at 06:46

## 2018-07-13 RX ADMIN — IBUPROFEN 200 MG: 200 TABLET, FILM COATED ORAL at 06:26

## 2018-07-13 RX ADMIN — OXYCODONE HYDROCHLORIDE 120 MG: 30 TABLET ORAL at 10:31

## 2018-07-13 RX ADMIN — OMEPRAZOLE 20 MG: 20 CAPSULE, DELAYED RELEASE ORAL at 06:46

## 2018-07-13 RX ADMIN — METHADONE HYDROCHLORIDE 40 MG: 10 TABLET ORAL at 08:59

## 2018-07-13 RX ADMIN — LORAZEPAM 1 MG: 0.5 TABLET ORAL at 14:25

## 2018-07-13 RX ADMIN — ESCITALOPRAM OXALATE 20 MG: 20 TABLET ORAL at 06:46

## 2018-07-13 RX ADMIN — MULTIPLE VITAMINS W/ MINERALS TAB 1 TABLET: TAB at 09:00

## 2018-07-13 RX ADMIN — BUPROPION HYDROCHLORIDE 150 MG: 150 TABLET, FILM COATED, EXTENDED RELEASE ORAL at 06:46

## 2018-07-13 RX ADMIN — LORAZEPAM 1 MG: 0.5 TABLET ORAL at 08:59

## 2018-07-13 RX ADMIN — GABAPENTIN 900 MG: 300 CAPSULE ORAL at 06:46

## 2018-07-13 RX ADMIN — BACLOFEN 20 MG: 20 TABLET ORAL at 14:25

## 2018-07-13 RX ADMIN — LEVOTHYROXINE SODIUM 75 MCG: 75 TABLET ORAL at 06:47

## 2018-07-13 RX ADMIN — OXYCODONE HYDROCHLORIDE 120 MG: 30 TABLET ORAL at 02:02

## 2018-07-13 RX ADMIN — SENNOSIDES AND DOCUSATE SODIUM 2 TABLET: 8.6; 5 TABLET ORAL at 09:00

## 2018-07-13 RX ADMIN — BACLOFEN 20 MG: 20 TABLET ORAL at 06:28

## 2018-07-13 NOTE — PLAN OF CARE
Problem: Patient Care Overview  Goal: Plan of Care/Patient Progress Review  PT orders received for PT evaluation and treatment: post-op.  Per MD orders pt is to have wear hip abduction brace (set a 40 degrees hip flexion restriction and 20 degrees abducted bilaterally) with all OOB activity and is NWB bilaterally.  Pt does not have hip abduction brace here. Per OT pt is not going to get the one she has from home and is not going to wear the brace when at home.  Per chart and OT pt has been getting into a wheelchair with assist from PCA and going out to smoke without hip abduction brace.  Pt not appropriate for PT evaluation at this time, will place pt on hold until pt has hip abduction brace.

## 2018-07-13 NOTE — PLAN OF CARE
"Called into this patients room, \" Start the paperwork for me to go home now AMA\" called the resident Justin Colbert. And informed him that she wants to go AMA. This patient was encouraged to stay until she was able to have someone go and get her brace, to use her abduction pillow, not to bear weight on her surgical leg. She does not want to have another brace made here. The PCA \" She cannot afford to have another one made\" Dr. Akbar was informed that she is going AMA.  "

## 2018-07-13 NOTE — PLAN OF CARE
Problem: Surgery Nonspecified (Adult)  Goal: Signs and Symptoms of Listed Potential Problems Will be Absent, Minimized or Managed (Surgery Nonspecified)  Signs and symptoms of listed potential problems will be absent, minimized or managed by discharge/transition of care (reference Surgery Nonspecified (Adult) CPG).  Outcome: Improving  Pt arrived to floor around 2345. Was extremely drowsy and difficult to arouse upon arrival.  VS: Requiring O2,  0615:took nasal cannula off and refused to put back on, stating around 86 on RA   O2: 3LPM via nasal cannula, changed to Oxy+ mask at 0230, decreased to 2LPM at 0520   Output: Jordan patent   Last BM: Unable to assess due to drowsiness   Activity: Bedrest throughout shift   Skin: Intact ex scars, bruising   Pain: Managed with 120mg oxycodone   CMS: Unable to assess due to drowsiness   Dressing: No dressing present   Diet: Clears, sips of water tolerated   LDA: PIV infusing   Equipment: IV pole, Capno, PCDs, abductor pillow   Plan: TBD   Additional Info: Has friend of 25+ years/PCA in room. Removed abductor pillow and refused to put back on. Unreasonable demands regarding meds (see previous/post notes).

## 2018-07-13 NOTE — ANESTHESIA POSTPROCEDURE EVALUATION
Patient: Alethea Patel    Procedure(s):  Closed Reduction of Left Total Hip Arthroplasty - Wound Class: I-Clean    Diagnosis:Dislocated Hip   Diagnosis Additional Information: No value filed.    Anesthesia Type:  General, ETT    Note:  Anesthesia Post Evaluation    Patient location during evaluation: PACU  Patient participation: Able to fully participate in evaluation  Level of consciousness: sleepy but conscious  Pain management: adequate  Airway patency: patent  Cardiovascular status: hemodynamically stable  Respiratory status: nasal cannula and spontaneous ventilation  Hydration status: acceptable  PONV: none     Anesthetic complications: None    Comments: Patient comfortable, sleepy. Arousable, but falls back to sleep easily after answering questions. Patient's sister states this is normal for her in the evening. Patient will be monitored with end tidal on the floor.        Last vitals:  Vitals:    07/12/18 2242 07/12/18 2245 07/12/18 2300   BP:  103/70 108/72   Pulse:      Resp: 18 10 13   Temp:   37.1  C (98.8  F)   SpO2: 98% 98% 96%         Electronically Signed By: Carla Coleman MD  July 13, 2018  4:37 AM

## 2018-07-13 NOTE — ANESTHESIA CARE TRANSFER NOTE
Patient: Alethea Patel    Procedure(s):  Closed Reduction of Left Total Hip Arthroplasty - Wound Class: I-Clean    Diagnosis: Dislocated Hip   Diagnosis Additional Information: No value filed.    Anesthesia Type:   General, ETT     Note:  Airway :Face Mask  Patient transferred to:PACU  Comments: Transported to PACU with FM O2.  VSS.  Report given.  Patient comfortable.Handoff Report: Identifed the Patient, Identified the Reponsible Provider, Reviewed the pertinent medical history, Discussed the surgical course, Reviewed Intra-OP anesthesia mangement and issues during anesthesia, Set expectations for post-procedure period and Allowed opportunity for questions and acknowledgement of understanding      Vitals: (Last set prior to Anesthesia Care Transfer)    CRNA VITALS  7/12/2018 2016 - 7/12/2018 2046      7/12/2018             Resp Rate (observed): (!)  2                Electronically Signed By: VALENTIN Ann CRNA  July 12, 2018  8:46 PM

## 2018-07-13 NOTE — PROGRESS NOTES
I was contacted by the patient's nurse that Ms. Patel was leaving AMA when I was in clinic. I was notified by the nurse that she had been walking around, and had not been wearing her abduction pillow. I advised the nurse to strongly advocate for the patient to stay and not leave without having a brace placed on as she has history hip prosthesis dislocations and that this is for the safety of the patient and her well being.     Dr. Saleem was updated of the patient's status.     Justin Colbert MD  Orthopaedic Surgery Resident, PGY1   Pager: [phone]

## 2018-07-13 NOTE — UTILIZATION REVIEW
"  [facility] Utilization Review  Admission Status; Secondary Review Determination     Admission Date: 7/12/2018  2:54 PM      Under the authority of the Utilization Management Committee, the utilization review process indicated a secondary review on the above patient.  The review outcome is based on review of the medical records, discussions with staff, and applying clinical experience noted on the date of the review.        (X) Observation Status Appropriate - This patient does not meet hospital inpatient criteria and is placed in observation status. If this patient's primary payer is Medicare and was admitted as an inpatient, Condition Code 44 should be used and patient status changed to \"observation\".   () Observation Status concurrent Review           RATIONALE FOR DETERMINATION   38-year-old female with history of left HAJA, recurrent dislocations, admitted with left hip dislocation.  Patient failed close reduction in the ER, was closed reduced in OR under anesthesia. Patient required staying in [facility] PACU for a while due to nasal trumpet, was very sleepy when she arrived to the floor but arousable.  Patient is on chronic methadone. Patient admitted to orthopedic surgery, recommend nonweightbearing to lower extremities, brace bipeds bilateral, with abduction pillow in place.  Patient is on high-dose oxycodone, Seroquel, melatonin, Ativan, and had been hypoxic at last check.  Patient has been noncompliant with abductor pillow and weightbearing. Procedure is outpatient, DIscuss with Dr. Colbert, and waiting for brace, likely plan to discharge after brace available, [id number] change to observation status, and he will discuss with team and make the change.       The severity of illness, intensity of service provided, expected LOS make the care appropriate for observation status at this time.        The information on this document is developed by the utilization review team in order for the business office " to ensure compliance.  This only denotes the appropriateness of proper admission status and does not reflect the quality of care rendered.         The definitions of Inpatient Status and Observation Status used in making the determination above are those provided in the CMS Coverage Manual, Chapter 1 and Chapter 6, section 70.4.      Sincerely,       Rajinder Vicente MD  Physician Advisor  Utilization Review-[place]    Phone: [phone]

## 2018-07-13 NOTE — CONSULTS
[facility] Medicine Consult    Reason for consult: medical management         Assessment and Recommendation:   37 yo woman POD 0 for left total hip arthroplasty.    Postoperative:   - continue pain control with oxycodone, methadone and bowel regimen  - ADAT  - continue home gabapentin, tiznadine to help with pain  - accuchecks POD 1 and 2  - DVT ppx per primary team    Depression:  - continue home bupropion, escitalopram, duloxetine, lorazepam , seroquel    Asthma:  - continue home fluticasone, monteleukast    Hypothyroid:  - continue home levothyroxine    GERD:  - continue ranitidine, PPI    Tobacco use:   - current smoker. Recommend cessation    Karo Taveras MD           History of Present Illness:   This patient is a 37 yo woman with PMH of ALL and osteonecrosis of the hip. She is on methadone chronically. She has had bilateral hip surgeries and three closed reductions earlier this year. She presented to the ED today with dislocation again and failed closed reduction. She underwent left total arthroplasty this afternoon and had to stay in the PACU for awhile d/t reliance on a nasal trumpet. She was very sleepy when she came to the floor but arousable and protecting her airway.             Past Medical History:     Past Medical History:   Diagnosis Date     Acute lymphoblastic leukemia in remission (H)      Anxiety      Depression      Depressive disorder      Gastro-oesophageal reflux disease      IBS (irritable bowel syndrome)      Osteonecrosis (H)      Osteonecrosis (H)              Past Surgical History:      Past Surgical History:   Procedure Laterality Date     ARTHROPLASTY KNEE Left 1/8/2016    Procedure: ARTHROPLASTY KNEE;  Surgeon: Cheko Saleem MD;  [facility]     ARTHROPLASTY PATELLO-FEMORAL (KNEE)  6/12/2012    Procedure: ARTHROPLASTY PATELLO-FEMORAL (KNEE);  Right Knee Patella Resurfacing;  Surgeon: Cheko Saleem MD;  [facility]     ARTHROPLASTY REVISION HIP Left 4/3/2018     Procedure: ARTHROPLASTY REVISION HIP;  Conversion of Left Hip Rigo-Arthroplasty to Left Total Hip Replacement;  Surgeon: Cheko Saleem MD;  [facility]     ARTHROPLASTY REVISION HIP Left 5/25/2018    Procedure: ARTHROPLASTY REVISION HIP;  Revision Left Total Hip Arthroplasty ;  Surgeon: Cheko Saleem MD;  [facility]     C PELVIS/HIP JOINT SURGERY UNLISTED       CLOSED REDUCTION HIP Left 5/18/2018    Procedure: CLOSED REDUCTION HIP;  Closed Reduction Left Hip, and Aspiration Culture   Left Hip;  Surgeon: Cheko Saleem MD;  [facility]     CLOSED REDUCTION HIP Left 5/23/2018    Procedure: CLOSED REDUCTION HIP;  Closed Reduction Left Hip;  Surgeon: Cheko Saleem MD;  [facility]     HIP SURGERY  5/31/2005    Left hip resurfacing hemiarthroplasty     HIP SURGERY  6/5/07     INJECT BLOCK MEDIAL BRANCH CERVICAL/THORACIC/LUMBAR N/A 1/9/2016    Procedure: INJECT BLOCK MEDIAL BRANCH CERVICAL / THORACIC / LUMBAR;  Surgeon: GENERIC ANESTHESIA PROVIDER;  [facility]     JOINT REPLACEMENT  2/2/2010    Rt TKA     KNEE SURGERY       ORTHOPEDIC SURGERY      right foot surgery             Social History:     Social History   Substance Use Topics     Smoking status: Current Every Day Smoker     Packs/day: 1.00     Years: 10.00     Types: Cigarettes     Start date: 5/29/1997     Smokeless tobacco: Never Used     Alcohol use No      Comment: very rare             Family History:     Family History   Problem Relation Age of Onset     Migraines Son      Soft Tissue Cancer Brother      Low Back Problems Mother      Family history reviewed          Medications:     Current Facility-Administered Medications   Medication     [Auto Hold] fentaNYL (PF) (SUBLIMAZE) injection 100 mcg     fentaNYL (PF) (SUBLIMAZE) injection 50 mcg     HYDROmorphone (PF) (DILAUDID) injection 0.4-0.6 mg     lactated ringers infusion     naloxone (NARCAN) injection 0.1-0.4 mg     ondansetron (ZOFRAN-ODT) ODT tab 4 mg    Or      ondansetron (ZOFRAN) injection 4 mg     ORAL Pain Medications -  may administer as ordered by surgeon for take home use             Review of Systems:   The Review of Systems is negative other than noted in the HPI           Physical Exam:   Vitals were reviewed  Temp: 97.3  F (36.3  C) Temp src: Axillary BP: 118/86 Pulse: 87 Heart Rate: 98 Resp: 11 SpO2: 94 % O2 Device: Simple face mask (with capnography, mask not able to fully seal to pt's face) Oxygen Delivery: 4 LPM  General: NAD  HEENT: no scleral icterus, MMM  Pulm: CTAB  CV: RRR  Abd: soft, nontender  Extremities: No edema  Neuro:somnolent, arouses with stimulus           Data:   Lab and imaging data from this admission reviewed.      Karo Taveras MD

## 2018-07-13 NOTE — PLAN OF CARE
Problem: Patient Care Overview  Goal: Plan of Care/Patient Progress Review  OT:  Per chart, patient needs abduction brace and is NWB B LE.  Observed patient up in wheelchair this morning without brace.  Spoke with patient, patient reports she will not be getting the brace from home and does not plan to wear brace at home.  Patient very sleepy, falling asleep during conversation.  Patient asking if I could help let the team know she is ready for DC, explained that therapy is unable to work with patient against surgeon's orders.  Will complete orders at this time.
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
## 2018-07-13 NOTE — OP NOTE
Procedure Date: 07/12/2018      SURGEON:  Cheko Saleem MD      ASSISTANT:  Justin Colbert MD      PREOPERATIVE DIAGNOSIS:  Dislocation of left revision total hip arthroplasty.      POSTOPERATIVE DIAGNOSIS:  Dislocation of left revision total hip arthroplasty.      PROCEDURE PERFORMED:  Closed reduction of left total hip arthroplasty and examination under anesthesia.      INDICATIONS:  This patient has had a primary hip replacement and subsequent revision hip arthroplasty performed for recurrent dislocation.  She has done well in the past 6 weeks, but developed a 1st episode of dislocation after a previously revised hip.  The mechanism of dislocation was unclear by patient history.      DESCRIPTION OF PROCEDURE:  The patient was placed on the operating table in the lateral decubitus position using the Wixson table castillo.  After induction of general anesthesia, the patient was placed in lateral decubitus position with the left hip turned up in the Wixson hip castillo.  I then applied traction, adduction and internal rotation and reduced the hip and subsequently externally rotated the hip.  The leg lengths were judged to be symmetric.  Intraoperative fluoroscopic examination confirmed concentric reduction of the hip joint.  Subsequent examination under anesthesia revealed the following:  The patient was stable in flexion to 90 degrees and internal rotation to 60 degrees, as well as full extension and external rotation of 60 degrees.  Furthermore, at 45 degrees of flexion and adduction in the position of sleep, internal rotation to 45 degrees was possible prior to any evidence of subluxation.  Given these findings, I elected to forego any attempt at open revision.  The patient was placed in an abductor pillow and taken to the recovery area in satisfactory condition.      Postoperative plan will be for the brace to be applied, maintaining thigh cuffs around both thighs with 20 degrees of fixed abduction bilaterally, as  well as 0-45 degrees of flexion allowable at each hip joint.         NANCY FERNANDO MD             D: 2018   T: 2018   MT: RAVIN      Name:     ELIZABETH ZAPATA   MRN:      [id number]        Account:        [id number]   :      1980           Procedure Date: 2018      Document: [id number]

## 2018-07-13 NOTE — PROGRESS NOTES
Pt woke up and requested all of her sleeping meds as well as her pain meds. Pt was not very happy when writer said that it would be unsafe to give her  Ativan 1-2mg , Seroquel 25mg, Melatonin 3mg and Oxycodone 120mg all at once since she just had surgery, has been sleep and her Capno numbers are not ideal at this time. (RR low 7-11, CO2 56-65, O2 95 on 3L NC).  120mg Oxycodone given. Pt and sister agreeable that RN round on patient in 1 hour and if everything was ok we would consider additional meds at that time.

## 2018-07-13 NOTE — BRIEF OP NOTE
[facility], [place]    Brief Operative Note    Pre-operative diagnosis: Dislocated Hip L hip  Post-operative diagnosis same  Procedure: Procedure(s):  Closed Reduction of Left Total Hip Arthroplasty - Wound Class: I-Clean  Surgeon: Surgeon(s) and Role:     * Cheko Saleem MD - Primary     * Justin Colbert MD - Resident - Assisting  Anesthesia: General   Estimated blood loss: None  Drains: None  Specimens: * No specimens in log *  Findings:   Left dislocated Hip  Complications: None.  Implants: None.    Plan:  - Admit to orthopaedic surgery  - Anticoagulation/DVT Prophylaxis: mechanical  - Antibiotics/Tetanus: none  - X-rays/Imaging: x-rays prior to discharge  - Activity: as tolerated  - Weight bearing: non-weight bearing on lower extremities, keep abduction pillow in place until patient is in a brace. Brace settings: thigh pads b/l, 20 deg abduction, 0-40 degrees flexion b/l.  - Pain control: oxycodone, methadone on board,   - Diet: as tolerated  - Follow-up: w/ Dr. Godoy pending  - Disposition: pending brace placement and pain control

## 2018-07-13 NOTE — PROGRESS NOTES
Orthopaedic Surgery Progress Note 07/13/2018    S: patient was sitting in bed with the Abduction pillow off, and her SCDs off. No acute events overnight.  Pain well controlled. Denies numbness or tingling. Denies chest pain, SOB, nausea/vomiting. Tolerating oral diet. No BM, has flatus. Urinary catheter in place    O:  Temp: 98.8  F (37.1  C) Temp src: Oral BP: 95/57 Pulse: 87 Heart Rate: 81 Resp: 9 SpO2: 97 % O2 Device: Oxymask Oxygen Delivery: 2 LPM    Exam:  Gen: No acute distress, resting comfortably in bed.  Resp: Non-labored breathing  MSK:  LE:  - SILT femoral/tibial/sural/saphenous/DP/SP nerves  - Fires quad,, GaSC, dorsiflexes, plantar flexes  - foot wwp      Recent Labs  Lab 07/12/18  1627   WBC 8.2   HGB 11.3*          Assessment: Alethea Patel is a 38 year old s/p revision of left HAJA due to recurrent dislocations on 5/25/2018 who was closed reduced on 7/12/2018.     Had an extensive discussion with patient about keeping her abduction pillow on and wearing SCDs while in bed. Patient was adamant that she can keep her legs abducted without the pillow. Patient adamant about wanting to go home soon as possible and she does not want to bring her brace from home prior to going home and does not want to get fitted for brace here because she has a brace at home. I encouraged patient to have the brace placed prior to going home.    Plan:  - Admit to orthopaedic surgery  - Anticoagulation/DVT Prophylaxis: mechanical  - Antibiotics/Tetanus: none  - X-rays/Imaging: x-rays prior to discharge  - Activity: as tolerated  - Weight bearing: non-weight bearing on lower extremities, keep abduction pillow in place until patient is in a brace. Brace settings: thigh pads b/l, 20 deg abduction, 0-40 degrees flexion b/l.  - Pain control: oxycodone, methadone on board,   - Diet: as tolerated  - Follow-up: w/ Dr. Saleem pending  - Disposition: pending brace placement and pain control     Assessment and Plan discussed with  Dr. Saleem and Dr. Hill.    Justin Colbert MD  Orthopaedic Surgery Resident, PGY1   Pager: [phone]    Please page me directly with any questions/concerns during regular weekday hours before 5pm. If there is no response, if it is a weekend, or if it is during evening hours then please page the orthopaedic surgery resident on call.

## 2018-07-13 NOTE — OR NURSING
Dr Zapata at bedside to assess pt. Pt typically is difficult to arouse from sleeping at home per pt's sister Lela, also at bedside. Pt does wake to repeated voice and gentle shaking. Will answer questions appropriately and will fall back asleep. Pt is able to follow commands, CMS is intact, deep breathing encouraged, lung sounds clear, no c/o pain or nausea. Pt will transfer to floor with 3 L NC and capnography with pulse ox. Report given to Mehrdad Cintron RN. Will transfer to floor.

## 2018-07-14 NOTE — DISCHARGE SUMMARY
ORTHOPAEDIC SURGERY DISCHARGE SUMMARY     Date of Admission: 7/12/2018  Date of Discharge: 7/13/2018  4:30 PM  Disposition: Left AMA  Staff Physician: Dr. Saleem  Primary Care Provider: [facility]    DISCHARGE DIAGNOSIS:  Dislocated Hip     PROCEDURES: Procedure(s):  CLOSED REDUCTION LEFT HIP  on 7/12/2018    BRIEF HISTORY:  Alethea Patel is a 38 year old female s/p revision of left HAJA due to recurrent dislocations on 5/25/2018  Who presented to ED with left hip pain on 7/12/2018. Patient was found to have left prosthesis hip dislocation on X-ray. The attempt made to close reduce the hip in the ED was unsuccessful. Patient's dislocated left hip prosthesis was closed reduced on 7/12/2018 in the OR with Dr. Saleem.    HOSPITAL COURSE:    The patient was admitted following the above listed procedures for pain control and rehabilitation. Alethea Patel did well post-operatively. Medicine was consulted post operatively to aid in management of medical co-morbidities. The patient received routine nursing cares and at the time of discharge was medically stable. Vital signs were stable throughout admission with some events of hypoventilation. The patient is tolerating a regular diet and had a montano in place prior to leaving, discontinued prior to leaving. Pain is now controlled on oral medications. Stool softeners have been used while taking pain medications to help prevent constipation. Patient has not been compliant with physician's recommendations to keep abduction pillow in place to prevent another hip dislocation. Alethea Patel is not deemed ready for discharge. Patient left hospital against medical advise. Patient was advised multiple times to not leave the hospital without a brace and consistently use abduction pillow and risks to her health for leaving against medical advise.     Antibiotics:  None  DVT prophylaxis:  Mechanical   PT Progress:  Not applicable   Pain Meds:  Not  applicable.  Inpatient Events: Patient left AMA.    PHYSICAL EXAM:    Previous physical exam prior to leaving AMA:  Gen: No acute distress, resting comfortably in bed.  Resp: Non-labored breathing  MSK:  LLE:  - SILT femoral/tibial/sural/saphenous/DP/SP nerves  - Fires quad, GaSC, dorsiflexes, plantar flexes  - foot wwp    FOLLOWUP:    Follow up with Dr. Saleem on previously schedule appointment.    No future appointments.    Orthopaedic Surgery appointments are at the [facility] [place] ([address] [place] [address]). Call [phone] to schedule a follow-up appointment at this location with your provider.     PLANNED DISCHARGE ORDERS:     DVT Prophylaxis: Not applicable     Activity: Not applicable     Wound Care: Not applicable      Discharge Medication List as of 7/13/2018  5:14 PM      CONTINUE these medications which have NOT CHANGED    Details   albuterol (PROAIR HFA/PROVENTIL HFA/VENTOLIN HFA) 108 (90 BASE) MCG/ACT Inhaler Inhale 2 puffs into the lungs every 6 hours as needed for shortness of breath / dyspnea or wheezing, Historical      B Complex Vitamins (B COMPLEX PO) Take 1 tablet by mouth daily Dose unknown; OTC, Historical      BACLOFEN PO Take 20 mg by mouth 4 times daily , Historical      BuPROPion HCl (WELLBUTRIN SR PO) Take 150 mg by mouth 2 times daily, Historical      DULoxetine (CYMBALTA) 60 MG EC capsule Take 60 mg by mouth 2 times daily , Historical      Escitalopram Oxalate (LEXAPRO PO) Take 20 mg by mouth daily, Historical      fluticasone (FLONASE) 50 MCG/ACT nasal spray Spray 2 sprays into both nostrils daily as needed for rhinitis or allergies, Historical      gabapentin (NEURONTIN) 300 MG capsule Take 900 mg by mouth 3 times daily , Historical      IBUPROFEN PO Take 200 mg by mouth every 6 hours as needed for moderate pain, Historical      levothyroxine (SYNTHROID, LEVOTHROID) 75 MCG tablet Take 75 mcg by mouth daily, Historical      LORazepam (ATIVAN) 1 MG  tablet Take 1-2 mg by mouth 3 times daily Max 5 tablets daily, Historical      MAGNESIUM OXIDE PO Take 200 mg by mouth daily, Historical      METHADONE HCL PO Take 40 mg by mouth every 8 hours, Historical      montelukast (SINGULAIR) 10 MG tablet Take 10 mg by mouth At Bedtime, Historical      Multiple Vitamins-Minerals (MULTIVITAMIN ADULT PO) Take 1 tablet by mouth daily, Historical      Nutritional Supplements (MELATONIN PO) Take 3 mg by mouth At Bedtime , Historical      omeprazole (PRILOSEC OTC) 20 MG tablet Take 20 mg by mouth daily , Historical      !! order for DME Equipment being ordered: [employer] Seat ()  Treatment Diagnosis: Limited independence in ADLs/mobility due to hip precautions and recent hip surgeryDisp-1 each, R-0, Local Print      !! order for DME [facility]  [facility]  [phone]     Directions: Advance as Tolerated and Instructed by MDDisp-1 Device, R-0, Local Print      oxyCODONE IR (ROXICODONE) 30 MG tablet Take 120 mg by mouth every 4 hours as needed for moderate to severe pain, Historical      POTASSIUM PO Take 1 tablet by mouth daily Dose unknown; OTC, Historical      Prochlorperazine Maleate (COMPAZINE PO) Take 10 mg by mouth every 6 hours as needed.  , Historical      QUEtiapine (SEROQUEL) 25 MG tablet Take 25 mg by mouth At Bedtime , Historical      ranitidine (ZANTAC) 150 MG tablet Take 150 mg by mouth At Bedtime , Historical      senna-docusate (SENOKOT-S;PERICOLACE) 8.6-50 MG per tablet Take 2 tablets by mouth 2 times daily, Disp-30 tablet, R-0, E-Prescribe      TIZANIDINE HCL PO Take 2 mg by mouth every 6 hours as needed for muscle spasms, Historical      warfarin (COUMADIN) 5 MG tablet Take 1 tablet (5 mg) by mouth daily, Disp-30 tablet, R-0, E-PrescribeINR goal 2-2.5       !! - Potential duplicate medications found. Please discuss with provider.          No discharge procedures on file.    Justin Colbert MD  Orthopaedic Surgery Resident, PGY1   Pager: [phone]

## 2018-07-16 ENCOUNTER — CARE COORDINATION (OUTPATIENT)
Dept: CARE COORDINATION | Facility: CLINIC | Age: 38
End: 2018-07-16

## 2018-09-27 DIAGNOSIS — T84.028A: Primary | ICD-10-CM

## 2018-09-27 DIAGNOSIS — Z96.649: Primary | ICD-10-CM

## 2018-10-01 ENCOUNTER — RADIANT APPOINTMENT (OUTPATIENT)
Dept: GENERAL RADIOLOGY | Facility: CLINIC | Age: 38
End: 2018-10-01
Attending: ORTHOPAEDIC SURGERY
Payer: MEDICARE

## 2018-10-01 ENCOUNTER — OFFICE VISIT (OUTPATIENT)
Dept: ORTHOPEDICS | Facility: CLINIC | Age: 38
End: 2018-10-01
Payer: MEDICARE

## 2018-10-01 VITALS — BODY MASS INDEX: 25.76 KG/M2 | WEIGHT: 173.9 LBS | HEIGHT: 69 IN

## 2018-10-01 DIAGNOSIS — Z96.649: ICD-10-CM

## 2018-10-01 DIAGNOSIS — T84.028A: ICD-10-CM

## 2018-10-01 DIAGNOSIS — Z98.890 STATUS POST CLOSED REDUCTION OF DISLOCATED TOTAL HIP PROSTHESIS: Primary | ICD-10-CM

## 2018-10-01 NOTE — PROGRESS NOTES
[facility]  Orthopaedic Oncology and Adult Reconstructive (joint replacement) Surgery   by Cheko Saleem M.D.    Alethea Patel MRN# [id number]   Age: 38 year old YOB: 1980     Requesting physician: Referred [facility]ce [facility]       IMPRESSION: The patient is a 38-year-old female with a complex history in regards to her left total hip arthroplasty presents now 2-1/2 months after closed reduction of her left total hip arthroplasty under anesthesia by Dr. Saleem.     PLAN:  1.  We reviewed hip precautions in clinic today.  We had a long discussion that should she avoid another dislocation in the next upcoming months, it is more likely that this will not be an ongoing problem for her.  2.  In regards to her anterior hip pain, it is unclear what the etiology for this could be.  We did discuss that we would prefer to continue to monitor this over the next several months.  Should she continue to have difficulty or it be significant enough that is limiting her activities we could consider an image guided corticosteroid injection to the tendon.  3.  The patient should follow-up at the one-year anniversary of her revision total hip arthroplasty which would be in May 2019.  Repeat x-rays will be obtained at that time.  She should follow-up sooner should she have any new concerns or questions.    DIAGNOSIS:   1. Multi-focal osteonecrosis.  2. Lumbar disc herniation  3. Acute Lymphoblastic Leukemia, chemotherapy [id number]   4. Chronic nicotine abuse     SURGERIES:  1. 2005, L femoral head resurfacing hemiarthroplasty  2. 6/5/2007, R HAJA  3. 2/2/2010, right total knee arthroplasty. No patella resurfacing  4. 6/12/2012, R patella resurfacing  5. 1/8/2016, L TKA ([facility]) [facility]  6. 4/3/2018, Revision L surface replacement bartolo to HAJA ([facility]) [facility]  7. 4/19/18 , 5/18/2018, 5/23/2018  Closed reductions L HAJA  8. 5/25/2018, Revision L HAJA to larger head size and cup repositioning ([facility]) [facility].  Cultures (-) x 3  9. 7/12/18, Closed reduction of L HAJA under anesthesia ([facility]).    Postoperative hip replacement follow-up clinic visit    Alethea Patel is doing okay since her closed reduction of the left total hip arthroplasty on July 12.  She has been attempting to be compliant with her hip precautions.  She does note pain to her anterior hip with prolonged steps and sometimes even with shorter steps.  It is a drop of pain which can make it difficult for her to ambulate.  She denies any incisional concerns.  She denies any fevers or chills.    EXAM:  Leg/ankle edema:  none  Femoral, peroneal and tibial nerve function: normal  Gait:Antalgic with flexion at her lumbar spine.  Level pelvis when standing.  She is able to demonstrate full extension of her hip and flexion to at least 90 degrees.    IMAGING:    X-rays of the pelvis and left hip were reviewed today reveal evidence of well-seated components of a total hip arthroplasty.  No acute bony abnormalities.    Patient was seen and discussed with Dr. Saleem, Orthopedic Surgery attending, who is in agreement with the above assessment and plan.     Tasha Whitehead MD  Orthopaedic Surgery Resident, PGY-4    Attending MD (Dr. Cheko Saleem) Attestation :  This patient was seen and evaluated by me including a history, exam, and interpretation of all imaging and/or lab data.  Either a training physician (resident/fellow), who also saw the patient, or scribe has documented the clinic visit in the attached note.    MD Blas Rhoades Family Professor  Oncology and Adult Reconstructive Surgery  Dept Orthopaedic Surgery, [facility]  [phone] office, [phone] pager  [email or web address]        Answers for HPI/ROS submitted by the patient on 10/1/2018   General Symptoms: Yes  Skin Symptoms: Yes  HENT Symptoms: Yes  EYE SYMPTOMS: No  HEART SYMPTOMS: No  LUNG SYMPTOMS: Yes  INTESTINAL SYMPTOMS: Yes  URINARY SYMPTOMS: No

## 2018-10-01 NOTE — NURSING NOTE
"Chief Complaint   Patient presents with     RECHECK     patient stated she is in for pain in her hip        38 year old  1980    Ht 1.753 m (5' 9\")  Wt 78.9 kg (173 lb 14.4 oz)  BMI 25.68 kg/m2    Date of injury:  1. 15 years ago     Type of injury: chronic  1. Patient has A.L.L    Date/Surgery/Surgeon/Hospital:  1. Dr. Stiven Williamson, left partial hip replacement  2003  [facility]   3. Dr. Stiven Williamson, right TKA 2007  4. Dr. Stiven Williamson, Left TKA 2009  5. Patricia Saleem Left hip HAJA April 3rd 2018  6. Dr. Stiven Williamson Left hip revision, end of may early june                        Pain Assessment  Patient Currently in Pain: Yes  0-10 Pain Scale: 4  Primary Pain Location: Hip  Pain Orientation: Left  Pain Descriptors: Sharp, Radiating  Alleviating Factors: Rest, Heat, NSAIDS  Aggravating Factors:  (standing up )                            [facility] [place], [place] - [address] [id number] [place] [address] - [address] [facility] [address]    Allergies   Allergen Reactions     Chantix [Varenicline] Nausea and Vomiting     Other reaction(s): Vomiting     No Clinical Screening - See Comments      Other reaction(s): Unknown Reaction     Seasonal Allergies      Ciprofloxacin Other (See Comments)     Interacts with other medication  Other reaction(s): Dizziness  Other reaction(s): Other  Interacts with other medication     Latex Itching and Rash     Other reaction(s): Other - Describe In Comment Field  Vaginal itching, burning  Other reaction(s): Intolerance     Current Outpatient Prescriptions   Medication     albuterol (PROAIR HFA/PROVENTIL HFA/VENTOLIN HFA) 108 (90 BASE) MCG/ACT Inhaler     B Complex Vitamins (B COMPLEX PO)     BACLOFEN PO     BuPROPion HCl (WELLBUTRIN SR PO)     DULoxetine (CYMBALTA) 60 MG EC capsule     " Escitalopram Oxalate (LEXAPRO PO)     fluticasone (FLONASE) 50 MCG/ACT nasal spray     gabapentin (NEURONTIN) 300 MG capsule     IBUPROFEN PO     levothyroxine (SYNTHROID, LEVOTHROID) 75 MCG tablet     LORazepam (ATIVAN) 1 MG tablet     MAGNESIUM OXIDE PO     METHADONE HCL PO     montelukast (SINGULAIR) 10 MG tablet     Multiple Vitamins-Minerals (MULTIVITAMIN ADULT PO)     Nutritional Supplements (MELATONIN PO)     omeprazole (PRILOSEC OTC) 20 MG tablet     order for DME     order for DME     oxyCODONE IR (ROXICODONE) 30 MG tablet     POTASSIUM PO     Prochlorperazine Maleate (COMPAZINE PO)     QUEtiapine (SEROQUEL) 25 MG tablet     ranitidine (ZANTAC) 150 MG tablet     senna-docusate (SENOKOT-S;PERICOLACE) 8.6-50 MG per tablet     TIZANIDINE HCL PO     warfarin (COUMADIN) 5 MG tablet     No current facility-administered medications for this visit.          Questionnaires:  HOOS Hip Dysfunction & Osteoarthritis Outcome Questionnaire    Hip Dysfunction & Osteoarthritis Outcome Score (HOOS), English Version LK 2.0 (Hayder Jasmine, Tameka VIEYRA, 2003) 4/19/2018   S1. Do you feel grinding, hear clicking or any other type of noise from your hip? Sometimes   S2. Difficulties spreading legs wide apart None   S3. Difficulties to stride out when walking Severe   S4. How severe is your hip joint stiffness after first wakening in the morning? Severe   S5. How severe is your hip stiffness after sitting, lying or resting LATER IN THE DAY? Severe   Symptom Count 5   Symptom Sum 11   Symptom Mean 2.2   Symptom Subscale Score 45   P1. How often is your hip painful? Always   P2. Straightening your hip fully Extreme   P3. Bending your hip FULLY Extreme   P4. Walking on a flat surface Extreme   P5. Going up or down stairs Severe   P6. At night while in bed Severe   P7. Sitting or lying Severe   P8. Standing upright Extreme   P9. Walking on a hard surface (asphalt, concrete, etc.) Severe   P10. Walking on an uneven surface  Extreme   Pain Count 10   Pain Sum 36   Pain Mean 3.6   Pain Subscale Score 10   A1. Descending stairs Extreme   A2. Ascending stairs Extreme   A3. Rising from sitting Extreme   A4. Standing Extreme   A5. Bending to the floor/ an object Extreme   A6. Walking on a flat surface Extreme   A7. Getting in/out of car Extreme   A8. Going shopping Extreme   A9. Putting on socks/stockings Extreme   A10. Rising from bed Extreme   A11. Taking off socks/stockings Extreme   A12. Lying in bed (turning over, maintaining hip position) Extreme   A13. Getting in/out of bed Extreme   A14. Sitting Extreme   A15. Getting on/off toilet Extreme   A16. Heavy domestic duties (moving heavy boxes, scrubbing floors, etc.) Extreme   A17. Light domestic duties (cooking, dusting, etc.) Extreme   ADL Count 17   ADL Sum 68   ADL Mean 4   ADL Subscale Score 0   SP1. Squatting Extreme   SP2. Running Extreme   SP3. Twisting/pivoting on loaded leg Extreme   SP4. Walking on uneven surface Extreme   Sports/Rec Count 4   Sports/Rec Sum 16   Sports/Rec Mean 4   Sports/Rec Subscale Score 0   Q1. How often are you aware of your hip problem? Constantly   Q2. Have you modified you life style to avoid activities potentially damaging to your hip? Totally   Q3. How much are you troubled with lack of confidence in your hip? Extremely   Q4. In general, how much difficulty do you have with your hip? Extreme   QOL Count 4   QOL Sum 16   QOL Mean 4   Quality of Life Subscale Score 0            KOOS Knee Survey Assessment    No flowsheet data found.         Promis 10 Assessment    PROMIS 10 4/19/2018   In general, would you say your health is: Poor   In general, would you say your quality of life is: Poor   In general, how would you rate your physical health? Poor   In general, how would you rate your mental health, including your mood and your ability to think? Poor   In general, how would you rate your satisfaction with your social activities and  relationships? Poor   In general, please rate how well you carry out your usual social activities and roles Poor   To what extent are you able to carry out your everyday physical activities such as walking, climbing stairs, carrying groceries, or moving a chair? Not at all   How often have you been bothered by emotional problems such as feeling anxious, depressed or irritable? Always   How would you rate your fatigue on average? Severe   How would you rate your pain on average?   0 = No Pain  to  10 = Worst Imaginable Pain 10   In general, would you say your health is: 1   In general, would you say your quality of life is: 1   In general, how would you rate your physical health? 1   In general, how would you rate your mental health, including your mood and your ability to think? 1   In general, how would you rate your satisfaction with your social activities and relationships? 1   In general, please rate how well you carry out your usual social activities and roles. (This includes activities at home, at work and in your community, and responsibilities as a parent, child, spouse, employee, friend, etc.) 1   To what extent are you able to carry out your everyday physical activities such as walking, climbing stairs, carrying groceries, or moving a chair? 1   In the past 7 days, how often have you been bothered by emotional problems such as feeling anxious, depressed, or irritable? 5   In the past 7 days, how would you rate your fatigue on average? 4   In the past 7 days, how would you rate your pain on average, where 0 means no pain, and 10 means worst imaginable pain? 10   Global Mental Health Score 4   Global Physical Health Score 5   PROMIS TOTAL - SUBSCORES 9   Some recent data might be hidden            Ortho [facility] Knee Questionnaire    No flowsheet data found.

## 2018-10-01 NOTE — MR AVS SNAPSHOT
"              After Visit Summary   10/1/2018    Alethea Patel    MRN: [id number]           Patient Information     Date Of Birth          1980        Visit Information        Provider Department      10/1/2018 11:00 AM Cheko Saleem MD [facility]        Today's Diagnoses     Status post closed reduction of dislocated total hip prosthesis    -  1       Follow-ups after your visit        Who to contact     Please call your clinic at [phone] to:    Ask questions about your health    Make or cancel appointments    Discuss your medicines    Learn about your test results    Speak to your doctor            Additional Information About Your Visit        MyChart Information     [employer] gives you secure access to your electronic health record. If you see a primary care provider, you can also send messages to your care team and make appointments. If you have questions, please call your primary care clinic.  If you do not have a primary care provider, please call [phone] and they will assist you.      [employer] is an electronic gateway that provides easy, online access to your medical records. With [employer], you can request a clinic appointment, read your test results, renew a prescription or communicate with your care team.     To access your existing account, please contact your [facility] or call [phone] for assistance.        Care EveryWhere ID     This is your Care EveryWhere ID. This could be used by other organizations to access your [place] medical records  [id number]        Your Vitals Were     Height BMI (Body Mass Index)                1.753 m (5' 9\") 25.68 kg/m2           Blood Pressure from Last 3 Encounters:   07/13/18 113/78   05/28/18 105/64   05/23/18 91/51    Weight from Last 3 Encounters:   10/01/18 78.9 kg (173 lb 14.4 oz)   07/12/18 76.3 kg (168 lb 3.2 oz)   06/14/18 75.7 kg (166 lb 14.4 oz)              Today, you had the following  "    No orders found for display       Primary Care Provider Office Phone # Fax #    [facility] [phone] [address] [facility] [address] [address]        Equal Access to Services     EMILIANO MARTINEZ : Hadii aad ku hadnoyo Soshantalali, waaxda luqadaha, qaybta kaalmada adeegyada, waxarnold ballin ana mn boaz bai labebe silva. So [facility] [phone].    ATENCIÓN: Si habla español, tiene a parisi disposición servicios gratuitos de asistencia lingüística. Llame al [phone].    We comply with applicable federal civil rights laws and [place] laws. We do not discriminate on the basis of race, color, national origin, age, disability, sex, sexual orientation, or gender identity.            Thank you!     Thank you for choosing [facility]  for your care. Our goal is always to provide you with excellent care. Hearing back from our patients is one way we can continue to improve our services. Please take a few minutes to complete the written survey that you may receive in the mail after your visit with us. Thank you!             Your Updated Medication List - Protect others around you: Learn how to safely use, store and throw away your medicines at www.disposemymeds.org.          This list is accurate as of 10/1/18  3:14 PM.  Always use your most recent med list.                   Brand Name Dispense Instructions for use Diagnosis    albuterol 108 (90 BASE) MCG/ACT Inhaler    PROAIR HFA/PROVENTIL HFA/VENTOLIN HFA     Inhale 2 puffs into the lungs every 6 hours as needed for shortness of breath / dyspnea or wheezing        B COMPLEX PO      Take 1 tablet by mouth daily Dose unknown; OTC        BACLOFEN PO      Take 20 mg by mouth 4 times daily        COMPAZINE PO      Take 10 mg by mouth every 6 hours as needed.        DULoxetine 60 MG EC capsule    CYMBALTA     Take 60 mg by mouth 2 times daily        fluticasone 50 MCG/ACT spray    FLONASE     Spray 2 sprays into both nostrils daily as  needed for rhinitis or allergies        IBUPROFEN PO      Take 200 mg by mouth every 6 hours as needed for moderate pain        levothyroxine 75 MCG tablet    SYNTHROID/LEVOTHROID     Take 75 mcg by mouth daily        LEXAPRO PO      Take 20 mg by mouth daily        LORazepam 1 MG tablet    ATIVAN     Take 1-2 mg by mouth 3 times daily Max 5 tablets daily        MAGNESIUM OXIDE PO      Take 200 mg by mouth daily        MELATONIN PO      Take 3 mg by mouth At Bedtime        METHADONE HCL PO      Take 40 mg by mouth every 8 hours        montelukast 10 MG tablet    SINGULAIR     Take 10 mg by mouth At Bedtime        MULTIVITAMIN ADULT PO      Take 1 tablet by mouth daily        NEURONTIN 300 MG capsule   Generic drug:  gabapentin      Take 900 mg by mouth 3 times daily        order for DME     1 Device    [facility] [phone]   Directions: Advance as Tolerated and Instructed by MD    Status post total right knee replacement       order for DME     1 each    Equipment being ordered: Bath Seat () Treatment Diagnosis: Limited independence in ADLs/mobility due to hip precautions and recent hip surgery    Status post hip surgery       POTASSIUM PO      Take 1 tablet by mouth daily Dose unknown; OTC        priLOSEC OTC 20 MG tablet   Generic drug:  omeprazole      Take 20 mg by mouth daily        QUEtiapine 25 MG tablet    SEROquel     Take 25 mg by mouth At Bedtime        ranitidine 150 MG tablet    ZANTAC     Take 150 mg by mouth At Bedtime        ROXICODONE 30 MG tablet   Generic drug:  oxyCODONE IR      Take 120 mg by mouth every 4 hours as needed for moderate to severe pain        senna-docusate 8.6-50 MG per tablet    SENOKOT-S;PERICOLACE    30 tablet    Take 2 tablets by mouth 2 times daily    Status post total left knee replacement       TIZANIDINE HCL PO      Take 2 mg by mouth every 6 hours as needed for muscle spasms        warfarin 5 MG tablet    COUMADIN    30 tablet    Take 1 tablet (5 mg)  by mouth daily    Status post hip surgery       WELLBUTRIN SR PO      Take 150 mg by mouth 2 times daily

## 2018-10-01 NOTE — LETTER
10/1/2018       RE: Alethea Patel  [address]  [place] [address]     Dear Colleague,    Thank you for referring your patient, Alethea Patel, to the [facility]. Please see a copy of my visit note below.    [facility]  Orthopaedic Oncology and Adult Reconstructive (joint replacement) Surgery   by Cheko Saleem M.D.    Alethea Patel MRN# [id number]   Age: 38 year old YOB: 1980     Requesting physician: Referred Self  [facility]       IMPRESSION: The patient is a 38-year-old female with a complex history in regards to her left total hip arthroplasty presents now 2-1/2 months after closed reduction of her left total hip arthroplasty under anesthesia by Dr. Saleem.     PLAN:  1.  We reviewed hip precautions in clinic today.  We had a long discussion that should she avoid another dislocation in the next upcoming months, it is more likely that this will not be an ongoing problem for her.  2.  In regards to her anterior hip pain, it is unclear what the etiology for this could be.  We did discuss that we would prefer to continue to monitor this over the next several months.  Should she continue to have difficulty or it be significant enough that is limiting her activities we could consider an image guided corticosteroid injection to the tendon.  3.  The patient should follow-up at the one-year anniversary of her revision total hip arthroplasty which would be in May 2019.  Repeat x-rays will be obtained at that time.  She should follow-up sooner should she have any new concerns or questions.    DIAGNOSIS:   1. Multi-focal osteonecrosis.  2. Lumbar disc herniation  3. Acute Lymphoblastic Leukemia, chemotherapy [id number]   4. Chronic nicotine abuse     SURGERIES:  1. 2005, L femoral head resurfacing hemiarthroplasty  2. 6/5/2007, R HAJA  3. 2/2/2010, right total knee arthroplasty. No patella  resurfacing  4. 6/12/2012, R patella resurfacing  5. 1/8/2016, L TKA (Stiven) [facility]  6. 4/3/2018, Revision L surface replacement bartolo to HAJA (Stiven) [facility]  7. 4/19/18 , 5/18/2018, 5/23/2018  Closed reductions L HAJA  8. 5/25/2018, Revision L HAJA to larger head size and cup repositioning (Stiven) [facility]. Cultures (-) x 3  9. 7/12/18, Closed reduction of L HAJA under anesthesia (Stiven).    Postoperative hip replacement follow-up clinic visit    Alethea Patel is doing okay since her closed reduction of the left total hip arthroplasty on July 12.  She has been attempting to be compliant with her hip precautions.  She does note pain to her anterior hip with prolonged steps and sometimes even with shorter steps.  It is a drop of pain which can make it difficult for her to ambulate.  She denies any incisional concerns.  She denies any fevers or chills.    EXAM:  Leg/ankle edema:  none  Femoral, peroneal and tibial nerve function: normal  Gait:Antalgic with flexion at her lumbar spine.  Level pelvis when standing.  She is able to demonstrate full extension of her hip and flexion to at least 90 degrees.    IMAGING:    X-rays of the pelvis and left hip were reviewed today reveal evidence of well-seated components of a total hip arthroplasty.  No acute bony abnormalities.    Patient was seen and discussed with Dr. Saleem, Orthopedic Surgery attending, who is in agreement with the above assessment and plan.     Tasha Whitehead MD  Orthopaedic Surgery Resident, PGY-4    Attending MD (Dr. Cheko Saleem) Attestation :  This patient was seen and evaluated by me including a history, exam, and interpretation of all imaging and/or lab data.  Either a training physician (resident/fellow), who also saw the patient, or scribe has documented the clinic visit in the attached note.    Cheko Saleem MD  [facility] Family Professor  Oncology and Adult Reconstructive Surgery  Dept Orthopaedic Surgery, [facility]  [phone] office, [phone]  pager  [email or web address]

## 2018-12-18 ENCOUNTER — TELEPHONE (OUTPATIENT)
Dept: ONCOLOGY | Facility: CLINIC | Age: 38
End: 2018-12-18

## 2018-12-18 NOTE — TELEPHONE ENCOUNTER
Tobacco Treatment Team at the [facility] attempted to reach Ms. Patel on 12/18/2018 regarding the tobacco cessation program to help Ms. Patel to quit smoking. We will attempt to reach Ms. Patel another time.

## 2018-12-27 NOTE — TELEPHONE ENCOUNTER
Patient is undecided about quitting. Reports liking to socially smoke with  and drink wine together.     Is ok if we call her again down the road to assess readiness.     Tobacco Treatment Team at the [facility] attempted to reach Ms. Patel on 12/27/2018 regarding the tobacco cessation program to help Ms. Patel to quit smoking. We will attempt to reach Ms. Patel another time.

## 2019-01-03 ENCOUNTER — TRANSFERRED RECORDS (OUTPATIENT)
Dept: HEALTH INFORMATION MANAGEMENT | Facility: CLINIC | Age: 39
End: 2019-01-03

## 2019-05-24 ENCOUNTER — PRE VISIT (OUTPATIENT)
Dept: ORTHOPEDICS | Facility: CLINIC | Age: 39
End: 2019-05-24

## 2019-05-24 NOTE — TELEPHONE ENCOUNTER
RECORDS RECEIVED FROM: 2 Herniated Disc lower lumbar / does not have any imaging/ no back or spine surgery - scheduled per pt    DATE RECEIVED: 5/24   NOTES STATUS DETAILS   OFFICE NOTE from referring provider N/A    OFFICE NOTE from other specialist internal  Cheko Ham MD  10/7/14   DISCHARGE SUMMARY from hospital internal 7/13/18   DISCHARGE REPORT from the ER N/A    OPERATIVE REPORT Internal HIP SURGERY 2018   MEDICATION LIST Internal    IMPLANT RECORD/STICKER N/A    LABS     CBC/DIFF Internal    CULTURES N/A    INJECTIONS DONE IN RADIOLOGY N/A    MRI Internal 9/11/17 lumbar spine   CT SCAN N/A    XRAYS (IMAGES & REPORTS) Internal HIP AND PELVIC XRAY'S    TUMOR     PATHOLOGY  Slides & report Internal

## 2019-05-31 NOTE — MR AVS SNAPSHOT
Alethea Patel   4/9/2018   Anticoagulation Therapy Visit    Description:  37 year old female   Provider:  Ambreen Vines, RN   Department:  [facility]           INR as of 4/9/2018     Today's INR 1.6!      Anticoagulation Summary as of 4/9/2018     INR goal 2.0-2.5   Today's INR 1.6!   Full instructions 4/9: 5 mg; 4/10: 5 mg; 4/11: 2.5 mg; Otherwise No maintenance plan   Next INR check 4/12/2018    Indications   Long-term (current) use of anticoagulants [Z79.01] [Z79.01]  Aftercare following joint replacement [Z47.1] [Z47.1]         April 2018 Details    Sun Mon Tue Wed Thu Fri Sat     1               2               3               4               5               6               7                 8               9      5 mg   See details      10      5 mg         11      2.5 mg         12            13               14                 15               16               17               18               19               20               21                 22               23               24               25               26               27               28                 29               30                     Date Details   04/09 This INR check       Date of next INR:  4/12/2018         How to take your warfarin dose     To take:  2.5 mg Take 0.5 of a 5 mg tablet.    To take:  5 mg Take 1 of the 5 mg tablets.            PA and appeal has already been denied. See encounter from 5/17/19.

## 2019-10-01 DIAGNOSIS — Z96.649 S/P REVISION OF TOTAL HIP: ICD-10-CM

## 2019-10-01 DIAGNOSIS — Z98.890 STATUS POST CLOSED REDUCTION OF DISLOCATED TOTAL HIP PROSTHESIS: Primary | ICD-10-CM

## 2019-11-06 ENCOUNTER — HEALTH MAINTENANCE LETTER (OUTPATIENT)
Age: 39
End: 2019-11-06

## 2019-12-09 NOTE — TELEPHONE ENCOUNTER
DIAGNOSIS: DDD, 2 Herniated discs, recs and imaging at [facility], appt per pt   APPOINTMENT DATE: Jan 24, 2020    NOTES STATUS DETAILS   OFFICE NOTE from referring provider N/A    OFFICE NOTE from other specialist Care Everywhere 8.21.19 [facility]    [facility] 2019, 2018...     DISCHARGE SUMMARY from hospital N/A    DISCHARGE REPORT from the ER N/A    OPERATIVE REPORT N/A    MEDICATION LIST Care Everywhere    IMPLANT RECORD/STICKER N/A    LABS     CBC/DIFF N/A    CULTURES N/A    INJECTIONS DONE IN RADIOLOGY N/A    MRI Received  [facility] 8.28.19    [facility] 2010    CT SCAN N/A    XRAYS (IMAGES & REPORTS) N/A    TUMOR     PATHOLOGY  Slides & report N/A      12/17/19 SE  1:39 PM  Called [facility] for MRI, they will push    12/08/19 SE  6:52 PM  Faxed request to [facility] for recent MRI

## 2020-01-13 ENCOUNTER — OFFICE VISIT (OUTPATIENT)
Dept: ORTHOPEDICS | Facility: CLINIC | Age: 40
End: 2020-01-13
Payer: COMMERCIAL

## 2020-01-13 ENCOUNTER — ANCILLARY PROCEDURE (OUTPATIENT)
Dept: GENERAL RADIOLOGY | Facility: CLINIC | Age: 40
End: 2020-01-13
Attending: ORTHOPAEDIC SURGERY
Payer: COMMERCIAL

## 2020-01-13 DIAGNOSIS — Z96.649 S/P REVISION OF TOTAL HIP: ICD-10-CM

## 2020-01-13 DIAGNOSIS — Z98.890 STATUS POST CLOSED REDUCTION OF DISLOCATED TOTAL HIP PROSTHESIS: Primary | ICD-10-CM

## 2020-01-13 DIAGNOSIS — Z98.890 STATUS POST CLOSED REDUCTION OF DISLOCATED TOTAL HIP PROSTHESIS: ICD-10-CM

## 2020-01-13 ASSESSMENT — ENCOUNTER SYMPTOMS
DEPRESSION: 1
COUGH: 1
DIARRHEA: 1
MUSCLE WEAKNESS: 0
SKIN CHANGES: 0
WEIGHT GAIN: 1
DIZZINESS: 1
WHEEZING: 1
POLYDIPSIA: 1
TASTE DISTURBANCE: 0
INCREASED ENERGY: 0
HEADACHES: 1
FEVER: 0
TREMORS: 0
SORE THROAT: 1
SNORES LOUDLY: 0
RECTAL PAIN: 0
DYSPNEA ON EXERTION: 1
CHILLS: 0
NECK PAIN: 1
BOWEL INCONTINENCE: 0
SMELL DISTURBANCE: 0
MEMORY LOSS: 0
DECREASED CONCENTRATION: 1
PARALYSIS: 0
SPUTUM PRODUCTION: 1
MYALGIAS: 1
DEPRESSION: 1
SEIZURES: 0
LOSS OF CONSCIOUSNESS: 0
HEARTBURN: 1
STIFFNESS: 1
SINUS CONGESTION: 1
NAUSEA: 1
BLOATING: 1
POSTURAL DYSPNEA: 0
JAUNDICE: 0
HEADACHES: 1
STIFFNESS: 1
BOWEL INCONTINENCE: 0
NIGHT SWEATS: 0
PARALYSIS: 0
WEIGHT LOSS: 0
NAUSEA: 1
SPUTUM PRODUCTION: 1
POSTURAL DYSPNEA: 0
HEMATURIA: 0
HEMOPTYSIS: 0
FLANK PAIN: 0
WEAKNESS: 0
SKIN CHANGES: 0
TINGLING: 0
HOT FLASHES: 0
SMELL DISTURBANCE: 0
SHORTNESS OF BREATH: 1
JAUNDICE: 0
NAIL CHANGES: 0
SINUS CONGESTION: 1
FEVER: 0
SHORTNESS OF BREATH: 1
DECREASED LIBIDO: 0
POOR WOUND HEALING: 0
NECK MASS: 0
NUMBNESS: 0
POLYPHAGIA: 0
JOINT SWELLING: 0
DIZZINESS: 1
SINUS PAIN: 1
SEIZURES: 0
JOINT SWELLING: 0
NECK PAIN: 1
NECK MASS: 0
CONSTIPATION: 1
SNORES LOUDLY: 0
ALTERED TEMPERATURE REGULATION: 1
ARTHRALGIAS: 1
HALLUCINATIONS: 0
COUGH: 1
TROUBLE SWALLOWING: 0
HEMATURIA: 0
SINUS PAIN: 1
BLOOD IN STOOL: 0
NAIL CHANGES: 0
WEIGHT LOSS: 0
DECREASED LIBIDO: 0
COUGH DISTURBING SLEEP: 0
VOMITING: 0
DYSPNEA ON EXERTION: 1
HALLUCINATIONS: 0
NERVOUS/ANXIOUS: 1
MUSCLE WEAKNESS: 0
SPEECH CHANGE: 0
INSOMNIA: 1
HEARTBURN: 1
SPEECH CHANGE: 0
WEIGHT GAIN: 1
WEAKNESS: 0
INSOMNIA: 1
DECREASED APPETITE: 0
MEMORY LOSS: 0
ARTHRALGIAS: 1
CHILLS: 0
MUSCLE CRAMPS: 1
DYSURIA: 0
NERVOUS/ANXIOUS: 1
ALTERED TEMPERATURE REGULATION: 1
POLYPHAGIA: 0
BACK PAIN: 1
BACK PAIN: 1
COUGH DISTURBING SLEEP: 0
DISTURBANCES IN COORDINATION: 0
FATIGUE: 1
DECREASED APPETITE: 0
FLANK PAIN: 0
POLYDIPSIA: 1
DISTURBANCES IN COORDINATION: 0
ABDOMINAL PAIN: 1
LOSS OF CONSCIOUSNESS: 0
HOARSE VOICE: 0
DECREASED CONCENTRATION: 1
MYALGIAS: 1
INCREASED ENERGY: 0
VOMITING: 0
SORE THROAT: 1
FATIGUE: 1
TINGLING: 0
POOR WOUND HEALING: 0
PANIC: 1
BLOOD IN STOOL: 0
PANIC: 1
NIGHT SWEATS: 0
DIARRHEA: 1
ABDOMINAL PAIN: 1
MUSCLE CRAMPS: 1
HOARSE VOICE: 0
CONSTIPATION: 1
TASTE DISTURBANCE: 0
HOT FLASHES: 0
RECTAL PAIN: 0
DIFFICULTY URINATING: 0
TREMORS: 0
TROUBLE SWALLOWING: 0
NUMBNESS: 0
HEMOPTYSIS: 0
WHEEZING: 1
BLOATING: 1
DYSURIA: 0
DIFFICULTY URINATING: 0

## 2020-01-13 ASSESSMENT — PATIENT HEALTH QUESTIONNAIRE - PHQ9
SUM OF ALL RESPONSES TO PHQ QUESTIONS 1-9: 6
10. IF YOU CHECKED OFF ANY PROBLEMS, HOW DIFFICULT HAVE THESE PROBLEMS MADE IT FOR YOU TO DO YOUR WORK, TAKE CARE OF THINGS AT HOME, OR GET ALONG WITH OTHER PEOPLE: SOMEWHAT DIFFICULT
SUM OF ALL RESPONSES TO PHQ QUESTIONS 1-9: 6

## 2020-01-13 ASSESSMENT — ACTIVITIES OF DAILY LIVING (ADL)
ADL_SUBSCALE_SCORE: 63.23
ADL_SUM: 25
ADL_MEAN: 1.47

## 2020-01-13 ASSESSMENT — HOOS S4: HOW SEVERE IS YOUR HIP JOINT STIFFNESS AFTER FIRST WAKENING IN THE MORNING?: MODERATE

## 2020-01-13 NOTE — LETTER
1/13/2020       RE: Alethea Patel  [address]  [place] [address]     Dear Colleague,    Thank you for referring your patient, Alethea Patel, to the [facility] ORTHOPAEDIC CLINIC at [facility]. Please see a copy of my visit note below.        [facility]  Orthopaedic Surgery, Joint Replacement Consultation  by Cheko Saleem M.D.    Alethea Patel MRN# [id number]   Age: 39 year old YOB: 1980     Requesting physician: Referred Self  [facility]     DIAGNOSIS:   1. Multi-focal osteonecrosis.  2. Lumbar disc herniation  3. Acute Lymphoblastic Leukemia, chemotherapy [id number]   4. Chronic nicotine abuse     SURGERIES:  1. 2005, L femoral head resurfacing hemiarthroplasty  2. 6/5/2007, R HAJA  3. 2/2/2010, right total knee arthroplasty. No patella resurfacing  4. 6/12/2012, R patella resurfacing  5. 1/8/2016, L TKA (Stiven) [facility]  6. 4/3/2018, Revision L surface replacement bartolo to HAJA (Stiven) [facility]  7. 4/19/18 , 5/18/2018, 5/23/2018  Closed reductions L HAJA  8. 5/25/2018, Revision L HAJA to larger head size and cup repositioning (Stiven) [facility]. Cultures (-) x 3  9. 7/12/18, Closed reduction of L HAJA under anesthesia (Stiven).               History of Present Illness:   39 year old female status post knee surgery as described above.  She is in our office today for a follow-up 1 year after close reduction of left total hip arthroplasty under anesthesia.  She states that she is doing relatively well.  She has some occasional pain in her left hip and knee.  As pain medication she uses oxycodone 30 mg twice daily prescribed by her hematologist.  She has been using this pain medication for more than 15 years.  Patient has no questions or concerns concerning her hip.  Because of lumbar spine pathology she will be seeing a spine specialist soon.             Physical Exam:     EXAMINATION pertinent findings:   VITAL SIGNS: not currently  breastfeeding.  There is no height or weight on file to calculate BMI.  RESP: non labored breathing   SKIN: grossly normal   LYMPHATIC: grossly normal   NEURO: grossly normal   VASCULAR: satisfactory perfusion of all extremities   MUSCULOSKELETAL:   Incisions well-healed.  Normal gait.  Normal range of motion of the left hip.  Neurovascular intact left lower extremity.                Data:   All laboratory data reviewed  All imaging studies reviewed by me  CR pelvis and left hip today: Adequate implant positioning.  No signs of loosening.  No signs of dislocating or fractures.         Assessment and Plan:   Assessment:  39-year-old female status post revision left total hip arthroplasty and subsequent dislocation here for a one-year follow-up.  Patient is doing well.  No changes in the interim.    Plan:  Discussed the findings with the patient.  She will continue to improve her strength and endurance.  There we will follow-up with her in 5 years with radiographic evaluation.  She will contact us sooner if there is any questions or concerns or changes.      Santosh Neville MD  [facility] Arthroplasty Fellow    Total Time = 25 min, 50% of which was spent in counseling and coordination of care as documented above.    Scribe Disclosure:  Richmond JACKSON, a scribe, prepared the chart for today's encounter.     Attending MD (Dr. Cheko Saleem) Attestation :  This patient was seen and evaluated by me including a history, exam, and interpretation of all imaging and/or lab data.  Either a training physician (resident/fellow), who also saw the patient, or scribe has documented the visit in the attached note.    MD Blas Rhoades Family Professor  Oncology and Adult Reconstructive Surgery  Dept Orthopaedic Surgery, [facility]  [phone] office, [phone] pager  [email or web address]

## 2020-01-13 NOTE — NURSING NOTE
Chief Complaint   Patient presents with     Surgical Followup     F/u Same day XR S/p Closed Reduction of Left Total Hip Arthroplasty - Left DOS: 07/12/2018       39 year old  1980                      [id number] - [place], [place] - [address]    Allergies   Allergen Reactions     Chantix [Varenicline] Nausea and Vomiting     Other reaction(s): Vomiting     No Clinical Screening - See Comments      Other reaction(s): Unknown Reaction     Seasonal Allergies      Ciprofloxacin Other (See Comments)     Interacts with other medication  Other reaction(s): Dizziness  Other reaction(s): Other  Interacts with other medication     Latex Itching and Rash     Other reaction(s): Other - Describe In Comment Field  Vaginal itching, burning  Other reaction(s): Intolerance       Current Outpatient Medications   Medication     albuterol (PROAIR HFA/PROVENTIL HFA/VENTOLIN HFA) 108 (90 BASE) MCG/ACT Inhaler     B Complex Vitamins (B COMPLEX PO)     BACLOFEN PO     BuPROPion HCl (WELLBUTRIN SR PO)     DULoxetine (CYMBALTA) 60 MG EC capsule     Escitalopram Oxalate (LEXAPRO PO)     fluticasone (FLONASE) 50 MCG/ACT nasal spray     gabapentin (NEURONTIN) 300 MG capsule     IBUPROFEN PO     levothyroxine (SYNTHROID, LEVOTHROID) 75 MCG tablet     LORazepam (ATIVAN) 1 MG tablet     MAGNESIUM OXIDE PO     METHADONE HCL PO     montelukast (SINGULAIR) 10 MG tablet     Multiple Vitamins-Minerals (MULTIVITAMIN ADULT PO)     Nutritional Supplements (MELATONIN PO)     omeprazole (PRILOSEC OTC) 20 MG tablet     oxyCODONE IR (ROXICODONE) 30 MG tablet     POTASSIUM PO     Prochlorperazine Maleate (COMPAZINE PO)     QUEtiapine (SEROQUEL) 25 MG tablet     senna-docusate (SENOKOT-S;PERICOLACE) 8.6-50 MG per tablet     TIZANIDINE HCL PO     order for DME     order for DME     warfarin (COUMADIN) 5 MG tablet     No current facility-administered medications for this visit.

## 2020-01-13 NOTE — PROGRESS NOTES
[facility]  Orthopaedic Surgery, Joint Replacement Consultation  by Cheko Saleem M.D.    Alethea Patel MRN# [id number]   Age: 39 year old YOB: 1980     Requesting physician: Referred Self  [facility]     DIAGNOSIS:   1. Multi-focal osteonecrosis.  2. Lumbar disc herniation  3. Acute Lymphoblastic Leukemia, chemotherapy [id number]   4. Chronic nicotine abuse     SURGERIES:  1. 2005, L femoral head resurfacing hemiarthroplasty  2. 6/5/2007, R HAJA  3. 2/2/2010, right total knee arthroplasty. No patella resurfacing  4. 6/12/2012, R patella resurfacing  5. 1/8/2016, L TKA (Stiven) [facility]  6. 4/3/2018, Revision L surface replacement bartolo to HAJA (Stiven) [facility]  7. 4/19/18 , 5/18/2018, 5/23/2018  Closed reductions L HAJA  8. 5/25/2018, Revision L HAJA to larger head size and cup repositioning (Stiven) [facility]. Cultures (-) x 3  9. 7/12/18, Closed reduction of L HAJA under anesthesia (Stiven).               History of Present Illness:   39 year old female status post knee surgery as described above.  She is in our office today for a follow-up 1 year after close reduction of left total hip arthroplasty under anesthesia.  She states that she is doing relatively well.  She has some occasional pain in her left hip and knee.  As pain medication she uses oxycodone 30 mg twice daily prescribed by her hematologist.  She has been using this pain medication for more than 15 years.  Patient has no questions or concerns concerning her hip.  Because of lumbar spine pathology she will be seeing a spine specialist soon.             Physical Exam:     EXAMINATION pertinent findings:   VITAL SIGNS: not currently breastfeeding.  There is no height or weight on file to calculate BMI.  RESP: non labored breathing   SKIN: grossly normal   LYMPHATIC: grossly normal   NEURO: grossly normal   VASCULAR: satisfactory perfusion of all extremities   MUSCULOSKELETAL:   Incisions well-healed.  Normal gait.  Normal  range of motion of the left hip.  Neurovascular intact left lower extremity.                Data:   All laboratory data reviewed  All imaging studies reviewed by me  CR pelvis and left hip today: Adequate implant positioning.  No signs of loosening.  No signs of dislocating or fractures.         Assessment and Plan:   Assessment:  39-year-old female status post revision left total hip arthroplasty and subsequent dislocation here for a one-year follow-up.  Patient is doing well.  No changes in the interim.    Plan:  Discussed the findings with the patient.  She will continue to improve her strength and endurance.  There we will follow-up with her in 5 years with radiographic evaluation.  She will contact us sooner if there is any questions or concerns or changes.      Santosh Neville MD  [facility] Arthroplasty Fellow    Total Time = 25 min, 50% of which was spent in counseling and coordination of care as documented above.      Cheko Saleem MD  [facility] Family Professor  Oncology and Adult Reconstructive Surgery  Dept Orthopaedic Surgery, [facility]  [phone] office, [phone] pager  [email or web address]      Scribe Disclosure:  IRichmond, a scribe, prepared the chart for today's encounter.     Answers for HPI/ROS submitted by the patient on 1/13/2020   If you checked off any problems, how difficult have these problems made it for you to do your work, take care of things at home, or get along with other people?: Somewhat difficult  PHQ9 TOTAL SCORE: 6  General Symptoms: Yes  Skin Symptoms: Yes  HENT Symptoms: Yes  EYE SYMPTOMS: No  HEART SYMPTOMS: No  LUNG SYMPTOMS: Yes  INTESTINAL SYMPTOMS: Yes  URINARY SYMPTOMS: Yes  GYNECOLOGIC SYMPTOMS: Yes  BREAST SYMPTOMS: No  SKELETAL SYMPTOMS: Yes  BLOOD SYMPTOMS: No  NERVOUS SYSTEM SYMPTOMS: Yes  MENTAL HEALTH SYMPTOMS: Yes  Fever: No  Loss of appetite: No  Weight loss: No  Weight gain: Yes  Fatigue: Yes  Night sweats: No  Chills: No  Increased stress:  Yes  Excessive hunger: No  Excessive thirst: Yes  Feeling hot or cold when others believe the temperature is normal: Yes  Loss of height: No  Post-operative complications: No  Surgical site pain: No  Hallucinations: No  Change in or Loss of Energy: No  Hyperactivity: No  Confusion: Yes  Changes in hair: No  Changes in moles/birth marks: No  Itching: No  Rashes: No  Changes in nails: No  Acne: Yes  Hair in places you don't want it: No  Change in facial hair: No  Warts: No  Non-healing sores: No  Scarring: No  Flaking of skin: No  Color changes of hands/feet in cold : No  Sun sensitivity: No  Skin thickening: No  Ear pain: Yes  Ear discharge: No  Hearing loss: No  Tinnitus: No  Nosebleeds: No  Congestion: Yes  Sinus pain: Yes  Trouble swallowing: No   Voice hoarseness: No  Mouth sores: No  Sore throat: Yes  Tooth pain: No  Gum tenderness: No  Bleeding gums: No  Change in taste: No  Change in sense of smell: No  Dry mouth: No  Hearing aid used: No  Neck lump: No  Cough: Yes  Sputum or phlegm: Yes  Coughing up blood: No  Difficulty breating or shortness of breath: Yes  Snoring: No  Wheezing: Yes  Difficulty breathing on exertion: Yes  Nighttime Cough: No  Difficulty breathing when lying flat: No  Heart burn or indigestion: Yes  Nausea: Yes  Vomiting: No  Abdominal pain: Yes  Bloating: Yes  Constipation: Yes  Diarrhea: Yes  Blood in stool: No  Black stools: No  Rectal or Anal pain: No  Fecal incontinence: No  Yellowing of skin or eyes: No  Vomit with blood: No  Change in stools: No  Trouble holding urine or incontinence: No  Pain or burning: No  Trouble starting or stopping: No  Increased frequency of urination: No  Blood in urine: No  Decreased frequency of urination: No  Frequent nighttime urination: No  Flank pain: No  Difficulty emptying bladder: No  Back pain: Yes  Muscle aches: Yes  Neck pain: Yes  Swollen joints: No  Joint pain: Yes  Bone pain: Yes  Muscle cramps: Yes  Muscle weakness: No  Joint stiffness:  Yes  Bone fracture: No  Trouble with coordination: No  Dizziness or trouble with balance: Yes  Fainting or black-out spells: No  Memory loss: No  Headache: Yes  Seizures: No  Speech problems: No  Tingling: No  Tremor: No  Weakness: No  Difficulty walking: No  Paralysis: No  Numbness: No  Bleeding or spotting between periods: No  Heavy or painful periods: No  Irregular periods: Yes  Vaginal discharge: No  Hot flashes: No  Vaginal dryness: No  Genital ulcers: No  Reduced libido: No  Painful intercourse: No  Difficulty with sexual arousal: No  Post-menopausal bleeding: No  Nervous or Anxious: Yes  Depression: Yes  Trouble sleeping: Yes  Trouble thinking or concentrating: Yes  Mood changes: No  Panic attacks: Yes      Attending MD (Dr. Cheko Saleem) Attestation :  This patient was seen and evaluated by me including a history, exam, and interpretation of all imaging and/or lab data.  Either a training physician (resident/fellow), who also saw the patient, or scribe has documented the visit in the attached note.    Cheko Saleem MD  [facility] Family Professor  Oncology and Adult Reconstructive Surgery  Dept Orthopaedic Surgery, [facility]  [phone] office, [phone] pager  [email or web address]

## 2020-01-21 DIAGNOSIS — M54.9 BACK PAIN: Primary | ICD-10-CM

## 2020-01-24 ENCOUNTER — ANCILLARY PROCEDURE (OUTPATIENT)
Dept: GENERAL RADIOLOGY | Facility: CLINIC | Age: 40
End: 2020-01-24
Attending: PREVENTIVE MEDICINE
Payer: COMMERCIAL

## 2020-01-24 ENCOUNTER — PRE VISIT (OUTPATIENT)
Dept: ORTHOPEDICS | Facility: CLINIC | Age: 40
End: 2020-01-24

## 2020-01-24 ENCOUNTER — OFFICE VISIT (OUTPATIENT)
Dept: ORTHOPEDICS | Facility: CLINIC | Age: 40
End: 2020-01-24
Payer: COMMERCIAL

## 2020-01-24 DIAGNOSIS — M54.9 BACK PAIN: ICD-10-CM

## 2020-01-24 DIAGNOSIS — M51.26 HERNIATED LUMBAR INTERVERTEBRAL DISC: Primary | ICD-10-CM

## 2020-01-24 RX ORDER — CYCLOBENZAPRINE HCL 10 MG
10 TABLET ORAL
Qty: 30 TABLET | Refills: 1 | Status: ON HOLD | OUTPATIENT
Start: 2020-01-24 | End: 2024-01-31

## 2020-01-24 NOTE — LETTER
1/24/2020       RE: Alethea Patel  [address]  [place] [address]     Dear Colleague,    Thank you for referring your patient, Alethea Patel, to the [facility] ORTHOPAEDIC CLINIC at [facility]. Please see a copy of my visit note below.    HISTORY OF PRESENT ILLNESS  Ms. Patel is a pleasant 39 year old year old female who presents to clinic today with chronic low back pain  Alethea explains that she had an MRI recently  Location: low back   Quality:  achy pain    Severity: 7/10 at worst    Duration: 5+ months  Timing: occurs intermittently  Context: occurs while sitting too long and standing and bending  Modifying factors:  resting and non-use makes it better, movement and use makes it worse  Associated signs & symptoms: radiation into legs  Additional history: as documented    MEDICAL HISTORY  Patient Active Problem List   Diagnosis     Acute lymphoblastic leukemia (ALL) (H)     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)     Failed total knee, right (H)     Elbow pain     Lumbar disc herniation with radiculopathy     S/P total knee arthroplasty     Gastroesophageal reflux disease     Irritable bowel syndrome     Anxiety     Seizure (H)     Avascular necrosis of bone (H)     Moderate episode of recurrent major depressive disorder (H)     Female infertility     Status post hip surgery     Long-term (current) use of anticoagulants [Z79.01]     Aftercare following joint replacement [Z47.1]     Hip dislocation, left, subsequent encounter     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)     Hip dislocation, left (H)     Status post closed reduction of dislocated total hip prosthesis       Current Outpatient Medications   Medication Sig Dispense Refill     albuterol (PROAIR HFA/PROVENTIL HFA/VENTOLIN HFA) 108 (90 BASE) MCG/ACT Inhaler Inhale 2 puffs into the lungs every 6 hours as needed for shortness of breath / dyspnea or wheezing       B Complex  Vitamins (B COMPLEX PO) Take 1 tablet by mouth daily Dose unknown; OTC       BACLOFEN PO Take 20 mg by mouth 4 times daily        BuPROPion HCl (WELLBUTRIN SR PO) Take 150 mg by mouth 2 times daily       DULoxetine (CYMBALTA) 60 MG EC capsule Take 60 mg by mouth 2 times daily        Escitalopram Oxalate (LEXAPRO PO) Take 20 mg by mouth daily       fluticasone (FLONASE) 50 MCG/ACT nasal spray Spray 2 sprays into both nostrils daily as needed for rhinitis or allergies       gabapentin (NEURONTIN) 300 MG capsule Take 900 mg by mouth 3 times daily        IBUPROFEN PO Take 200 mg by mouth every 6 hours as needed for moderate pain       levothyroxine (SYNTHROID, LEVOTHROID) 75 MCG tablet Take 75 mcg by mouth daily       LORazepam (ATIVAN) 1 MG tablet Take 1-2 mg by mouth 3 times daily Max 5 tablets daily       MAGNESIUM OXIDE PO Take 200 mg by mouth daily       METHADONE HCL PO Take 40 mg by mouth every 8 hours       montelukast (SINGULAIR) 10 MG tablet Take 10 mg by mouth At Bedtime       Multiple Vitamins-Minerals (MULTIVITAMIN ADULT PO) Take 1 tablet by mouth daily       Nutritional Supplements (MELATONIN PO) Take 3 mg by mouth At Bedtime        omeprazole (PRILOSEC OTC) 20 MG tablet Take 20 mg by mouth daily        order for DME Equipment being ordered: Bath Seat ()  Treatment Diagnosis: Limited independence in ADLs/mobility due to hip precautions and recent hip surgery 1 each 0     order for DME [facility]  [facility]  [phone]     Directions: Advance as Tolerated and Instructed by MD 1 Device 0     oxyCODONE IR (ROXICODONE) 30 MG tablet Take 120 mg by mouth every 4 hours as needed for moderate to severe pain       POTASSIUM PO Take 1 tablet by mouth daily Dose unknown; OTC       Prochlorperazine Maleate (COMPAZINE PO) Take 10 mg by mouth every 6 hours as needed.         QUEtiapine (SEROQUEL) 25 MG tablet Take 25 mg by mouth At Bedtime        senna-docusate (SENOKOT-S;PERICOLACE) 8.6-50 MG per tablet Take 2  tablets by mouth 2 times daily 30 tablet 0     TIZANIDINE HCL PO Take 2 mg by mouth every 6 hours as needed for muscle spasms       warfarin (COUMADIN) 5 MG tablet Take 1 tablet (5 mg) by mouth daily 30 tablet 0       Allergies   Allergen Reactions     Chantix [Varenicline] Nausea and Vomiting     Other reaction(s): Vomiting     No Clinical Screening - See Comments      Other reaction(s): Unknown Reaction     Seasonal Allergies      Ciprofloxacin Other (See Comments)     Interacts with other medication  Other reaction(s): Dizziness  Other reaction(s): Other  Interacts with other medication     Latex Itching and Rash     Other reaction(s): Other - Describe In Comment Field  Vaginal itching, burning  Other reaction(s): Intolerance       Family History   Problem Relation Age of Onset     Migraines Son      Soft Tissue Cancer Brother      Cancer Brother      Other Cancer Brother      Low Back Problems Mother      Thyroid Disease Mother      Obesity Mother      Deep Vein Thrombosis Mother        Additional medical/Social/Surgical histories reviewed in [facility] and updated as appropriate.     REVIEW OF SYSTEMS (1/24/2020)  10 point ROS of systems including Constitutional, Eyes, Respiratory, Cardiovascular, Gastroenterology, Genitourinary, Integumentary, Musculoskeletal, Psychiatric were all negative except for pertinent positives noted in my HPI.     PHYSICAL EXAM  VSS    General  - normal appearance, in no obvious distress  CV  - normal peripheral perfusion  Pulm  - normal respiratory pattern, non-labored  Musculoskeletal - lumbar spine  - stance: normal gait without limp, no obvious leg length discrepancy, normal heel and toe walk  - inspection: normal bone and joint alignment, no obvious scoliosis  - palpation: no paravertebral or bony tenderness  - ROM: flexion exacerbates some pain, normal extension, sidebending, rotation  - strength: lower extremities 5/5 in all planes  - special tests:  (+) straight leg raise- low  back  (+) slump test  Neuro  - patellar and Achilles DTRs 2+ bilaterally, has some bilateral lower extremity sensory deficit throughout L4 distribution, grossly normal coordination, normal muscle tone  Skin  - no ecchymosis, erythema, warmth, or induration, no obvious rash  Psych  - interactive, appropriate, normal mood and affect  ASSESSMENT & PLAN  40 yo female with lumbar ddd, disc herniations , radicular pain  Reviewed lumbar MRI : shows disc herniations, ddd  Ordered THOM  Given HEP  Start PT  Flexeril PRN  F/u after THOM    Joel Garcias MD, CAQSM

## 2020-01-24 NOTE — NURSING NOTE
Reason For Visit:   Chief Complaint   Patient presents with     Consult     DDD, 2 herniated discs     There were no vitals taken for this visit.    Pain Assessment  Patient Currently in Pain: Yes  0-10 Pain Scale: 5  Primary Pain Location: Back    Thu Woodard ATC

## 2020-01-24 NOTE — PROGRESS NOTES
HISTORY OF PRESENT ILLNESS  Ms. Patel is a pleasant 39 year old year old female who presents to clinic today with chronic low back pain  Alethea explains that she had an MRI recently  Location: low back   Quality:  achy pain    Severity: 7/10 at worst    Duration: 5+ months  Timing: occurs intermittently  Context: occurs while sitting too long and standing and bending  Modifying factors:  resting and non-use makes it better, movement and use makes it worse  Associated signs & symptoms: radiation into legs  Additional history: as documented    MEDICAL HISTORY  Patient Active Problem List   Diagnosis     Acute lymphoblastic leukemia (ALL) (H)     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)     Failed total knee, right (H)     Elbow pain     Lumbar disc herniation with radiculopathy     S/P total knee arthroplasty     Gastroesophageal reflux disease     Irritable bowel syndrome     Anxiety     Seizure (H)     Avascular necrosis of bone (H)     Moderate episode of recurrent major depressive disorder (H)     Female infertility     Status post hip surgery     Long-term (current) use of anticoagulants [Z79.01]     Aftercare following joint replacement [Z47.1]     Hip dislocation, left, subsequent encounter     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)     Hip dislocation, left (H)     Status post closed reduction of dislocated total hip prosthesis       Current Outpatient Medications   Medication Sig Dispense Refill     albuterol (PROAIR HFA/PROVENTIL HFA/VENTOLIN HFA) 108 (90 BASE) MCG/ACT Inhaler Inhale 2 puffs into the lungs every 6 hours as needed for shortness of breath / dyspnea or wheezing       B Complex Vitamins (B COMPLEX PO) Take 1 tablet by mouth daily Dose unknown; OTC       BACLOFEN PO Take 20 mg by mouth 4 times daily        BuPROPion HCl (WELLBUTRIN SR PO) Take 150 mg by mouth 2 times daily       DULoxetine (CYMBALTA) 60 MG EC capsule Take 60 mg by mouth 2 times daily        Escitalopram  Oxalate (LEXAPRO PO) Take 20 mg by mouth daily       fluticasone (FLONASE) 50 MCG/ACT nasal spray Spray 2 sprays into both nostrils daily as needed for rhinitis or allergies       gabapentin (NEURONTIN) 300 MG capsule Take 900 mg by mouth 3 times daily        IBUPROFEN PO Take 200 mg by mouth every 6 hours as needed for moderate pain       levothyroxine (SYNTHROID, LEVOTHROID) 75 MCG tablet Take 75 mcg by mouth daily       LORazepam (ATIVAN) 1 MG tablet Take 1-2 mg by mouth 3 times daily Max 5 tablets daily       MAGNESIUM OXIDE PO Take 200 mg by mouth daily       METHADONE HCL PO Take 40 mg by mouth every 8 hours       montelukast (SINGULAIR) 10 MG tablet Take 10 mg by mouth At Bedtime       Multiple Vitamins-Minerals (MULTIVITAMIN ADULT PO) Take 1 tablet by mouth daily       Nutritional Supplements (MELATONIN PO) Take 3 mg by mouth At Bedtime        omeprazole (PRILOSEC OTC) 20 MG tablet Take 20 mg by mouth daily        order for DME Equipment being ordered: Bath Seat ()  Treatment Diagnosis: Limited independence in ADLs/mobility due to hip precautions and recent hip surgery 1 each 0     order for DME [facility]  [facility]  [phone]     Directions: Advance as Tolerated and Instructed by MD 1 Device 0     oxyCODONE IR (ROXICODONE) 30 MG tablet Take 120 mg by mouth every 4 hours as needed for moderate to severe pain       POTASSIUM PO Take 1 tablet by mouth daily Dose unknown; OTC       Prochlorperazine Maleate (COMPAZINE PO) Take 10 mg by mouth every 6 hours as needed.         QUEtiapine (SEROQUEL) 25 MG tablet Take 25 mg by mouth At Bedtime        senna-docusate (SENOKOT-S;PERICOLACE) 8.6-50 MG per tablet Take 2 tablets by mouth 2 times daily 30 tablet 0     TIZANIDINE HCL PO Take 2 mg by mouth every 6 hours as needed for muscle spasms       warfarin (COUMADIN) 5 MG tablet Take 1 tablet (5 mg) by mouth daily 30 tablet 0       Allergies   Allergen Reactions     Chantix [Varenicline] Nausea and Vomiting      Other reaction(s): Vomiting     No Clinical Screening - See Comments      Other reaction(s): Unknown Reaction     Seasonal Allergies      Ciprofloxacin Other (See Comments)     Interacts with other medication  Other reaction(s): Dizziness  Other reaction(s): Other  Interacts with other medication     Latex Itching and Rash     Other reaction(s): Other - Describe In Comment Field  Vaginal itching, burning  Other reaction(s): Intolerance       Family History   Problem Relation Age of Onset     Migraines Son      Soft Tissue Cancer Brother      Cancer Brother      Other Cancer Brother      Low Back Problems Mother      Thyroid Disease Mother      Obesity Mother      Deep Vein Thrombosis Mother        Additional medical/Social/Surgical histories reviewed in [facility] and updated as appropriate.     REVIEW OF SYSTEMS (1/24/2020)  10 point ROS of systems including Constitutional, Eyes, Respiratory, Cardiovascular, Gastroenterology, Genitourinary, Integumentary, Musculoskeletal, Psychiatric were all negative except for pertinent positives noted in my HPI.     PHYSICAL EXAM  VSS    General  - normal appearance, in no obvious distress  CV  - normal peripheral perfusion  Pulm  - normal respiratory pattern, non-labored  Musculoskeletal - lumbar spine  - stance: normal gait without limp, no obvious leg length discrepancy, normal heel and toe walk  - inspection: normal bone and joint alignment, no obvious scoliosis  - palpation: no paravertebral or bony tenderness  - ROM: flexion exacerbates some pain, normal extension, sidebending, rotation  - strength: lower extremities 5/5 in all planes  - special tests:  (+) straight leg raise- low back  (+) slump test  Neuro  - patellar and Achilles DTRs 2+ bilaterally, has some bilateral lower extremity sensory deficit throughout L4 distribution, grossly normal coordination, normal muscle tone  Skin  - no ecchymosis, erythema, warmth, or induration, no obvious rash  Psych  - interactive,  appropriate, normal mood and affect  ASSESSMENT & PLAN  40 yo female with lumbar ddd, disc herniations , radicular pain  Reviewed lumbar MRI : shows disc herniations, ddd  Ordered THOM  Given HEP  Start PT  Flexeril PRN  F/u after THOM      Joel Garcias MD, CAQSM

## 2020-02-03 ENCOUNTER — TELEPHONE (OUTPATIENT)
Dept: ORTHOPEDICS | Facility: CLINIC | Age: 40
End: 2020-02-03

## 2020-02-03 NOTE — TELEPHONE ENCOUNTER
Health Call Center    Phone Message    May a detailed message be left on voicemail: yes     Reason for Call: Patient called wanting to get a referral sent to the [facility] for her PT. Please advise. Thank you.    Action Taken: Message routed to:  Adult Clinics: Sports Medicine p [phone]

## 2020-02-25 ENCOUNTER — ANCILLARY PROCEDURE (OUTPATIENT)
Dept: GENERAL RADIOLOGY | Facility: CLINIC | Age: 40
End: 2020-02-25
Attending: PREVENTIVE MEDICINE
Payer: COMMERCIAL

## 2020-02-25 VITALS — SYSTOLIC BLOOD PRESSURE: 128 MMHG | OXYGEN SATURATION: 96 % | HEART RATE: 121 BPM | DIASTOLIC BLOOD PRESSURE: 81 MMHG

## 2020-02-25 DIAGNOSIS — M51.26 HERNIATED LUMBAR INTERVERTEBRAL DISC: ICD-10-CM

## 2020-02-25 PROCEDURE — 62323 NJX INTERLAMINAR LMBR/SAC: CPT | Performed by: RADIOLOGY

## 2020-02-25 RX ORDER — METHYLPREDNISOLONE ACETATE 80 MG/ML
80 INJECTION, SUSPENSION INTRA-ARTICULAR; INTRALESIONAL; INTRAMUSCULAR; SOFT TISSUE ONCE
Status: COMPLETED | OUTPATIENT
Start: 2020-02-25 | End: 2020-02-25

## 2020-02-25 RX ORDER — BUPIVACAINE HYDROCHLORIDE 5 MG/ML
2 INJECTION, SOLUTION EPIDURAL; INTRACAUDAL ONCE
Status: COMPLETED | OUTPATIENT
Start: 2020-02-25 | End: 2020-02-25

## 2020-02-25 RX ORDER — IOPAMIDOL 408 MG/ML
10 INJECTION, SOLUTION INTRATHECAL ONCE
Status: COMPLETED | OUTPATIENT
Start: 2020-02-25 | End: 2020-02-25

## 2020-02-25 RX ADMIN — METHYLPREDNISOLONE ACETATE 80 MG: 80 INJECTION, SUSPENSION INTRA-ARTICULAR; INTRALESIONAL; INTRAMUSCULAR; SOFT TISSUE at 13:56

## 2020-02-25 RX ADMIN — BUPIVACAINE HYDROCHLORIDE 20 MG: 5 INJECTION, SOLUTION EPIDURAL; INTRACAUDAL at 13:55

## 2020-02-25 RX ADMIN — IOPAMIDOL 2 ML: 408 INJECTION, SOLUTION INTRATHECAL at 13:56

## 2020-02-25 NOTE — PROGRESS NOTES
AFTER YOU GO HOME    ? DO relax; minimize your activity for 24 hours  ? You may resume normal activity tomorrow  ? You may remove the bandage in the evening or next morning  ? You may resume bathing the next day  ? Drink at least 4 extra glasses of fluid today if not on fluid restrictions  ? DO NOT drive or operate machinery at home or at work for at least 24 hours      VISIT THE EMERGENCY ROOM OR URGENT CARE IF:    ? There is redness or swelling at the injection site  ? There is discharge from the injection site  ? You develop a temperature of 101  F or greater      ADDITIONAL INSTRUCTIONS:    ? You may resume your Coumadin or other blood thinner at your regular dose today.  Follow up with your physician to have your INR rechecked if indicated.  ? If you gain no relief from the injection after two (2) weeks, follow-up with your provider for your options.        Contacts:    During business hours from 8 to 5 pm, you may call [phone] to reach a nurse advisor at [facility].  After hours, call [facility]  [phone].  Ask for the Radiologist on-call.  Someone is on-call 24 hrs/day.  [facility] Toll Free Number   [phone]

## 2020-02-25 NOTE — PROGRESS NOTES
: Alethea was seen in X-ray today for a lumbar epidural injection. Patient rated pain before procedure 5/10. After procedure patient rated pain 2/10. This pain level is acceptable to patient. Patient discharged home with .

## 2020-08-07 NOTE — PLAN OF CARE
"  Benja Macedo is a 76 y.o. male who presents with   Chief Complaint   Patient presents with   • Hypertension     Hydrochlorothiazide 12.5 mg; lisinopril 20 mg; carvedilol 6.25 mg   • Hyperlipidemia     Atorvastatin 20 mg   • Diabetes     Metformin 500 mg   .    76-year-old male.  6-month checkup/lab update visit.  No complaints    Hypertension   This is a chronic problem. The current episode started more than 1 year ago. The problem is controlled. Current antihypertension treatment includes diuretics, ACE inhibitors and beta blockers. The current treatment provides moderate improvement. There are no compliance problems.    Hyperlipidemia   This is a chronic problem. The current episode started more than 1 year ago. The problem is controlled. Recent lipid tests were reviewed and are normal. Current antihyperlipidemic treatment includes statins. The current treatment provides moderate improvement of lipids. There are no compliance problems.    Diabetes   He presents for his follow-up diabetic visit. He has type 2 diabetes mellitus. His disease course has been stable. There are no hypoglycemic associated symptoms. There are no diabetic associated symptoms. Symptoms are stable. There are no diabetic complications. Current diabetic treatment includes oral agent (monotherapy). He is compliant with treatment most of the time. An ACE inhibitor/angiotensin II receptor blocker is being taken.        /70   Pulse 79   Temp 98.4 °F (36.9 °C)   Resp 13   Ht 177.8 cm (70\")   Wt 92.3 kg (203 lb 6.4 oz)   SpO2 96%   BMI 29.18 kg/m²     The following portions of the patient's history were reviewed and updated as appropriate: allergies, current medications, past medical history and problem list.    Review of Systems   Constitutional: Negative.    HENT: Negative.    Eyes: Negative.    Respiratory: Negative.    Cardiovascular: Negative.    Genitourinary: Negative.    Musculoskeletal: Negative.    Skin: Negative.  " Problem: Patient Care Overview  Goal: Interdisciplinary Rounds/Family Conf  Outcome: No Change  AMA paper was signed by the patient, resident Justin rAchibald was informed that she will be leaving against medical advice, Dr. Akbar was also notified. Jordan catheter and saline lock was discontinued prior to her going home. Once again went over hip precautions as ordered, that it would be in her best interest to use the abduction pillow, incentive spirometer, hip brace from home, non weight bearing on her surgical leg. Despite the MD recommendation she wants to go home. PCA stating she cannot be with her tomorrow and needs to go home. PCA assisted her with dressing, into a wheelchair, escorted her out to a private car. She was last medicated with oxycodone at 1425.          Neurological: Negative.    Psychiatric/Behavioral: Negative.        Objective   Physical Exam   Constitutional: He is oriented to person, place, and time. He appears well-developed and well-nourished. No distress.   HENT:   Head: Normocephalic and atraumatic.   Eyes: Pupils are equal, round, and reactive to light. Conjunctivae and EOM are normal.   Neck: Normal range of motion. Neck supple. No thyromegaly present.   Neck exam negative.  Carotid auscultation normal-no bruits heard.   Cardiovascular: Normal rate, regular rhythm, normal heart sounds and intact distal pulses. Exam reveals no gallop and no friction rub.   No murmur heard.  Pulmonary/Chest: Effort normal and breath sounds normal. No respiratory distress. He has no wheezes. He has no rales. He exhibits no tenderness.   Neurological: He is alert and oriented to person, place, and time.   Skin:   Patient with an approximate 1 cm hyperkeratotic raised area of the lateral left elbow.  Also a smaller hyperkeratotic area of the left wrist area and a third raised right lower arm hyperkeratotic area.  Dermatology evaluation advised.  His wife says that other members in the family see a dermatologist and he will make an appointment with that physician.   Psychiatric: He has a normal mood and affect. His behavior is normal. Judgment and thought content normal.   Nursing note and vitals reviewed.      Assessment/Plan   Benja was seen today for hypertension, hyperlipidemia and diabetes.    Diagnoses and all orders for this visit:    Benign essential hypertension  -     Comprehensive Metabolic Panel  -     MicroAlbumin, Urine, Random - Urine, Clean Catch    Type 2 diabetes mellitus with stage 3 chronic kidney disease, without long-term current use of insulin (CMS/[facility])  -     Comprehensive Metabolic Panel  -     Hemoglobin A1c  -     MicroAlbumin, Urine, Random - Urine, Clean Catch    Healthcare maintenance  -     Hepatitis C Antibody    Mixed hyperlipidemia  -      Comprehensive Metabolic Panel  -     Lipid Panel        Plan: Labs as above.Today's exam unremarkable for any new problems or events.  Continue all current treatment as prescribed.  A follow-up checkup/lab update visit is advised for 6 months assuming today's labs are all acceptable.

## 2020-08-21 DIAGNOSIS — Z98.890 STATUS POST HIP SURGERY: Primary | ICD-10-CM

## 2020-09-03 ENCOUNTER — ANCILLARY PROCEDURE (OUTPATIENT)
Dept: GENERAL RADIOLOGY | Facility: CLINIC | Age: 40
End: 2020-09-03
Attending: ORTHOPAEDIC SURGERY
Payer: COMMERCIAL

## 2020-09-03 ENCOUNTER — OFFICE VISIT (OUTPATIENT)
Dept: ORTHOPEDICS | Facility: CLINIC | Age: 40
End: 2020-09-03
Payer: COMMERCIAL

## 2020-09-03 DIAGNOSIS — Z98.890 STATUS POST HIP SURGERY: ICD-10-CM

## 2020-09-03 DIAGNOSIS — Z98.890 STATUS POST HIP SURGERY: Primary | ICD-10-CM

## 2020-09-03 NOTE — LETTER
9/3/2020         RE: Alethea Patel  [address]  [place] [address]        Dear Colleague,    Thank you for referring your patient, Alethea Patel, to the [facility]. Please see a copy of my visit note below.        [facility]  Orthopaedic Surgery, Joint Replacement Follow Up  by Cheko Saleem M.D.    Alethea Patel MRN# [id number]   Age: 39 year old YOB: 1980     Requesting physician: Referred Self  [facility]     DIAGNOSIS:   1. Multi-focal osteonecrosis.  2. Lumbar disc herniation  3. Acute Lymphoblastic Leukemia, chemotherapy [id number]   4. Chronic nicotine abuse     SURGERIES:  1. 2005, L femoral head resurfacing hemiarthroplasty  2. 6/5/2007, R HAJA  3. 2/2/2010, right total knee arthroplasty. No patella resurfacing  4. 6/12/2012, R patella resurfacing  5. 1/8/2016, L TKA (Stiven) [facility]  6. 4/3/2018, Revision L surface replacement bartolo to HAJA (Stiven) [facility]  7. 4/19/18 , 5/18/2018, 5/23/2018  Closed reductions L HAJA  8. 5/25/2018, Revision L HAJA to larger head size and cup repositioning (Stiven) [facility]. Cultures (-) x 3  9. 7/12/18, Closed reduction of L HAJA under anesthesia ([place]).          History of Present Illness:   39 year old female status post the surgery as described above.  She is in our office today for a follow-up 2 year after close reduction of left total hip arthroplasty under anesthesia. She was last seen in January 2020. She reports some anterior hip and thigh pain that feels like a muscle pull over the past 1.5 months. She denies trauma. Denies fevers, chills, night sweats. Pain does not wake her at night. Continues to take 20 mg of oxycodone 3-4x a day for chronic pain. She has referral in for physical therapy, but has not done anything for this yet.           Physical Exam:     EXAMINATION pertinent findings:   VITAL SIGNS: not currently breastfeeding.  There is no height or weight on file to calculate BMI.  RESP:  non labored breathing   SKIN: grossly normal   LYMPHATIC: grossly normal   NEURO: grossly normal   VASCULAR: satisfactory perfusion of all extremities   MUSCULOSKELETAL:     RLE:  -well healed surgical scar.  No tenderness to palpation over the greater trochanter  -Hip ROM: Flexion 100, internal rotation 20, external rotation 40, abduction 20 all without pain  -Sensation intact light touch globally in the lower extremity  -Fires EHL, FHL, gastrocsoleus, tibialis anterior  -Foot warm and well perfused    LLE:  -well healed surgical scar.  No tenderness to palpation of the trochanter.  Mild tenderness palpation over the AIIS mild tenderness palpation over the rectus femoris proximally.  -Hip ROM: Flexion 100, internal rotation 20, external rotation 40, abduction 20 all without pain  -Sensation intact light touch globally in the lower extremity  -Fires EHL, FHL, gastrocsoleus, tibialis anterior  -Foot warm and well perfused         Data:   All laboratory data reviewed  All imaging studies reviewed by me    XR Pelvis and Left Hip XR (9/3/2020):  -Well-positioned bilateral total hip arthroplasties without evidence of hardware failure or loosening.               Assessment and Plan:   Assessment:  39-year-old female status bilateral total hip arthroplasties.  She has had subsequent revision left total hip arthroplasty and subsequent dislocation that underwent closed reduction last in 2018.  Overall she is doing well without interval dislocations.     Anterior thigh/hip pain possibly due to iliopsoas tendon impingement.    Plan:  - Continue activities as tolerated  - Discussed that if she continues to have pain and issues with her left anterior hip/thigh pain next step would be to obtain an MRI in 6 months or so to evaluate her implants and also the psoas tendon  - Recommended keeping physical therapy referral which had already been placed if she continues to have pain  - Patient will otherwise follow up as needed if she  has improvement in her hip/thigh pain    The patient was seen and discussed with Dr. Stiven Mckenna MD  PGY-4  Orthopaedic Surgery    Total Time = 20 min, 50% of which was spent in counseling and coordination of care as documented above.      Attending MD (Dr. Cheko Saleem) Attestation :  This patient was seen and evaluated by me including a history, exam, and interpretation of all imaging and/or lab data.  Either a training physician (resident/fellow), who also saw the patient, or scribe has documented the visit in the attached note.    MD Blas Rhoades Family Professor  Oncology and Adult Reconstructive Surgery  Dept Orthopaedic Surgery, [facility]  [phone] office, [phone] pager  [email or web address]

## 2020-09-03 NOTE — PROGRESS NOTES
[facility]  Orthopaedic Surgery, Joint Replacement Follow Up  by Cheko Saleem M.D.    Alethea Patel MRN# [id number]   Age: 39 year old YOB: 1980     Requesting physician: Referred Self  [facility]     DIAGNOSIS:   1. Multi-focal osteonecrosis.  2. Lumbar disc herniation  3. Acute Lymphoblastic Leukemia, chemotherapy [id number]   4. Chronic nicotine abuse     SURGERIES:  1. 2005, L femoral head resurfacing hemiarthroplasty  2. 6/5/2007, R HAJA  3. 2/2/2010, right total knee arthroplasty. No patella resurfacing  4. 6/12/2012, R patella resurfacing  5. 1/8/2016, L TKA (Stiven) [facility]  6. 4/3/2018, Revision L surface replacement bartolo to HAJA (Stiven) [facility]  7. 4/19/18 , 5/18/2018, 5/23/2018  Closed reductions L HAJA  8. 5/25/2018, Revision L HAJA to larger head size and cup repositioning (Stiven) [facility]. Cultures (-) x 3  9. 7/12/18, Closed reduction of L HAJA under anesthesia (Stiven).          History of Present Illness:   39 year old female status post the surgery as described above.  She is in our office today for a follow-up 2 year after close reduction of left total hip arthroplasty under anesthesia. She was last seen in January 2020. She reports some anterior hip and thigh pain that feels like a muscle pull over the past 1.5 months. She denies trauma. Denies fevers, chills, night sweats. Pain does not wake her at night. Continues to take 20 mg of oxycodone 3-4x a day for chronic pain. She has referral in for physical therapy, but has not done anything for this yet.           Physical Exam:     EXAMINATION pertinent findings:   VITAL SIGNS: not currently breastfeeding.  There is no height or weight on file to calculate BMI.  RESP: non labored breathing   SKIN: grossly normal   LYMPHATIC: grossly normal   NEURO: grossly normal   VASCULAR: satisfactory perfusion of all extremities   MUSCULOSKELETAL:     RLE:  -well healed surgical scar.  No tenderness to palpation over the greater  trochanter  -Hip ROM: Flexion 100, internal rotation 20, external rotation 40, abduction 20 all without pain  -Sensation intact light touch globally in the lower extremity  -Fires EHL, FHL, gastrocsoleus, tibialis anterior  -Foot warm and well perfused    LLE:  -well healed surgical scar.  No tenderness to palpation of the trochanter.  Mild tenderness palpation over the AIIS mild tenderness palpation over the rectus femoris proximally.  -Hip ROM: Flexion 100, internal rotation 20, external rotation 40, abduction 20 all without pain  -Sensation intact light touch globally in the lower extremity  -Fires EHL, FHL, gastrocsoleus, tibialis anterior  -Foot warm and well perfused         Data:   All laboratory data reviewed  All imaging studies reviewed by me    XR Pelvis and Left Hip XR (9/3/2020):  -Well-positioned bilateral total hip arthroplasties without evidence of hardware failure or loosening.               Assessment and Plan:   Assessment:  39-year-old female status bilateral total hip arthroplasties.  She has had subsequent revision left total hip arthroplasty and subsequent dislocation that underwent closed reduction last in 2018.  Overall she is doing well without interval dislocations.     Anterior thigh/hip pain possibly due to iliopsoas tendon impingement.    Plan:  - Continue activities as tolerated  - Discussed that if she continues to have pain and issues with her left anterior hip/thigh pain next step would be to obtain an MRI in 6 months or so to evaluate her implants and also the psoas tendon  - Recommended keeping physical therapy referral which had already been placed if she continues to have pain  - Patient will otherwise follow up as needed if she has improvement in her hip/thigh pain    The patient was seen and discussed with Dr. Stiven Mckenna MD  PGY-4  Orthopaedic Surgery    Total Time = 20 min, 50% of which was spent in counseling and coordination of care as documented  above.      Attending MD (Dr. Cheko Saleem) Attestation :  This patient was seen and evaluated by me including a history, exam, and interpretation of all imaging and/or lab data.  Either a training physician (resident/fellow), who also saw the patient, or scribe has documented the visit in the attached note.    MD Blas Rhoades Family Professor  Oncology and Adult Reconstructive Surgery  Dept Orthopaedic Surgery, [facility], [facility]  [phone] office, [phone] pager  [email or web address]

## 2020-09-03 NOTE — NURSING NOTE
Chief Complaint   Patient presents with     RECHECK     Pt. states that she is here today to F/u on Increased Pain of the Left Hip S/p Closed Reduction of Left Total Hip Arthroplasty - Left DOS: 07/12/2018       40 year old  1980        [id number] - [facility], [place] - [address]    Allergies   Allergen Reactions     Chantix [Varenicline] Nausea and Vomiting     Other reaction(s): Vomiting     No Clinical Screening - See Comments      Other reaction(s): Unknown Reaction     Seasonal Allergies      Ciprofloxacin Other (See Comments)     Interacts with other medication  Other reaction(s): Dizziness  Other reaction(s): Other  Interacts with other medication     Latex Itching and Rash     Other reaction(s): Other - Describe In Comment Field  Vaginal itching, burning  Other reaction(s): Intolerance       Current Outpatient Medications   Medication     albuterol (PROAIR HFA/PROVENTIL HFA/VENTOLIN HFA) 108 (90 BASE) MCG/ACT Inhaler     B Complex Vitamins (B COMPLEX PO)     BACLOFEN PO     BuPROPion HCl (WELLBUTRIN SR PO)     fluticasone (FLONASE) 50 MCG/ACT nasal spray     gabapentin (NEURONTIN) 300 MG capsule     IBUPROFEN PO     levothyroxine (SYNTHROID, LEVOTHROID) 75 MCG tablet     LORazepam (ATIVAN) 1 MG tablet     MAGNESIUM OXIDE PO     METHADONE HCL PO     montelukast (SINGULAIR) 10 MG tablet     Multiple Vitamins-Minerals (MULTIVITAMIN ADULT PO)     Nutritional Supplements (MELATONIN PO)     omeprazole (PRILOSEC OTC) 20 MG tablet     order for DME     order for DME     oxyCODONE IR (ROXICODONE) 30 MG tablet     POTASSIUM PO     Prochlorperazine Maleate (COMPAZINE PO)     QUEtiapine (SEROQUEL) 25 MG tablet     senna-docusate (SENOKOT-S;PERICOLACE) 8.6-50 MG per tablet     cyclobenzaprine (FLEXERIL) 10 MG tablet     DULoxetine (CYMBALTA) 60 MG EC capsule     Escitalopram Oxalate (LEXAPRO PO)     TIZANIDINE HCL PO     warfarin (COUMADIN) 5 MG tablet      No current facility-administered medications for this visit.

## 2020-12-29 ENCOUNTER — TELEPHONE (OUTPATIENT)
Dept: ORTHOPEDICS | Facility: CLINIC | Age: 40
End: 2020-12-29

## 2020-12-29 NOTE — TELEPHONE ENCOUNTER
See MRI encounter notes dated today.    Mimi Olmstead RN          [facility]    Phone Message    May a detailed message be left on voicemail: yes     Reason for Call: Other: Pt would like to see Dr. Saleem earlier than the telephone visit. Pt is experienceing pain in left hip and has been unable to work, can barely walk. If Stiven is not available any earlier, please coordinate with another  or PA.      Action Taken: Message routed to:  [facility]o    Travel Screening: Not Applicable

## 2020-12-30 ENCOUNTER — TELEPHONE (OUTPATIENT)
Dept: ORTHOPEDICS | Facility: CLINIC | Age: 40
End: 2020-12-30

## 2020-12-30 DIAGNOSIS — M25.552 LEFT HIP PAIN: ICD-10-CM

## 2020-12-30 DIAGNOSIS — Z98.890 STATUS POST CLOSED REDUCTION OF DISLOCATED TOTAL HIP PROSTHESIS: Primary | ICD-10-CM

## 2020-12-30 NOTE — TELEPHONE ENCOUNTER
RN called and spoke with patient.  Per patient, she would like to have her MRI imaging done in [place]. [facility].  RN called that number and it turns out it is just a clinic and patient needs to go to [facility] to get the MRIs.  RN then called patient regarding this plan. Patient expressed understanding.    Since RN is working from home, RN enlisted the help of Ashleigh DELAROSA to fax both the left hip and pelvis MRI order to [facility] Radiology department  Fax number is .  Phone is .

## 2021-01-05 ENCOUNTER — TELEPHONE (OUTPATIENT)
Dept: ORTHOPEDICS | Facility: CLINIC | Age: 41
End: 2021-01-05

## 2021-01-05 NOTE — TELEPHONE ENCOUNTER
[facility]    Phone Message    May a detailed message be left on voicemail: yes     Reason for Call: Order(s): Other:   Reason for requested: Patient needs an MRI.  Date needed: today  Provider name: Dr. Saleem    Patient's medical facility, [facility] in [place], has yet to receive the orders for an MRI.    Please fax to [phone].    The MRI is for the (L) hip and pelvic area.    Action Taken: Message routed to:  [facility]o    Travel Screening: Not Applicable

## 2021-01-07 ENCOUNTER — TELEPHONE (OUTPATIENT)
Dept: ORTHOPEDICS | Facility: CLINIC | Age: 41
End: 2021-01-07

## 2021-01-07 NOTE — TELEPHONE ENCOUNTER
Called James back to discuss. They wanted to do a CT scan on the patient's hip as they were getting to much artifact on the MRI of the hip due to the hardware. The clinic at [facility] was unable to perform the MRI using the technique to reduce artifact on the imaging. Dr. Saleem was consulted and he wishes for the imaging at [facility] to be terminated so that the patient can have the imaging done at our clinic where the needed techniques can be performed. Our office will reach out to the patient to get this imaging scheduled.

## 2021-01-07 NOTE — TELEPHONE ENCOUNTER
[facility]    Phone Message    May a detailed message be left on voicemail: yes     Reason for Call: Other: Please call James (Swati) back to clarify orders. CT or MRI for pelvis and hip. Patient is there at their clinic right now.    Action Taken: Message routed to:  [facility] Orthopedic    Travel Screening: Not Applicable

## 2021-01-08 ENCOUNTER — TELEPHONE (OUTPATIENT)
Dept: ORTHOPEDICS | Facility: CLINIC | Age: 41
End: 2021-01-08

## 2021-01-08 DIAGNOSIS — M25.552 LEFT HIP PAIN: ICD-10-CM

## 2021-01-08 DIAGNOSIS — Z98.890 STATUS POST CLOSED REDUCTION OF DISLOCATED TOTAL HIP PROSTHESIS: Primary | ICD-10-CM

## 2021-01-08 NOTE — TELEPHONE ENCOUNTER
RN called patient back. Lengthy conversation as patient doesn't quite get RNs explaination.  RN was trying to tell patient that Dr. Saleem would prefer patient to get a new MRI with MARS technique. Patient insists she would like to keep her Monday appt and just have Dr. Saleem to review the imaging from [facility] and then decide if she actually needs the imaging.   RN then called imaging and able to secured MRI appts for 1/15/2021 at 830am.  Patient now wants to a different appt with a time that coincides with Dr. Saleem's clinic.RN stated he will call again even though previously she described to have plenty of flexibility.   RN called scheduling again. Have a different MRI appt that coincides with Dr. Saleem's clinic. It will be for Jan 21st Thursday. Appt date is 830am for MRI. RN then placed patient on Dr. Saleem's schedule same day.  RN called patient with this plan.  Patient expressed understanding.    Mimi Olmstead RN

## 2021-01-08 NOTE — TELEPHONE ENCOUNTER
RN called and left a voice message for Alethea.  It appears that she will need to do her imaging down here at the [facility].  She will need to MARS technique used on her MRI's as she has hardware.  She was unable to do it locally because they do not have this technique available to them.  RN called to see if I could assist her with scheduling of these tests.  RN left a number to call us back at and get them scheduled.

## 2021-01-08 NOTE — PROGRESS NOTES
RN placed new orders for MRI of left hip and pelvis. Both with MARS technique per Dr. Saleem's request.  Patient's initial desire to get the imaging performed in [facility] has to be terminated due to the fact they don't have that capabilities.  RN called and spoke with patient to assist patient in scheduling these and then a visit with Dr. Stiven Olmstead RN

## 2021-01-11 ENCOUNTER — TELEPHONE (OUTPATIENT)
Dept: ORTHOPEDICS | Facility: CLINIC | Age: 41
End: 2021-01-11

## 2021-01-11 ENCOUNTER — VIRTUAL VISIT (OUTPATIENT)
Dept: ORTHOPEDICS | Facility: CLINIC | Age: 41
End: 2021-01-11
Payer: COMMERCIAL

## 2021-01-11 DIAGNOSIS — M25.559 HIP PAIN: Primary | ICD-10-CM

## 2021-01-11 PROBLEM — F32.A DEPRESSION: Status: ACTIVE | Noted: 2021-01-11

## 2021-01-11 PROBLEM — T40.2X1A OPIOID OVERDOSE (H): Status: ACTIVE | Noted: 2020-08-03

## 2021-01-11 PROBLEM — T14.90XA: Status: ACTIVE | Noted: 2021-01-11

## 2021-01-11 PROBLEM — J30.9 ALLERGIC RHINITIS: Status: ACTIVE | Noted: 2020-07-08

## 2021-01-11 PROBLEM — S73.005A HIP DISLOCATION, LEFT, INITIAL ENCOUNTER (H): Status: ACTIVE | Noted: 2018-04-16

## 2021-01-11 PROBLEM — E03.2 HYPOTHYROIDISM DUE TO MEDICATION: Status: ACTIVE | Noted: 2017-06-08

## 2021-01-11 PROBLEM — M22.41 CHONDROMALACIA PATELLAE OF RIGHT KNEE: Status: ACTIVE | Noted: 2021-01-11

## 2021-01-11 PROBLEM — D64.9 CHRONIC ANEMIA: Status: ACTIVE | Noted: 2021-01-11

## 2021-01-11 PROBLEM — M25.552 LEFT HIP PAIN: Status: ACTIVE | Noted: 2017-06-08

## 2021-01-11 PROBLEM — G47.01 INSOMNIA DUE TO MEDICAL CONDITION: Status: ACTIVE | Noted: 2017-07-25

## 2021-01-11 PROBLEM — E78.1 HYPERTRIGLYCERIDEMIA: Status: ACTIVE | Noted: 2017-06-08

## 2021-01-11 PROBLEM — M24.529 CONTRACTURE OF ELBOW: Status: ACTIVE | Noted: 2021-01-11

## 2021-01-11 PROCEDURE — 99213 OFFICE O/P EST LOW 20 MIN: CPT | Mod: 95 | Performed by: ORTHOPAEDIC SURGERY

## 2021-01-11 RX ORDER — LEVOTHYROXINE SODIUM 50 UG/1
50 TABLET ORAL AT BEDTIME
COMMUNITY
Start: 2020-12-28

## 2021-01-11 RX ORDER — LORAZEPAM 0.5 MG/1
TABLET ORAL
Status: ON HOLD | COMMUNITY
Start: 2021-01-07 | End: 2024-01-31

## 2021-01-11 RX ORDER — OXYCODONE HYDROCHLORIDE 20 MG/1
5 TABLET ORAL 4 TIMES DAILY
Status: ON HOLD | COMMUNITY
Start: 2021-01-08 | End: 2024-01-31

## 2021-01-11 RX ORDER — TRAZODONE HYDROCHLORIDE 100 MG/1
TABLET ORAL
COMMUNITY
Start: 2019-04-24 | End: 2024-01-26

## 2021-01-11 RX ORDER — FAMOTIDINE 20 MG/1
20 TABLET, FILM COATED ORAL
COMMUNITY
Start: 2020-04-10 | End: 2021-04-10

## 2021-01-11 RX ORDER — BUPROPION HYDROCHLORIDE 150 MG/1
150 TABLET, EXTENDED RELEASE ORAL AT BEDTIME
COMMUNITY
Start: 2020-11-23

## 2021-01-11 NOTE — LETTER
1/11/2021         RE: Alethea Patel  [address]  [place] [address]        Dear Colleague,    Thank you for referring your patient, Alethea Patel, to the [facility] [place]. Please see a copy of my visit note below.        [facility]  Orthopaedic Surgery, Joint Replacement Follow Up  by Cheko Saleem M.D.    Alethea Patel MRN# [id number]   Age: 39 year old YOB: 1980     Requesting physician: Referred Self  [facility]     DIAGNOSIS:   1. Multi-focal osteonecrosis.  2. Lumbar disc herniation  3. Acute Lymphoblastic Leukemia, chemotherapy [id number]   4. Chronic nicotine abuse     SURGERIES:  1. 2005, L femoral head resurfacing hemiarthroplasty  2. 6/5/2007, R HAJA  3. 2/2/2010, right total knee arthroplasty. No patella resurfacing  4. 6/12/2012, R patella resurfacing  5. 1/8/2016, L TKA (Stiven) [facility]  6. 4/3/2018, Revision L surface replacement bartolo to HAJA (Stiven) [facility]  7. 4/19/18 , 5/18/2018, 5/23/2018  Closed reductions L HAJA  8. 5/25/2018, Revision L HAJA to larger head size and cup repositioning (Stiven) [facility]. Cultures (-) x 3  9. 7/12/18, Closed reduction of L HAJA under anesthesia (Stiven).          History of Present Illness:   I spoke with Alethea via virtual visit today to review her MRI examination.  We had done this as she was experiencing left hip and anterior groin pain of uncertain etiology.  We were looking for any evidence of adverse local tissue reaction or other soft tissue injury.  She notes that her symptoms have been gradually improving.  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  She needs to use a stool as well to sit.           Physical Exam:     EXAMINATION pertinent findings:   VITAL SIGNS: not currently breastfeeding.  There is no height or weight on file to calculate BMI.  Examination deferred due to virtual visit status.         Data:   All laboratory data  reviewed  All imaging studies reviewed by me    XR Pelvis and Left Hip XR (9/3/2020):  -Well-positioned bilateral total hip arthroplasties without evidence of hardware failure or loosening.                 Assessment and Plan:   Assessment:  I am uncertain of the etiology of her left groin pain.  There is no evidence of intra-articular problem or pathology or evidence of adverse local tissue reaction.  Also I find no evidence of iliopsoas tendon impingement.  I suspect her symptoms are either related to #1 soft tissue pathology or #2 hip subluxation or #3 referred pain from her lumbar spine.      Plan:  Advised that she manage her symptoms expectantly.  Refrain from working full-time shift as long she has symptoms.  She may need to sit more frequently and use a stool.  I believe there is a good chance that her symptoms will continue to slowly resolve over time.  If not or if they become progressively more severe she will return for follow-up examination and investigations of her lumbar spine as well.    Total Time = 20 min, 50% of which was spent in counseling and coordination of care as documented above.    MD Blas Rhoades Family Professor  Oncology and Adult Reconstructive Surgery  Dept Orthopaedic Surgery, [facility]  [phone] office, [phone] pager  [email or web address]

## 2021-01-11 NOTE — TELEPHONE ENCOUNTER
RN called and spoke with patient. Told patient new statements are added. Also mail the letter to patient.  Patient expressed understanding.    Mimi Olmstead RN       [facility] Call [facility]    Phone Message    May a detailed message be left on voicemail: yes     Reason for Call: Other: Patient has wording she would like added to her letter to work      Action Taken: Other: ortho    Travel Screening: Not Applicable

## 2021-01-11 NOTE — PROGRESS NOTES
[facility]  Orthopaedic Surgery, Joint Replacement Follow Up  by Cheko Saleem M.D.    Alethea Patel MRN# [id number]   Age: 39 year old YOB: 1980     Requesting physician: Referred Self  [facility]     DIAGNOSIS:   1. Multi-focal osteonecrosis.  2. Lumbar disc herniation  3. Acute Lymphoblastic Leukemia, chemotherapy [id number]   4. Chronic nicotine abuse     SURGERIES:  1. 2005, L femoral head resurfacing hemiarthroplasty  2. 6/5/2007, R HAJA  3. 2/2/2010, right total knee arthroplasty. No patella resurfacing  4. 6/12/2012, R patella resurfacing  5. 1/8/2016, L TKA (Stiven) [facility]  6. 4/3/2018, Revision L surface replacement bartolo to HAJA (Stiven) [facility]  7. 4/19/18 , 5/18/2018, 5/23/2018  Closed reductions L HAJA  8. 5/25/2018, Revision L HAJA to larger head size and cup repositioning (Stiven) [facility]. Cultures (-) x 3  9. 7/12/18, Closed reduction of L HAJA under anesthesia (Stiven).          History of Present Illness:   I spoke with Alethea via virtual visit today to review her MRI examination.  We had done this as she was experiencing left hip and anterior groin pain of uncertain etiology.  We were looking for any evidence of adverse local tissue reaction or other soft tissue injury.  She notes that her symptoms have been gradually improving.  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  She needs to use a stool as well to sit.           Physical Exam:     EXAMINATION pertinent findings:   VITAL SIGNS: not currently breastfeeding.  There is no height or weight on file to calculate BMI.  Examination deferred due to virtual visit status.         Data:   All laboratory data reviewed  All imaging studies reviewed by me    XR Pelvis and Left Hip XR (9/3/2020):  -Well-positioned bilateral total hip arthroplasties without evidence of hardware failure or loosening.                 Assessment and Plan:   Assessment:  I am uncertain of the etiology of  her left groin pain.  There is no evidence of intra-articular problem or pathology or evidence of adverse local tissue reaction.  Also I find no evidence of iliopsoas tendon impingement.  I suspect her symptoms are either related to #1 soft tissue pathology or #2 hip subluxation or #3 referred pain from her lumbar spine.      Plan:  Advised that she manage her symptoms expectantly.  Refrain from working full-time shift as long she has symptoms.  She may need to sit more frequently and use a stool.  I believe there is a good chance that her symptoms will continue to slowly resolve over time.  If not or if they become progressively more severe she will return for follow-up examination and investigations of her lumbar spine as well.    Total Time = 20 min, 50% of which was spent in counseling and coordination of care as documented above.    MD Blas Rhoades Family Professor  Oncology and Adult Reconstructive Surgery  Dept Orthopaedic Surgery, [facility]  [phone] office, [phone] pager  [email or web address]

## 2021-01-11 NOTE — TELEPHONE ENCOUNTER
RN mail work letter dictated by Dr. Saleem. Mail it to patient per patient's request.    Mimi Olmstead RN

## 2021-01-11 NOTE — LETTER
Return to Work  2021     Seen today: yes    Patient:  Alethea Patel  :   1980  MRN:     [id number]  Physician: NANCY FERNANDO    Alethea Patel may return to work on 2020.      Patient limitations: She is having pain after undergoing total hip replacement several years ago.  I have advised that she refrain from a working full-time shift as this may aggravate her symptoms.  In addition, while she is working would be helpful to have a high chair stool that she may sit on or lean against to alleviate some of her discomfort.    Once her symptoms resolve, she may resume a more normal working schedule as tolerated.      Nancy Fernando MD  [facility] Family Professor  Oncology and Adult Reconstructive Surgery  Dept Orthopaedic Surgery, [facility]  [phone] office  [email or web address]      Electronically signed by Nancy Fernando MD

## 2021-01-14 ENCOUNTER — TELEPHONE (OUTPATIENT)
Dept: ORTHOPEDICS | Facility: CLINIC | Age: 41
End: 2021-01-14

## 2021-01-14 NOTE — TELEPHONE ENCOUNTER
RN called and spoke with patient. Gave patient Julieth's contact.    Mimi Olmstead RN      [facility]    Phone Message    May a detailed message be left on voicemail: yes     Reason for Call: Other: Pt would like a call back regarding her work restriction information.     Action Taken: Other: [facility]    Travel Screening: Not Applicable

## 2021-07-01 DIAGNOSIS — Z98.890 STATUS POST HIP SURGERY: Primary | ICD-10-CM

## 2021-08-24 DIAGNOSIS — M25.561 BILATERAL KNEE PAIN: Primary | ICD-10-CM

## 2021-08-24 DIAGNOSIS — M25.562 BILATERAL KNEE PAIN: Primary | ICD-10-CM

## 2021-08-26 ENCOUNTER — ANCILLARY PROCEDURE (OUTPATIENT)
Dept: GENERAL RADIOLOGY | Facility: CLINIC | Age: 41
End: 2021-08-26
Attending: ORTHOPAEDIC SURGERY
Payer: COMMERCIAL

## 2021-08-26 ENCOUNTER — OFFICE VISIT (OUTPATIENT)
Dept: ORTHOPEDICS | Facility: CLINIC | Age: 41
End: 2021-08-26
Payer: COMMERCIAL

## 2021-08-26 VITALS — WEIGHT: 180 LBS | BODY MASS INDEX: 25.77 KG/M2 | HEIGHT: 70 IN

## 2021-08-26 DIAGNOSIS — M25.561 BILATERAL KNEE PAIN: ICD-10-CM

## 2021-08-26 DIAGNOSIS — M25.561 CHRONIC PAIN OF BOTH KNEES: Primary | ICD-10-CM

## 2021-08-26 DIAGNOSIS — G89.29 CHRONIC PAIN OF BOTH KNEES: Primary | ICD-10-CM

## 2021-08-26 DIAGNOSIS — M25.562 BILATERAL KNEE PAIN: ICD-10-CM

## 2021-08-26 DIAGNOSIS — M25.562 CHRONIC PAIN OF BOTH KNEES: Primary | ICD-10-CM

## 2021-08-26 PROCEDURE — 99214 OFFICE O/P EST MOD 30 MIN: CPT | Mod: GC | Performed by: ORTHOPAEDIC SURGERY

## 2021-08-26 PROCEDURE — 73560 X-RAY EXAM OF KNEE 1 OR 2: CPT | Mod: RT | Performed by: RADIOLOGY

## 2021-08-26 ASSESSMENT — PATIENT HEALTH QUESTIONNAIRE - PHQ9: SUM OF ALL RESPONSES TO PHQ QUESTIONS 1-9: 5

## 2021-08-26 ASSESSMENT — KOOS JR: HOW SEVERE IS YOUR KNEE STIFFNESS AFTER FIRST WAKING IN MORNING: MODERATE

## 2021-08-26 ASSESSMENT — MIFFLIN-ST. JEOR: SCORE: 1556.72

## 2021-08-26 ASSESSMENT — ACTIVITIES OF DAILY LIVING (ADL): FUNCTION,_DAILY_LIVING_SCORE: 39.7

## 2021-08-26 NOTE — NURSING NOTE
"Chief Complaint   Patient presents with     RECHECK     Both Hip and Knee Pain       41 year old  1980    Ht 1.778 m (5' 10\")   Wt 81.6 kg (180 lb)   BMI 25.83 kg/m       Pain Assessment  Patient Currently in Pain: Yes  0-10 Pain Scale: 5 (pain in knees and hip bothers patient at night and with increased weight bearing activity)                              [facility] [place], [place] - [address]  [id number] - [place], [place] - [address] [facility] [address]        Allergies   Allergen Reactions     Chantix [Varenicline] Nausea and Vomiting     Other reaction(s): Vomiting     No Clinical Screening - See Comments      Other reaction(s): Unknown Reaction     Seasonal Allergies      Ciprofloxacin Other (See Comments)     Interacts with other medication  Other reaction(s): Dizziness       Latex Itching and Rash     Other reaction(s): Other - Describe In Comment Field  Vaginal itching, burning  Other reaction(s): Intolerance           Current Outpatient Medications   Medication     albuterol (PROAIR HFA/PROVENTIL HFA/VENTOLIN HFA) 108 (90 BASE) MCG/ACT Inhaler     B Complex Vitamins (B COMPLEX PO)     BACLOFEN PO     buPROPion (WELLBUTRIN SR) 150 MG 12 hr tablet     cyclobenzaprine (FLEXERIL) 10 MG tablet     DULoxetine (CYMBALTA) 60 MG EC capsule     Escitalopram Oxalate (LEXAPRO PO)     fluticasone (FLONASE) 50 MCG/ACT nasal spray     gabapentin (NEURONTIN) 300 MG capsule     IBUPROFEN PO     levothyroxine (SYNTHROID/LEVOTHROID) 50 MCG tablet     LORazepam (ATIVAN) 0.5 MG tablet     LORazepam (ATIVAN) 1 MG tablet     MAGNESIUM OXIDE PO     METHADONE HCL PO     montelukast (SINGULAIR) 10 MG tablet     Multiple Vitamins-Minerals (MULTIVITAMIN ADULT PO)     Nutritional Supplements (MELATONIN PO)     omeprazole (PRILOSEC OTC) 20 MG tablet     order for DME     order for DME     oxyCODONE HCl (ROXICODONE) 20 MG TABS immediate release tablet     " POTASSIUM PO     Prochlorperazine Maleate (COMPAZINE PO)     QUEtiapine (SEROQUEL) 25 MG tablet     senna-docusate (SENOKOT-S;PERICOLACE) 8.6-50 MG per tablet     TIZANIDINE HCL PO     traZODone (DESYREL) 100 MG tablet     warfarin (COUMADIN) 5 MG tablet     No current facility-administered medications for this visit.             Questionnaires:    HOOS Hip Dysfunction & Osteoarthritis Outcome Questionnaire    Hip Dysfunction & Osteoarthritis Outcome Score (HOOS), English Version LK 2.0 (Hayder Jasmine, Tameka VIEYRA, 2003) 1/13/2020   S1. Do you feel grinding, hear clicking or any other type of noise from your hip? Rarely   S2. Difficulties spreading legs wide apart None   S3. Difficulties to stride out when walking None   S4. How severe is your hip joint stiffness after first wakening in the morning? Moderate   S5. How severe is your hip stiffness after sitting, lying or resting LATER IN THE DAY? Severe   Symptom Count 5   Symptom Sum 6   Symptom Mean 1.2   Symptom Subscale Score 70   P1. How often is your hip painful? Daily   P2. Straightening your hip fully Mild   P3. Bending your hip FULLY None   P4. Walking on a flat surface None   P5. Going up or down stairs None   P6. At night while in bed Moderate   P7. Sitting or lying Mild   P8. Standing upright Mild   P9. Walking on a hard surface (asphalt, concrete, etc.) Mild   P10. Walking on an uneven surface Moderate   Pain Count 10   Pain Sum 11   Pain Mean 1.1   Pain Subscale Score 72.5   A1. Descending stairs Mild   A2. Ascending stairs Moderate   A3. Rising from sitting Moderate   A4. Standing Moderate   A5. Bending to the floor/ an object Mild   A6. Walking on a flat surface Moderate   A7. Getting in/out of car Moderate   A8. Going shopping Moderate   A9. Putting on socks/stockings Moderate   A10. Rising from bed Moderate   A11. Taking off socks/stockings Mild   A12. Lying in bed (turning over, maintaining hip position) Mild   A13. Getting in/out of  bed Mild   A14. Sitting Mild   A15. Getting on/off toilet Mild   A16. Heavy domestic duties (moving heavy boxes, scrubbing floors, etc.) Mild   A17. Light domestic duties (cooking, dusting, etc.) Mild   ADL Count 17   ADL Sum 25   ADL Mean 1.47   ADL Subscale Score 63.23   SP1. Squatting Moderate   SP2. Running Moderate   SP3. Twisting/pivoting on loaded leg Moderate   SP4. Walking on uneven surface Moderate   Sports/Rec Count 4   Sports/Rec Sum 8   Sports/Rec Mean 2   Sports/Rec Subscale Score 50   Q1. How often are you aware of your hip problem? Daily   Q2. Have you modified you life style to avoid activities potentially damaging to your hip? Mildly   Q3. How much are you troubled with lack of confidence in your hip? Not at all   Q4. In general, how much difficulty do you have with your hip? Moderate   QOL Count 4   QOL Sum 6   QOL Mean 1.5   Quality of Life Subscale Score 62.5              KOOS Knee Survey Assessment    Knee Outcome Survey ADL Scale (KALEIGH Buckley; SANDY [employer]; Nik RS; Scott, FH; CHAVA Jean; 1998) 8/26/2021   Do you have swelling in your knee? Sometimes   Do you feel grinding, hear clicking or any other type of noise when your knee moves? Often   Does your knee catch or hang up when moving? Sometimes   Can you straighten your knee fully? Often   Can you bend your knee fully? Often   How severe is your knee joint stiffness after first wakening in the morning? Moderate   How severe is your knee stiffness after sitting, lying or resting LATER IN THE DAY? Moderate   How often do you experience knee pain? Daily   Twisting/pivoting on your knee Severe   Straightening knee fully Severe   Bending knee fully Severe   Walking on flat surface Moderate   Going up or down stairs Moderate   At night while in bed Severe   Sitting or lying Moderate   Standing upright Severe   Descending stairs Mild   Ascending stairs Severe   Rising from sitting Severe   Standing Severe   Bending to floor/ an object  Severe   Walking on flat surface Moderate   Getting in/out of car Severe   Going shopping Moderate   Putting on socks/stockings Moderate   Rising from bed Moderate   Taking off socks/stockings Moderate   Lying in bed (turning over, maintaining knee position) Severe   Getting in/out of bath Moderate   Sitting Moderate   Getting on/off toilet Moderate   Heavy domestic duties (moving heavy boxes, scrubbing floors, etc) Severe   Light domestic duties (cooking, dusting, etc) Severe   Squatting Severe   Running Severe   Jumping Severe   Twisting/pivoting on your injured knee Severe   Kneeling Severe   How often are you aware of your knee problem? Daily   Have you modified your life style to avoid potentially damaging activities to your knee? Moderately   How much are you troubled with lack of confidence in your knee? Severely   In general, how much difficulty do you have with your knee? Severe   Pain Score 33.33   Symptoms Score 39.28   Function, Daily Living Score 39.7   Sports and Rec Score 25   Quality of Life Score 31.25              Promis 10 Assessment    PROMIS 10 8/26/2021   In general, would you say your health is: Good   In general, would you say your quality of life is: Good   In general, how would you rate your physical health? Good   In general, how would you rate your mental health, including your mood and your ability to think? Good   In general, how would you rate your satisfaction with your social activities and relationships? Good   In general, please rate how well you carry out your usual social activities and roles Good   To what extent are you able to carry out your everyday physical activities such as walking, climbing stairs, carrying groceries, or moving a chair? Moderately   How often have you been bothered by emotional problems such as feeling anxious, depressed or irritable? Sometimes   How would you rate your fatigue on average? Moderate   How would you rate your pain on average?   0 = No Pain   to  10 = Worst Imaginable Pain 5   In general, would you say your health is: 3   In general, would you say your quality of life is: 3   In general, how would you rate your physical health? 3   In general, how would you rate your mental health, including your mood and your ability to think? 3   In general, how would you rate your satisfaction with your social activities and relationships? 3   In general, please rate how well you carry out your usual social activities and roles. (This includes activities at home, at work and in your community, and responsibilities as a parent, child, spouse, employee, friend, etc.) 3   To what extent are you able to carry out your everyday physical activities such as walking, climbing stairs, carrying groceries, or moving a chair? 3   In the past 7 days, how often have you been bothered by emotional problems such as feeling anxious, depressed, or irritable? 3   In the past 7 days, how would you rate your fatigue on average? 3   In the past 7 days, how would you rate your pain on average, where 0 means no pain, and 10 means worst imaginable pain? 5   Global Mental Health Score 12   Global Physical Health Score 12   PROMIS TOTAL - SUBSCORES 24   Some recent data might be hidden

## 2021-08-26 NOTE — PROGRESS NOTES
[facility]  Orthopaedic Surgery, Joint Replacement Consultation  by Cheko Saleem M.D.    Alethea Patel MRN# [id number]   Age: 41 year old YOB: 1980     Requesting physician: No ref. provider found  [employer]     DIAGNOSIS:   1. Multi-focal osteonecrosis.  2. Lumbar disc herniation  3. Acute Lymphoblastic Leukemia, chemotherapy [id number]   4. Chronic nicotine abuse     SURGERIES:  1. 2005, L femoral head resurfacing hemiarthroplasty  2. 6/5/2007, R HAJA  3. 2/2/2010, right total knee arthroplasty. No patella resurfacing  4. 6/12/2012, R patella resurfacing  5. 1/8/2016, L TKA ([facility]) [facility]  6. 4/3/2018, Revision L surface replacement bartolo to HAJA ([facility]) [facility]  7. 4/19/18 , 5/18/2018, 5/23/2018  Closed reductions L HAJA  8. 5/25/2018, Revision L HAJA to larger head size and cup repositioning ([facility]) [facility]. Cultures (-) x 3  9. 7/12/18, Closed reduction of L HAJA under anesthesia ([facility]).           Assessment and Plan:   Assessment:  Bilateral knee pain of uncertain etiology.  May be related to intermittent bouts of intraosseous hypertension which is known to occur with osteonecrosis.  There is no evidence of implant related complication requiring any type of surgical intervention.  I explained to the patient that she may have flareups of pain occasionally on an intermittent basis.  Pain could also be musculoskeletal in nature related to her increased activity at work while carrying objects at [employer].     Plan:  Expectant management.  Decreased activities which aggravate her symptoms.  I am not recommended any analgesic medication or other pharmacologic agents to reduce intraosseous hypertension.  She reports that she has been decreasing her narcotic usage and is down to 20 mg of oxycodone per day.  She is not sure whether or not she revealed to completely stop this but is working with her pain management team.    I did advise patient that she should come in for an x-ray  of both hips and both knees periodically and interval of every 5 years for regular follow-up.    Patient seen, evaluated and discussed with Dr. Saleem who is in agreement with the above assessment and plan.      Francisco J Purvis MD  Orthopaedic Surgery PGY4  Pager: [phone]      Attending MD (Dr. Cheko Saleem) Attestation :  This patient was seen and evaluated by me including a history, exam, and interpretation of all imaging and/or lab data.  Either a training physician (resident/fellow), who also saw the patient, or scribe has documented the visit in the attached note.    MD Blas Rhoades Family Professor  Oncology and Adult Reconstructive Surgery  Dept Orthopaedic Surgery, [facility]  [phone] office, [phone] pager  [email or web address]                    History of Present Illness:   41 year old female  chief complaint    Alona is seen for evaluation of her knees.  She notes that she is having bilateral knee discomfort which varies in intensity from 1 knee to the other.  Occasional catching sensation on the left side.  No locking.  Pain is mainly activity related.  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      No recent injury.  No radicular symptoms.  No groin pain.         Physical Exam:     EXAMINATION pertinent findings:   PSYCH: Pleasant, healthy-appearing, alert, oriented x3, cooperative. Normal mood and affect.  VITAL SIGNS: not currently breastfeeding..  Reviewed nursing intake notes.   There is no height or weight on file to calculate BMI.  RESP: non labored breathing   ABD: benign, soft, non-tender, no acute peritoneal findings  SKIN: grossly normal   LYMPHATIC: grossly normal, no adenopathy, no extremity edema  NEURO: grossly normal , no motor deficits  VASCULAR: satisfactory perfusion of all extremities   MUSCULOSKELETAL:   Gait: She is a slightly abnormal gait and that the right hip  does not fully extend when standing.  This may be related to slight flexion contracture of the right knee joint.  Otherwise I do not detect a and antalgic limp.    Hips have full symmetric range of motion bilaterally without pain with rotation of either hip joint.    Right knee demonstrates 3 to 5 degree fixed flexion contracture with further flexion to 100 degrees.  No effusion.  No warmth.  Some soft tissue swelling about the knee joint.  Incision is well-healed.  No evidence of excessive laxity in the coronal or sagittal plane.    Left knee demonstrates 0 to 105 degrees range of motion.  No effusion or no warmth.  Minimal swelling about the knee joint.  Incision well-healed.  No evidence of excessive laxity in the coronal or sagittal plane.           Data:   All laboratory data reviewed  All imaging studies reviewed by me     Radiographs of both knees do not demonstrate any evidence of loosening malposition subsidence or change over time.      DATA for DOCUMENTATION:         Past Medical History:     Patient Active Problem List   Diagnosis     Acute lymphoblastic leukemia (ALL) in remission (H)     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)     Failed total knee, right (H)     Elbow pain     Lumbar disc herniation with radiculopathy     S/P total knee arthroplasty     GERD (gastroesophageal reflux disease)     Chronic constipation     Anxiety     Seizure (H)     Avascular necrosis (H)     Moderate episode of recurrent major depressive disorder (H)     Female infertility     Status post hip surgery     Long-term (current) use of anticoagulants [Z79.01]     Aftercare following joint replacement [Z47.1]     Hip dislocation, left, subsequent encounter     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)     Hip dislocation, left, initial encounter (H)     Status post closed reduction of dislocated total hip prosthesis     Allergic rhinitis     Chondromalacia patellae of right knee     Chronic anemia      Contracture of elbow     Depression     Hyperprolactinemia (H)     Hypertriglyceridemia     Injury of skin and subcutaneous tissue     Insomnia due to medical condition     Left hip pain     Narcotic dependence (H)     Opioid overdose (H)     Hypothyroidism due to medication     Past Medical History:   Diagnosis Date     Acute lymphoblastic leukemia in remission (H)      Anxiety      Chest pain      Chronic osteoarthritis      Degenerative joint disease      Depression      Depressive disorder      Gastro-oesophageal reflux disease      Hypothyroidism      IBS (irritable bowel syndrome)      Osteonecrosis (H)      Osteonecrosis (H)      Thyroid disease        Also see scanned health assessment forms.       Past Surgical History:     Past Surgical History:   Procedure Laterality Date     ARTHROPLASTY KNEE Left 1/8/2016    Procedure: ARTHROPLASTY KNEE;  Surgeon: Cheko Saleem MD;  Location: [facility]     ARTHROPLASTY PATELLO-FEMORAL (KNEE)  6/12/2012    Procedure: ARTHROPLASTY PATELLO-FEMORAL (KNEE);  Right Knee Patella Resurfacing;  Surgeon: Cheko Saleem MD;  [facility]     ARTHROPLASTY REVISION HIP Left 4/3/2018    Procedure: ARTHROPLASTY REVISION HIP;  Conversion of Left Hip Rigo-Arthroplasty to Left Total Hip Replacement;  Surgeon: Cheko Saleem MD;  [facility]     ARTHROPLASTY REVISION HIP Left 5/25/2018    Procedure: ARTHROPLASTY REVISION HIP;  Revision Left Total Hip Arthroplasty ;  Surgeon: Cheko Saleem MD;  [facility]     C PELVIS/HIP JOINT SURGERY UNLISTED       CLOSED REDUCTION HIP Left 5/18/2018    Procedure: CLOSED REDUCTION HIP;  Closed Reduction Left Hip, and Aspiration Culture   Left Hip;  Surgeon: Cheko Saleem MD;  [facility]     CLOSED REDUCTION HIP Left 5/23/2018    Procedure: CLOSED REDUCTION HIP;  Closed Reduction Left Hip;  Surgeon: Cheko Saleem MD;  [facility]     CLOSED REDUCTION HIP Left 7/12/2018    Procedure: CLOSED REDUCTION HIP;  Closed Reduction  of Left Total Hip Arthroplasty;  Surgeon: Cheko Saleem MD;  [facility]     HIP SURGERY  5/31/2005    Left hip resurfacing hemiarthroplasty     HIP SURGERY  6/5/07     INJECT BLOCK MEDIAL BRANCH CERVICAL/THORACIC/LUMBAR N/A 1/9/2016    Procedure: INJECT BLOCK MEDIAL BRANCH CERVICAL / THORACIC / LUMBAR;  Surgeon: GENERIC ANESTHESIA PROVIDER;  Location: [facility] OR     JOINT REPLACEMENT  2/2/2010    Rt TKA     KNEE SURGERY       ORTHOPEDIC SURGERY      right foot surgery            Social History:     Social History     Socioeconomic History     Marital status:      Spouse name: Not on file     Number of children: Not on file     Years of education: Not on file     Highest education level: Not on file   Occupational History     Not on file   Tobacco Use     Smoking status: Current Every Day Smoker     Packs/day: 1.00     Years: 10.00     Pack years: 10.00     Types: Cigarettes     Start date: 5/29/1997     Smokeless tobacco: Never Used   Substance and Sexual Activity     Alcohol use: No     Comment: very rare     Drug use: No     Sexual activity: Yes     Partners: Male     Birth control/protection: None   Other Topics Concern     Not on file   Social History Narrative     Not on file     Social Determinants of Health     Financial Resource Strain:      Difficulty of Paying Living Expenses:    Food Insecurity:      Worried About Running Out of Food in the Last Year:      Ran Out of Food in the Last Year:    Transportation Needs:      Lack of Transportation (Medical):      Lack of Transportation (Non-Medical):    Physical Activity:      Days of Exercise per Week:      Minutes of Exercise per Session:    Stress:      Feeling of Stress :    Social Connections:      Frequency of Communication with Friends and Family:      Frequency of Social Gatherings with Friends and Family:      Attends Orthodoxy Services:      Active Member of Clubs or Organizations:      Attends Club or Organization Meetings:      Marital  Status:    Intimate Partner Violence:      Fear of Current or Ex-Partner:      Emotionally Abused:      Physically Abused:      Sexually Abused:             Family History:       Family History   Problem Relation Age of Onset     Migraines Son      Soft Tissue Cancer Brother      Cancer Brother      Other Cancer Brother      Low Back Problems Mother      Thyroid Disease Mother      Obesity Mother      Deep Vein Thrombosis Mother             Medications:     Current Outpatient Medications   Medication Sig     albuterol (PROAIR HFA/PROVENTIL HFA/VENTOLIN HFA) 108 (90 BASE) MCG/ACT Inhaler Inhale 2 puffs into the lungs every 6 hours as needed for shortness of breath / dyspnea or wheezing     B Complex Vitamins (B COMPLEX PO) Take 1 tablet by mouth daily Dose unknown; OTC     BACLOFEN PO Take 20 mg by mouth 4 times daily      buPROPion (WELLBUTRIN SR) 150 MG 12 hr tablet TK 1 T PO D FOR 12 H     cyclobenzaprine (FLEXERIL) 10 MG tablet Take 1 tablet (10 mg) by mouth nightly as needed for muscle spasms (Patient not taking: Reported on 9/3/2020)     DULoxetine (CYMBALTA) 60 MG EC capsule Take 60 mg by mouth 2 times daily      Escitalopram Oxalate (LEXAPRO PO) Take 20 mg by mouth daily     fluticasone (FLONASE) 50 MCG/ACT nasal spray Spray 2 sprays into both nostrils daily as needed for rhinitis or allergies     gabapentin (NEURONTIN) 300 MG capsule Take 900 mg by mouth 3 times daily      IBUPROFEN PO Take 200 mg by mouth every 6 hours as needed for moderate pain     levothyroxine (SYNTHROID/LEVOTHROID) 50 MCG tablet TAKE 1 TABLET(50 MCG) BY MOUTH EVERY DAY     LORazepam (ATIVAN) 0.5 MG tablet TAKE 1 TO 2 TABLETS BY MOUTH THREE TIMES DAILY. NOT TO EXCEED 5 TABLETS DAILY - TRY TO REDUCE TO MAX 2.5MG DAILY     LORazepam (ATIVAN) 1 MG tablet Take 1-2 mg by mouth 3 times daily Max 5 tablets daily     MAGNESIUM OXIDE PO Take 200 mg by mouth daily     METHADONE HCL PO Take 40 mg by mouth every 8 hours     montelukast (SINGULAIR)  10 MG tablet Take 10 mg by mouth At Bedtime     Multiple Vitamins-Minerals (MULTIVITAMIN ADULT PO) Take 1 tablet by mouth daily     Nutritional Supplements (MELATONIN PO) Take 3 mg by mouth At Bedtime      omeprazole (PRILOSEC OTC) 20 MG tablet Take 20 mg by mouth daily      order for DME Equipment being ordered: Bath Seat ()  Treatment Diagnosis: Limited independence in ADLs/mobility due to hip precautions and recent hip surgery     order for DME [facility]  [facility]  [phone]     Directions: Advance as Tolerated and Instructed by MD     oxyCODONE HCl (ROXICODONE) 20 MG TABS immediate release tablet TAKE 1 TABLET BY MOUTH EVERY 4 HOURS AS NEEDED FOR PAIN. DO NOT EXCEED 5 TABLETS PER DAY     POTASSIUM PO Take 1 tablet by mouth daily Dose unknown; OTC     Prochlorperazine Maleate (COMPAZINE PO) Take 10 mg by mouth every 6 hours as needed.       QUEtiapine (SEROQUEL) 25 MG tablet Take 25 mg by mouth At Bedtime      senna-docusate (SENOKOT-S;PERICOLACE) 8.6-50 MG per tablet Take 2 tablets by mouth 2 times daily     TIZANIDINE HCL PO Take 2 mg by mouth every 6 hours as needed for muscle spasms     traZODone (DESYREL) 100 MG tablet TK 1/2  T PO ATN PRF SLP     warfarin (COUMADIN) 5 MG tablet Take 1 tablet (5 mg) by mouth daily (Patient not taking: Reported on 9/3/2020)     No current facility-administered medications for this visit.              Review of Systems:   A comprehensive 10 point review of systems (constitutional, ENT, cardiac, peripheral vascular, lymphatic, respiratory, GI, , Musculoskeletal, skin, Neurological) was performed and found to be negative except as described in this note.       HOOS Hip Dysfunction & Osteoarthritis Outcome Questionnaire    Hip Dysfunction & Osteoarthritis Outcome Score (HOOS), English Version LK 2.0 (Hayder Jasmine, Tameka DINH., 2003) 1/13/2020   S1. Do you feel grinding, hear clicking or any other type of noise from your hip? Rarely   S2. Difficulties  spreading legs wide apart None   S3. Difficulties to stride out when walking None   S4. How severe is your hip joint stiffness after first wakening in the morning? Moderate   S5. How severe is your hip stiffness after sitting, lying or resting LATER IN THE DAY? Severe   Symptom Count 5   Symptom Sum 6   Symptom Mean 1.2   Symptom Subscale Score 70   P1. How often is your hip painful? Daily   P2. Straightening your hip fully Mild   P3. Bending your hip FULLY None   P4. Walking on a flat surface None   P5. Going up or down stairs None   P6. At night while in bed Moderate   P7. Sitting or lying Mild   P8. Standing upright Mild   P9. Walking on a hard surface (asphalt, concrete, etc.) Mild   P10. Walking on an uneven surface Moderate   Pain Count 10   Pain Sum 11   Pain Mean 1.1   Pain Subscale Score 72.5   A1. Descending stairs Mild   A2. Ascending stairs Moderate   A3. Rising from sitting Moderate   A4. Standing Moderate   A5. Bending to the floor/ an object Mild   A6. Walking on a flat surface Moderate   A7. Getting in/out of car Moderate   A8. Going shopping Moderate   A9. Putting on socks/stockings Moderate   A10. Rising from bed Moderate   A11. Taking off socks/stockings Mild   A12. Lying in bed (turning over, maintaining hip position) Mild   A13. Getting in/out of bed Mild   A14. Sitting Mild   A15. Getting on/off toilet Mild   A16. Heavy domestic duties (moving heavy boxes, scrubbing floors, etc.) Mild   A17. Light domestic duties (cooking, dusting, etc.) Mild   ADL Count 17   ADL Sum 25   ADL Mean 1.47   ADL Subscale Score 63.23   SP1. Squatting Moderate   SP2. Running Moderate   SP3. Twisting/pivoting on loaded leg Moderate   SP4. Walking on uneven surface Moderate   Sports/Rec Count 4   Sports/Rec Sum 8   Sports/Rec Mean 2   Sports/Rec Subscale Score 50   Q1. How often are you aware of your hip problem? Daily   Q2. Have you modified you life style to avoid activities potentially damaging to your hip?  Mildly   Q3. How much are you troubled with lack of confidence in your hip? Not at all   Q4. In general, how much difficulty do you have with your hip? Moderate   QOL Count 4   QOL Sum 6   QOL Mean 1.5   Quality of Life Subscale Score 62.5              [unfilled]    KOOS Knee Survey Assessment    No flowsheet data found.           Promis 10 Assessment    PROMIS 10 1/13/2020   In general, would you say your health is: Fair   In general, would you say your quality of life is: Good   In general, how would you rate your physical health? Fair   In general, how would you rate your mental health, including your mood and your ability to think? Fair   In general, how would you rate your satisfaction with your social activities and relationships? Fair   In general, please rate how well you carry out your usual social activities and roles Fair   To what extent are you able to carry out your everyday physical activities such as walking, climbing stairs, carrying groceries, or moving a chair? A little   How often have you been bothered by emotional problems such as feeling anxious, depressed or irritable? Often   How would you rate your fatigue on average? Mild   How would you rate your pain on average?   0 = No Pain  to  10 = Worst Imaginable Pain 6   In general, would you say your health is: 2   In general, would you say your quality of life is: 3   In general, how would you rate your physical health? 2   In general, how would you rate your mental health, including your mood and your ability to think? 2   In general, how would you rate your satisfaction with your social activities and relationships? 2   In general, please rate how well you carry out your usual social activities and roles. (This includes activities at home, at work and in your community, and responsibilities as a parent, child, spouse, employee, friend, etc.) 2   To what extent are you able to carry out your everyday physical activities such as walking,  climbing stairs, carrying groceries, or moving a chair? 2   In the past 7 days, how often have you been bothered by emotional problems such as feeling anxious, depressed, or irritable? 4   In the past 7 days, how would you rate your fatigue on average? 2   In the past 7 days, how would you rate your pain on average, where 0 means no pain, and 10 means worst imaginable pain? 6   Global Mental Health Score 9   Global Physical Health Score 11   PROMIS TOTAL - SUBSCORES 20   Some recent data might be hidden              Ortho [place] Knee Questionnaire    No flowsheet data found.             See intake form completed by patient

## 2021-08-26 NOTE — LETTER
8/26/2021         RE: Alethea Patel  [address]  [place] [address]        Dear Colleague,    Thank you for referring your patient, Alethea Patel, to the [facility] [place]. Please see a copy of my visit note below.        [facility]  Orthopaedic Surgery, Joint Replacement Consultation  by Cheko Saleem M.D.    Alethea Patel MRN# [id number]   Age: 41 year old YOB: 1980     Requesting physician: No ref. provider found  [facility]     DIAGNOSIS:   1. Multi-focal osteonecrosis.  2. Lumbar disc herniation  3. Acute Lymphoblastic Leukemia, chemotherapy [id number]   4. Chronic nicotine abuse     SURGERIES:  1. 2005, L femoral head resurfacing hemiarthroplasty  2. 6/5/2007, R HAJA  3. 2/2/2010, right total knee arthroplasty. No patella resurfacing  4. 6/12/2012, R patella resurfacing  5. 1/8/2016, L TKA (Stiven) [facility]  6. 4/3/2018, Revision L surface replacement bartolo to HAJA (Stiven) [facility]  7. 4/19/18 , 5/18/2018, 5/23/2018  Closed reductions L HAJA  8. 5/25/2018, Revision L HAJA to larger head size and cup repositioning (Stiven) [facility]. Cultures (-) x 3  9. 7/12/18, Closed reduction of L HAJA under anesthesia (Stiven).           Assessment and Plan:   Assessment:  Bilateral knee pain of uncertain etiology.  May be related to intermittent bouts of intraosseous hypertension which is known to occur with osteonecrosis.  There is no evidence of implant related complication requiring any type of surgical intervention.  I explained to the patient that she may have flareups of pain occasionally on an intermittent basis.  Pain could also be musculoskeletal in nature related to her increased activity at work while carrying objects at [employer].     Plan:  Expectant management.  Decreased activities which aggravate her symptoms.  I am not recommended any analgesic medication or other pharmacologic agents to reduce intraosseous hypertension.  She  reports that she has been decreasing her narcotic usage and is down to 20 mg of oxycodone per day.  She is not sure whether or not she revealed to completely stop this but is working with her pain management team.    I did advise patient that she should come in for an x-ray of both hips and both knees periodically and interval of every 5 years for regular follow-up.    Patient seen, evaluated and discussed with Dr. Saleem who is in agreement with the above assessment and plan.      Francisco J Purvis MD  Orthopaedic Surgery PGY4  Pager: [phone]      Attending MD (Dr. Cheko Saleem) Attestation :  This patient was seen and evaluated by me including a history, exam, and interpretation of all imaging and/or lab data.  Either a training physician (resident/fellow), who also saw the patient, or scribe has documented the visit in the attached note.    Cheko Saleem MD  [facility] Family Professor  Oncology and Adult Reconstructive Surgery  Dept Orthopaedic Surgery, [facility]  [phone] office, [phone] pager  [email or web address]                    History of Present Illness:   41 year old female  chief complaint    Alona is seen for evaluation of her knees.  She notes that she is having bilateral knee discomfort which varies in intensity from 1 knee to the other.  Occasional catching sensation on the left side.  No locking.  Pain is mainly activity related.  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      No recent injury.  No radicular symptoms.  No groin pain.         Physical Exam:     EXAMINATION pertinent findings:   PSYCH: Pleasant, healthy-appearing, alert, oriented x3, cooperative. Normal mood and affect.  VITAL SIGNS: not currently breastfeeding..  Reviewed nursing intake notes.   There is no height or weight on file to calculate BMI.  RESP: non labored breathing   ABD: benign, soft, non-tender, no acute  peritoneal findings  SKIN: grossly normal   LYMPHATIC: grossly normal, no adenopathy, no extremity edema  NEURO: grossly normal , no motor deficits  VASCULAR: satisfactory perfusion of all extremities   MUSCULOSKELETAL:   Gait: She is a slightly abnormal gait and that the right hip does not fully extend when standing.  This may be related to slight flexion contracture of the right knee joint.  Otherwise I do not detect a and antalgic limp.    Hips have full symmetric range of motion bilaterally without pain with rotation of either hip joint.    Right knee demonstrates 3 to 5 degree fixed flexion contracture with further flexion to 100 degrees.  No effusion.  No warmth.  Some soft tissue swelling about the knee joint.  Incision is well-healed.  No evidence of excessive laxity in the coronal or sagittal plane.    Left knee demonstrates 0 to 105 degrees range of motion.  No effusion or no warmth.  Minimal swelling about the knee joint.  Incision well-healed.  No evidence of excessive laxity in the coronal or sagittal plane.           Data:   All laboratory data reviewed  All imaging studies reviewed by me     Radiographs of both knees do not demonstrate any evidence of loosening malposition subsidence or change over time.      DATA for DOCUMENTATION:         Past Medical History:     Patient Active Problem List   Diagnosis     Acute lymphoblastic leukemia (ALL) in remission (H)     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)     Failed total knee, right (H)     Elbow pain     Lumbar disc herniation with radiculopathy     S/P total knee arthroplasty     GERD (gastroesophageal reflux disease)     Chronic constipation     Anxiety     Seizure (H)     Avascular necrosis (H)     Moderate episode of recurrent major depressive disorder (H)     Female infertility     Status post hip surgery     Long-term (current) use of anticoagulants [Z79.01]     Aftercare following joint replacement [Z47.1]     Hip dislocation, left,  subsequent encounter     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)     Hip dislocation, left, initial encounter (H)     Status post closed reduction of dislocated total hip prosthesis     Allergic rhinitis     Chondromalacia patellae of right knee     Chronic anemia     Contracture of elbow     Depression     Hyperprolactinemia (H)     Hypertriglyceridemia     Injury of skin and subcutaneous tissue     Insomnia due to medical condition     Left hip pain     Narcotic dependence (H)     Opioid overdose (H)     Hypothyroidism due to medication     Past Medical History:   Diagnosis Date     Acute lymphoblastic leukemia in remission (H)      Anxiety      Chest pain      Chronic osteoarthritis      Degenerative joint disease      Depression      Depressive disorder      Gastro-oesophageal reflux disease      Hypothyroidism      IBS (irritable bowel syndrome)      Osteonecrosis (H)      Osteonecrosis (H)      Thyroid disease        Also see scanned health assessment forms.       Past Surgical History:     Past Surgical History:   Procedure Laterality Date     ARTHROPLASTY KNEE Left 1/8/2016    Procedure: ARTHROPLASTY KNEE;  Surgeon: Cheko Saleem MD;  Location: [facility]     ARTHROPLASTY PATELLO-FEMORAL (KNEE)  6/12/2012    Procedure: ARTHROPLASTY PATELLO-FEMORAL (KNEE);  Right Knee Patella Resurfacing;  Surgeon: Cheko Saleem MD;  [facility]     ARTHROPLASTY REVISION HIP Left 4/3/2018    Procedure: ARTHROPLASTY REVISION HIP;  Conversion of Left Hip Rigo-Arthroplasty to Left Total Hip Replacement;  Surgeon: Cheko Saleem MD;  [facility]     ARTHROPLASTY REVISION HIP Left 5/25/2018    Procedure: ARTHROPLASTY REVISION HIP;  Revision Left Total Hip Arthroplasty ;  Surgeon: Cheko Saleem MD;  [facility] OR     C PELVIS/HIP JOINT SURGERY UNLISTED       CLOSED REDUCTION HIP Left 5/18/2018    Procedure: CLOSED REDUCTION HIP;  Closed Reduction Left Hip, and Aspiration Culture   Left Hip;  Surgeon:  Cheko Saleem MD;  [facility]     CLOSED REDUCTION HIP Left 5/23/2018    Procedure: CLOSED REDUCTION HIP;  Closed Reduction Left Hip;  Surgeon: Cheko Saleem MD;  [facility]     CLOSED REDUCTION HIP Left 7/12/2018    Procedure: CLOSED REDUCTION HIP;  Closed Reduction of Left Total Hip Arthroplasty;  Surgeon: Cheko Saleem MD;  [facility]     HIP SURGERY  5/31/2005    Left hip resurfacing hemiarthroplasty     HIP SURGERY  6/5/07     INJECT BLOCK MEDIAL BRANCH CERVICAL/THORACIC/LUMBAR N/A 1/9/2016    Procedure: INJECT BLOCK MEDIAL BRANCH CERVICAL / THORACIC / LUMBAR;  Surgeon: GENERIC ANESTHESIA PROVIDER;  Location: [facility] OR     JOINT REPLACEMENT  2/2/2010    Rt TKA     KNEE SURGERY       ORTHOPEDIC SURGERY      right foot surgery            Social History:     Social History     Socioeconomic History     Marital status:      Spouse name: Not on file     Number of children: Not on file     Years of education: Not on file     Highest education level: Not on file   Occupational History     Not on file   Tobacco Use     Smoking status: Current Every Day Smoker     Packs/day: 1.00     Years: 10.00     Pack years: 10.00     Types: Cigarettes     Start date: 5/29/1997     Smokeless tobacco: Never Used   Substance and Sexual Activity     Alcohol use: No     Comment: very rare     Drug use: No     Sexual activity: Yes     Partners: Male     Birth control/protection: None   Other Topics Concern     Not on file   Social History Narrative     Not on file     Social Determinants of Health     Financial Resource Strain:      Difficulty of Paying Living Expenses:    Food Insecurity:      Worried About Running Out of Food in the Last Year:      Ran Out of Food in the Last Year:    Transportation Needs:      Lack of Transportation (Medical):      Lack of Transportation (Non-Medical):    Physical Activity:      Days of Exercise per Week:      Minutes of Exercise per Session:    Stress:      Feeling of  Stress :    Social Connections:      Frequency of Communication with Friends and Family:      Frequency of Social Gatherings with Friends and Family:      Attends Christian Services:      Active Member of Clubs or Organizations:      Attends Club or Organization Meetings:      Marital Status:    Intimate Partner Violence:      Fear of Current or Ex-Partner:      Emotionally Abused:      Physically Abused:      Sexually Abused:             Family History:       Family History   Problem Relation Age of Onset     Migraines Son      Soft Tissue Cancer Brother      Cancer Brother      Other Cancer Brother      Low Back Problems Mother      Thyroid Disease Mother      Obesity Mother      Deep Vein Thrombosis Mother             Medications:     Current Outpatient Medications   Medication Sig     albuterol (PROAIR HFA/PROVENTIL HFA/VENTOLIN HFA) 108 (90 BASE) MCG/ACT Inhaler Inhale 2 puffs into the lungs every 6 hours as needed for shortness of breath / dyspnea or wheezing     B Complex Vitamins (B COMPLEX PO) Take 1 tablet by mouth daily Dose unknown; OTC     BACLOFEN PO Take 20 mg by mouth 4 times daily      buPROPion (WELLBUTRIN SR) 150 MG 12 hr tablet TK 1 T PO D FOR 12 H     cyclobenzaprine (FLEXERIL) 10 MG tablet Take 1 tablet (10 mg) by mouth nightly as needed for muscle spasms (Patient not taking: Reported on 9/3/2020)     DULoxetine (CYMBALTA) 60 MG EC capsule Take 60 mg by mouth 2 times daily      Escitalopram Oxalate (LEXAPRO PO) Take 20 mg by mouth daily     fluticasone (FLONASE) 50 MCG/ACT nasal spray Spray 2 sprays into both nostrils daily as needed for rhinitis or allergies     gabapentin (NEURONTIN) 300 MG capsule Take 900 mg by mouth 3 times daily      IBUPROFEN PO Take 200 mg by mouth every 6 hours as needed for moderate pain     levothyroxine (SYNTHROID/LEVOTHROID) 50 MCG tablet TAKE 1 TABLET(50 MCG) BY MOUTH EVERY DAY     LORazepam (ATIVAN) 0.5 MG tablet TAKE 1 TO 2 TABLETS BY MOUTH THREE TIMES DAILY.  NOT TO EXCEED 5 TABLETS DAILY - TRY TO REDUCE TO MAX 2.5MG DAILY     LORazepam (ATIVAN) 1 MG tablet Take 1-2 mg by mouth 3 times daily Max 5 tablets daily     MAGNESIUM OXIDE PO Take 200 mg by mouth daily     METHADONE HCL PO Take 40 mg by mouth every 8 hours     montelukast (SINGULAIR) 10 MG tablet Take 10 mg by mouth At Bedtime     Multiple Vitamins-Minerals (MULTIVITAMIN ADULT PO) Take 1 tablet by mouth daily     Nutritional Supplements (MELATONIN PO) Take 3 mg by mouth At Bedtime      omeprazole (PRILOSEC OTC) 20 MG tablet Take 20 mg by mouth daily      order for DME Equipment being ordered: Bath Seat ()  Treatment Diagnosis: Limited independence in ADLs/mobility due to hip precautions and recent hip surgery     order for DME [facility]  [facility]  [phone]     Directions: Advance as Tolerated and Instructed by MD     oxyCODONE HCl (ROXICODONE) 20 MG TABS immediate release tablet TAKE 1 TABLET BY MOUTH EVERY 4 HOURS AS NEEDED FOR PAIN. DO NOT EXCEED 5 TABLETS PER DAY     POTASSIUM PO Take 1 tablet by mouth daily Dose unknown; OTC     Prochlorperazine Maleate (COMPAZINE PO) Take 10 mg by mouth every 6 hours as needed.       QUEtiapine (SEROQUEL) 25 MG tablet Take 25 mg by mouth At Bedtime      senna-docusate (SENOKOT-S;PERICOLACE) 8.6-50 MG per tablet Take 2 tablets by mouth 2 times daily     TIZANIDINE HCL PO Take 2 mg by mouth every 6 hours as needed for muscle spasms     traZODone (DESYREL) 100 MG tablet TK 1/2  T PO ATN PRF SLP     warfarin (COUMADIN) 5 MG tablet Take 1 tablet (5 mg) by mouth daily (Patient not taking: Reported on 9/3/2020)     No current facility-administered medications for this visit.              Review of Systems:   A comprehensive 10 point review of systems (constitutional, ENT, cardiac, peripheral vascular, lymphatic, respiratory, GI, , Musculoskeletal, skin, Neurological) was performed and found to be negative except as described in this note.       HOOS Hip Dysfunction &  Osteoarthritis Outcome Questionnaire    Hip Dysfunction & Osteoarthritis Outcome Score (HOOS), English Version LK 2.0 (Tono PAGE, Hayder DINH, Tameka VIEYRA, 2003) 1/13/2020   S1. Do you feel grinding, hear clicking or any other type of noise from your hip? Rarely   S2. Difficulties spreading legs wide apart None   S3. Difficulties to stride out when walking None   S4. How severe is your hip joint stiffness after first wakening in the morning? Moderate   S5. How severe is your hip stiffness after sitting, lying or resting LATER IN THE DAY? Severe   Symptom Count 5   Symptom Sum 6   Symptom Mean 1.2   Symptom Subscale Score 70   P1. How often is your hip painful? Daily   P2. Straightening your hip fully Mild   P3. Bending your hip FULLY None   P4. Walking on a flat surface None   P5. Going up or down stairs None   P6. At night while in bed Moderate   P7. Sitting or lying Mild   P8. Standing upright Mild   P9. Walking on a hard surface (asphalt, concrete, etc.) Mild   P10. Walking on an uneven surface Moderate   Pain Count 10   Pain Sum 11   Pain Mean 1.1   Pain Subscale Score 72.5   A1. Descending stairs Mild   A2. Ascending stairs Moderate   A3. Rising from sitting Moderate   A4. Standing Moderate   A5. Bending to the floor/ an object Mild   A6. Walking on a flat surface Moderate   A7. Getting in/out of car Moderate   A8. Going shopping Moderate   A9. Putting on socks/stockings Moderate   A10. Rising from bed Moderate   A11. Taking off socks/stockings Mild   A12. Lying in bed (turning over, maintaining hip position) Mild   A13. Getting in/out of bed Mild   A14. Sitting Mild   A15. Getting on/off toilet Mild   A16. Heavy domestic duties (moving heavy boxes, scrubbing floors, etc.) Mild   A17. Light domestic duties (cooking, dusting, etc.) Mild   ADL Count 17   ADL Sum 25   ADL Mean 1.47   ADL Subscale Score 63.23   SP1. Squatting Moderate   SP2. Running Moderate   SP3. Twisting/pivoting on loaded leg Moderate    SP4. Walking on uneven surface Moderate   Sports/Rec Count 4   Sports/Rec Sum 8   Sports/Rec Mean 2   Sports/Rec Subscale Score 50   Q1. How often are you aware of your hip problem? Daily   Q2. Have you modified you life style to avoid activities potentially damaging to your hip? Mildly   Q3. How much are you troubled with lack of confidence in your hip? Not at all   Q4. In general, how much difficulty do you have with your hip? Moderate   QOL Count 4   QOL Sum 6   QOL Mean 1.5   Quality of Life Subscale Score 62.5              [unfilled]    KOOS Knee Survey Assessment    No flowsheet data found.           Promis 10 Assessment    PROMIS 10 1/13/2020   In general, would you say your health is: Fair   In general, would you say your quality of life is: Good   In general, how would you rate your physical health? Fair   In general, how would you rate your mental health, including your mood and your ability to think? Fair   In general, how would you rate your satisfaction with your social activities and relationships? Fair   In general, please rate how well you carry out your usual social activities and roles Fair   To what extent are you able to carry out your everyday physical activities such as walking, climbing stairs, carrying groceries, or moving a chair? A little   How often have you been bothered by emotional problems such as feeling anxious, depressed or irritable? Often   How would you rate your fatigue on average? Mild   How would you rate your pain on average?   0 = No Pain  to  10 = Worst Imaginable Pain 6   In general, would you say your health is: 2   In general, would you say your quality of life is: 3   In general, how would you rate your physical health? 2   In general, how would you rate your mental health, including your mood and your ability to think? 2   In general, how would you rate your satisfaction with your social activities and relationships? 2   In general, please rate how well you carry  out your usual social activities and roles. (This includes activities at home, at work and in your community, and responsibilities as a parent, child, spouse, employee, friend, etc.) 2   To what extent are you able to carry out your everyday physical activities such as walking, climbing stairs, carrying groceries, or moving a chair? 2   In the past 7 days, how often have you been bothered by emotional problems such as feeling anxious, depressed, or irritable? 4   In the past 7 days, how would you rate your fatigue on average? 2   In the past 7 days, how would you rate your pain on average, where 0 means no pain, and 10 means worst imaginable pain? 6   Global Mental Health Score 9   Global Physical Health Score 11   PROMIS TOTAL - SUBSCORES 20   Some recent data might be hidden        Ortho [place] Knee Questionnaire    No flowsheet data found.       See intake form completed by patient

## 2022-01-28 DIAGNOSIS — M25.561 CHRONIC PAIN OF BOTH KNEES: Primary | ICD-10-CM

## 2022-01-28 DIAGNOSIS — G89.29 CHRONIC PAIN OF BOTH KNEES: Primary | ICD-10-CM

## 2022-01-28 DIAGNOSIS — M25.562 CHRONIC PAIN OF BOTH KNEES: Primary | ICD-10-CM

## 2022-02-03 ENCOUNTER — ANCILLARY PROCEDURE (OUTPATIENT)
Dept: GENERAL RADIOLOGY | Facility: CLINIC | Age: 42
End: 2022-02-03
Attending: ORTHOPAEDIC SURGERY
Payer: COMMERCIAL

## 2022-02-03 ENCOUNTER — OFFICE VISIT (OUTPATIENT)
Dept: ORTHOPEDICS | Facility: CLINIC | Age: 42
End: 2022-02-03
Payer: COMMERCIAL

## 2022-02-03 DIAGNOSIS — M25.561 CHRONIC PAIN OF BOTH KNEES: ICD-10-CM

## 2022-02-03 DIAGNOSIS — G89.29 CHRONIC PAIN OF BOTH KNEES: ICD-10-CM

## 2022-02-03 DIAGNOSIS — M25.562 CHRONIC PAIN OF BOTH KNEES: ICD-10-CM

## 2022-02-03 DIAGNOSIS — T84.84XD PAIN DUE TO TOTAL LEFT KNEE REPLACEMENT, SUBSEQUENT ENCOUNTER: Primary | ICD-10-CM

## 2022-02-03 DIAGNOSIS — Z96.652 PAIN DUE TO TOTAL LEFT KNEE REPLACEMENT, SUBSEQUENT ENCOUNTER: Primary | ICD-10-CM

## 2022-02-03 LAB
APPEARANCE FLD: ABNORMAL
COLOR FLD: YELLOW
LYMPHOCYTES NFR FLD MANUAL: 7 %
MONOS+MACROS NFR FLD MANUAL: 16 %
NEUTS BAND NFR FLD MANUAL: 77 %
WBC # FLD AUTO: 2294 /UL

## 2022-02-03 PROCEDURE — 20610 DRAIN/INJ JOINT/BURSA W/O US: CPT | Mod: LT | Performed by: ORTHOPAEDIC SURGERY

## 2022-02-03 PROCEDURE — 89051 BODY FLUID CELL COUNT: CPT | Mod: 90 | Performed by: PATHOLOGY

## 2022-02-03 PROCEDURE — 87070 CULTURE OTHR SPECIMN AEROBIC: CPT | Mod: 90 | Performed by: PATHOLOGY

## 2022-02-03 PROCEDURE — 87075 CULTR BACTERIA EXCEPT BLOOD: CPT | Mod: 90 | Performed by: PATHOLOGY

## 2022-02-03 PROCEDURE — 83518 IMMUNOASSAY DIPSTICK: CPT | Mod: 90 | Performed by: PATHOLOGY

## 2022-02-03 PROCEDURE — 73560 X-RAY EXAM OF KNEE 1 OR 2: CPT | Mod: LT | Performed by: RADIOLOGY

## 2022-02-03 PROCEDURE — 99000 SPECIMEN HANDLING OFFICE-LAB: CPT | Performed by: PATHOLOGY

## 2022-02-03 PROCEDURE — 99214 OFFICE O/P EST MOD 30 MIN: CPT | Mod: 25 | Performed by: ORTHOPAEDIC SURGERY

## 2022-02-03 ASSESSMENT — PATIENT HEALTH QUESTIONNAIRE - PHQ9: SUM OF ALL RESPONSES TO PHQ QUESTIONS 1-9: 3

## 2022-02-03 NOTE — NURSING NOTE
Chief Complaint   Patient presents with     RECHECK     Bilateral knee pain, left worse then right. pt stated it's gotten worse since last appt.        41 year old  1980    There were no vitals taken for this visit.    Pain Assessment  Patient Currently in Pain: Yes  0-10 Pain Scale: 4  Primary Pain Location: Knee       [id number] - [place], [place] - [address]    Allergies   Allergen Reactions     Chantix [Varenicline] Nausea and Vomiting     Other reaction(s): Vomiting     No Clinical Screening - See Comments      Other reaction(s): Unknown Reaction     Seasonal Allergies      Ciprofloxacin Other (See Comments)     Interacts with other medication  Other reaction(s): Dizziness       Latex Itching and Rash     Other reaction(s): Other - Describe In Comment Field  Vaginal itching, burning  Other reaction(s): Intolerance     Current Outpatient Medications   Medication     albuterol (PROAIR HFA/PROVENTIL HFA/VENTOLIN HFA) 108 (90 BASE) MCG/ACT Inhaler     B Complex Vitamins (B COMPLEX PO)     BACLOFEN PO     buPROPion (WELLBUTRIN SR) 150 MG 12 hr tablet     DULoxetine (CYMBALTA) 60 MG EC capsule     Escitalopram Oxalate (LEXAPRO PO)     fluticasone (FLONASE) 50 MCG/ACT nasal spray     gabapentin (NEURONTIN) 300 MG capsule     IBUPROFEN PO     levothyroxine (SYNTHROID/LEVOTHROID) 50 MCG tablet     LORazepam (ATIVAN) 0.5 MG tablet     LORazepam (ATIVAN) 1 MG tablet     MAGNESIUM OXIDE PO     METHADONE HCL PO     montelukast (SINGULAIR) 10 MG tablet     Multiple Vitamins-Minerals (MULTIVITAMIN ADULT PO)     Nutritional Supplements (MELATONIN PO)     omeprazole (PRILOSEC OTC) 20 MG tablet     order for DME     order for DME     oxyCODONE HCl (ROXICODONE) 20 MG TABS immediate release tablet     POTASSIUM PO     Prochlorperazine Maleate (COMPAZINE PO)     QUEtiapine (SEROQUEL) 25 MG tablet     senna-docusate (SENOKOT-S;PERICOLACE) 8.6-50 MG per tablet      TIZANIDINE HCL PO     traZODone (DESYREL) 100 MG tablet     cyclobenzaprine (FLEXERIL) 10 MG tablet     warfarin (COUMADIN) 5 MG tablet     No current facility-administered medications for this visit.     Questionnaires:    HOOS Hip Dysfunction & Osteoarthritis Outcome Questionnaire    Hip Dysfunction & Osteoarthritis Outcome Score (HOOS), English Version LK 2.0 (Hayder Jasmine Mannevik E., 2003) 1/13/2020   S1. Do you feel grinding, hear clicking or any other type of noise from your hip? Rarely   S2. Difficulties spreading legs wide apart None   S3. Difficulties to stride out when walking None   S4. How severe is your hip joint stiffness after first wakening in the morning? Moderate   S5. How severe is your hip stiffness after sitting, lying or resting LATER IN THE DAY? Severe   Symptom Count 5   Symptom Sum 6   Symptom Mean 1.2   Symptom Subscale Score 70   P1. How often is your hip painful? Daily   P2. Straightening your hip fully Mild   P3. Bending your hip FULLY None   P4. Walking on a flat surface None   P5. Going up or down stairs None   P6. At night while in bed Moderate   P7. Sitting or lying Mild   P8. Standing upright Mild   P9. Walking on a hard surface (asphalt, concrete, etc.) Mild   P10. Walking on an uneven surface Moderate   Pain Count 10   Pain Sum 11   Pain Mean 1.1   Pain Subscale Score 72.5   A1. Descending stairs Mild   A2. Ascending stairs Moderate   A3. Rising from sitting Moderate   A4. Standing Moderate   A5. Bending to the floor/ an object Mild   A6. Walking on a flat surface Moderate   A7. Getting in/out of car Moderate   A8. Going shopping Moderate   A9. Putting on socks/stockings Moderate   A10. Rising from bed Moderate   A11. Taking off socks/stockings Mild   A12. Lying in bed (turning over, maintaining hip position) Mild   A13. Getting in/out of bed Mild   A14. Sitting Mild   A15. Getting on/off toilet Mild   A16. Heavy domestic duties (moving heavy boxes, scrubbing  floors, etc.) Mild   A17. Light domestic duties (cooking, dusting, etc.) Mild   ADL Count 17   ADL Sum 25   ADL Mean 1.47   ADL Subscale Score 63.23   SP1. Squatting Moderate   SP2. Running Moderate   SP3. Twisting/pivoting on loaded leg Moderate   SP4. Walking on uneven surface Moderate   Sports/Rec Count 4   Sports/Rec Sum 8   Sports/Rec Mean 2   Sports/Rec Subscale Score 50   Q1. How often are you aware of your hip problem? Daily   Q2. Have you modified you life style to avoid activities potentially damaging to your hip? Mildly   Q3. How much are you troubled with lack of confidence in your hip? Not at all   Q4. In general, how much difficulty do you have with your hip? Moderate   QOL Count 4   QOL Sum 6   QOL Mean 1.5   Quality of Life Subscale Score 62.5      KOOS Knee Survey Assessment    Knee Outcome Survey ADL Scale (KALEIGH Buckley; SANDY [employer]; Nik, RS; Scott, FH; CHAVA Jean; 1998) 8/26/2021   Do you have swelling in your knee? Sometimes   Do you feel grinding, hear clicking or any other type of noise when your knee moves? Often   Does your knee catch or hang up when moving? Sometimes   Can you straighten your knee fully? Often   Can you bend your knee fully? Often   How severe is your knee joint stiffness after first wakening in the morning? Moderate   How severe is your knee stiffness after sitting, lying or resting LATER IN THE DAY? Moderate   How often do you experience knee pain? Daily   Twisting/pivoting on your knee Severe   Straightening knee fully Severe   Bending knee fully Severe   Walking on flat surface Moderate   Going up or down stairs Moderate   At night while in bed Severe   Sitting or lying Moderate   Standing upright Severe   Descending stairs Mild   Ascending stairs Severe   Rising from sitting Severe   Standing Severe   Bending to floor/ an object Severe   Walking on flat surface Moderate   Getting in/out of car Severe   Going shopping Moderate   Putting on socks/stockings  Moderate   Rising from bed Moderate   Taking off socks/stockings Moderate   Lying in bed (turning over, maintaining knee position) Severe   Getting in/out of bath Moderate   Sitting Moderate   Getting on/off toilet Moderate   Heavy domestic duties (moving heavy boxes, scrubbing floors, etc) Severe   Light domestic duties (cooking, dusting, etc) Severe   Squatting Severe   Running Severe   Jumping Severe   Twisting/pivoting on your injured knee Severe   Kneeling Severe   How often are you aware of your knee problem? Daily   Have you modified your life style to avoid potentially damaging activities to your knee? Moderately   How much are you troubled with lack of confidence in your knee? Severely   In general, how much difficulty do you have with your knee? Severe   Pain Score 33.33   Symptoms Score 39.28   Function, Daily Living Score 39.7   Sports and Rec Score 25   Quality of Life Score 31.25       Promis 10 Assessment    PROMIS 10 8/26/2021   In general, would you say your health is: Good   In general, would you say your quality of life is: Good   In general, how would you rate your physical health? Good   In general, how would you rate your mental health, including your mood and your ability to think? Good   In general, how would you rate your satisfaction with your social activities and relationships? Good   In general, please rate how well you carry out your usual social activities and roles Good   To what extent are you able to carry out your everyday physical activities such as walking, climbing stairs, carrying groceries, or moving a chair? Moderately   How often have you been bothered by emotional problems such as feeling anxious, depressed or irritable? Sometimes   How would you rate your fatigue on average? Moderate   How would you rate your pain on average?   0 = No Pain  to  10 = Worst Imaginable Pain 5   In general, would you say your health is: 3   In general, would you say your quality of life is: 3    In general, how would you rate your physical health? 3   In general, how would you rate your mental health, including your mood and your ability to think? 3   In general, how would you rate your satisfaction with your social activities and relationships? 3   In general, please rate how well you carry out your usual social activities and roles. (This includes activities at home, at work and in your community, and responsibilities as a parent, child, spouse, employee, friend, etc.) 3   To what extent are you able to carry out your everyday physical activities such as walking, climbing stairs, carrying groceries, or moving a chair? 3   In the past 7 days, how often have you been bothered by emotional problems such as feeling anxious, depressed, or irritable? 3   In the past 7 days, how would you rate your fatigue on average? 3   In the past 7 days, how would you rate your pain on average, where 0 means no pain, and 10 means worst imaginable pain? 5   Global Mental Health Score 12   Global Physical Health Score 12   PROMIS TOTAL - SUBSCORES 24   Some recent data might be hidden        [facility] [place] Knee Questionnaire    No flowsheet data found.

## 2022-02-03 NOTE — PROGRESS NOTES
[facility]  Orthopaedic Surgery, Joint Replacement Consultation  by Cheko Saelem M.D.    Alethea Patel MRN# [id number]   Age: 41 year old YOB: 1980     Requesting physician: No ref. provider found  [email or web address]     DIAGNOSIS:   1. Multi-focal osteonecrosis.  2. Lumbar disc herniation  3. Acute Lymphoblastic Leukemia, chemotherapy [id number]   4. Chronic nicotine abuse     SURGERIES:  1. 2005, L femoral head resurfacing hemiarthroplasty  2. 6/5/2007, R HAJA  3. 2/2/2010, right total knee arthroplasty. No patella resurfacing  4. 6/12/2012, R patella resurfacing  5. 1/8/2016, L TKA (Stiven) [facility]  6. 4/3/2018, Revision L surface replacement bartolo to HAJA (Stiven) [facility]  7. 4/19/18 , 5/18/2018, 5/23/2018  Closed reductions L HAJA  8. 5/25/2018, Revision L HAJA to larger head size and cup repositioning (Stiven) [facility]. Cultures (-) x 3  9. 7/12/18, Closed reduction of L HAJA under anesthesia (Stiven).      Alona has continued to have discomfort in both knees being more on the left side.  It is of a aching sensation.  She is able to walk and ambulate and continue to work at [employer].  Nonetheless I saw her previously for similar complaints.  She has not had any further dislocations of her left hip replacement.    On examination she has a small effusion within the left knee joint.  Patella is not ballotable.  Slight increased warmth is noted.  No effusion on the right side.  Both knees have full extension and further flexion past 100 degrees with stable ligaments.    Both hips have full range of motion without pain.    Radiographs do not demonstrate any implant related problem.         Assessment and Plan:   Assessment:  Bilateral knee pain of uncertain etiology.  May be related to intermittent bouts of intraosseous hypertension which is known to occur with osteonecrosis.  There is no evidence of implant related complication requiring any type of surgical intervention.  I explained to  the patient that she may have flareups of pain occasionally on an intermittent basis.      Nonetheless given the effusion within the left knee joint and her continued symptoms I advised performing an aspiration today.    Procedure note: Under sterile precautions, the left knee was aspirated.  10 cc of turbid yellow fluid was sent for cell count, culture examination and synovasure testing.  There were no complications.       Plan:  1. We will communicate results via MyChart to patient.  In absence of any identifiable prosthetic joint infection, I advised use of an anti-inflammatory agent and decreased activity.  2. If she has symptoms that persist and are problematic, then neck steps would be to perform bone scan evaluation of both knee joints and possibly CT examination.    Cheko Saleem MD  [facility]  Oncology and Adult Reconstructive Surgery  Dept Orthopaedic Surgery, [facility]  [phone] office, [phone] pager  [email or web address]           DATA for DOCUMENTATION:         Past Medical History:     Patient Active Problem List   Diagnosis     Acute lymphoblastic leukemia (ALL) in remission (H)     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)     Failed total knee, right (H)     Elbow pain     Lumbar disc herniation with radiculopathy     S/P total knee arthroplasty     GERD (gastroesophageal reflux disease)     Chronic constipation     Anxiety     Seizure (H)     Avascular necrosis (H)     Moderate episode of recurrent major depressive disorder (H)     Female infertility     Status post hip surgery     Long-term (current) use of anticoagulants [Z79.01]     Aftercare following joint replacement [Z47.1]     Hip dislocation, left, subsequent encounter     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)     Hip dislocation, left, initial encounter (H)     Status post closed reduction of dislocated total hip prosthesis     Allergic rhinitis     Chondromalacia patellae of right knee      Chronic anemia     Contracture of elbow     Depression     Hyperprolactinemia (H)     Hypertriglyceridemia     Injury of skin and subcutaneous tissue     Insomnia due to medical condition     Left hip pain     Narcotic dependence (H)     Opioid overdose (H)     Hypothyroidism due to medication     Past Medical History:   Diagnosis Date     Acute lymphoblastic leukemia in remission (H)      Anxiety      Chest pain      Chronic osteoarthritis      Degenerative joint disease      Depression      Depressive disorder      Gastro-oesophageal reflux disease      Hypothyroidism      IBS (irritable bowel syndrome)      Osteonecrosis (H)      Osteonecrosis (H)      Thyroid disease        Also see scanned health assessment forms.       Past Surgical History:     Past Surgical History:   Procedure Laterality Date     ARTHROPLASTY KNEE Left 1/8/2016    Procedure: ARTHROPLASTY KNEE;  Surgeon: Cheko Saleem MD;  [facility]: [facility]     ARTHROPLASTY PATELLO-FEMORAL (KNEE)  6/12/2012    Procedure: ARTHROPLASTY PATELLO-FEMORAL (KNEE);  Right Knee Patella Resurfacing;  Surgeon: Cheko Saleem MD;  [facility]     ARTHROPLASTY REVISION HIP Left 4/3/2018    Procedure: ARTHROPLASTY REVISION HIP;  Conversion of Left Hip Rigo-Arthroplasty to Left Total Hip Replacement;  Surgeon: Cheko Saleem MD;  [facility]     ARTHROPLASTY REVISION HIP Left 5/25/2018    Procedure: ARTHROPLASTY REVISION HIP;  Revision Left Total Hip Arthroplasty ;  Surgeon: Cheko Saleem MD;  [facility]     CLOSED REDUCTION HIP Left 5/18/2018    Procedure: CLOSED REDUCTION HIP;  Closed Reduction Left Hip, and Aspiration Culture   Left Hip;  Surgeon: Cheko Saleem MD;  [facility]     CLOSED REDUCTION HIP Left 5/23/2018    Procedure: CLOSED REDUCTION HIP;  Closed Reduction Left Hip;  Surgeon: Cheko Saleem MD;  [facility]     CLOSED REDUCTION HIP Left 7/12/2018    Procedure: CLOSED REDUCTION HIP;  Closed Reduction of Left Total Hip  Arthroplasty;  Surgeon: Cheko Saleem MD;  [facility]     HIP SURGERY  5/31/2005    Left hip resurfacing hemiarthroplasty     HIP SURGERY  6/5/07     INJECT BLOCK MEDIAL BRANCH CERVICAL/THORACIC/LUMBAR N/A 1/9/2016    Procedure: INJECT BLOCK MEDIAL BRANCH CERVICAL / THORACIC / LUMBAR;  Surgeon: GENERIC ANESTHESIA PROVIDER;  Location: [facility]     JOINT REPLACEMENT  2/2/2010    Rt TKA     KNEE SURGERY       ORTHOPEDIC SURGERY      right foot surgery     ZZC PELVIS/HIP JOINT SURGERY UNLISTED              Social History:     Social History     Socioeconomic History     Marital status:      Spouse name: Not on file     Number of children: Not on file     Years of education: Not on file     Highest education level: Not on file   Occupational History     Not on file   Tobacco Use     Smoking status: Current Every Day Smoker     Packs/day: 1.00     Years: 10.00     Pack years: 10.00     Types: Cigarettes     Start date: 5/29/1997     Smokeless tobacco: Never Used   Substance and Sexual Activity     Alcohol use: No     Comment: very rare     Drug use: No     Sexual activity: Yes     Partners: Male     Birth control/protection: None   Other Topics Concern     Not on file   Social History Narrative     Not on file     Social Determinants of Health     Financial Resource Strain: Not on file   Food Insecurity: Not on file   Transportation Needs: Not on file   Physical Activity: Not on file   Stress: Not on file   Social Connections: Not on file   Intimate Partner Violence: Not on file   Housing Stability: Not on file            Family History:       Family History   Problem Relation Age of Onset     Migraines Son      Soft Tissue Cancer Brother      Cancer Brother      Other Cancer Brother      Low Back Problems Mother      Thyroid Disease Mother      Obesity Mother      Deep Vein Thrombosis Mother             Medications:     Current Outpatient Medications   Medication Sig     albuterol (PROAIR HFA/PROVENTIL  HFA/VENTOLIN HFA) 108 (90 BASE) MCG/ACT Inhaler Inhale 2 puffs into the lungs every 6 hours as needed for shortness of breath / dyspnea or wheezing     B Complex Vitamins (B COMPLEX PO) Take 1 tablet by mouth daily Dose unknown; OTC     BACLOFEN PO Take 20 mg by mouth 4 times daily      buPROPion (WELLBUTRIN SR) 150 MG 12 hr tablet TK 1 T PO D FOR 12 H     DULoxetine (CYMBALTA) 60 MG EC capsule Take 60 mg by mouth 2 times daily      Escitalopram Oxalate (LEXAPRO PO) Take 20 mg by mouth daily     fluticasone (FLONASE) 50 MCG/ACT nasal spray Spray 2 sprays into both nostrils daily as needed for rhinitis or allergies     gabapentin (NEURONTIN) 300 MG capsule Take 900 mg by mouth 3 times daily      IBUPROFEN PO Take 200 mg by mouth every 6 hours as needed for moderate pain     levothyroxine (SYNTHROID/LEVOTHROID) 50 MCG tablet TAKE 1 TABLET(50 MCG) BY MOUTH EVERY DAY     LORazepam (ATIVAN) 0.5 MG tablet TAKE 1 TO 2 TABLETS BY MOUTH THREE TIMES DAILY. NOT TO EXCEED 5 TABLETS DAILY - TRY TO REDUCE TO MAX 2.5MG DAILY     LORazepam (ATIVAN) 1 MG tablet Take 1-2 mg by mouth 3 times daily Max 5 tablets daily     MAGNESIUM OXIDE PO Take 200 mg by mouth daily     METHADONE HCL PO Take 40 mg by mouth every 8 hours     montelukast (SINGULAIR) 10 MG tablet Take 10 mg by mouth At Bedtime     Multiple Vitamins-Minerals (MULTIVITAMIN ADULT PO) Take 1 tablet by mouth daily     Nutritional Supplements (MELATONIN PO) Take 3 mg by mouth At Bedtime      omeprazole (PRILOSEC OTC) 20 MG tablet Take 20 mg by mouth daily      order for DME Equipment being ordered: Bath Seat ()  Treatment Diagnosis: Limited independence in ADLs/mobility due to hip precautions and recent hip surgery     order for DME [facility]  [facility]  [phone]     Directions: Advance as Tolerated and Instructed by MD     oxyCODONE HCl (ROXICODONE) 20 MG TABS immediate release tablet 10 mg      POTASSIUM PO Take 1 tablet by mouth daily Dose unknown; OTC      Prochlorperazine Maleate (COMPAZINE PO) Take 10 mg by mouth every 6 hours as needed.       QUEtiapine (SEROQUEL) 25 MG tablet Take 25 mg by mouth At Bedtime      senna-docusate (SENOKOT-S;PERICOLACE) 8.6-50 MG per tablet Take 2 tablets by mouth 2 times daily     TIZANIDINE HCL PO Take 2 mg by mouth every 6 hours as needed for muscle spasms     traZODone (DESYREL) 100 MG tablet TK 1/2  T PO ATN PRF SLP     cyclobenzaprine (FLEXERIL) 10 MG tablet Take 1 tablet (10 mg) by mouth nightly as needed for muscle spasms (Patient not taking: Reported on 9/3/2020)     warfarin (COUMADIN) 5 MG tablet Take 1 tablet (5 mg) by mouth daily (Patient not taking: Reported on 9/3/2020)     No current facility-administered medications for this visit.              Review of Systems:   A comprehensive 10 point review of systems (constitutional, ENT, cardiac, peripheral vascular, lymphatic, respiratory, GI, , Musculoskeletal, skin, Neurological) was performed and found to be negative except as described in this note.       HOOS Hip Dysfunction & Osteoarthritis Outcome Questionnaire    Hip Dysfunction & Osteoarthritis Outcome Score (HOOS), English Version LK 2.0 (Tono PAGE, Hayder E, Tameka DINH., 2003) 1/13/2020   S1. Do you feel grinding, hear clicking or any other type of noise from your hip? Rarely   S2. Difficulties spreading legs wide apart None   S3. Difficulties to stride out when walking None   S4. How severe is your hip joint stiffness after first wakening in the morning? Moderate   S5. How severe is your hip stiffness after sitting, lying or resting LATER IN THE DAY? Severe   Symptom Count 5   Symptom Sum 6   Symptom Mean 1.2   Symptom Subscale Score 70   P1. How often is your hip painful? Daily   P2. Straightening your hip fully Mild   P3. Bending your hip FULLY None   P4. Walking on a flat surface None   P5. Going up or down stairs None   P6. At night while in bed Moderate   P7. Sitting or lying Mild   P8. Standing upright  Mild   P9. Walking on a hard surface (asphalt, concrete, etc.) Mild   P10. Walking on an uneven surface Moderate   Pain Count 10   Pain Sum 11   Pain Mean 1.1   Pain Subscale Score 72.5   A1. Descending stairs Mild   A2. Ascending stairs Moderate   A3. Rising from sitting Moderate   A4. Standing Moderate   A5. Bending to the floor/ an object Mild   A6. Walking on a flat surface Moderate   A7. Getting in/out of car Moderate   A8. Going shopping Moderate   A9. Putting on socks/stockings Moderate   A10. Rising from bed Moderate   A11. Taking off socks/stockings Mild   A12. Lying in bed (turning over, maintaining hip position) Mild   A13. Getting in/out of bed Mild   A14. Sitting Mild   A15. Getting on/off toilet Mild   A16. Heavy domestic duties (moving heavy boxes, scrubbing floors, etc.) Mild   A17. Light domestic duties (cooking, dusting, etc.) Mild   ADL Count 17   ADL Sum 25   ADL Mean 1.47   ADL Subscale Score 63.23   SP1. Squatting Moderate   SP2. Running Moderate   SP3. Twisting/pivoting on loaded leg Moderate   SP4. Walking on uneven surface Moderate   Sports/Rec Count 4   Sports/Rec Sum 8   Sports/Rec Mean 2   Sports/Rec Subscale Score 50   Q1. How often are you aware of your hip problem? Daily   Q2. Have you modified you life style to avoid activities potentially damaging to your hip? Mildly   Q3. How much are you troubled with lack of confidence in your hip? Not at all   Q4. In general, how much difficulty do you have with your hip? Moderate   QOL Count 4   QOL Sum 6   QOL Mean 1.5   Quality of Life Subscale Score 62.5              [unfilled]    KOOS Knee Survey Assessment    Knee Outcome Survey ADL Scale (KALEIGH Buckley; SANDY Brothers; Nik, RS; Scott, FH; CHAVA Jean; 1998) 8/26/2021   Do you have swelling in your knee? Sometimes   Do you feel grinding, hear clicking or any other type of noise when your knee moves? Often   Does your knee catch or hang up when moving? Sometimes   Can you straighten  your knee fully? Often   Can you bend your knee fully? Often   How severe is your knee joint stiffness after first wakening in the morning? Moderate   How severe is your knee stiffness after sitting, lying or resting LATER IN THE DAY? Moderate   How often do you experience knee pain? Daily   Twisting/pivoting on your knee Severe   Straightening knee fully Severe   Bending knee fully Severe   Walking on flat surface Moderate   Going up or down stairs Moderate   At night while in bed Severe   Sitting or lying Moderate   Standing upright Severe   Descending stairs Mild   Ascending stairs Severe   Rising from sitting Severe   Standing Severe   Bending to floor/ an object Severe   Walking on flat surface Moderate   Getting in/out of car Severe   Going shopping Moderate   Putting on socks/stockings Moderate   Rising from bed Moderate   Taking off socks/stockings Moderate   Lying in bed (turning over, maintaining knee position) Severe   Getting in/out of bath Moderate   Sitting Moderate   Getting on/off toilet Moderate   Heavy domestic duties (moving heavy boxes, scrubbing floors, etc) Severe   Light domestic duties (cooking, dusting, etc) Severe   Squatting Severe   Running Severe   Jumping Severe   Twisting/pivoting on your injured knee Severe   Kneeling Severe   How often are you aware of your knee problem? Daily   Have you modified your life style to avoid potentially damaging activities to your knee? Moderately   How much are you troubled with lack of confidence in your knee? Severely   In general, how much difficulty do you have with your knee? Severe   Pain Score 33.33   Symptoms Score 39.28   Function, Daily Living Score 39.7   Sports and Rec Score 25   Quality of Life Score 31.25              Promis 10 Assessment    PROMIS 10 8/26/2021   In general, would you say your health is: Good   In general, would you say your quality of life is: Good   In general, how would you rate your physical health? Good   In  general, how would you rate your mental health, including your mood and your ability to think? Good   In general, how would you rate your satisfaction with your social activities and relationships? Good   In general, please rate how well you carry out your usual social activities and roles Good   To what extent are you able to carry out your everyday physical activities such as walking, climbing stairs, carrying groceries, or moving a chair? Moderately   How often have you been bothered by emotional problems such as feeling anxious, depressed or irritable? Sometimes   How would you rate your fatigue on average? Moderate   How would you rate your pain on average?   0 = No Pain  to  10 = Worst Imaginable Pain 5   In general, would you say your health is: 3   In general, would you say your quality of life is: 3   In general, how would you rate your physical health? 3   In general, how would you rate your mental health, including your mood and your ability to think? 3   In general, how would you rate your satisfaction with your social activities and relationships? 3   In general, please rate how well you carry out your usual social activities and roles. (This includes activities at home, at work and in your community, and responsibilities as a parent, child, spouse, employee, friend, etc.) 3   To what extent are you able to carry out your everyday physical activities such as walking, climbing stairs, carrying groceries, or moving a chair? 3   In the past 7 days, how often have you been bothered by emotional problems such as feeling anxious, depressed, or irritable? 3   In the past 7 days, how would you rate your fatigue on average? 3   In the past 7 days, how would you rate your pain on average, where 0 means no pain, and 10 means worst imaginable pain? 5   Global Mental Health Score 12   Global Physical Health Score 12   PROMIS TOTAL - SUBSCORES 24   Some recent data might be hidden              [facility] [place] Knee  Questionnaire    No flowsheet data found.             See intake form completed by patient

## 2022-02-03 NOTE — LETTER
2/3/2022         RE: Alethea Patel  [address]  [place] [address]        Dear Colleague,    Thank you for referring your patient, Alethea Patel, to the [facility] [place]. Please see a copy of my visit note below.        [facility]  Orthopaedic Surgery, Joint Replacement Consultation  by Cheko Saleem M.D.    Alethea Patel MRN# [id number]   Age: 41 year old YOB: 1980     Requesting physician: No ref. provider found  [facility]     DIAGNOSIS:   1. Multi-focal osteonecrosis.  2. Lumbar disc herniation  3. Acute Lymphoblastic Leukemia, chemotherapy [id number]   4. Chronic nicotine abuse     SURGERIES:  1. 2005, L femoral head resurfacing hemiarthroplasty  2. 6/5/2007, R HAJA  3. 2/2/2010, right total knee arthroplasty. No patella resurfacing  4. 6/12/2012, R patella resurfacing  5. 1/8/2016, L TKA (Stiven) [facility]  6. 4/3/2018, Revision L surface replacement bartolo to HAJA (Stiven) [facility]  7. 4/19/18 , 5/18/2018, 5/23/2018  Closed reductions L HAJA  8. 5/25/2018, Revision L HAJA to larger head size and cup repositioning (Stiven) [facility]. Cultures (-) x 3  9. 7/12/18, Closed reduction of L HAJA under anesthesia (Stiven).      Alona has continued to have discomfort in both knees being more on the left side.  It is of a aching sensation.  She is able to walk and ambulate and continue to work at [employer].  Nonetheless I saw her previously for similar complaints.  She has not had any further dislocations of her left hip replacement.    On examination she has a small effusion within the left knee joint.  Patella is not ballotable.  Slight increased warmth is noted.  No effusion on the right side.  Both knees have full extension and further flexion past 100 degrees with stable ligaments.    Both hips have full range of motion without pain.    Radiographs do not demonstrate any implant related problem.         Assessment and Plan:    Assessment:  Bilateral knee pain of uncertain etiology.  May be related to intermittent bouts of intraosseous hypertension which is known to occur with osteonecrosis.  There is no evidence of implant related complication requiring any type of surgical intervention.  I explained to the patient that she may have flareups of pain occasionally on an intermittent basis.      Nonetheless given the effusion within the left knee joint and her continued symptoms I advised performing an aspiration today.    Procedure note: Under sterile precautions, the left knee was aspirated.  10 cc of turbid yellow fluid was sent for cell count, culture examination and synovasure testing.  There were no complications.       Plan:  1. We will communicate results via WTFast to patient.  In absence of any identifiable prosthetic joint infection, I advised use of an anti-inflammatory agent and decreased activity.  2. If she has symptoms that persist and are problematic, then neck steps would be to perform bone scan evaluation of both knee joints and possibly CT examination.    MD Blas Rhoades Family Professor  Oncology and Adult Reconstructive Surgery  Dept Orthopaedic Surgery, [facility]  [phone] office, [phone] pager  [email or web address]           DATA for DOCUMENTATION:         Past Medical History:     Patient Active Problem List   Diagnosis     Acute lymphoblastic leukemia (ALL) in remission (H)     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)     Failed total knee, right (H)     Elbow pain     Lumbar disc herniation with radiculopathy     S/P total knee arthroplasty     GERD (gastroesophageal reflux disease)     Chronic constipation     Anxiety     Seizure (H)     Avascular necrosis (H)     Moderate episode of recurrent major depressive disorder (H)     Female infertility     Status post hip surgery     Long-term (current) use of anticoagulants [Z79.01]     Aftercare following joint replacement [Z47.1]      Hip dislocation, left, subsequent encounter     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)     Hip dislocation, left, initial encounter (H)     Status post closed reduction of dislocated total hip prosthesis     Allergic rhinitis     Chondromalacia patellae of right knee     Chronic anemia     Contracture of elbow     Depression     Hyperprolactinemia (H)     Hypertriglyceridemia     Injury of skin and subcutaneous tissue     Insomnia due to medical condition     Left hip pain     Narcotic dependence (H)     Opioid overdose (H)     Hypothyroidism due to medication     Past Medical History:   Diagnosis Date     Acute lymphoblastic leukemia in remission (H)      Anxiety      Chest pain      Chronic osteoarthritis      Degenerative joint disease      Depression      Depressive disorder      Gastro-oesophageal reflux disease      Hypothyroidism      IBS (irritable bowel syndrome)      Osteonecrosis (H)      Osteonecrosis (H)      Thyroid disease        Also see scanned health assessment forms.       Past Surgical History:     Past Surgical History:   Procedure Laterality Date     ARTHROPLASTY KNEE Left 1/8/2016    Procedure: ARTHROPLASTY KNEE;  Surgeon: Cheko Saleem MD;  Location: [facility]     ARTHROPLASTY PATELLO-FEMORAL (KNEE)  6/12/2012    Procedure: ARTHROPLASTY PATELLO-FEMORAL (KNEE);  Right Knee Patella Resurfacing;  Surgeon: Cheko Saleem MD;  [facility]     ARTHROPLASTY REVISION HIP Left 4/3/2018    Procedure: ARTHROPLASTY REVISION HIP;  Conversion of Left Hip Rigo-Arthroplasty to Left Total Hip Replacement;  Surgeon: Cheko Saleem MD;  [facility]     ARTHROPLASTY REVISION HIP Left 5/25/2018    Procedure: ARTHROPLASTY REVISION HIP;  Revision Left Total Hip Arthroplasty ;  Surgeon: Cheko Saleem MD;  [facility]     CLOSED REDUCTION HIP Left 5/18/2018    Procedure: CLOSED REDUCTION HIP;  Closed Reduction Left Hip, and Aspiration Culture   Left Hip;  Surgeon: Cheko Saleem  MD;  [facility]     CLOSED REDUCTION HIP Left 5/23/2018    Procedure: CLOSED REDUCTION HIP;  Closed Reduction Left Hip;  Surgeon: Cheko Saleem MD;  [facility]     CLOSED REDUCTION HIP Left 7/12/2018    Procedure: CLOSED REDUCTION HIP;  Closed Reduction of Left Total Hip Arthroplasty;  Surgeon: Cheko Saleem MD;  [facility] OR     HIP SURGERY  5/31/2005    Left hip resurfacing hemiarthroplasty     HIP SURGERY  6/5/07     INJECT BLOCK MEDIAL BRANCH CERVICAL/THORACIC/LUMBAR N/A 1/9/2016    Procedure: INJECT BLOCK MEDIAL BRANCH CERVICAL / THORACIC / LUMBAR;  Surgeon: GENERIC ANESTHESIA PROVIDER;  Location: [facility] OR     JOINT REPLACEMENT  2/2/2010    Rt TKA     KNEE SURGERY       ORTHOPEDIC SURGERY      right foot surgery     ZZC PELVIS/HIP JOINT SURGERY UNLISTED              Social History:     Social History     Socioeconomic History     Marital status:      Spouse name: Not on file     Number of children: Not on file     Years of education: Not on file     Highest education level: Not on file   Occupational History     Not on file   Tobacco Use     Smoking status: Current Every Day Smoker     Packs/day: 1.00     Years: 10.00     Pack years: 10.00     Types: Cigarettes     Start date: 5/29/1997     Smokeless tobacco: Never Used   Substance and Sexual Activity     Alcohol use: No     Comment: very rare     Drug use: No     Sexual activity: Yes     Partners: Male     Birth control/protection: None   Other Topics Concern     Not on file   Social History Narrative     Not on file     Social Determinants of Health     Financial Resource Strain: Not on file   Food Insecurity: Not on file   Transportation Needs: Not on file   Physical Activity: Not on file   Stress: Not on file   Social Connections: Not on file   Intimate Partner Violence: Not on file   Housing Stability: Not on file            Family History:       Family History   Problem Relation Age of Onset     Migraines Son      Soft Tissue Cancer  Brother      Cancer Brother      Other Cancer Brother      Low Back Problems Mother      Thyroid Disease Mother      Obesity Mother      Deep Vein Thrombosis Mother             Medications:     Current Outpatient Medications   Medication Sig     albuterol (PROAIR HFA/PROVENTIL HFA/VENTOLIN HFA) 108 (90 BASE) MCG/ACT Inhaler Inhale 2 puffs into the lungs every 6 hours as needed for shortness of breath / dyspnea or wheezing     B Complex Vitamins (B COMPLEX PO) Take 1 tablet by mouth daily Dose unknown; OTC     BACLOFEN PO Take 20 mg by mouth 4 times daily      buPROPion (WELLBUTRIN SR) 150 MG 12 hr tablet TK 1 T PO D FOR 12 H     DULoxetine (CYMBALTA) 60 MG EC capsule Take 60 mg by mouth 2 times daily      Escitalopram Oxalate (LEXAPRO PO) Take 20 mg by mouth daily     fluticasone (FLONASE) 50 MCG/ACT nasal spray Spray 2 sprays into both nostrils daily as needed for rhinitis or allergies     gabapentin (NEURONTIN) 300 MG capsule Take 900 mg by mouth 3 times daily      IBUPROFEN PO Take 200 mg by mouth every 6 hours as needed for moderate pain     levothyroxine (SYNTHROID/LEVOTHROID) 50 MCG tablet TAKE 1 TABLET(50 MCG) BY MOUTH EVERY DAY     LORazepam (ATIVAN) 0.5 MG tablet TAKE 1 TO 2 TABLETS BY MOUTH THREE TIMES DAILY. NOT TO EXCEED 5 TABLETS DAILY - TRY TO REDUCE TO MAX 2.5MG DAILY     LORazepam (ATIVAN) 1 MG tablet Take 1-2 mg by mouth 3 times daily Max 5 tablets daily     MAGNESIUM OXIDE PO Take 200 mg by mouth daily     METHADONE HCL PO Take 40 mg by mouth every 8 hours     montelukast (SINGULAIR) 10 MG tablet Take 10 mg by mouth At Bedtime     Multiple Vitamins-Minerals (MULTIVITAMIN ADULT PO) Take 1 tablet by mouth daily     Nutritional Supplements (MELATONIN PO) Take 3 mg by mouth At Bedtime      omeprazole (PRILOSEC OTC) 20 MG tablet Take 20 mg by mouth daily      order for DME Equipment being ordered: Bath Seat ()  Treatment Diagnosis: Limited independence in ADLs/mobility due to hip precautions and  recent hip surgery     order for [facility]  [facility]  [phone]     Directions: Advance as Tolerated and Instructed by MD     oxyCODONE HCl (ROXICODONE) 20 MG TABS immediate release tablet 10 mg      POTASSIUM PO Take 1 tablet by mouth daily Dose unknown; OTC     Prochlorperazine Maleate (COMPAZINE PO) Take 10 mg by mouth every 6 hours as needed.       QUEtiapine (SEROQUEL) 25 MG tablet Take 25 mg by mouth At Bedtime      senna-docusate (SENOKOT-S;PERICOLACE) 8.6-50 MG per tablet Take 2 tablets by mouth 2 times daily     TIZANIDINE HCL PO Take 2 mg by mouth every 6 hours as needed for muscle spasms     traZODone (DESYREL) 100 MG tablet TK 1/2  T PO ATN PRF SLP     cyclobenzaprine (FLEXERIL) 10 MG tablet Take 1 tablet (10 mg) by mouth nightly as needed for muscle spasms (Patient not taking: Reported on 9/3/2020)     warfarin (COUMADIN) 5 MG tablet Take 1 tablet (5 mg) by mouth daily (Patient not taking: Reported on 9/3/2020)     No current facility-administered medications for this visit.              Review of Systems:   A comprehensive 10 point review of systems (constitutional, ENT, cardiac, peripheral vascular, lymphatic, respiratory, GI, , Musculoskeletal, skin, Neurological) was performed and found to be negative except as described in this note.       HOOS Hip Dysfunction & Osteoarthritis Outcome Questionnaire    Hip Dysfunction & Osteoarthritis Outcome Score (HOOS), English Version LK 2.0 (Hayder Jasmine, Tameka VIEYRA, 2003) 1/13/2020   S1. Do you feel grinding, hear clicking or any other type of noise from your hip? Rarely   S2. Difficulties spreading legs wide apart None   S3. Difficulties to stride out when walking None   S4. How severe is your hip joint stiffness after first wakening in the morning? Moderate   S5. How severe is your hip stiffness after sitting, lying or resting LATER IN THE DAY? Severe   Symptom Count 5   Symptom Sum 6   Symptom Mean 1.2   Symptom Subscale Score 70   P1.  How often is your hip painful? Daily   P2. Straightening your hip fully Mild   P3. Bending your hip FULLY None   P4. Walking on a flat surface None   P5. Going up or down stairs None   P6. At night while in bed Moderate   P7. Sitting or lying Mild   P8. Standing upright Mild   P9. Walking on a hard surface (asphalt, concrete, etc.) Mild   P10. Walking on an uneven surface Moderate   Pain Count 10   Pain Sum 11   Pain Mean 1.1   Pain Subscale Score 72.5   A1. Descending stairs Mild   A2. Ascending stairs Moderate   A3. Rising from sitting Moderate   A4. Standing Moderate   A5. Bending to the floor/ an object Mild   A6. Walking on a flat surface Moderate   A7. Getting in/out of car Moderate   A8. Going shopping Moderate   A9. Putting on socks/stockings Moderate   A10. Rising from bed Moderate   A11. Taking off socks/stockings Mild   A12. Lying in bed (turning over, maintaining hip position) Mild   A13. Getting in/out of bed Mild   A14. Sitting Mild   A15. Getting on/off toilet Mild   A16. Heavy domestic duties (moving heavy boxes, scrubbing floors, etc.) Mild   A17. Light domestic duties (cooking, dusting, etc.) Mild   ADL Count 17   ADL Sum 25   ADL Mean 1.47   ADL Subscale Score 63.23   SP1. Squatting Moderate   SP2. Running Moderate   SP3. Twisting/pivoting on loaded leg Moderate   SP4. Walking on uneven surface Moderate   Sports/Rec Count 4   Sports/Rec Sum 8   Sports/Rec Mean 2   Sports/Rec Subscale Score 50   Q1. How often are you aware of your hip problem? Daily   Q2. Have you modified you life style to avoid activities potentially damaging to your hip? Mildly   Q3. How much are you troubled with lack of confidence in your hip? Not at all   Q4. In general, how much difficulty do you have with your hip? Moderate   QOL Count 4   QOL Sum 6   QOL Mean 1.5   Quality of Life Subscale Score 62.5              [unfilled]    KOOS Knee Survey Assessment    Knee Outcome Survey ADL Scale (KALEIGH Buckley;  SANDY Brothers; HEIDI Zaragoza; Scott, FH; CHAVA Jean; 1998) 8/26/2021   Do you have swelling in your knee? Sometimes   Do you feel grinding, hear clicking or any other type of noise when your knee moves? Often   Does your knee catch or hang up when moving? Sometimes   Can you straighten your knee fully? Often   Can you bend your knee fully? Often   How severe is your knee joint stiffness after first wakening in the morning? Moderate   How severe is your knee stiffness after sitting, lying or resting LATER IN THE DAY? Moderate   How often do you experience knee pain? Daily   Twisting/pivoting on your knee Severe   Straightening knee fully Severe   Bending knee fully Severe   Walking on flat surface Moderate   Going up or down stairs Moderate   At night while in bed Severe   Sitting or lying Moderate   Standing upright Severe   Descending stairs Mild   Ascending stairs Severe   Rising from sitting Severe   Standing Severe   Bending to floor/ an object Severe   Walking on flat surface Moderate   Getting in/out of car Severe   Going shopping Moderate   Putting on socks/stockings Moderate   Rising from bed Moderate   Taking off socks/stockings Moderate   Lying in bed (turning over, maintaining knee position) Severe   Getting in/out of bath Moderate   Sitting Moderate   Getting on/off toilet Moderate   Heavy domestic duties (moving heavy boxes, scrubbing floors, etc) Severe   Light domestic duties (cooking, dusting, etc) Severe   Squatting Severe   Running Severe   Jumping Severe   Twisting/pivoting on your injured knee Severe   Kneeling Severe   How often are you aware of your knee problem? Daily   Have you modified your life style to avoid potentially damaging activities to your knee? Moderately   How much are you troubled with lack of confidence in your knee? Severely   In general, how much difficulty do you have with your knee? Severe   Pain Score 33.33   Symptoms Score 39.28   Function, Daily Living Score 39.7    Sports and Rec Score 25   Quality of Life Score 31.25              Promis 10 Assessment    PROMIS 10 8/26/2021   In general, would you say your health is: Good   In general, would you say your quality of life is: Good   In general, how would you rate your physical health? Good   In general, how would you rate your mental health, including your mood and your ability to think? Good   In general, how would you rate your satisfaction with your social activities and relationships? Good   In general, please rate how well you carry out your usual social activities and roles Good   To what extent are you able to carry out your everyday physical activities such as walking, climbing stairs, carrying groceries, or moving a chair? Moderately   How often have you been bothered by emotional problems such as feeling anxious, depressed or irritable? Sometimes   How would you rate your fatigue on average? Moderate   How would you rate your pain on average?   0 = No Pain  to  10 = Worst Imaginable Pain 5   In general, would you say your health is: 3   In general, would you say your quality of life is: 3   In general, how would you rate your physical health? 3   In general, how would you rate your mental health, including your mood and your ability to think? 3   In general, how would you rate your satisfaction with your social activities and relationships? 3   In general, please rate how well you carry out your usual social activities and roles. (This includes activities at home, at work and in your community, and responsibilities as a parent, child, spouse, employee, friend, etc.) 3   To what extent are you able to carry out your everyday physical activities such as walking, climbing stairs, carrying groceries, or moving a chair? 3   In the past 7 days, how often have you been bothered by emotional problems such as feeling anxious, depressed, or irritable? 3   In the past 7 days, how would you rate your fatigue on average? 3   In  the past 7 days, how would you rate your pain on average, where 0 means no pain, and 10 means worst imaginable pain? 5   Global Mental Health Score 12   Global Physical Health Score 12   PROMIS TOTAL - SUBSCORES 24   Some recent data might be hidden      Or[facility] [place] Knee Questionnaire    No flowsheet data found.     See intake form completed by patient

## 2022-02-04 LAB
ALPHA DEFENSINS 1+2+3 SNV QL IA: POSITIVE
PRELIM TEST AFFECT FURTHER TEST SPEC: YES
SPECIMEN SOURCE SNV: ABNORMAL

## 2022-02-08 LAB — BACTERIA SNV CULT: NO GROWTH

## 2022-02-14 ENCOUNTER — VIRTUAL VISIT (OUTPATIENT)
Dept: ORTHOPEDICS | Facility: CLINIC | Age: 42
End: 2022-02-14
Payer: COMMERCIAL

## 2022-02-14 DIAGNOSIS — M87.19 OSTEONECROSIS DUE TO DRUGS OF MULTIPLE SITES (H): Primary | ICD-10-CM

## 2022-02-14 PROCEDURE — 99213 OFFICE O/P EST LOW 20 MIN: CPT | Mod: 95 | Performed by: ORTHOPAEDIC SURGERY

## 2022-02-14 NOTE — NURSING NOTE
Chief Complaint   Patient presents with     Follow Up     Review aspiration results       41 year old  1980    There were no vitals taken for this visit.    Date/Surgery/Surgeon/Hospital:  1. Right knee patella resurfacing  2. Arthroplasty of right knee  3. Conversion of left hip bartolo-arthroplasty to total left hip replacement   4. Arthroplasty revision of left total hip            [facility] [place], [place] - [id number] - [place], [place] - [address]        Allergies   Allergen Reactions     Chantix [Varenicline] Nausea and Vomiting     Other reaction(s): Vomiting     No Clinical Screening - See Comments      Other reaction(s): Unknown Reaction     Seasonal Allergies      Ciprofloxacin Other (See Comments)     Interacts with other medication  Other reaction(s): Dizziness       Latex Itching and Rash     Other reaction(s): Other - Describe In Comment Field  Vaginal itching, burning  Other reaction(s): Intolerance           Current Outpatient Medications   Medication     albuterol (PROAIR HFA/PROVENTIL HFA/VENTOLIN HFA) 108 (90 BASE) MCG/ACT Inhaler     B Complex Vitamins (B COMPLEX PO)     BACLOFEN PO     buPROPion (WELLBUTRIN SR) 150 MG 12 hr tablet     cyclobenzaprine (FLEXERIL) 10 MG tablet     DULoxetine (CYMBALTA) 60 MG EC capsule     Escitalopram Oxalate (LEXAPRO PO)     fluticasone (FLONASE) 50 MCG/ACT nasal spray     gabapentin (NEURONTIN) 300 MG capsule     IBUPROFEN PO     levothyroxine (SYNTHROID/LEVOTHROID) 50 MCG tablet     LORazepam (ATIVAN) 0.5 MG tablet     LORazepam (ATIVAN) 1 MG tablet     MAGNESIUM OXIDE PO     METHADONE HCL PO     montelukast (SINGULAIR) 10 MG tablet     Multiple Vitamins-Minerals (MULTIVITAMIN ADULT PO)     Nutritional Supplements (MELATONIN PO)     omeprazole (PRILOSEC OTC) 20 MG tablet     order for DME     order for DME     oxyCODONE HCl (ROXICODONE) 20 MG TABS immediate  release tablet     POTASSIUM PO     Prochlorperazine Maleate (COMPAZINE PO)     QUEtiapine (SEROQUEL) 25 MG tablet     senna-docusate (SENOKOT-S;PERICOLACE) 8.6-50 MG per tablet     TIZANIDINE HCL PO     traZODone (DESYREL) 100 MG tablet     warfarin (COUMADIN) 5 MG tablet     No current facility-administered medications for this visit.             Questionnaires:    HOOS Hip Dysfunction & Osteoarthritis Outcome Questionnaire    Hip Dysfunction & Osteoarthritis Outcome Score (HOOS), English Version LK 2.0 (Hayder Jasmine, Tameka VIEYRA, 2003) 1/13/2020   S1. Do you feel grinding, hear clicking or any other type of noise from your hip? Rarely   S2. Difficulties spreading legs wide apart None   S3. Difficulties to stride out when walking None   S4. How severe is your hip joint stiffness after first wakening in the morning? Moderate   S5. How severe is your hip stiffness after sitting, lying or resting LATER IN THE DAY? Severe   Symptom Count 5   Symptom Sum 6   Symptom Mean 1.2   Symptom Subscale Score 70   P1. How often is your hip painful? Daily   P2. Straightening your hip fully Mild   P3. Bending your hip FULLY None   P4. Walking on a flat surface None   P5. Going up or down stairs None   P6. At night while in bed Moderate   P7. Sitting or lying Mild   P8. Standing upright Mild   P9. Walking on a hard surface (asphalt, concrete, etc.) Mild   P10. Walking on an uneven surface Moderate   Pain Count 10   Pain Sum 11   Pain Mean 1.1   Pain Subscale Score 72.5   A1. Descending stairs Mild   A2. Ascending stairs Moderate   A3. Rising from sitting Moderate   A4. Standing Moderate   A5. Bending to the floor/ an object Mild   A6. Walking on a flat surface Moderate   A7. Getting in/out of car Moderate   A8. Going shopping Moderate   A9. Putting on socks/stockings Moderate   A10. Rising from bed Moderate   A11. Taking off socks/stockings Mild   A12. Lying in bed (turning over, maintaining hip position) Mild   A13.  Getting in/out of bed Mild   A14. Sitting Mild   A15. Getting on/off toilet Mild   A16. Heavy domestic duties (moving heavy boxes, scrubbing floors, etc.) Mild   A17. Light domestic duties (cooking, dusting, etc.) Mild   ADL Count 17   ADL Sum 25   ADL Mean 1.47   ADL Subscale Score 63.23   SP1. Squatting Moderate   SP2. Running Moderate   SP3. Twisting/pivoting on loaded leg Moderate   SP4. Walking on uneven surface Moderate   Sports/Rec Count 4   Sports/Rec Sum 8   Sports/Rec Mean 2   Sports/Rec Subscale Score 50   Q1. How often are you aware of your hip problem? Daily   Q2. Have you modified you life style to avoid activities potentially damaging to your hip? Mildly   Q3. How much are you troubled with lack of confidence in your hip? Not at all   Q4. In general, how much difficulty do you have with your hip? Moderate   QOL Count 4   QOL Sum 6   QOL Mean 1.5   Quality of Life Subscale Score 62.5              KOOS Knee Survey Assessment    Knee Outcome Survey ADL Scale (KALEIGH Buckley; Deneen L; Nik, RS; Scott, FH; Ted, CHAVA; 1998) 8/26/2021   Do you have swelling in your knee? Sometimes   Do you feel grinding, hear clicking or any other type of noise when your knee moves? Often   Does your knee catch or hang up when moving? Sometimes   Can you straighten your knee fully? Often   Can you bend your knee fully? Often   How severe is your knee joint stiffness after first wakening in the morning? Moderate   How severe is your knee stiffness after sitting, lying or resting LATER IN THE DAY? Moderate   How often do you experience knee pain? Daily   Twisting/pivoting on your knee Severe   Straightening knee fully Severe   Bending knee fully Severe   Walking on flat surface Moderate   Going up or down stairs Moderate   At night while in bed Severe   Sitting or lying Moderate   Standing upright Severe   Descending stairs Mild   Ascending stairs Severe   Rising from sitting Severe   Standing Severe   Bending to  floor/ an object Severe   Walking on flat surface Moderate   Getting in/out of car Severe   Going shopping Moderate   Putting on socks/stockings Moderate   Rising from bed Moderate   Taking off socks/stockings Moderate   Lying in bed (turning over, maintaining knee position) Severe   Getting in/out of bath Moderate   Sitting Moderate   Getting on/off toilet Moderate   Heavy domestic duties (moving heavy boxes, scrubbing floors, etc) Severe   Light domestic duties (cooking, dusting, etc) Severe   Squatting Severe   Running Severe   Jumping Severe   Twisting/pivoting on your injured knee Severe   Kneeling Severe   How often are you aware of your knee problem? Daily   Have you modified your life style to avoid potentially damaging activities to your knee? Moderately   How much are you troubled with lack of confidence in your knee? Severely   In general, how much difficulty do you have with your knee? Severe   Pain Score 33.33   Symptoms Score 39.28   Function, Daily Living Score 39.7   Sports and Rec Score 25   Quality of Life Score 31.25              Promis 10 Assessment    PROMIS 10 8/26/2021   In general, would you say your health is: Good   In general, would you say your quality of life is: Good   In general, how would you rate your physical health? Good   In general, how would you rate your mental health, including your mood and your ability to think? Good   In general, how would you rate your satisfaction with your social activities and relationships? Good   In general, please rate how well you carry out your usual social activities and roles Good   To what extent are you able to carry out your everyday physical activities such as walking, climbing stairs, carrying groceries, or moving a chair? Moderately   How often have you been bothered by emotional problems such as feeling anxious, depressed or irritable? Sometimes   How would you rate your fatigue on average? Moderate   How would you rate your pain on  average?   0 = No Pain  to  10 = Worst Imaginable Pain 5   In general, would you say your health is: 3   In general, would you say your quality of life is: 3   In general, how would you rate your physical health? 3   In general, how would you rate your mental health, including your mood and your ability to think? 3   In general, how would you rate your satisfaction with your social activities and relationships? 3   In general, please rate how well you carry out your usual social activities and roles. (This includes activities at home, at work and in your community, and responsibilities as a parent, child, spouse, employee, friend, etc.) 3   To what extent are you able to carry out your everyday physical activities such as walking, climbing stairs, carrying groceries, or moving a chair? 3   In the past 7 days, how often have you been bothered by emotional problems such as feeling anxious, depressed, or irritable? 3   In the past 7 days, how would you rate your fatigue on average? 3   In the past 7 days, how would you rate your pain on average, where 0 means no pain, and 10 means worst imaginable pain? 5   Global Mental Health Score 12   Global Physical Health Score 12   PROMIS TOTAL - SUBSCORES 24   Some recent data might be hidden              Ortho [place] Knee Questionnaire    No flowsheet data found.

## 2022-02-14 NOTE — PROGRESS NOTES
[facility]  Orthopaedic Surgery, Joint Replacement Consultation  by Cheko Saleem M.D.    Alethea Patel MRN# [id number]   Age: 41 year old YOB: 1980     Requesting physician: No ref. provider found  [facility]  Dr. Rosa Hull      DIAGNOSIS:   1. Multi-focal osteonecrosis.  2. Lumbar disc herniation  3. Acute Lymphoblastic Leukemia, chemotherapy [id number]   4. Chronic nicotine abuse     SURGERIES:  1. 2005, L femoral head resurfacing hemiarthroplasty  2. 6/5/2007, R HAJA  3. 2/2/2010, right total knee arthroplasty. No patella resurfacing  4. 6/12/2012, R patella resurfacing  5. 1/8/2016, L TKA (Stiven) [facility]  6. 4/3/2018, Revision L surface replacement bartolo to HAJA (Stiven) [facility]  7. 4/19/18 , 5/18/2018, 5/23/2018  Closed reductions L HAJA  8. 5/25/2018, Revision L HAJA to larger head size and cup repositioning (Stiven) [facility]. Cultures (-) x 3  9. 7/12/18, Closed reduction of L HAJA under anesthesia (Stiven).    I saw Alona today via virtual video for visit to review the findings of her knee aspiration.  There is no evidence of any infectious process within her left knee joint.  The cell count and culture testing is all negative, although the alpha defensin synovasure test is positive via lateral flow assay.  Intra-articular synovial CRP is not performed with this test.    I do not think there is sufficient evidence to state that Alona has a prosthetic joint infection.  A false positive synovasure test is rare but does occur sporadically.  More commonly occurs in patients who have polyarticular or inflammatory types of arthritis.    I have not recommend any specific intervention for the present time expectant management.  I explained to Alona that she may have flareups of discomfort periodically in multiple different extremities possibly related to intraosseous hypertension with her diffuse polystatic osteonecrotic lesions.  Nonetheless I have encouraged her to  discontinue all forms her narcotic or even gabapentin medication.  Unfortunately this means that she will have periodic bouts of pain that at times are more severe than others.    I requested that she return in 4 to 5 years time for regular radiographs of her hips and knees bilaterally.  In the meantime if her symptoms or discomfort or pain pattern should change, I was available to see her or reinvestigate.    Cheko Saleem MD  [facility] Family Professor  Oncology and Adult Reconstructive Surgery  Dept Orthopaedic Surgery, [facility]  [phone] office, [phone] pager  [email or web address]      Virtual-Visit Details    Type of service:  Video/telephone Visit  Video/telephone total duration (including visit time, pre and post visit work time as documented above on the same day of service): 20    Visit start time: 1541  Visit end time:1605  Originating Location (pt. Location): Home  Distant Location (provider location):  [facility]   Platform used for Virtual Visit: RewardLoop

## 2022-02-14 NOTE — LETTER
2/14/2022         RE: Alethea Patel  [address]  [place] [address]        Dear Colleague,    Thank you for referring your patient, Alethea Patel, to the [facility] [place]. Please see a copy of my visit note below.        [facility]  Orthopaedic Surgery, Joint Replacement Consultation  by Cheko Saleem M.D.    Alethea Patel MRN# [id number]   Age: 41 year old YOB: 1980     Requesting physician: No ref. provider found  [facility]  Dr. Rosa Hull      DIAGNOSIS:   1. Multi-focal osteonecrosis.  2. Lumbar disc herniation  3. Acute Lymphoblastic Leukemia, chemotherapy [id number]   4. Chronic nicotine abuse     SURGERIES:  1. 2005, L femoral head resurfacing hemiarthroplasty  2. 6/5/2007, R HAJA  3. 2/2/2010, right total knee arthroplasty. No patella resurfacing  4. 6/12/2012, R patella resurfacing  5. 1/8/2016, L TKA (Stiven) [facility]  6. 4/3/2018, Revision L surface replacement bartolo to HAJA (Stiven) [facility]  7. 4/19/18 , 5/18/2018, 5/23/2018  Closed reductions L HAJA  8. 5/25/2018, Revision L HAJA to larger head size and cup repositioning (Stiven) [facility]. Cultures (-) x 3  9. 7/12/18, Closed reduction of L HAJA under anesthesia (Stiven).    I saw Alona today via virtual video for visit to review the findings of her knee aspiration.  There is no evidence of any infectious process within her left knee joint.  The cell count and culture testing is all negative, although the alpha defensin synovasure test is positive via lateral flow assay.  Intra-articular synovial CRP is not performed with this test.    I do not think there is sufficient evidence to state that Alona has a prosthetic joint infection.  A false positive synovasure test is rare but does occur sporadically.  More commonly occurs in patients who have polyarticular or inflammatory types of arthritis.    I have not recommend any specific intervention for the present time  expectant management.  I explained to Alona that she may have flareups of discomfort periodically in multiple different extremities possibly related to intraosseous hypertension with her diffuse polystatic osteonecrotic lesions.  Nonetheless I have encouraged her to discontinue all forms her narcotic or even gabapentin medication.  Unfortunately this means that she will have periodic bouts of pain that at times are more severe than others.    I requested that she return in 4 to 5 years time for regular radiographs of her hips and knees bilaterally.  In the meantime if her symptoms or discomfort or pain pattern should change, I was available to see her or reinvestigate.    MD Blas Rhoades Family Professor  Oncology and Adult Reconstructive Surgery  Dept Orthopaedic Surgery, [facility]  [phone] office, [phone] pager  [email or web address]      Virtual-Visit Details    Type of service:  Video/telephone Visit  Video/telephone total duration (including visit time, pre and post visit work time as documented above on the same day of service): 20    Visit start time: 1541  Visit end time:1605  Originating Location (pt. Location): Home  Distant Location (provider location):  [facility] ORTHOPEDIC [facility]   Platform used for Virtual Visit: Ubaldo

## 2022-02-17 LAB — BACTERIA SNV CULT: NORMAL

## 2022-03-21 ENCOUNTER — TRANSFERRED RECORDS (OUTPATIENT)
Dept: HEALTH INFORMATION MANAGEMENT | Facility: CLINIC | Age: 42
End: 2022-03-21

## 2022-10-01 NOTE — PLAN OF CARE
"Called to let me know that she is going outside to smoke, per the PCA \" I am going to find a wheelchair and bring her out to smoke\". Informed the PCA and the patient that she does not have an order to get up without her brace to stabilize her hip. Per the patient \" I live in [place] it is to far to go and get it\" her IV was saline locked, she is tolerating fluids well and putting out good amounts of urine, tolerating fluids well without any nausea. Refusing to have the [place], abductor pillow between her knees. \" I can keep my legs far apart without the pillow in place\". She asked to have her incentive spirometer placed on the window sill, she does not intend to use it, and she has many of them at home\"  Does not want to have her montano removed until she is able to speak with Dr. Saleem. Spoke with Antonia and she is not sure if Dr. Saleem will have time to come and talk with her personally.   " Unpredictable, possible elopement risk

## 2024-01-10 NOTE — PROGRESS NOTES
[facility]  Orthopaedic Surgery, Joint Replacement Consultation  by Cheko Saleem M.D.    Alethea Patel MRN# [id number]   Age: 41 year old YOB: 1980     Requesting physician: No ref. provider found  [facility]  Dr. Rosa Hull           Assessment and Plan:   Assessment:  Alethea is a 43-year-old female with a complex orthopedic history related to her multi-focal osteonecrosis now presenting with a year of her left TKA.  Suspect the failure is related to subchondral collapse under the medial tibial component due to the bony infarcts.  Patient will likely require revision surgery, possibly just the tibial component but possibly the entire prosthesis.  Will order basic labs today knee aspirated to rule out infection.    Procedure note: Under sterile precautions, the left knee was aspirated.  10 cc of turbid yellow fluid was sent for cell count, culture examination and synovasure testing.  There were no complications.     Plan:  -Follow-up on knee aspiration results, basic labs to rule infection  -Patient to remain nonweightbearing on the left lower extremity to prevent further collapse.  Provided with crutches today.  -Encouraged smoking cessation  -Assuming labs not concerning for infection, we will move forward with revision surgery; preoperative teaching completed today.    Patient expressed understanding and is in agreement with this plan. All questions answered.      Patient seen and discussed with Dr. Saleem.    Eran Corea MD  Orthopaedic Surgery, PGY-4  Pager: [phone]    Attending MD (Dr. Cheko Saleem) Attestation :  This patient was seen and evaluated by me including a history, exam, and interpretation of all imaging and/or lab data.  I formulated the treatment plan along with either a physician's assistant (PA-C) or training physician (resident/fellow), who also saw the patient. That individual or a scribe has documented the visit in the attached  note which I approve.    MD Blas Rhoades Family Professor  Oncology and Adult Reconstructive Surgery  Dept Orthopaedic Surgery, [facility]  [phone] office, [phone] pager  [email or web address]           For additional information and frequently asked questions regarding joint replacements, scan the QR code image below on your phone camera or go to:  [email or web address]              History of Present Illness:   Chief complaint: worsening left knee pain    Background history:  DIAGNOSIS:   Multi-focal osteonecrosis.  Lumbar disc herniation  Acute Lymphoblastic Leukemia, chemotherapy [id number]   Chronic nicotine abuse     SURGERIES:  2005, L femoral head resurfacing hemiarthroplasty  6/5/2007, R HAJA  2/2/2010, right total knee arthroplasty. No patella resurfacing  6/12/2012, R patella resurfacing  1/8/2016, L TKA (Stiven) [facility]  4/3/2018, Revision L surface replacement bartolo to HAJA (Stiven) [facility]  4/19/18 , 5/18/2018, 5/23/2018  Closed reductions L HAJA  5/25/2018, Revision L HAJA to larger head size and cup repositioning (Stiven) [facility]. Cultures (-) x 3  7/12/18, Closed reduction of L HAJA under anesthesia (Stiven)    Alethea is a 43-year-old female well-known to the arthroplasty service presenting today for evaluation of her worsening left knee pain.  Last seen by Dr. Saleem on 2/14/2022.  At that time, patient was complaining of intermittent knee pain, L>R.   Implants all appeared stable on radiographs. Knee was aspirated, synovial fluid labs unremarkable aside from a positive synovasure test. Plan was for  expectant management but she was advised to return in if her symptoms or discomfort or pain pattern should change.      Following this visit, patient states her pain and symptoms gradually improved and resolved.  She felt minimal pain in her left knee until April, 2023.  Patient reports that during a trip to [place], she began experiencing increasing  left knee pain.  Cannot identify specific inciting events.  The pain was persistent but fairly stable until November when she had another increase in pain as well as worsening deformity of her left knee.  Patient states that she went bowling with her mother who commented on the deformity of her left knee and insisted she present for further evaluation.    Patient describes diffuse and global pain in her left knee.  She has been able to walk but with pain.  Does not use any assistive devices.  Pain exacerbated by walking, relieved with rest.  Patient taking occasional ibuprofen, Tylenol, chronic oxycodone for pain.  Patient still working in customer service at [employer].  No other major health events.  Denies any fevers, chills, unanticipated weight changes, other constitutional symptoms.  Patient states she has baseline numbness in her feet but this has not changed in many years.    Patient is still smoking 3/4 PPD.           Physical Exam:     EXAMINATION pertinent findings:   PSYCH: Pleasant, healthy-appearing, alert, oriented x3, cooperative. Normal mood and affect.  VITAL SIGNS: There were no vitals taken for this visit.  RESP: non labored breathing   ABD: benign, soft, non-tender, no acute peritoneal findings  SKIN: grossly normal   LYMPHATIC: grossly normal, no adenopathy, no extremity edema  NEURO: grossly normal , no motor deficits  VASCULAR: satisfactory perfusion of all extremities   MUSCULOSKELETAL:   Focused exam of the left lower extremity demonstrates a moderate effusion about the knee.  Obvious varus deformity.  Antalgic gait.  Incision well-healed.  No erythema about the knee.  Tender to palpation over the medial and lateral joint lines.  Pain with varus and valgus stressing but no obvious laxity.  Knee range of motion .  Toes warm well-perfused.  Fires tib ant, gastroc, EHL, FHL.  [place] all distributions.         Data:   All laboratory data reviewed  All imaging studies reviewed by  me    Review of x-rays of the left knee demonstrates that the tibial component of her left-sided TKA has collapsed into varus along the medial tibia.  Femoral bone appears stable.         Review of Systems:   A comprehensive 10 point review of systems    .      Large Joint Injection/Arthocentesis: L knee joint    Date/Time: 1/11/2024 1:40 PM    Performed by: Eran Corea MD  Authorized by: Cheko Saleem MD    Needle Size:  20 G  Guidance: landmark guided    Approach:  Anterolateral  Location:  Knee      Aspirate amount (mL):  10  Aspirate:  Yellow  Aspirate analysis: sent for lab analysis    Outcome:  Tolerated well, no immediate complications  Procedure discussed: discussed risks, benefits, and alternatives    Consent Given by:  Patient  Timeout: timeout called immediately prior to procedure    Prep: patient was prepped and draped in usual sterile fashion

## 2024-01-11 ENCOUNTER — ANCILLARY PROCEDURE (OUTPATIENT)
Dept: GENERAL RADIOLOGY | Facility: CLINIC | Age: 44
End: 2024-01-11
Attending: ORTHOPAEDIC SURGERY
Payer: COMMERCIAL

## 2024-01-11 ENCOUNTER — OFFICE VISIT (OUTPATIENT)
Dept: ORTHOPEDICS | Facility: CLINIC | Age: 44
End: 2024-01-11
Payer: COMMERCIAL

## 2024-01-11 ENCOUNTER — LAB (OUTPATIENT)
Dept: LAB | Facility: CLINIC | Age: 44
End: 2024-01-11
Payer: COMMERCIAL

## 2024-01-11 DIAGNOSIS — Z96.659 MECHANICAL COMPLICATION OF PROSTHETIC KNEE IMPLANT, INITIAL ENCOUNTER (H): Primary | ICD-10-CM

## 2024-01-11 DIAGNOSIS — M87.19 OSTEONECROSIS DUE TO DRUGS OF MULTIPLE SITES (H): ICD-10-CM

## 2024-01-11 DIAGNOSIS — M87.19 OSTEONECROSIS DUE TO DRUGS OF MULTIPLE SITES (H): Primary | ICD-10-CM

## 2024-01-11 DIAGNOSIS — Z96.659 MECHANICAL COMPLICATION OF PROSTHETIC KNEE IMPLANT, INITIAL ENCOUNTER (H): ICD-10-CM

## 2024-01-11 DIAGNOSIS — T84.098A MECHANICAL COMPLICATION OF PROSTHETIC KNEE IMPLANT, INITIAL ENCOUNTER (H): ICD-10-CM

## 2024-01-11 DIAGNOSIS — T84.098A MECHANICAL COMPLICATION OF PROSTHETIC KNEE IMPLANT, INITIAL ENCOUNTER (H): Primary | ICD-10-CM

## 2024-01-11 LAB
APPEARANCE FLD: ABNORMAL
CELL COUNT BODY FLUID SOURCE: ABNORMAL
COLOR FLD: ABNORMAL
CRP SERPL-MCNC: 6.97 MG/L
ERYTHROCYTE [SEDIMENTATION RATE] IN BLOOD BY WESTERGREN METHOD: 22 MM/HR (ref 0–20)
LYMPHOCYTES NFR FLD MANUAL: 23 %
MONOS+MACROS NFR FLD MANUAL: 33 %
NEUTS BAND NFR FLD MANUAL: 44 %
WBC # FLD AUTO: 1655 /UL

## 2024-01-11 PROCEDURE — 87102 FUNGUS ISOLATION CULTURE: CPT | Performed by: ORTHOPAEDIC SURGERY

## 2024-01-11 PROCEDURE — 89050 BODY FLUID CELL COUNT: CPT | Performed by: ORTHOPAEDIC SURGERY

## 2024-01-11 PROCEDURE — 99214 OFFICE O/P EST MOD 30 MIN: CPT | Mod: 25 | Performed by: ORTHOPAEDIC SURGERY

## 2024-01-11 PROCEDURE — 20610 DRAIN/INJ JOINT/BURSA W/O US: CPT | Mod: LT | Performed by: ORTHOPAEDIC SURGERY

## 2024-01-11 PROCEDURE — 87070 CULTURE OTHR SPECIMN AEROBIC: CPT | Performed by: ORTHOPAEDIC SURGERY

## 2024-01-11 PROCEDURE — 83518 IMMUNOASSAY DIPSTICK: CPT | Mod: 90 | Performed by: PATHOLOGY

## 2024-01-11 PROCEDURE — 36415 COLL VENOUS BLD VENIPUNCTURE: CPT | Performed by: PATHOLOGY

## 2024-01-11 PROCEDURE — 73522 X-RAY EXAM HIPS BI 3-4 VIEWS: CPT | Performed by: RADIOLOGY

## 2024-01-11 PROCEDURE — 85652 RBC SED RATE AUTOMATED: CPT | Performed by: PATHOLOGY

## 2024-01-11 PROCEDURE — 86140 C-REACTIVE PROTEIN: CPT | Performed by: PATHOLOGY

## 2024-01-11 PROCEDURE — 73560 X-RAY EXAM OF KNEE 1 OR 2: CPT | Mod: RT | Performed by: RADIOLOGY

## 2024-01-11 PROCEDURE — 87116 MYCOBACTERIA CULTURE: CPT | Mod: 90 | Performed by: PATHOLOGY

## 2024-01-11 PROCEDURE — 99000 SPECIMEN HANDLING OFFICE-LAB: CPT | Performed by: PATHOLOGY

## 2024-01-11 PROCEDURE — 87075 CULTR BACTERIA EXCEPT BLOOD: CPT | Performed by: ORTHOPAEDIC SURGERY

## 2024-01-11 PROCEDURE — 87206 SMEAR FLUORESCENT/ACID STAI: CPT | Mod: 90 | Performed by: PATHOLOGY

## 2024-01-11 ASSESSMENT — ACTIVITIES OF DAILY LIVING (ADL)
ADL_MEAN: 0.76
ADL_SUBSCALE_SCORE: 69
ADL_SUBSCALE_SCORE: 81
ADL_SUM: 13
ADL_SUM: 21
ADL_COUNT: 17
ADL_MEAN: 1.24
ADL_COUNT: 17

## 2024-01-11 ASSESSMENT — HOOS S4
HOW SEVERE IS YOUR HIP JOINT STIFFNESS AFTER FIRST WAKENING IN THE MORNING?: MILD
HOW SEVERE IS YOUR HIP JOINT STIFFNESS AFTER FIRST WAKENING IN THE MORNING?: MILD

## 2024-01-11 NOTE — NURSING NOTE
Pre-Op Teaching was done in person at the clinic.    Teaching Flowsheet   Relevant Diagnosis: Pre-Op Teaching  Teaching Topic: L TKA Pre-Op     Person(s) involved in teaching:   Patient and      Motivation Level:  Asks Questions: Yes  Eager to Learn: Yes  Cooperative: Yes  Receptive (willing/able to accept information): Yes  Any cultural factors/Rastafari beliefs that may influence understanding or compliance? No     Patient demonstrates understanding of the following:  Reason for the appointment, diagnosis and treatment plan: Yes  Knowledge of proper use of medications and conditions for which they are ordered (with special attention to potential side effects or drug interactions): Yes  Which situations necessitate calling provider and whom to contact: Yes- discussed the stoplight tool to help assist with this.      Teaching Concerns Addressed:      Proper use of surgical scrub explain: Yes    Nutritional needs and diet plan: Yes  Pain management techniques: Yes  Wound Care: Yes  How and/when to access community resources: Yes     Instructional Materials Used/Given:  2 bottle of chlorhexidine, a surgery packet, and a joint booklet given to patient in clinic.      - Important contact info/ phone numbers: emphasizing clinic number and after hours number  - Map/ location of surgery  - Medications to avoid  - Showering instructions  - Stop light tool  - Your Guide to Joint Replacement Booklet   - Antibiotic post op before every dentist appointment, procedure, or surgery for the rest of their life.     Additionally the following was discussed with patient:  -  will be driving the patient to surgery and able to pick the patient up after discharge and staying with them for 4-5 days after surgery.  - Need to schedule a dentist appointment and get done any recommended dental work prior to surgery.   - Online joint replacement call and the use of [employer] to send educational messages. Asked patient to sign up  for join class during visit and patient declined to sign up at this time and stated will sign up later. Sheet with sign up instruction given to patient.   - Told patient to check in with insurance to check about coverage.     -Next step: Schedule a surgery date and schedule a Pre-Op appointment with PCP    Time spent with patient: 15 minutes.

## 2024-01-11 NOTE — NURSING NOTE
[facility] [address] [id number] [phone]  Dept: [phone]  ______________________________________________________________________________    Patient: Alethea Patel   : 1980   MRN: [id number]   2024    INVASIVE PROCEDURE SAFETY CHECKLIST    Date: 2024   Procedure:Left knee joint aspiration  Patient Name: Alethea Patel  MRN: [id number]  YOB: 1980    Action: Complete sections as appropriate. Any discrepancy results in a HARD COPY until resolved.     PRE PROCEDURE:  Patient ID verified with 2 identifiers (name and  or MRN): Yes  Procedure and site verified with patient/designee (when able): Yes  Accurate consent documentation in medical record: Yes  H&P (or appropriate assessment) documented in medical record: NA  H&P must be up to 20 days prior to procedure and updates within 24 hours of procedure as applicable: NA  Relevant diagnostic and radiology test results appropriately labeled and displayed as applicable: Yes  Procedure site(s) marked with provider initials: NA    TIMEOUT:  Time-Out performed immediately prior to starting procedure, including verbal and active participation of all team members addressing the following:Yes  * Correct patient identify  * Confirmed that the correct side and site are marked  * An accurate procedure consent form  * Agreement on the procedure to be done  * Correct patient position  * Relevant images and results are properly labeled and appropriately displayed  * The need to administer antibiotics or fluids for irrigation purposes during the procedure as applicable   * Safety precautions based on patient history or medication use    DURING PROCEDURE: Verification of correct person, site, and procedures any time the responsibility for care of the patient is transferred to another member of the care team.       The following medications were given:          Prior to injection, verified patient identity using patient's name and date of birth.  Due to injection administration, patient instructed to remain in clinic for 15 minutes  afterwards, and to report any adverse reaction to me immediately.      Scribed by Kay Caavzos ATC for Dr. Saleem on January 11, 2024 at 1:41p based on the provider's statements to me.     Kay Cavazos ATC

## 2024-01-11 NOTE — LETTER
1/11/2024         RE: Alethea Patel  [address]  [facility] [address]        Dear Colleague,    Thank you for referring your patient, Alethea Patel, to the [facility] [place]. Please see a copy of my visit note below.        [facility]  Orthopaedic Surgery, Joint Replacement Consultation  by Cheko Saleem M.D.    Alethea Patel MRN# [id number]   Age: 41 year old YOB: 1980     Requesting physician: No ref. provider found  [facility]  Dr. Rosa Hull           Assessment and Plan:   Assessment:  Alethea is a 43-year-old female with a complex orthopedic history related to her multi-focal osteonecrosis now presenting with a year of her left TKA.  Suspect the failure is related to subchondral collapse under the medial tibial component due to the bony infarcts.  Patient will likely require revision surgery, possibly just the tibial component but possibly the entire prosthesis.  Will order basic labs today knee aspirated to rule out infection.    Procedure note: Under sterile precautions, the left knee was aspirated.  10 cc of turbid yellow fluid was sent for cell count, culture examination and synovasure testing.  There were no complications.     Plan:  -Follow-up on knee aspiration results, basic labs to rule infection  -Patient to remain nonweightbearing on the left lower extremity to prevent further collapse.  Provided with crutches today.  -Encouraged smoking cessation  -Assuming labs not concerning for infection, we will move forward with revision surgery; preoperative teaching completed today.    Patient expressed understanding and is in agreement with this plan. All questions answered.      Patient seen and discussed with Dr. Saleem.    Eran Corea MD  Orthopaedic Surgery, PGY-4  Pager: [phone]    Attending MD (Dr. Cheko Saleem) Attestation :  This patient was seen and evaluated by me including a history,  exam, and interpretation of all imaging and/or lab data.  I formulated the treatment plan along with either a physician's assistant (PAKAMILA) or training physician (resident/fellow), who also saw the patient. That individual or a scribe has documented the visit in the attached note which I approve.    MD Blas Rhoades Family Professor  Oncology and Adult Reconstructive Surgery  Dept Orthopaedic Surgery, [facility]  [phone] office, [phone] pager  [email or web address]           For additional information and frequently asked questions regarding joint replacements, scan the QR code image below on your phone camera or go to:  [email or web address]              History of Present Illness:   Chief complaint: worsening left knee pain    Background history:  DIAGNOSIS:   Multi-focal osteonecrosis.  Lumbar disc herniation  Acute Lymphoblastic Leukemia, chemotherapy [id number]   Chronic nicotine abuse     SURGERIES:  2005, L femoral head resurfacing hemiarthroplasty  6/5/2007, R HAJA  2/2/2010, right total knee arthroplasty. No patella resurfacing  6/12/2012, R patella resurfacing  1/8/2016, L TKA (Stiven) [facility]  4/3/2018, Revision L surface replacement bartolo to HAJA (Stiven) [facility]  4/19/18 , 5/18/2018, 5/23/2018  Closed reductions L HAJA  5/25/2018, Revision L HAJA to larger head size and cup repositioning (Stiven) [facility]. Cultures (-) x 3  7/12/18, Closed reduction of L HAJA under anesthesia (Stiven)    Alethea is a 43-year-old female well-known to the arthroplasty service presenting today for evaluation of her worsening left knee pain.  Last seen by Dr. Saleem on 2/14/2022.  At that time, patient was complaining of intermittent knee pain, L>R.   Implants all appeared stable on radiographs. Knee was aspirated, synovial fluid labs unremarkable aside from a positive synovasure test. Plan was for  expectant management but she was advised to return in if her symptoms or  discomfort or pain pattern should change.      Following this visit, patient states her pain and symptoms gradually improved and resolved.  She felt minimal pain in her left knee until April, 2023.  Patient reports that during a trip to [place], she began experiencing increasing left knee pain.  Cannot identify specific inciting events.  The pain was persistent but fairly stable until November when she had another increase in pain as well as worsening deformity of her left knee.  Patient states that she went bowling with her mother who commented on the deformity of her left knee and insisted she present for further evaluation.    Patient describes diffuse and global pain in her left knee.  She has been able to walk but with pain.  Does not use any assistive devices.  Pain exacerbated by walking, relieved with rest.  Patient taking occasional ibuprofen, Tylenol, chronic oxycodone for pain.  Patient still working in customer service at [employer].  No other major health events.  Denies any fevers, chills, unanticipated weight changes, other constitutional symptoms.  Patient states she has baseline numbness in her feet but this has not changed in many years.    Patient is still smoking 3/4 PPD.           Physical Exam:     EXAMINATION pertinent findings:   PSYCH: Pleasant, healthy-appearing, alert, oriented x3, cooperative. Normal mood and affect.  VITAL SIGNS: There were no vitals taken for this visit.  RESP: non labored breathing   ABD: benign, soft, non-tender, no acute peritoneal findings  SKIN: grossly normal   LYMPHATIC: grossly normal, no adenopathy, no extremity edema  NEURO: grossly normal , no motor deficits  VASCULAR: satisfactory perfusion of all extremities   MUSCULOSKELETAL:   Focused exam of the left lower extremity demonstrates a moderate effusion about the knee.  Obvious varus deformity.  Antalgic gait.  Incision well-healed.  No erythema about the knee.  Tender to palpation over the medial and  lateral joint lines.  Pain with varus and valgus stressing but no obvious laxity.  Knee range of motion .  Toes warm well-perfused.  Fires tib ant, gastroc, EHL, FHL.  [place] all distributions.         Data:   All laboratory data reviewed  All imaging studies reviewed by me    Review of x-rays of the left knee demonstrates that the tibial component of her left-sided TKA has collapsed into varus along the medial tibia.  Femoral bone appears stable.         Review of Systems:   A comprehensive 10 point review of systems    .      Large Joint Injection/Arthocentesis: L knee joint    Date/Time: 1/11/2024 1:40 PM    Performed by: Eran Corea MD  Authorized by: Cheko Saleem MD    Needle Size:  20 G  Guidance: landmark guided    Approach:  Anterolateral  Location:  Knee      Aspirate amount (mL):  10  Aspirate:  Yellow  Aspirate analysis: sent for lab analysis    Outcome:  Tolerated well, no immediate complications  Procedure discussed: discussed risks, benefits, and alternatives    Consent Given by:  Patient  Timeout: timeout called immediately prior to procedure    Prep: patient was prepped and draped in usual sterile fashion

## 2024-01-12 ENCOUNTER — TELEPHONE (OUTPATIENT)
Dept: ORTHOPEDICS | Facility: CLINIC | Age: 44
End: 2024-01-12
Payer: COMMERCIAL

## 2024-01-12 LAB
ALPHA DEFENSINS 1+2+3 SNV QL IA: NEGATIVE
PRELIM TEST AFFECT FURTHER TEST SPEC: YES
SPECIMEN SOURCE SNV: NORMAL

## 2024-01-12 NOTE — TELEPHONE ENCOUNTER
Patient is scheduled for surgery with Dr. Saleem    Spoke with: Patient    Date of Surgery: 1/30/24    Location: [place]    Post op: 4 weeks    Pre op with Provider: Complete    H&P: Will schedule with PCP    Additional imaging/appointments: N/A    Surgery packet: Received in clinic     Additional comments: N/A        Whitley Tripathi MA on 1/12/2024 at 12:28 PM

## 2024-01-16 DIAGNOSIS — Z96.652 S/P TKR (TOTAL KNEE REPLACEMENT), LEFT: Primary | ICD-10-CM

## 2024-01-16 LAB — BACTERIA SNV CULT: NO GROWTH

## 2024-01-18 PROBLEM — G89.29 CHRONIC PAIN: Status: ACTIVE | Noted: 2022-08-31

## 2024-01-18 PROBLEM — N92.6 MENSTRUAL DISORDER: Status: ACTIVE | Noted: 2024-01-18

## 2024-01-18 PROBLEM — M54.2 CERVICALGIA: Status: ACTIVE | Noted: 2022-08-31

## 2024-01-18 PROBLEM — M79.2 NEUROPATHIC PAIN: Status: ACTIVE | Noted: 2024-01-18

## 2024-01-18 PROBLEM — N92.6 MENSTRUAL PERIOD LATE: Status: ACTIVE | Noted: 2024-01-18

## 2024-01-18 PROBLEM — R39.15 URGENCY OF URINATION: Status: ACTIVE | Noted: 2024-01-18

## 2024-01-18 PROBLEM — R52 GENERALIZED PAIN: Status: ACTIVE | Noted: 2024-01-18

## 2024-01-25 LAB — BACTERIA SNV CULT: NORMAL

## 2024-01-26 ENCOUNTER — PREP FOR PROCEDURE (OUTPATIENT)
Dept: OTHER | Facility: CLINIC | Age: 44
End: 2024-01-26
Payer: COMMERCIAL

## 2024-01-26 RX ORDER — DIPHENHYDRAMINE HCL 50 MG
50 CAPSULE ORAL AT BEDTIME
Status: ON HOLD | COMMUNITY
End: 2024-01-31

## 2024-01-26 RX ORDER — FERROUS SULFATE 325(65) MG
325 TABLET ORAL
COMMUNITY

## 2024-01-26 RX ORDER — FAMOTIDINE 20 MG/1
1 TABLET, FILM COATED ORAL AT BEDTIME
COMMUNITY
Start: 2022-07-07

## 2024-01-26 RX ORDER — AMITRIPTYLINE HYDROCHLORIDE 50 MG/1
50 TABLET ORAL AT BEDTIME
COMMUNITY

## 2024-01-26 RX ORDER — HYDROXYZINE HYDROCHLORIDE 25 MG/1
TABLET, FILM COATED ORAL
Status: ON HOLD | COMMUNITY
Start: 2023-11-28 | End: 2024-01-31

## 2024-01-26 NOTE — PROGRESS NOTES
Patient Position (indicated by x):     Supine   X  Supine with torso rolled up on a bump      Floppy lateral on torso length bean bag      Lateral decubitus, bean bag, full length      Lateral decubitus, [place] hip positioner      Safety paddle side supports x 2 clamped to side rail      Lithotomy, both legs in yellow padded leg castillo      Lithotomy, single leg in yellow padded leg castillo      Prone on blanket rolls/round gel pad      Prone on Amrik (arched) frame on Charlie table      Single thigh in orange arthroscopy clamp      Beach chair semi recumbent      Spider limb positioner      Arm out on radiolucent arm table      Split drape with top bar      Revision HAJA drape with plastic side bags for leg   X  Extremity drape      Shoulder pack drape      Laparotomy drape     Jordan catheter             General Equipment Requests (indicated by x):       C-Arm with C-Armor drape      C-Arm (video capable, Inspro00 model)      O-Arm with Stealth imaging      Fracture Table      Charlie XR Table      Cell saver      Saleem Biopsy trephine set w/ K-wire & pituitary rongeurs      Small pituitary rongeur   X  Stiven's angled curettes, narrow shaft      Bone graft, kapner gouges   X  Midas Tate Medtronic deandre, electric motor - HOLD      Phenol 5%      Sherman BMAC stem cell     Vancomycin 1 gram powder      Zometa 4 mg vials      Depo Medrol steroid      Blunt Pelvic Retractor (.55, Blunt Hohmann with  slight bend)      (1) Portable hand held radiation detector machine for sentinel node biopsy and (2) Lymphazurin   X  [place] Osteotomes         Part 1: Extraction setup (indicated by x):                                                                                  x Pulse lavage: - shower spray tip                           - Long intramedullary tip   x CamVac disposable plastic suction tip (debris)     Intramedullary flexible reamers   x Power drill, oscillating saw   x Reciprocating saw (double edged serrated saw  "blade)     Tibia implant extractor (Innomed)   x Biomet offset bone impactor (white handle)     IM canal cement removal tools (Homa set)   x Angled curettes (Saleem set)     Flexible osteotomes   x Single prong PCL retractor   x Large hammer   x Triple antibiotic irrigation x 3 liters   x Metal ruler (6 inch)   x Betadine prep solution, 1 bottle, full strength, sterile   x  90 degree Angled Elpidio retractors             Implant Extraction/Cement spacer tools (indicated by x):      Biomet Ultrasound cement removal   X  Midas Tate, AC-1 (1mm tiny dissecting tip with wire guide gold handpiece)   X  Flexible osteotomes (both black and wood handled with new replacement blades)      Universal stem extractor      \"L\" hook & hoop style stem extractor stem extractor (Cisco AML stem extractor)      Rema Explant cup removal osteotomes   X  Suction tip with debris trap (clear plastic disposable)   X  Biomet offset implant impactor (white handle in Cubicle's cart)   X  Homa IM canal long shaft cement removal gouges, pituitary ronguers)      Biomet Spacer One hip stem spacer molds with trials      Biomet Articulating knee spacer molds with trials      Biomet [place] Blue gentamycin PMMA and Vanco 1 g vial/package PMMA      Prasad Rods, large + yovani cutter         Step 2: Re-implantation     TKA Requests (indicated by x):      Biomet Vanguard TKA (+/- PCL retention)      Depuy attune revision knee system      [place] Trathlon revision knee + stems   X  Reciprocating saw      Universal tibial baseplate extractor - HOLD    X  Biomet Vanguard SSK revision knee system    X  Biomet Persona revision knee system      Biomet OSS prox tib replacement      Biomet IM knee fusion nail         Specimens and cultures (indicated by x):   X  Tissue cultures, aerobic and anaerobic, and fungal culture. x5      Frozen section      pathology specimens - fresh      pathology specimens - formalin     Plan: Revision of left total knee arthroplasty " with Biomet    Nemesio Dowling MD  Adult Reconstructive Surgery Fellow  Department of Orthopaedic Surgery, [facility]

## 2024-01-26 NOTE — OR NURSING
Pt advised no smoking DOS and no Marijuana, Cannabis, Hemp, THC in any form for 24 hrs before surgery. She verbalized understanding.

## 2024-01-29 ENCOUNTER — ANESTHESIA EVENT (OUTPATIENT)
Dept: SURGERY | Facility: CLINIC | Age: 44
DRG: 467 | End: 2024-01-29
Payer: COMMERCIAL

## 2024-01-29 NOTE — OR NURSING
During pre-op phone call with pt on 1/26/24 pt requested her (female) roommate be able to stay overnight in the hospital with her because of pt's severe anxiety if she is staying alone. I sent a confidential email to [facility] management that pt requests her (female) roommate be able to stay overnight because of pt's severe anxiety if she is staying alone. I heard back from the Malia Whittaker, nurse manager of the floor where pt will be admitted.  Malia said she was sorry but this patient doesn t meet criteria to have an overnight visitor and if pt arrives to the unit before she leaves, Malia  will round on her and try to reassure her that staff will check on her through the night.     I called pt this morning and informed her that her roommate is not allowed to stay overnight but that per the nurse manager, staff would be checking on her through the night. Pt verbalized understanding and acceptance.

## 2024-01-30 ENCOUNTER — HOSPITAL ENCOUNTER (INPATIENT)
Facility: CLINIC | Age: 44
LOS: 3 days | Discharge: HOME | DRG: 467 | End: 2024-02-02
Attending: ORTHOPAEDIC SURGERY | Admitting: ORTHOPAEDIC SURGERY
Payer: COMMERCIAL

## 2024-01-30 ENCOUNTER — APPOINTMENT (OUTPATIENT)
Dept: GENERAL RADIOLOGY | Facility: CLINIC | Age: 44
DRG: 467 | End: 2024-01-30
Attending: ORTHOPAEDIC SURGERY
Payer: COMMERCIAL

## 2024-01-30 ENCOUNTER — ANESTHESIA (OUTPATIENT)
Dept: SURGERY | Facility: CLINIC | Age: 44
DRG: 467 | End: 2024-01-30
Payer: COMMERCIAL

## 2024-01-30 ENCOUNTER — APPOINTMENT (OUTPATIENT)
Dept: GENERAL RADIOLOGY | Facility: CLINIC | Age: 44
DRG: 467 | End: 2024-01-30
Attending: STUDENT IN AN ORGANIZED HEALTH CARE EDUCATION/TRAINING PROGRAM
Payer: COMMERCIAL

## 2024-01-30 DIAGNOSIS — Z96.659 HISTORY OF REVISION OF TOTAL KNEE ARTHROPLASTY: ICD-10-CM

## 2024-01-30 DIAGNOSIS — T84.012A FAILED TOTAL RIGHT KNEE REPLACEMENT, INITIAL ENCOUNTER (H): Primary | ICD-10-CM

## 2024-01-30 DIAGNOSIS — Z96.652 S/P TOTAL KNEE ARTHROPLASTY, LEFT: ICD-10-CM

## 2024-01-30 PROCEDURE — 999N000063 XR KNEE PORT LEFT 1/2 VIEWS: Mod: LT

## 2024-01-30 PROCEDURE — C1713 ANCHOR/SCREW BN/BN,TIS/BN: HCPCS | Performed by: ORTHOPAEDIC SURGERY

## 2024-01-30 PROCEDURE — 272N000002 HC OR SUPPLY OTHER OPNP: Performed by: ORTHOPAEDIC SURGERY

## 2024-01-30 PROCEDURE — 250N000013 HC RX MED GY IP 250 OP 250 PS 637: Performed by: STUDENT IN AN ORGANIZED HEALTH CARE EDUCATION/TRAINING PROGRAM

## 2024-01-30 PROCEDURE — 258N000003 HC RX IP 258 OP 636: Performed by: PHYSICIAN ASSISTANT

## 2024-01-30 PROCEDURE — P9045 ALBUMIN (HUMAN), 5%, 250 ML: HCPCS | Mod: JZ | Performed by: NURSE ANESTHETIST, CERTIFIED REGISTERED

## 2024-01-30 PROCEDURE — 250N000011 HC RX IP 250 OP 636: Performed by: ANESTHESIOLOGY

## 2024-01-30 PROCEDURE — 250N000009 HC RX 250: Performed by: NURSE ANESTHETIST, CERTIFIED REGISTERED

## 2024-01-30 PROCEDURE — 250N000013 HC RX MED GY IP 250 OP 250 PS 637

## 2024-01-30 PROCEDURE — 250N000025 HC SEVOFLURANE, PER MIN: Performed by: ORTHOPAEDIC SURGERY

## 2024-01-30 PROCEDURE — 272N000001 HC OR GENERAL SUPPLY STERILE: Performed by: ORTHOPAEDIC SURGERY

## 2024-01-30 PROCEDURE — 258N000003 HC RX IP 258 OP 636: Performed by: NURSE ANESTHETIST, CERTIFIED REGISTERED

## 2024-01-30 PROCEDURE — 999N000141 HC STATISTIC PRE-PROCEDURE NURSING ASSESSMENT: Performed by: ORTHOPAEDIC SURGERY

## 2024-01-30 PROCEDURE — C1776 JOINT DEVICE (IMPLANTABLE): HCPCS | Performed by: ORTHOPAEDIC SURGERY

## 2024-01-30 PROCEDURE — 87075 CULTR BACTERIA EXCEPT BLOOD: CPT | Performed by: ORTHOPAEDIC SURGERY

## 2024-01-30 PROCEDURE — 258N000003 HC RX IP 258 OP 636: Performed by: STUDENT IN AN ORGANIZED HEALTH CARE EDUCATION/TRAINING PROGRAM

## 2024-01-30 PROCEDURE — 999N000065 XR KNEE PORT LEFT 1/2 VIEWS: Mod: LT

## 2024-01-30 PROCEDURE — 360N000078 HC SURGERY LEVEL 5, PER MIN: Performed by: ORTHOPAEDIC SURGERY

## 2024-01-30 PROCEDURE — 120N000002 HC R&B MED SURG/OB UMMC

## 2024-01-30 PROCEDURE — 87070 CULTURE OTHR SPECIMN AEROBIC: CPT | Performed by: ORTHOPAEDIC SURGERY

## 2024-01-30 PROCEDURE — 99254 IP/OBS CNSLTJ NEW/EST MOD 60: CPT

## 2024-01-30 PROCEDURE — 370N000017 HC ANESTHESIA TECHNICAL FEE, PER MIN: Performed by: ORTHOPAEDIC SURGERY

## 2024-01-30 PROCEDURE — 0SRD0J9 REPLACEMENT OF LEFT KNEE JOINT WITH SYNTHETIC SUBSTITUTE, CEMENTED, OPEN APPROACH: ICD-10-PCS | Performed by: ORTHOPAEDIC SURGERY

## 2024-01-30 PROCEDURE — 250N000011 HC RX IP 250 OP 636: Performed by: NURSE ANESTHETIST, CERTIFIED REGISTERED

## 2024-01-30 PROCEDURE — 250N000011 HC RX IP 250 OP 636: Performed by: PHYSICIAN ASSISTANT

## 2024-01-30 PROCEDURE — 710N000010 HC RECOVERY PHASE 1, LEVEL 2, PER MIN: Performed by: ORTHOPAEDIC SURGERY

## 2024-01-30 PROCEDURE — 250N000011 HC RX IP 250 OP 636: Performed by: STUDENT IN AN ORGANIZED HEALTH CARE EDUCATION/TRAINING PROGRAM

## 2024-01-30 PROCEDURE — 250N000013 HC RX MED GY IP 250 OP 250 PS 637: Performed by: PHYSICIAN ASSISTANT

## 2024-01-30 PROCEDURE — 0SPD0JZ REMOVAL OF SYNTHETIC SUBSTITUTE FROM LEFT KNEE JOINT, OPEN APPROACH: ICD-10-PCS | Performed by: ORTHOPAEDIC SURGERY

## 2024-01-30 DEVICE — IMPLANTABLE DEVICE
Type: IMPLANTABLE DEVICE | Site: KNEE | Status: FUNCTIONAL
Brand: BIOMET KNEE SYSTEM

## 2024-01-30 DEVICE — IMPLANTABLE DEVICE
Type: IMPLANTABLE DEVICE | Site: KNEE | Status: FUNCTIONAL
Brand: VANGUARD® KNEE SYSTEM

## 2024-01-30 DEVICE — IMPLANTABLE DEVICE
Type: IMPLANTABLE DEVICE | Site: KNEE | Status: FUNCTIONAL
Brand: BIOMET 360 KNEE SYSTEM

## 2024-01-30 DEVICE — BONE CEMENT SIMPLEX W/TOBRAMYCIN 6197-9-001: Type: IMPLANTABLE DEVICE | Site: KNEE | Status: FUNCTIONAL

## 2024-01-30 RX ORDER — ONDANSETRON 4 MG/1
4 TABLET, ORALLY DISINTEGRATING ORAL EVERY 6 HOURS PRN
Status: DISCONTINUED | OUTPATIENT
Start: 2024-01-30 | End: 2024-02-02 | Stop reason: HOSPADM

## 2024-01-30 RX ORDER — ASPIRIN 81 MG/1
81 TABLET ORAL 2 TIMES DAILY
Status: DISCONTINUED | OUTPATIENT
Start: 2024-01-30 | End: 2024-02-02 | Stop reason: HOSPADM

## 2024-01-30 RX ORDER — HYDROMORPHONE HYDROCHLORIDE 1 MG/ML
0.5 INJECTION, SOLUTION INTRAMUSCULAR; INTRAVENOUS; SUBCUTANEOUS
Status: DISCONTINUED | OUTPATIENT
Start: 2024-01-30 | End: 2024-02-02 | Stop reason: HOSPADM

## 2024-01-30 RX ORDER — METHOCARBAMOL 750 MG/1
750 TABLET, FILM COATED ORAL EVERY 6 HOURS PRN
Status: DISCONTINUED | OUTPATIENT
Start: 2024-01-30 | End: 2024-02-02 | Stop reason: HOSPADM

## 2024-01-30 RX ORDER — GLYCOPYRROLATE 0.2 MG/ML
INJECTION, SOLUTION INTRAMUSCULAR; INTRAVENOUS PRN
Status: DISCONTINUED | OUTPATIENT
Start: 2024-01-30 | End: 2024-01-30

## 2024-01-30 RX ORDER — GABAPENTIN 300 MG/1
900 CAPSULE ORAL 3 TIMES DAILY
Status: DISCONTINUED | OUTPATIENT
Start: 2024-01-30 | End: 2024-02-02 | Stop reason: HOSPADM

## 2024-01-30 RX ORDER — LABETALOL HYDROCHLORIDE 5 MG/ML
10 INJECTION, SOLUTION INTRAVENOUS
Status: DISCONTINUED | OUTPATIENT
Start: 2024-01-30 | End: 2024-01-30 | Stop reason: HOSPADM

## 2024-01-30 RX ORDER — BACLOFEN 10 MG/1
10 TABLET ORAL DAILY
Status: DISCONTINUED | OUTPATIENT
Start: 2024-01-30 | End: 2024-02-02 | Stop reason: HOSPADM

## 2024-01-30 RX ORDER — FAMOTIDINE 20 MG/1
20 TABLET, FILM COATED ORAL AT BEDTIME
Status: DISCONTINUED | OUTPATIENT
Start: 2024-01-30 | End: 2024-02-02 | Stop reason: HOSPADM

## 2024-01-30 RX ORDER — ACETAMINOPHEN 325 MG/1
975 TABLET ORAL EVERY 8 HOURS
Status: COMPLETED | OUTPATIENT
Start: 2024-01-30 | End: 2024-02-02

## 2024-01-30 RX ORDER — CELECOXIB 200 MG/1
400 CAPSULE ORAL ONCE
Status: COMPLETED | OUTPATIENT
Start: 2024-01-30 | End: 2024-01-30

## 2024-01-30 RX ORDER — OXYCODONE HYDROCHLORIDE 5 MG/1
5 TABLET ORAL
Status: DISCONTINUED | OUTPATIENT
Start: 2024-01-30 | End: 2024-01-30 | Stop reason: HOSPADM

## 2024-01-30 RX ORDER — FENTANYL CITRATE 50 UG/ML
50 INJECTION, SOLUTION INTRAMUSCULAR; INTRAVENOUS EVERY 5 MIN PRN
Status: DISCONTINUED | OUTPATIENT
Start: 2024-01-30 | End: 2024-01-30 | Stop reason: HOSPADM

## 2024-01-30 RX ORDER — ONDANSETRON 2 MG/ML
4 INJECTION INTRAMUSCULAR; INTRAVENOUS EVERY 30 MIN PRN
Status: DISCONTINUED | OUTPATIENT
Start: 2024-01-30 | End: 2024-01-30 | Stop reason: HOSPADM

## 2024-01-30 RX ORDER — DEXAMETHASONE SODIUM PHOSPHATE 4 MG/ML
INJECTION, SOLUTION INTRA-ARTICULAR; INTRALESIONAL; INTRAMUSCULAR; INTRAVENOUS; SOFT TISSUE PRN
Status: DISCONTINUED | OUTPATIENT
Start: 2024-01-30 | End: 2024-01-30

## 2024-01-30 RX ORDER — ACETAMINOPHEN 325 MG/1
650 TABLET ORAL EVERY 4 HOURS PRN
Status: DISCONTINUED | OUTPATIENT
Start: 2024-02-02 | End: 2024-02-02 | Stop reason: HOSPADM

## 2024-01-30 RX ORDER — FENTANYL CITRATE 50 UG/ML
INJECTION, SOLUTION INTRAMUSCULAR; INTRAVENOUS PRN
Status: DISCONTINUED | OUTPATIENT
Start: 2024-01-30 | End: 2024-01-30

## 2024-01-30 RX ORDER — ONDANSETRON 2 MG/ML
4 INJECTION INTRAMUSCULAR; INTRAVENOUS EVERY 6 HOURS PRN
Status: DISCONTINUED | OUTPATIENT
Start: 2024-01-30 | End: 2024-02-02 | Stop reason: HOSPADM

## 2024-01-30 RX ORDER — LEVOTHYROXINE SODIUM 50 UG/1
50 TABLET ORAL AT BEDTIME
Status: DISCONTINUED | OUTPATIENT
Start: 2024-01-30 | End: 2024-02-02 | Stop reason: HOSPADM

## 2024-01-30 RX ORDER — SODIUM CHLORIDE, SODIUM LACTATE, POTASSIUM CHLORIDE, CALCIUM CHLORIDE 600; 310; 30; 20 MG/100ML; MG/100ML; MG/100ML; MG/100ML
INJECTION, SOLUTION INTRAVENOUS CONTINUOUS PRN
Status: DISCONTINUED | OUTPATIENT
Start: 2024-01-30 | End: 2024-01-30

## 2024-01-30 RX ORDER — SODIUM CHLORIDE, SODIUM LACTATE, POTASSIUM CHLORIDE, CALCIUM CHLORIDE 600; 310; 30; 20 MG/100ML; MG/100ML; MG/100ML; MG/100ML
INJECTION, SOLUTION INTRAVENOUS CONTINUOUS
Status: DISCONTINUED | OUTPATIENT
Start: 2024-01-30 | End: 2024-01-30 | Stop reason: HOSPADM

## 2024-01-30 RX ORDER — SODIUM CHLORIDE, SODIUM LACTATE, POTASSIUM CHLORIDE, CALCIUM CHLORIDE 600; 310; 30; 20 MG/100ML; MG/100ML; MG/100ML; MG/100ML
INJECTION, SOLUTION INTRAVENOUS CONTINUOUS
Status: DISCONTINUED | OUTPATIENT
Start: 2024-01-30 | End: 2024-02-02 | Stop reason: HOSPADM

## 2024-01-30 RX ORDER — HYDROXYZINE HYDROCHLORIDE 25 MG/1
25 TABLET, FILM COATED ORAL EVERY 6 HOURS PRN
Status: DISCONTINUED | OUTPATIENT
Start: 2024-01-30 | End: 2024-02-02 | Stop reason: HOSPADM

## 2024-01-30 RX ORDER — HYDROMORPHONE HYDROCHLORIDE 1 MG/ML
0.2 INJECTION, SOLUTION INTRAMUSCULAR; INTRAVENOUS; SUBCUTANEOUS EVERY 5 MIN PRN
Status: DISCONTINUED | OUTPATIENT
Start: 2024-01-30 | End: 2024-01-30 | Stop reason: HOSPADM

## 2024-01-30 RX ORDER — TRANEXAMIC ACID 650 MG/1
1950 TABLET ORAL ONCE
Status: COMPLETED | OUTPATIENT
Start: 2024-01-30 | End: 2024-01-30

## 2024-01-30 RX ORDER — OXYCODONE HYDROCHLORIDE 5 MG/1
10 TABLET ORAL EVERY 4 HOURS PRN
Status: DISCONTINUED | OUTPATIENT
Start: 2024-01-30 | End: 2024-02-02 | Stop reason: HOSPADM

## 2024-01-30 RX ORDER — AMITRIPTYLINE HYDROCHLORIDE 50 MG/1
50 TABLET ORAL AT BEDTIME
Status: DISCONTINUED | OUTPATIENT
Start: 2024-01-30 | End: 2024-02-02 | Stop reason: HOSPADM

## 2024-01-30 RX ORDER — FLUTICASONE PROPIONATE 50 MCG
2 SPRAY, SUSPENSION (ML) NASAL DAILY PRN
Status: DISCONTINUED | OUTPATIENT
Start: 2024-01-30 | End: 2024-02-02 | Stop reason: HOSPADM

## 2024-01-30 RX ORDER — ACETAMINOPHEN 325 MG/1
975 TABLET ORAL ONCE
Status: DISCONTINUED | OUTPATIENT
Start: 2024-01-30 | End: 2024-01-30 | Stop reason: HOSPADM

## 2024-01-30 RX ORDER — METHADONE HYDROCHLORIDE 10 MG/1
10 TABLET ORAL AT BEDTIME
Status: DISCONTINUED | OUTPATIENT
Start: 2024-01-30 | End: 2024-02-02 | Stop reason: HOSPADM

## 2024-01-30 RX ORDER — HYDROMORPHONE HYDROCHLORIDE 1 MG/ML
0.4 INJECTION, SOLUTION INTRAMUSCULAR; INTRAVENOUS; SUBCUTANEOUS EVERY 5 MIN PRN
Status: DISCONTINUED | OUTPATIENT
Start: 2024-01-30 | End: 2024-01-30 | Stop reason: HOSPADM

## 2024-01-30 RX ORDER — PROCHLORPERAZINE MALEATE 10 MG
10 TABLET ORAL EVERY 6 HOURS PRN
Status: DISCONTINUED | OUTPATIENT
Start: 2024-01-30 | End: 2024-02-02 | Stop reason: HOSPADM

## 2024-01-30 RX ORDER — ONDANSETRON 2 MG/ML
INJECTION INTRAMUSCULAR; INTRAVENOUS PRN
Status: DISCONTINUED | OUTPATIENT
Start: 2024-01-30 | End: 2024-01-30

## 2024-01-30 RX ORDER — FENTANYL CITRATE 50 UG/ML
25 INJECTION, SOLUTION INTRAMUSCULAR; INTRAVENOUS EVERY 5 MIN PRN
Status: DISCONTINUED | OUTPATIENT
Start: 2024-01-30 | End: 2024-01-30 | Stop reason: HOSPADM

## 2024-01-30 RX ORDER — NALOXONE HYDROCHLORIDE 0.4 MG/ML
0.4 INJECTION, SOLUTION INTRAMUSCULAR; INTRAVENOUS; SUBCUTANEOUS
Status: DISCONTINUED | OUTPATIENT
Start: 2024-01-30 | End: 2024-02-02 | Stop reason: HOSPADM

## 2024-01-30 RX ORDER — POLYETHYLENE GLYCOL 3350 17 G/17G
17 POWDER, FOR SOLUTION ORAL DAILY
Status: DISCONTINUED | OUTPATIENT
Start: 2024-01-31 | End: 2024-02-02 | Stop reason: HOSPADM

## 2024-01-30 RX ORDER — ACETAMINOPHEN 325 MG/1
975 TABLET ORAL ONCE
Status: COMPLETED | OUTPATIENT
Start: 2024-01-30 | End: 2024-01-30

## 2024-01-30 RX ORDER — PROPOFOL 10 MG/ML
INJECTION, EMULSION INTRAVENOUS PRN
Status: DISCONTINUED | OUTPATIENT
Start: 2024-01-30 | End: 2024-01-30

## 2024-01-30 RX ORDER — CEFAZOLIN SODIUM 2 G/100ML
2 INJECTION, SOLUTION INTRAVENOUS EVERY 8 HOURS
Qty: 900 ML | Refills: 0 | Status: DISCONTINUED | OUTPATIENT
Start: 2024-01-30 | End: 2024-02-02 | Stop reason: HOSPADM

## 2024-01-30 RX ORDER — AMOXICILLIN 250 MG
1 CAPSULE ORAL 2 TIMES DAILY
Status: DISCONTINUED | OUTPATIENT
Start: 2024-01-30 | End: 2024-02-02 | Stop reason: HOSPADM

## 2024-01-30 RX ORDER — NALOXONE HYDROCHLORIDE 0.4 MG/ML
0.2 INJECTION, SOLUTION INTRAMUSCULAR; INTRAVENOUS; SUBCUTANEOUS
Status: DISCONTINUED | OUTPATIENT
Start: 2024-01-30 | End: 2024-02-02 | Stop reason: HOSPADM

## 2024-01-30 RX ORDER — ALBUTEROL SULFATE 0.83 MG/ML
2.5 SOLUTION RESPIRATORY (INHALATION) EVERY 4 HOURS PRN
Status: DISCONTINUED | OUTPATIENT
Start: 2024-01-30 | End: 2024-01-30 | Stop reason: HOSPADM

## 2024-01-30 RX ORDER — NICOTINE 21 MG/24HR
1 PATCH, TRANSDERMAL 24 HOURS TRANSDERMAL DAILY
Status: DISCONTINUED | OUTPATIENT
Start: 2024-01-30 | End: 2024-02-02 | Stop reason: HOSPADM

## 2024-01-30 RX ORDER — OXYCODONE HYDROCHLORIDE 10 MG/1
10 TABLET ORAL
Status: DISCONTINUED | OUTPATIENT
Start: 2024-01-30 | End: 2024-01-30 | Stop reason: HOSPADM

## 2024-01-30 RX ORDER — LIDOCAINE HYDROCHLORIDE 20 MG/ML
INJECTION, SOLUTION INFILTRATION; PERINEURAL PRN
Status: DISCONTINUED | OUTPATIENT
Start: 2024-01-30 | End: 2024-01-30

## 2024-01-30 RX ORDER — LIDOCAINE 40 MG/G
CREAM TOPICAL
Status: DISCONTINUED | OUTPATIENT
Start: 2024-01-30 | End: 2024-02-02 | Stop reason: HOSPADM

## 2024-01-30 RX ORDER — MONTELUKAST SODIUM 10 MG/1
10 TABLET ORAL AT BEDTIME
Status: DISCONTINUED | OUTPATIENT
Start: 2024-01-30 | End: 2024-02-02 | Stop reason: HOSPADM

## 2024-01-30 RX ORDER — BUPROPION HYDROCHLORIDE 150 MG/1
150 TABLET, EXTENDED RELEASE ORAL AT BEDTIME
Status: DISCONTINUED | OUTPATIENT
Start: 2024-01-30 | End: 2024-02-02 | Stop reason: HOSPADM

## 2024-01-30 RX ORDER — ONDANSETRON 4 MG/1
4 TABLET, ORALLY DISINTEGRATING ORAL EVERY 30 MIN PRN
Status: DISCONTINUED | OUTPATIENT
Start: 2024-01-30 | End: 2024-01-30 | Stop reason: HOSPADM

## 2024-01-30 RX ORDER — HYDROMORPHONE HYDROCHLORIDE 1 MG/ML
0.2 INJECTION, SOLUTION INTRAMUSCULAR; INTRAVENOUS; SUBCUTANEOUS
Status: DISCONTINUED | OUTPATIENT
Start: 2024-01-30 | End: 2024-02-02 | Stop reason: HOSPADM

## 2024-01-30 RX ORDER — BISACODYL 10 MG
10 SUPPOSITORY, RECTAL RECTAL DAILY PRN
Status: DISCONTINUED | OUTPATIENT
Start: 2024-01-30 | End: 2024-02-02 | Stop reason: HOSPADM

## 2024-01-30 RX ORDER — KETAMINE HYDROCHLORIDE 10 MG/ML
INJECTION INTRAMUSCULAR; INTRAVENOUS PRN
Status: DISCONTINUED | OUTPATIENT
Start: 2024-01-30 | End: 2024-01-30

## 2024-01-30 RX ORDER — CEFAZOLIN SODIUM/WATER 2 G/20 ML
2 SYRINGE (ML) INTRAVENOUS
Status: COMPLETED | OUTPATIENT
Start: 2024-01-30 | End: 2024-01-30

## 2024-01-30 RX ORDER — CEFAZOLIN SODIUM/WATER 2 G/20 ML
2 SYRINGE (ML) INTRAVENOUS SEE ADMIN INSTRUCTIONS
Status: DISCONTINUED | OUTPATIENT
Start: 2024-01-30 | End: 2024-01-30 | Stop reason: HOSPADM

## 2024-01-30 RX ORDER — OXYCODONE HYDROCHLORIDE 5 MG/1
5 TABLET ORAL EVERY 4 HOURS PRN
Status: DISCONTINUED | OUTPATIENT
Start: 2024-01-30 | End: 2024-02-02 | Stop reason: HOSPADM

## 2024-01-30 RX ADMIN — ALBUMIN HUMAN: 0.05 INJECTION, SOLUTION INTRAVENOUS at 11:12

## 2024-01-30 RX ADMIN — HYDROMORPHONE HYDROCHLORIDE 0.5 MG: 1 INJECTION, SOLUTION INTRAMUSCULAR; INTRAVENOUS; SUBCUTANEOUS at 09:16

## 2024-01-30 RX ADMIN — FENTANYL CITRATE 50 MCG: 50 INJECTION INTRAMUSCULAR; INTRAVENOUS at 13:27

## 2024-01-30 RX ADMIN — OXYCODONE HYDROCHLORIDE 10 MG: 5 TABLET ORAL at 14:35

## 2024-01-30 RX ADMIN — HYDROMORPHONE HYDROCHLORIDE 0.4 MG: 1 INJECTION, SOLUTION INTRAMUSCULAR; INTRAVENOUS; SUBCUTANEOUS at 14:24

## 2024-01-30 RX ADMIN — ACETAMINOPHEN 975 MG: 325 TABLET, FILM COATED ORAL at 21:06

## 2024-01-30 RX ADMIN — FAMOTIDINE 20 MG: 20 TABLET ORAL at 21:06

## 2024-01-30 RX ADMIN — DEXMEDETOMIDINE 0.3 MCG/KG/HR: 100 INJECTION, SOLUTION, CONCENTRATE INTRAVENOUS at 08:34

## 2024-01-30 RX ADMIN — CEFAZOLIN SODIUM 2 G: 2 INJECTION, SOLUTION INTRAVENOUS at 20:13

## 2024-01-30 RX ADMIN — SODIUM CHLORIDE, POTASSIUM CHLORIDE, SODIUM LACTATE AND CALCIUM CHLORIDE: 600; 310; 30; 20 INJECTION, SOLUTION INTRAVENOUS at 08:11

## 2024-01-30 RX ADMIN — HYDROMORPHONE HYDROCHLORIDE 0.4 MG: 1 INJECTION, SOLUTION INTRAMUSCULAR; INTRAVENOUS; SUBCUTANEOUS at 14:10

## 2024-01-30 RX ADMIN — FENTANYL CITRATE 50 MCG: 50 INJECTION INTRAMUSCULAR; INTRAVENOUS at 08:54

## 2024-01-30 RX ADMIN — METHADONE HYDROCHLORIDE 10 MG: 10 TABLET ORAL at 21:06

## 2024-01-30 RX ADMIN — PROPOFOL 200 MG: 10 INJECTION, EMULSION INTRAVENOUS at 08:26

## 2024-01-30 RX ADMIN — FENTANYL CITRATE 50 MCG: 50 INJECTION INTRAMUSCULAR; INTRAVENOUS at 13:53

## 2024-01-30 RX ADMIN — PHENYLEPHRINE HYDROCHLORIDE 100 MCG: 10 INJECTION INTRAVENOUS at 08:52

## 2024-01-30 RX ADMIN — Medication 10 MG: at 10:26

## 2024-01-30 RX ADMIN — CELECOXIB 400 MG: 200 CAPSULE ORAL at 07:19

## 2024-01-30 RX ADMIN — HYDROMORPHONE HYDROCHLORIDE 0.5 MG: 1 INJECTION, SOLUTION INTRAMUSCULAR; INTRAVENOUS; SUBCUTANEOUS at 08:32

## 2024-01-30 RX ADMIN — PHENYLEPHRINE HYDROCHLORIDE 100 MCG: 10 INJECTION INTRAVENOUS at 08:34

## 2024-01-30 RX ADMIN — Medication 10 MG: at 11:31

## 2024-01-30 RX ADMIN — Medication 10 MG: at 12:23

## 2024-01-30 RX ADMIN — GABAPENTIN 900 MG: 300 CAPSULE ORAL at 17:01

## 2024-01-30 RX ADMIN — Medication 2 G: at 08:34

## 2024-01-30 RX ADMIN — TRANEXAMIC ACID 1950 MG: 650 TABLET ORAL at 07:19

## 2024-01-30 RX ADMIN — PHENYLEPHRINE HYDROCHLORIDE 100 MCG: 10 INJECTION INTRAVENOUS at 08:43

## 2024-01-30 RX ADMIN — ONDANSETRON 4 MG: 2 INJECTION INTRAMUSCULAR; INTRAVENOUS at 08:11

## 2024-01-30 RX ADMIN — ALBUMIN HUMAN: 0.05 INJECTION, SOLUTION INTRAVENOUS at 10:52

## 2024-01-30 RX ADMIN — ACETAMINOPHEN 975 MG: 325 TABLET, FILM COATED ORAL at 07:19

## 2024-01-30 RX ADMIN — SENNOSIDES AND DOCUSATE SODIUM 1 TABLET: 8.6; 5 TABLET ORAL at 20:13

## 2024-01-30 RX ADMIN — SODIUM CHLORIDE, POTASSIUM CHLORIDE, SODIUM LACTATE AND CALCIUM CHLORIDE: 600; 310; 30; 20 INJECTION, SOLUTION INTRAVENOUS at 15:31

## 2024-01-30 RX ADMIN — GABAPENTIN 900 MG: 300 CAPSULE ORAL at 20:13

## 2024-01-30 RX ADMIN — HYDROMORPHONE HYDROCHLORIDE 0.5 MG: 1 INJECTION, SOLUTION INTRAMUSCULAR; INTRAVENOUS; SUBCUTANEOUS at 17:02

## 2024-01-30 RX ADMIN — BACLOFEN 10 MG: 10 TABLET ORAL at 21:12

## 2024-01-30 RX ADMIN — DEXAMETHASONE SODIUM PHOSPHATE 8 MG: 4 INJECTION, SOLUTION INTRA-ARTICULAR; INTRALESIONAL; INTRAMUSCULAR; INTRAVENOUS; SOFT TISSUE at 08:38

## 2024-01-30 RX ADMIN — METHOCARBAMOL 750 MG: 750 TABLET ORAL at 21:12

## 2024-01-30 RX ADMIN — ASPIRIN 81 MG: 81 TABLET ORAL at 20:13

## 2024-01-30 RX ADMIN — OXYCODONE HYDROCHLORIDE 10 MG: 5 TABLET ORAL at 20:13

## 2024-01-30 RX ADMIN — NICOTINE 1 PATCH: 14 PATCH, EXTENDED RELEASE TRANSDERMAL at 17:00

## 2024-01-30 RX ADMIN — METHOCARBAMOL 750 MG: 750 TABLET ORAL at 14:35

## 2024-01-30 RX ADMIN — SODIUM CHLORIDE, POTASSIUM CHLORIDE, SODIUM LACTATE AND CALCIUM CHLORIDE: 600; 310; 30; 20 INJECTION, SOLUTION INTRAVENOUS at 09:48

## 2024-01-30 RX ADMIN — Medication 50 MG: at 08:32

## 2024-01-30 RX ADMIN — ACETAMINOPHEN 975 MG: 325 TABLET, FILM COATED ORAL at 14:14

## 2024-01-30 RX ADMIN — SUGAMMADEX 200 MG: 100 INJECTION, SOLUTION INTRAVENOUS at 12:52

## 2024-01-30 RX ADMIN — PHENYLEPHRINE HYDROCHLORIDE 100 MCG: 10 INJECTION INTRAVENOUS at 08:37

## 2024-01-30 RX ADMIN — Medication 20 MG: at 09:32

## 2024-01-30 RX ADMIN — BUPROPION HYDROCHLORIDE 150 MG: 150 TABLET, EXTENDED RELEASE ORAL at 21:09

## 2024-01-30 RX ADMIN — GLYCOPYRROLATE 0.2 MG: 0.2 INJECTION, SOLUTION INTRAMUSCULAR; INTRAVENOUS at 08:27

## 2024-01-30 RX ADMIN — ROCURONIUM BROMIDE 50 MG: 10 INJECTION, SOLUTION INTRAVENOUS at 08:27

## 2024-01-30 RX ADMIN — MIDAZOLAM 2 MG: 1 INJECTION INTRAMUSCULAR; INTRAVENOUS at 08:11

## 2024-01-30 RX ADMIN — PHENYLEPHRINE HYDROCHLORIDE 100 MCG: 10 INJECTION INTRAVENOUS at 08:26

## 2024-01-30 RX ADMIN — LEVOTHYROXINE SODIUM 50 MCG: 50 TABLET ORAL at 21:06

## 2024-01-30 RX ADMIN — MONTELUKAST 10 MG: 10 TABLET, FILM COATED ORAL at 21:06

## 2024-01-30 RX ADMIN — Medication 2 G: at 12:18

## 2024-01-30 RX ADMIN — FENTANYL CITRATE 50 MCG: 50 INJECTION INTRAMUSCULAR; INTRAVENOUS at 08:58

## 2024-01-30 RX ADMIN — LIDOCAINE HYDROCHLORIDE 100 MG: 20 INJECTION, SOLUTION INFILTRATION; PERINEURAL at 08:26

## 2024-01-30 RX ADMIN — ONDANSETRON 4 MG: 2 INJECTION INTRAMUSCULAR; INTRAVENOUS at 12:39

## 2024-01-30 RX ADMIN — AMITRIPTYLINE HYDROCHLORIDE 50 MG: 50 TABLET, FILM COATED ORAL at 21:06

## 2024-01-30 RX ADMIN — HYDROMORPHONE HYDROCHLORIDE 0.5 MG: 1 INJECTION, SOLUTION INTRAMUSCULAR; INTRAVENOUS; SUBCUTANEOUS at 11:39

## 2024-01-30 ASSESSMENT — LIFESTYLE VARIABLES: TOBACCO_USE: 1

## 2024-01-30 ASSESSMENT — ACTIVITIES OF DAILY LIVING (ADL)
ADLS_ACUITY_SCORE: 24
ADLS_ACUITY_SCORE: 24
ADLS_ACUITY_SCORE: 26
ADLS_ACUITY_SCORE: 22
ADLS_ACUITY_SCORE: 24
ADLS_ACUITY_SCORE: 20
ADLS_ACUITY_SCORE: 22
ADLS_ACUITY_SCORE: 22
ADLS_ACUITY_SCORE: 26

## 2024-01-30 ASSESSMENT — ENCOUNTER SYMPTOMS: SEIZURES: 1

## 2024-01-30 ASSESSMENT — COPD QUESTIONNAIRES: COPD: 1

## 2024-01-30 NOTE — ANESTHESIA PREPROCEDURE EVALUATION
Anesthesia Pre-Procedure Evaluation    Patient: Alethea Patel   MRN: [id number] : 1980        Procedure : Procedure(s):  REVISION, LEFT TOTAL ARTHROPLASTY, KNEE          Past Medical History:   Diagnosis Date     Acute lymphoblastic leukemia in remission (H)      Anxiety      Chest pain      Chronic osteoarthritis      Degenerative joint disease      Depression      Depressive disorder      Gastro-oesophageal reflux disease      Hypothyroidism      IBS (irritable bowel syndrome)      Osteonecrosis (H)      Osteonecrosis (H)      Thyroid disease       Past Surgical History:   Procedure Laterality Date     ARTHROPLASTY KNEE Left 2016    Procedure: ARTHROPLASTY KNEE;  Surgeon: Cheko Saleem MD;  [facility]     ARTHROPLASTY PATELLO-FEMORAL (KNEE)  2012    Procedure: ARTHROPLASTY PATELLO-FEMORAL (KNEE);  Right Knee Patella Resurfacing;  Surgeon: Cheko Saleem MD;  [facility]     ARTHROPLASTY REVISION HIP Left 4/3/2018    Procedure: ARTHROPLASTY REVISION HIP;  Conversion of Left Hip Rigo-Arthroplasty to Left Total Hip Replacement;  Surgeon: Cheko Saleem MD;  [facility]     ARTHROPLASTY REVISION HIP Left 2018    Procedure: ARTHROPLASTY REVISION HIP;  Revision Left Total Hip Arthroplasty ;  Surgeon: Cheko Saleem MD;  [facility]     CLOSED REDUCTION HIP Left 2018    Procedure: CLOSED REDUCTION HIP;  Closed Reduction Left Hip, and Aspiration Culture   Left Hip;  Surgeon: Cheko Saleem MD;  [facility]     CLOSED REDUCTION HIP Left 2018    Procedure: CLOSED REDUCTION HIP;  Closed Reduction Left Hip;  Surgeon: Cheko Saleem MD;  [facility]     CLOSED REDUCTION HIP Left 2018    Procedure: CLOSED REDUCTION HIP;  Closed Reduction of Left Total Hip Arthroplasty;  Surgeon: Cheko Saleem MD;  [facility]     HIP SURGERY  2005    Left hip resurfacing hemiarthroplasty     HIP SURGERY  07     INJECT BLOCK MEDIAL BRANCH  CERVICAL/THORACIC/LUMBAR N/A 1/9/2016    Procedure: INJECT BLOCK MEDIAL BRANCH CERVICAL / THORACIC / LUMBAR;  Surgeon: GENERIC ANESTHESIA PROVIDER;  Location: [facility]     JOINT REPLACEMENT  2/2/2010    Rt TKA     KNEE SURGERY       ORTHOPEDIC SURGERY      right foot surgery     ZZC PELVIS/HIP JOINT SURGERY UNLISTED        Allergies   Allergen Reactions     Chantix [Varenicline] Nausea and Vomiting     Other reaction(s): Vomiting     No Clinical Screening - See Comments      Other reaction(s): Unknown Reaction     Seasonal Allergies      Ciprofloxacin Other (See Comments)     Interacts with other medication  Other reaction(s): Dizziness, passes out-not sure if it was the cipro or not       Latex Itching and Rash     Other reaction(s): Other - Describe In Comment Field  Vaginal itching, burning  Other reaction(s): Intolerance      Social History     Tobacco Use     Smoking status: Every Day     Packs/day: 1.00     Years: 10.00     Additional pack years: 0.00     Total pack years: 10.00     Types: Cigarettes     Start date: 5/29/1997     Smokeless tobacco: Never   Substance Use Topics     Alcohol use: Not Currently      Wt Readings from Last 1 Encounters:   08/26/21 81.6 kg (180 lb)        Anesthesia Evaluation   Pt has had prior anesthetic.         ROS/MED HX  ENT/Pulmonary:     (+)                tobacco use, Current use,   patient smoked within 24 hours, Intermittent, asthma    COPD,              Neurologic:     (+)       seizures, last seizure: years ago.,                        Cardiovascular:       METS/Exercise Tolerance:     Hematologic:       Musculoskeletal:       GI/Hepatic:     (+) GERD,                   Renal/Genitourinary:       Endo: Comment: Hypothyroidism secondary to medications.    (+)          thyroid problem, hypothyroidism,           Psychiatric/Substance Use:     (+) psychiatric history anxiety and depression       Infectious Disease:       Malignancy:   (+) Malignancy, History of  Lymphoma/Leukemia.Lymph CA Remission status post.      Other:            Physical Exam    Airway        Mallampati: I   TM distance: > 3 FB   Neck ROM: full   Mouth opening: > 3 cm    Respiratory Devices and Support         Dental       (+) Multiple crowns, permanant bridges      Cardiovascular   cardiovascular exam normal          Pulmonary   pulmonary exam normal            Other findings: Upper dentures    Hx of claustrophobia.    EEG (3/21/2014) - normal    Labs (1/15/2024)    Hg               12.5  Hematocrit  36.5  Plts             183  K                 4.0  OUTSIDE LABS:  CBC:   Lab Results   Component Value Date    WBC 8.2 07/12/2018    WBC 9.2 05/22/2018    HGB 11.3 (L) 07/12/2018    HGB 10.5 (L) 05/27/2018    HCT 35.2 07/12/2018    HCT 34.7 (L) 05/22/2018     07/12/2018     05/22/2018     BMP:   Lab Results   Component Value Date     07/12/2018     05/22/2018    POTASSIUM 3.8 07/12/2018    POTASSIUM 4.3 05/22/2018    CHLORIDE 109 07/12/2018    CHLORIDE 108 05/22/2018    CO2 27 07/12/2018    CO2 29 05/22/2018    BUN 6 (L) 07/12/2018    BUN 9 05/22/2018    CR 0.82 07/12/2018    CR 0.89 05/22/2018    GLC 97 07/12/2018    GLC 99 05/22/2018     COAGS:   Lab Results   Component Value Date    INR 0.95 07/12/2018     POC:   Lab Results   Component Value Date     (H) 05/25/2018    HCG Negative 05/25/2018    HCGS Negative 07/12/2018     HEPATIC:   Lab Results   Component Value Date    ALBUMIN 2.5 (L) 01/10/2016    PROTTOTAL 5.3 (L) 01/10/2016    ALT 15 01/10/2016    AST 11 01/10/2016    ALKPHOS 64 01/10/2016    BILITOTAL 0.2 01/10/2016     OTHER:   Lab Results   Component Value Date    APOLONIA 8.3 (L) 07/12/2018    MAG 2.0 04/04/2018    CRP 10.7 (H) 05/22/2018    SED 22 (H) 01/11/2024       Anesthesia Plan    ASA Status:  3    NPO Status:  NPO Appropriate    Anesthesia Type: General.     - Airway: ETT   Induction: Propofol.   Maintenance: Balanced.        Consents    Anesthesia Plan(s)  and associated risks, benefits, and realistic alternatives discussed. Questions answered and patient/representative(s) expressed understanding.     - Discussed: Risks, Benefits and Alternatives for the PROCEDURE were discussed     - Discussed with:  Patient, Spouse      - Extended Intubation/Ventilatory Support Discussed: No.      - Patient is DNR/DNI Status: No     Use of blood products discussed: No .     Postoperative Care    Pain management: IV analgesics, Oral pain medications.   PONV prophylaxis: Ondansetron (or other 5HT-3), Dexamethasone or Solumedrol     Comments:               Brenda Viveros MD    I have reviewed the pertinent notes and labs in the chart from the past 30 days and (re)examined the patient.  Any updates or changes from those notes are reflected in this note.            # Drug Induced Coagulation Defect: home medication list includes an anticoagulant medication
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
## 2024-01-30 NOTE — PROGRESS NOTES
"[facility] ADMISSION NOTE     Patient admitted to room 834 at approximately 1500 via bed from OR. Patient was accompanied by other:Spouse.     Verbal SBAR report received from OR RN prior to patient arrival.     Patient alert and oriented X 4. Pain is controlled with current analgesics/that was given at OR denies pain during admission. See MAR for detailed information. Admission vital signs: Blood pressure 135/83, pulse 107, temperature 98.3  F (36.8  C), temperature source Oral, resp. rate 14, height 1.778 m (5' 10\"), weight 89.4 kg (197 lb 1.5 oz), last menstrual period 01/22/2024, SpO2 96%, not currently breastfeeding.   Patient was oriented to plan of care, call light, bed controls, tv, telephone, bathroom, and visiting hour  "

## 2024-01-30 NOTE — OP NOTE
[facility] Orthopaedic Surgery  Operative Note            Procedure date 1/30/2024   Pre-operative diagnosis: Mechanical complication of prosthetic knee implant, initial encounter  (H24) [T84.701B, Z96.869]   Post-operative diagnosis same as pre-operative   Procedure: Procedure(s):  REVISION, LEFT TOTAL ARTHROPLASTY, KNEE   Surgeon: Cheko Saleem MD   Assistants(s): Bunny Corea MD    Anesthesia: Choice    Estimated blood loss: 200 ml   Total IV fluids: (See anesthesia record)   Blood transfusion: (See anesthesia record)   Total urine output: (See anesthesia record)   Drains: Charlie-Harris     Specimens:  ID Type Source Tests Collected by Time Destination   A : left knee #1 Tissue Knee, Left ANAEROBIC BACTERIAL CULTURE ROUTINE, AEROBIC BACTERIAL CULTURE ROUTINE Cheko Saleem MD 1/30/2024  9:33 AM    B : left knee #1 Tissue Knee, Left ANAEROBIC BACTERIAL CULTURE ROUTINE, AEROBIC BACTERIAL CULTURE ROUTINE Cheko Saleem MD 1/30/2024  9:34 AM    C : left knee #1 Tissue Knee, Left ANAEROBIC BACTERIAL CULTURE ROUTINE, AEROBIC BACTERIAL CULTURE ROUTINE Cheko Saleem MD 1/30/2024  9:34 AM    D : left knee #1 Tissue Knee, Left ANAEROBIC BACTERIAL CULTURE ROUTINE, AEROBIC BACTERIAL CULTURE ROUTINE Cheko Saleem MD 1/30/2024  9:35 AM    E : left knee #1 Tissue Knee, Left ANAEROBIC BACTERIAL CULTURE ROUTINE, AEROBIC BACTERIAL CULTURE ROUTINE Cheko Saleem MD 1/30/2024  9:36 AM        Implants:  Vanguard Femur SSK 72.5mm, 15mm x 120mm splined stem  Tibia 67mm, medial 10mm augment, 5mm offset, 14mm x 120mm splined stem.  Tobra PMMA      Implant Name Type Inv. Item Serial No.  Lot No. LRB No. Used Action   BONE CEMENT SIMPLEX W/TOBRAMYCIN 6197-9-001 - [id number] Cement, Bone BONE CEMENT SIMPLEX W/TOBRAMYCIN 6197-9-001  [employer] [id number] Left 3 Implanted   ADAPTER OFFSET 360 5MM - [id number] Total Joint Component/Insert ADAPTER OFFSET  360 5MM  [employer] [id number] Left 1 Implanted   tibial augment with bolts     [id number] Left 1 Implanted   IMP TRAY TIBIAL STRK  67MM [id number] - [id number] Total Joint Component/Insert IMP TRAY TIBIAL STRK  67MM [id number]  [employer] [id number] Left 1 Implanted   STEM SPLINED KNEE V2 [id number] - [id number] Total Joint Component/Insert STEM SPLINED KNEE V2 [id number]  [employer] [id number] Left 1 Implanted   TIBIA  SM CRUCIATE - [id number] Total Joint Component/Insert TIBIA  SM CRUCIATE  [employer] [id number] Left 1 Implanted   STEM SPLINED KNEE V2 [id number] - [id number] Total Joint Component/Insert STEM SPLINED KNEE V2 [id number]  [employer] [id number] Left 1 Implanted   Vanguard [id number]/ Knee System Femoral Left-With Screw 72.5mm    [employer] [id number] Left 1 Implanted   Vanguard Knee System Constrained PS Tibial Bearing DCM [employer]    [employer] [id number] Left 1 Implanted       Findings:   Loose tibial component.  No evidence of infection.    Indications:  DIAGNOSIS:   Multi-focal osteonecrosis.  Lumbar disc herniation  Acute Lymphoblastic Leukemia, chemotherapy [id number]   Chronic nicotine abuse     SURGERIES:  2005, L femoral head resurfacing hemiarthroplasty  6/5/2007, R HAJA  2/2/2010, right total knee arthroplasty. No patella resurfacing  6/12/2012, R patella resurfacing  1/8/2016, L TKA (Stiven) [facility]  4/3/2018, Revision L surface replacement bartolo to HAJA (Stiven) [facility]  4/19/18 , 5/18/2018, 5/23/2018  Closed reductions L HAJA  5/25/2018, Revision L HAJA to larger head size and cup repositioning (Stiven) [facility]. Cultures (-) x 3  7/12/18, Closed reduction of L HAJA under anesthesia (Stiven)    Procedure details:  This patient was placed on the operating table in the supine position and a tourniquet placed about the left thigh.  After standard prep drape, the left lower extremity was elevated and exsanguinated.  The tourniquet was inflated to 250 mmHg.  Preoperative antibiotics were administered prior to  incision.    Midline incision with standard anteromedial arthrotomy performed and the patella was displaced laterally.  There was a severe degree of synovitis present and there was also dark pigmented tissue present due to metallosis.  All of the synovitic tissue was now grossly excised sharply using scalpel and excising it from the inside of the knee joint.  Once this was performed, I evacuated and removed the polyethylene bearing.  4 sets of tissue cultures were obtained from the synovial tissue in various locations and an additional culture was also obtained from the tibial bone interface.    After completing the synovectomy, I then was able to displace the tibia sufficiently to extract it.  It was loose grossly.  I then used a curette and rongeur to clean up the bony surfaces and the surrounding tissues.    Tibial revision preparation was undertaken by using intramedullary guide straight reamer to enlarge the tibial shaft to 14 mm.  I then used intramedullary guide to determine proper placement for a transverse cut along the proximal portion of the tibia.  There is a severe amount of medial bone loss.  For this reason I decided to use a medial augment to perform a step cut osteotomy to accommodate the size and position of augmentation.    Trial reduction was performed.  The medial side was tight relative to the lateral side.  For this reason I performed an extensive medial release by undermining the SCL and perform subperiosteal dissection and release from the pes anserine tendons proximally to the joint line and posterior medially around the joint line while releasing the posterior capsule and the semimembranosus insertion site.  Despite this there was still some asymmetry in the ligament Bensing being tighter on the medial side.  Furthermore, the extension gap was tighter than the flexion gap.  For this reason I decided to revise the femoral component to reposition the joint line several millimeters proximally  and also to implant a higher level of constraint using an SS K tibial post bearing.      Therefore at this time, a reciprocating saw was used to take down the bony face between the implant and the underlying femur bone along the anterior chamfer and distal femoral cuts.  Once this was completed, I used a bone impactor and was gradually able to disengage the distal femoral component from the underlying bone with minimal bone loss.  The implant was thereby removed.  I resected the distal femoral joint line and additional 2 mm.  Using intramedullary guide then, I reamed to 15 mm diameters.  Trial components were used to place and prepared the distal femur using the 4-in-1 cutting block positioned appropriately with rotational alignment using a posterior referencing.  By performing this I was able to increase extension gap by 2 mm.  Knee joint was now reduced with trial implants and radiographs were obtained demonstrating proper implant position.    After implants removed, it was necessary to deflate the tourniquet while the new implants were prepared and assembled on the back table.  Pulsatile bleeding from the posterior aspect of the tibia was noted.  Therefore I carefully dissected around this area using suction evaluation to keep the area clean.  Several arterial bleeding points were identified in the region of the popliteal artery.    I elevated the limb and reinflated the tourniquet for hemostasis control.  I was able to visualize the posterior aspect much more clearly at this point and it appeared to me that there were several bleeding points from the region of the popliteal artery and anterior branches.  I therefore oversewed any anterior branch bleeding points with a 5-0 and 6-0 Prolene suture..  A running 5-0 Prolene suture was used to control hemostasis posterior to the tibia in the region of the main popliteal artery trunk.  The tourniquet was deflated and hemostasis was secured and there is no further  hemorrhage from within the popliteal region.  2 confirm continuity and satisfactory arterial flow within the foot, an intraoperative Doppler examination was performed and there was an excellent  3+  posterior tibial pulse present by Doppler signal and 2+ dorsalis pedis pulse with biphasic waveforms audibly noted.    The real implants were now sent on the back table.  It was necessary to offset the tibial component slightly laterally and anteriorly.  Furthermore I had to perform additional preparation of the bony surfaces to except the implant.  Once the position was optimized and additional preparation was completed, I elevated the limb and reinflated the tourniquet temporarily to 250 mmHg.  This allowed for clean bony surfaces present.  3 packs of tobramycin antibiotic cement were now back in mixed and used to affix the proximal third of the tibial opponent.  The stem to part of the tibia was not cemented.  Similarly the stem portion of the femoral component was noncemented while the condylar surfaces were cemented.  Once this was complete excess cement was removed.  Cement was allowed to harden.  The tourniquet was deflated.  Trial reduction revealed that the 14 mm super stabilizer type polyethylene bearing was appropriate and this was implant into position and locked to the tibial tray.    Thorough irrigation was used to cleanse the wound.  Hemostasis was verified as were the pulses after implantation of the prosthesis.    A periarticular wound block was now performed using anesthetic cocktail comprising quarter percent bupivacaine with epinephrine, Toradol.    Wound closure was accomplished over a deep drain brought out through the proximal lateral stab incision.  Interrupted #1 Ethibond sutures were used to reapproximate the capsular tissues.  The fat pad and distal tissues were closed with 2-0 Vicryl figure-of-eight sutures.  Subcutaneous layer was closed with 2-0 Vicryl suture and 3-0 PDS suture running  subcuticular suture on the skin.  Skin glue alginate and Tegaderm dressings were then applied afterwards.    The 22 modifier is appended this patient's operative procedure code to the following reasons.  Her multiple operative procedures, the need for full revision of the knee and increased complexity and managing her care perioperatively all contribute to increased complexity and effort required to perform her surgery and manage her perioperative care.  This is also reflected by the operative time of over 5 hours which is 50% greater that normally associated with this procedure.    Postoperative plan:    Patient may weight-bear as tolerated.   Full range of motion from active extension to 130 degrees flexion allow.  Aspirin x 4 duration for DVT prophylaxis  Pulse oximeter x 24 h on the left foot to monitor O2 saturations and vascular status.  Pain management consultation given patient's long-term narcotic usage.      MD Blas Rhoades Family Professor  Oncology and Adult Reconstructive Surgery  Dept Orthopaedic Surgery, [facility]  [phone] office, [phone] pager  [email or web address]

## 2024-01-30 NOTE — PROGRESS NOTES
Ortho Navigator unable to make contact with patient after several attempts. Patient has been medically cleared for surgery.    Negrito Matos  Ortho Navigator   [phone]

## 2024-01-30 NOTE — ANESTHESIA POSTPROCEDURE EVALUATION
Patient: Alethea Patel    Procedure: Procedure(s):  REVISION, LEFT TOTAL ARTHROPLASTY, KNEE       Anesthesia Type:  General    Note:  Disposition: Inpatient   Postop Pain Control: Uneventful            Sign Out: Well controlled pain   PONV: No   Neuro/Psych: Uneventful            Sign Out: Acceptable/Baseline neuro status   Airway/Respiratory: Uneventful            Sign Out: Acceptable/Baseline resp. status   CV/Hemodynamics: Uneventful            Sign Out: Acceptable CV status; No obvious hypovolemia; No obvious fluid overload   Other NRE: NONE   DID A NON-ROUTINE EVENT OCCUR? No    Event details/Postop Comments:  Doing well.  Fully awake, alert and comfortable.  Tolerating po. VSS.  Will discharge to floor soon.       Last vitals:  Vitals Value Taken Time   /83 01/30/24 1445   Temp     Pulse 111 01/30/24 1454   Resp 14 01/30/24 1454   SpO2 96 % 01/30/24 1454   Vitals shown include unfiled device data.    Electronically Signed By: Brenda Viveros MD  January 30, 2024  2:55 PM

## 2024-01-30 NOTE — BRIEF OP NOTE
[facility]    Brief Operative Note    Pre-operative diagnosis: Mechanical complication of prosthetic knee implant, initial encounter  (H24) [T84.168E, Z96.273]  Post-operative diagnosis Same as pre-operative diagnosis    Procedure: REVISION, LEFT TOTAL ARTHROPLASTY, KNEE, Left - Knee    Surgeon: Surgeon(s) and Role:     * Cheko Saleem MD - Primary     * Eran Corea MD - Resident - Assisting  Anesthesia: Choice   Estimated Blood Loss: 400 mL from 1/30/2024  8:11 AM to 1/30/2024  1:08 PM      Drains: Charlie-Harris  Specimens:   ID Type Source Tests Collected by Time Destination   A : left knee #1 Tissue Knee, Left ANAEROBIC BACTERIAL CULTURE ROUTINE, AEROBIC BACTERIAL CULTURE ROUTINE Cheko Saleem MD 1/30/2024  9:33 AM    B : left knee #1 Tissue Knee, Left ANAEROBIC BACTERIAL CULTURE ROUTINE, AEROBIC BACTERIAL CULTURE ROUTINE Cheko Saleem MD 1/30/2024  9:34 AM    C : left knee #1 Tissue Knee, Left ANAEROBIC BACTERIAL CULTURE ROUTINE, AEROBIC BACTERIAL CULTURE ROUTINE Cheko Saleem MD 1/30/2024  9:34 AM    D : left knee #1 Tissue Knee, Left ANAEROBIC BACTERIAL CULTURE ROUTINE, AEROBIC BACTERIAL CULTURE ROUTINE Cheko Saleem MD 1/30/2024  9:35 AM    E : left knee #1 Tissue Knee, Left ANAEROBIC BACTERIAL CULTURE ROUTINE, AEROBIC BACTERIAL CULTURE ROUTINE Cheko Saleem MD 1/30/2024  9:36 AM      Findings:   See dictated op report  .  Complications: Unanticipated bleeding from small vessel posterior to tibia which was controled with vascaulr repair. PT and DP dopplerable after case.  This was nottreated with transfusion of PRBC's  .  Implants:   Implant Name Type Inv. Item Serial No.  Lot No. LRB No. Used Action   BONE CEMENT SIMPLEX W/TOBRAMYCIN 6197-9-001 - [id number] Cement, Bone BONE CEMENT SIMPLEX W/TOBRAMYCIN 6197-9-001  [employer] [id number] Left 3 Implanted   ADAPTER OFFSET 360 5MM - [id number] Total Joint Component/Insert  ADAPTER OFFSET 360 5MM  [employer] [id number] Left 1 Implanted   tibial augment with bolts     [id number] Left 1 Implanted   IMP TRAY TIBIAL STRK  67MM [id number] - [id number] Total Joint Component/Insert IMP TRAY TIBIAL STRK  67MM [id number]  [employer] [id number] Left 1 Implanted   STEM SPLINED KNEE V2 [id number] - [id number] Total Joint Component/Insert STEM SPLINED KNEE V2 [id number]  [employer] [id number] Left 1 Implanted   TIBIA  SM CRUCIATE - [id number] Total Joint Component/Insert TIBIA  SM CRUCIATE  [employer] [id number] Left 1 Implanted   STEM SPLINED KNEE V2 [id number] - [id number] Total Joint Component/Insert STEM SPLINED KNEE V2 [id number]  [employer] [id number] Left 1 Implanted   V[employer]ard [id number]/ Knee System Femoral Left-With Screw 72.5mm    [employer] [id number] Left 1 Implanted   Vanguard Knee System Constrained PS Tibial Bearing DCM ArCom    [employer] [id number] Left 1 Implanted     Assessment/Plan: Alethea Patle is a 43 year old female with a complex orthopedic history related to her multi-focal osteonecrosis who presented with failure of her left TKA now s/p revision TKA of both the femoral and tibial components on 1/30/24 with Dr. Saleem.     Ortho Primary, Medicine co-management  Activity: Up with assist.  Weight bearing status: WBAT LLE  Antibiotics: Ancef x 72 hours (until surgical cultures result).  Diet: Begin with clear fluids and progress diet as tolerated.  DVT prophylaxis:  starting POD1 x 4 weeks  Elevation: Elevate LLE as much as possible.  Pulse oximeter to toe on left foot x 24hrs post op to monitor vascular status (vascular injury intraop s/p repair)  Wound Care: keep dressing in place until POD7  Drains: Document output per shift, discontinue when <30cc/shift.  Pain management: Transition from IV to orals as tolerated.   X-rays: PACU    PT/OT: eval and treat  Labs: Hgb POD1  Cultures: Pending, follow culture results closely.  Consults: PT, OT. Medicine, pain  management, appreciate assistance in caring for this patient.  Follow-up: Clinic with Dr. Saleem care team in 4 weeksneeded.  Future Appointments   Date Time Provider Department Center   1/31/2024  9:30 AM Kristine Tracy, PT [facility] [place]   1/31/2024 11:15 AM Carmelina Dow, OT [facility] [place]   1/31/2024  1:30 PM [facility] PT WAITLIST [facility] [place]   3/1/2024  2:40 PM [facility]       Disposition: Pending progress with therapies, pain control on orals, and medical stability, anticipate discharge to home vs TCU on POD #2-4.    Eran Corea MD  Orthopaedic Surgery, PGY-4  Pager: [phone]

## 2024-01-30 NOTE — ANESTHESIA PROCEDURE NOTES
Airway       Patient location during procedure: OR       Procedure Start/Stop Times: 1/30/2024 8:29 AM  Staff -        CRNA: Rosalind Bettencourt APRN CRNA       Performed By: CRNAIndications and Patient Condition       Indications for airway management: flaquita-procedural         Mask difficulty assessment: 1 - vent by mask    Final Airway Details       Final airway type: endotracheal airway       Successful airway: ETT - single  Endotracheal Airway Details        ETT size (mm): 7.5       Cuffed: yes       Successful intubation technique: direct laryngoscopy       DL Blade Type: Woodard 2       Grade View of Cords: 1       Adjucts: stylet       Position: Right       Measured from: gums/teeth       Secured at (cm): 22       Bite block used: None    Post intubation assessment        Placement verified by: capnometry, equal breath sounds and chest rise        Number of attempts at approach: 1       Secured with: tape       Ease of procedure: easy       Dentition: Intact and Unchanged    Medication(s) Administered   Medication Administration Time: 1/30/2024 8:29 AM

## 2024-01-30 NOTE — ANESTHESIA CARE TRANSFER NOTE
Patient: Alethea Patel    Procedure: Procedure(s):  REVISION, LEFT TOTAL ARTHROPLASTY, KNEE       Diagnosis: Mechanical complication of prosthetic knee implant, initial encounter  (H24) [T84.463N, Z96.293]  Diagnosis Additional Information: No value filed.    Anesthesia Type:   General     Note:    Oropharynx: oropharynx clear of all foreign objects  Level of Consciousness: drowsy  Oxygen Supplementation: face mask  Level of Supplemental Oxygen (L/min / FiO2): 6  Independent Airway: airway patency satisfactory and stable  Dentition: dentition unchanged  Vital Signs Stable: post-procedure vital signs reviewed and stable  Report to RN Given: handoff report given  Patient transferred to: PACU    Handoff Report: Identifed the Patient, Identified the Reponsible Provider, Reviewed the pertinent medical history, Discussed the surgical course, Reviewed Intra-OP anesthesia mangement and issues during anesthesia, Set expectations for post-procedure period and Allowed opportunity for questions and acknowledgement of understanding      Vitals:  Vitals Value Taken Time   /77 01/30/24 1315   Temp 37.2    Pulse 119 01/30/24 1316   Resp 19 01/30/24 1316   SpO2 98 % 01/30/24 1316   Vitals shown include unfiled device data.    Electronically Signed By: VALENTIN Marina CRNA  January 30, 2024  1:17 PM

## 2024-01-30 NOTE — CONSULTS
[facility]  Consult Note - Hospitalist Service, GOLD TEAM   Date of Admission:  1/30/2024  Consult Requested by: Orthopedic Surgery; Dr. Corea  Reason for Consult: Medical co-management    Assessment & Plan   Alethea Patel is a 43 year old female w/ a PMH of previous TKA w/ complications of prosthetic joint, chronic pain w/ long term opioid and methadone use, acute lymphocytic leukemia in remission, hypothyroidism, reactive airway disease admitted on 1/30/2024. She is s/p L revision TKA 1/30/24. Medicine was consulted for general co-management.     #Complication of prosthetic joint s/p L revision TKA 1/30/24  #Hx of multifocal osteonecrosis of L knee s/p TKA (9 years ago)   - Pain management per primary team  - Agree w/ scheduled bowel regimen     #Tachycardia, mild  Tachycardic on vitals and on my exam. No complaints of chest pain, palpitations, dizziness, sob, etc. Possible dehydration iso NPO prior to procedure. Blood pressure stable, afebrile, no infectious symptoms to suggest infectious source.   - Recommend oral fluid intake, can consider bolus if tachycardia persists  - CTM    #Chronic pain  #Long term opioid use  [facility]/ [facility]. On PTA oxycodone 5mg 1-2 tabs Q4-6H PRN and methadone 10mg BID. Also uses tizanidine, gabapentin, baclofen, amitriptyline. [facility] aware she is having TKA.  - Pain management per primary team   -- Agree w/ continuing methadone and scheduled bowel regimen    #Tobacco use  No break from smoking prior to surgery. Would like to try a patch, declined gum and lozenges.   - Nicotine patch    #ALL, in remission: Diagnosed in 2003, s/p 3 years chemo therapy.   #Hypothyroidism: TSH 2.32 (WNL) on 6/5/23. Continue PTA levothyroxine 50mcg at bedtime.  #Reactive airway disease: PTA montelukast. Has PRN albuterol inhaler listed, doesn't use often. Can add on PRN albuterol if needed this stay.   #GERD: PTA omeprazole &  "famotidine  #Depression, #Anxiety: PTA amitriptyline, bupropion, hydroxyzine PRN, quetiapine    Clinically Significant Risk Factors Present on Admission               # Drug Induced Coagulation Defect: home medication list includes an anticoagulant medication         # Overweight: Estimated body mass index is 28.28 kg/m  as calculated from the following:    Height as of this encounter: 1.778 m (5' 10\").    Weight as of this encounter: 89.4 kg (197 lb 1.5 oz).              Curt Lawson PA-C  Hospitalist Service, GOLD TEAM   Securely message with [employer] (more info)  Text page via [facility] Paging/Directory   See signed in provider for up to date coverage information  ______________________________________________________________________    Chief Complaint   POD 0 s/p L revision TKA    History is obtained from the patient, chart review.     History of Present Illness   Alethea Patel is a 43 year old female who has a PMH of previous TKA w/ complications of prosthetic joint, chronic pain w/ long term opioid and methadone use, acute lymphocytic leukemia in remission, hypothyroidism, reactive airway disease.    She is POD 0 s/p L revision TKA 1/30/24. Medicine was consulted for general co-management.     Patient is feeling pretty well after operation. No significant pain at this time, had a nerve block which is working well. No chest pain, SOB, abdominal pain, other complaints. Biggest concern is making sure she is able to stay on top of the pain, especially when the pain block wears off. She also wants to make sure she has good pain control for when she gets up to try to walk this evening. Her nurse was at bedside and reassured her he would be the one walking with her and would give her pain meds prior to this and that she did not have to walk a full 75 feet.     Patient does smoke cigarettes. Did not take break from smoking prior to operation. Is amenable to a nicotine patch, does not want gum or lozenges.       Past " Medical History    Past Medical History:   Diagnosis Date    Acute lymphoblastic leukemia in remission (H)     Anxiety     Chest pain     Chronic osteoarthritis     Degenerative joint disease     Depression     Depressive disorder     Gastro-oesophageal reflux disease     Hypothyroidism     IBS (irritable bowel syndrome)     Osteonecrosis (H)     Osteonecrosis (H)     Thyroid disease        Past Surgical History   Past Surgical History:   Procedure Laterality Date    ARTHROPLASTY KNEE Left 1/8/2016    Procedure: ARTHROPLASTY KNEE;  Surgeon: Cheko Saleem MD;  [facility]    ARTHROPLASTY PATELLO-FEMORAL (KNEE)  6/12/2012    Procedure: ARTHROPLASTY PATELLO-FEMORAL (KNEE);  Right Knee Patella Resurfacing;  Surgeon: Cheko Saleem MD;  [facility]    ARTHROPLASTY REVISION HIP Left 4/3/2018    Procedure: ARTHROPLASTY REVISION HIP;  Conversion of Left Hip Rigo-Arthroplasty to Left Total Hip Replacement;  Surgeon: Cheko Saleem MD;  [facility]    ARTHROPLASTY REVISION HIP Left 5/25/2018    Procedure: ARTHROPLASTY REVISION HIP;  Revision Left Total Hip Arthroplasty ;  Surgeon: Cheko Saleem MD;  [facility] OR    CLOSED REDUCTION HIP Left 5/18/2018    Procedure: CLOSED REDUCTION HIP;  Closed Reduction Left Hip, and Aspiration Culture   Left Hip;  Surgeon: Cheko Saleem MD;  [facility]    CLOSED REDUCTION HIP Left 5/23/2018    Procedure: CLOSED REDUCTION HIP;  Closed Reduction Left Hip;  Surgeon: Cheko Saleem MD;  [facility] OR    CLOSED REDUCTION HIP Left 7/12/2018    Procedure: CLOSED REDUCTION HIP;  Closed Reduction of Left Total Hip Arthroplasty;  Surgeon: Cheko Saleem MD;  [facility]    HIP SURGERY  5/31/2005    Left hip resurfacing hemiarthroplasty    HIP SURGERY  6/5/07    INJECT BLOCK MEDIAL BRANCH CERVICAL/THORACIC/LUMBAR N/A 1/9/2016    Procedure: INJECT BLOCK MEDIAL BRANCH CERVICAL / THORACIC / LUMBAR;  Surgeon: GENERIC ANESTHESIA PROVIDER;  Location: [facility] OR    JOINT  REPLACEMENT  2/2/2010    Rt TKA    KNEE SURGERY      ORTHOPEDIC SURGERY      right foot surgery    ZZC PELVIS/HIP JOINT SURGERY UNLISTED         Medications   Current Facility-Administered Medications   Medication    [START ON 2/2/2024] acetaminophen (TYLENOL) tablet 650 mg    acetaminophen (TYLENOL) tablet 975 mg    amitriptyline (ELAVIL) tablet 50 mg    aspirin EC tablet 81 mg    baclofen (LIORESAL) tablet 10 mg    benzocaine-menthol (CHLORASEPTIC) 6-10 MG lozenge 1 lozenge    bisacodyl (DULCOLAX) suppository 10 mg    buPROPion (WELLBUTRIN SR) 12 hr tablet 150 mg    ceFAZolin (ANCEF) 2 g in 100 mL D5W intermittent infusion    famotidine (PEPCID) tablet 20 mg    fluticasone (FLONASE) 50 MCG/ACT spray 2 spray    gabapentin (NEURONTIN) capsule 900 mg    HYDROmorphone (PF) (DILAUDID) injection 0.2 mg    Or    HYDROmorphone (PF) (DILAUDID) injection 0.5 mg    hydrOXYzine HCl (ATARAX) tablet 25 mg    lactated ringers infusion    levothyroxine (SYNTHROID/LEVOTHROID) tablet 50 mcg    lidocaine (LMX4) cream    lidocaine 1 % 0.1-1 mL    magnesium hydroxide (MILK OF MAGNESIA) suspension 30 mL    methadone (DOLOPHINE) tablet 10 mg    methocarbamol (ROBAXIN) tablet 750 mg    montelukast (SINGULAIR) tablet 10 mg    naloxone (NARCAN) injection 0.2 mg    Or    naloxone (NARCAN) injection 0.4 mg    Or    naloxone (NARCAN) injection 0.2 mg    Or    naloxone (NARCAN) injection 0.4 mg    nicotine (NICODERM CQ) 14 MG/24HR 24 hr patch 1 patch    nicotine Patch in Place    ondansetron (ZOFRAN ODT) ODT tab 4 mg    Or    ondansetron (ZOFRAN) injection 4 mg    oxyCODONE (ROXICODONE) tablet 5 mg    Or    oxyCODONE (ROXICODONE) tablet 10 mg    [START ON 1/31/2024] polyethylene glycol (MIRALAX) Packet 17 g    prochlorperazine (COMPAZINE) injection 10 mg    Or    prochlorperazine (COMPAZINE) tablet 10 mg    senna-docusate (SENOKOT-S/PERICOLACE) 8.6-50 MG per tablet 1 tablet    sodium chloride (PF) 0.9% PF flush 3 mL    sodium chloride (PF)  0.9% PF flush 3 mL          Review of Systems    The 5 point Review of Systems is negative other than noted in the HPI or here.     Allergies   Allergies   Allergen Reactions    Chantix [Varenicline] Nausea and Vomiting     Other reaction(s): Vomiting    No Clinical Screening - See Comments      Other reaction(s): Unknown Reaction    Seasonal Allergies     Ciprofloxacin Other (See Comments)     Interacts with other medication  Other reaction(s): Dizziness, passes out-not sure if it was the cipro or not      Latex Itching and Rash     Other reaction(s): Other - Describe In Comment Field  Vaginal itching, burning  Other reaction(s): Intolerance        Physical Exam   Vital Signs: Temp: 98.3  F (36.8  C) Temp src: Oral BP: 134/83 Pulse: 108   Resp: 10 SpO2: 97 % O2 Device: None (Room air) Oxygen Delivery: 1.5 LPM  Weight: 197 lbs 1.46 oz    General Appearance: Sitting up comfortably in bed, NAD.   Respiratory: Lungs CTAB, breathing comfortably on room air.   Cardiovascular: Mild tachycardia, no murmur appreciated.   GI: Abdomen non distended, no guarding.   Skin: Warm and dry. No acute abnormalities noted.   Other: Alert and oriented. Pleasant and interactive. No focal deficits noted.      Medical Decision Making       35 MINUTES SPENT BY ME on the date of service doing chart review, history, exam, documentation & further activities per the note.      Data   ------------------------- PAST 24 HR DATA REVIEWED -----------------------------------------------        Imaging results reviewed over the past 24 hrs:   Recent Results (from the past 24 hour(s))   XR Knee Port Left 1/2 Views    Narrative    EXAM: XR KNEE PORT LEFT 1/2 VIEWS  LOCATION: [facility]  DATE: 1/30/2024    INDICATION: Left total knee revision.  COMPARISON: 1/11/2014      Impression    IMPRESSION: A single portable AP supine radiograph of the left knee intraoperative obtained during placement of a revision  left total knee arthroplasty with new long-stemmed customized proximal left tibial component. Expected intraoperative soft tissue   and intra-articular gas. Pre-existing bone infarct in the proximal tibial metaphysis. Likely bone infarct in the distal femoral metaphysis medial. No visible fracture.   XR Knee Port Left 1/2 Views    Narrative    EXAM: XR KNEE PORT LEFT 1/2 VIEWS  LOCATION: [facility]  DATE: 1/30/2024    INDICATION: intra op left total knee arthroplasty  COMPARISON: None.      Impression    IMPRESSION: Single view of the knee shows changes of total knee arthroplasty. Components appear well seated.

## 2024-01-31 ENCOUNTER — APPOINTMENT (OUTPATIENT)
Dept: PHYSICAL THERAPY | Facility: CLINIC | Age: 44
DRG: 467 | End: 2024-01-31
Attending: ORTHOPAEDIC SURGERY
Payer: COMMERCIAL

## 2024-01-31 LAB — HGB BLD-MCNC: 8.4 G/DL (ref 11.7–15.7)

## 2024-01-31 PROCEDURE — 97116 GAIT TRAINING THERAPY: CPT | Mod: GP

## 2024-01-31 PROCEDURE — 250N000013 HC RX MED GY IP 250 OP 250 PS 637

## 2024-01-31 PROCEDURE — 250N000013 HC RX MED GY IP 250 OP 250 PS 637: Performed by: STUDENT IN AN ORGANIZED HEALTH CARE EDUCATION/TRAINING PROGRAM

## 2024-01-31 PROCEDURE — 250N000011 HC RX IP 250 OP 636: Performed by: STUDENT IN AN ORGANIZED HEALTH CARE EDUCATION/TRAINING PROGRAM

## 2024-01-31 PROCEDURE — 99254 IP/OBS CNSLTJ NEW/EST MOD 60: CPT | Performed by: PHYSICIAN ASSISTANT

## 2024-01-31 PROCEDURE — 97161 PT EVAL LOW COMPLEX 20 MIN: CPT | Mod: GP

## 2024-01-31 PROCEDURE — 120N000002 HC R&B MED SURG/OB UMMC

## 2024-01-31 PROCEDURE — 250N000013 HC RX MED GY IP 250 OP 250 PS 637: Performed by: INTERNAL MEDICINE

## 2024-01-31 PROCEDURE — 97110 THERAPEUTIC EXERCISES: CPT | Mod: GP

## 2024-01-31 PROCEDURE — 97530 THERAPEUTIC ACTIVITIES: CPT | Mod: GP

## 2024-01-31 PROCEDURE — 999N000111 HC STATISTIC OT IP EVAL DEFER

## 2024-01-31 PROCEDURE — 85018 HEMOGLOBIN: CPT | Performed by: STUDENT IN AN ORGANIZED HEALTH CARE EDUCATION/TRAINING PROGRAM

## 2024-01-31 PROCEDURE — 36415 COLL VENOUS BLD VENIPUNCTURE: CPT | Performed by: STUDENT IN AN ORGANIZED HEALTH CARE EDUCATION/TRAINING PROGRAM

## 2024-01-31 PROCEDURE — 99232 SBSQ HOSP IP/OBS MODERATE 35: CPT | Performed by: INTERNAL MEDICINE

## 2024-01-31 RX ORDER — PHENOL 1.4 %
10 AEROSOL, SPRAY (ML) MUCOUS MEMBRANE AT BEDTIME
COMMUNITY

## 2024-01-31 RX ORDER — BACLOFEN 10 MG/1
10 TABLET ORAL DAILY
COMMUNITY

## 2024-01-31 RX ORDER — PANTOPRAZOLE SODIUM 40 MG/1
40 TABLET, DELAYED RELEASE ORAL
Status: DISCONTINUED | OUTPATIENT
Start: 2024-02-01 | End: 2024-02-02 | Stop reason: HOSPADM

## 2024-01-31 RX ORDER — PROCHLORPERAZINE MALEATE 10 MG
20 TABLET ORAL AT BEDTIME
COMMUNITY

## 2024-01-31 RX ORDER — TIZANIDINE 2 MG/1
4 TABLET ORAL 4 TIMES DAILY
COMMUNITY

## 2024-01-31 RX ORDER — ALBUTEROL SULFATE 90 UG/1
2 AEROSOL, METERED RESPIRATORY (INHALATION) EVERY 6 HOURS PRN
Status: DISCONTINUED | OUTPATIENT
Start: 2024-01-31 | End: 2024-02-02 | Stop reason: HOSPADM

## 2024-01-31 RX ORDER — IBUPROFEN 200 MG
600-800 TABLET ORAL EVERY 8 HOURS PRN
COMMUNITY

## 2024-01-31 RX ORDER — PANTOPRAZOLE SODIUM 40 MG/1
40 TABLET, DELAYED RELEASE ORAL
Status: DISCONTINUED | OUTPATIENT
Start: 2024-01-31 | End: 2024-01-31

## 2024-01-31 RX ORDER — METHADONE HYDROCHLORIDE 5 MG/1
10 TABLET ORAL AT BEDTIME
COMMUNITY

## 2024-01-31 RX ORDER — VITAMIN B COMPLEX
1 TABLET ORAL DAILY
COMMUNITY

## 2024-01-31 RX ORDER — OXYCODONE HYDROCHLORIDE 5 MG/1
TABLET ORAL
Status: ON HOLD | COMMUNITY
End: 2024-02-02

## 2024-01-31 RX ORDER — QUETIAPINE FUMARATE 50 MG/1
50 TABLET, FILM COATED ORAL AT BEDTIME
Status: DISCONTINUED | OUTPATIENT
Start: 2024-01-31 | End: 2024-02-02 | Stop reason: HOSPADM

## 2024-01-31 RX ORDER — DIPHENHYDRAMINE HCL 25 MG
50-100 TABLET ORAL AT BEDTIME
COMMUNITY

## 2024-01-31 RX ADMIN — ACETAMINOPHEN 975 MG: 325 TABLET, FILM COATED ORAL at 14:21

## 2024-01-31 RX ADMIN — OXYCODONE HYDROCHLORIDE 10 MG: 5 TABLET ORAL at 21:22

## 2024-01-31 RX ADMIN — CEFAZOLIN SODIUM 2 G: 2 INJECTION, SOLUTION INTRAVENOUS at 11:55

## 2024-01-31 RX ADMIN — NICOTINE 1 PATCH: 14 PATCH, EXTENDED RELEASE TRANSDERMAL at 09:15

## 2024-01-31 RX ADMIN — MONTELUKAST 10 MG: 10 TABLET, FILM COATED ORAL at 21:22

## 2024-01-31 RX ADMIN — METHOCARBAMOL 750 MG: 750 TABLET ORAL at 04:28

## 2024-01-31 RX ADMIN — ACETAMINOPHEN 975 MG: 325 TABLET, FILM COATED ORAL at 04:29

## 2024-01-31 RX ADMIN — HYDROXYZINE HYDROCHLORIDE 25 MG: 25 TABLET, FILM COATED ORAL at 22:24

## 2024-01-31 RX ADMIN — OXYCODONE HYDROCHLORIDE 10 MG: 5 TABLET ORAL at 12:39

## 2024-01-31 RX ADMIN — BUPROPION HYDROCHLORIDE 150 MG: 150 TABLET, EXTENDED RELEASE ORAL at 21:23

## 2024-01-31 RX ADMIN — METHADONE HYDROCHLORIDE 10 MG: 10 TABLET ORAL at 21:23

## 2024-01-31 RX ADMIN — GABAPENTIN 900 MG: 300 CAPSULE ORAL at 20:34

## 2024-01-31 RX ADMIN — LEVOTHYROXINE SODIUM 50 MCG: 50 TABLET ORAL at 21:23

## 2024-01-31 RX ADMIN — ASPIRIN 81 MG: 81 TABLET ORAL at 20:34

## 2024-01-31 RX ADMIN — CEFAZOLIN SODIUM 2 G: 2 INJECTION, SOLUTION INTRAVENOUS at 04:28

## 2024-01-31 RX ADMIN — PANTOPRAZOLE SODIUM 40 MG: 40 TABLET, DELAYED RELEASE ORAL at 09:14

## 2024-01-31 RX ADMIN — GABAPENTIN 900 MG: 300 CAPSULE ORAL at 14:21

## 2024-01-31 RX ADMIN — HYDROMORPHONE HYDROCHLORIDE 0.5 MG: 1 INJECTION, SOLUTION INTRAMUSCULAR; INTRAVENOUS; SUBCUTANEOUS at 20:41

## 2024-01-31 RX ADMIN — POLYETHYLENE GLYCOL 3350 17 G: 17 POWDER, FOR SOLUTION ORAL at 09:15

## 2024-01-31 RX ADMIN — BACLOFEN 10 MG: 10 TABLET ORAL at 09:14

## 2024-01-31 RX ADMIN — QUETIAPINE FUMARATE 50 MG: 50 TABLET ORAL at 21:23

## 2024-01-31 RX ADMIN — FAMOTIDINE 20 MG: 20 TABLET ORAL at 21:23

## 2024-01-31 RX ADMIN — GABAPENTIN 900 MG: 300 CAPSULE ORAL at 09:14

## 2024-01-31 RX ADMIN — ACETAMINOPHEN 975 MG: 325 TABLET, FILM COATED ORAL at 22:24

## 2024-01-31 RX ADMIN — METHOCARBAMOL 750 MG: 750 TABLET ORAL at 20:34

## 2024-01-31 RX ADMIN — SENNOSIDES AND DOCUSATE SODIUM 1 TABLET: 8.6; 5 TABLET ORAL at 20:34

## 2024-01-31 RX ADMIN — OXYCODONE HYDROCHLORIDE 10 MG: 5 TABLET ORAL at 04:28

## 2024-01-31 RX ADMIN — OXYCODONE HYDROCHLORIDE 10 MG: 5 TABLET ORAL at 00:24

## 2024-01-31 RX ADMIN — OXYCODONE HYDROCHLORIDE 10 MG: 5 TABLET ORAL at 08:50

## 2024-01-31 RX ADMIN — HYDROMORPHONE HYDROCHLORIDE 0.5 MG: 1 INJECTION, SOLUTION INTRAMUSCULAR; INTRAVENOUS; SUBCUTANEOUS at 17:33

## 2024-01-31 RX ADMIN — OXYCODONE HYDROCHLORIDE 10 MG: 5 TABLET ORAL at 16:59

## 2024-01-31 RX ADMIN — AMITRIPTYLINE HYDROCHLORIDE 50 MG: 50 TABLET, FILM COATED ORAL at 21:22

## 2024-01-31 RX ADMIN — HYDROMORPHONE HYDROCHLORIDE 0.5 MG: 1 INJECTION, SOLUTION INTRAMUSCULAR; INTRAVENOUS; SUBCUTANEOUS at 23:00

## 2024-01-31 RX ADMIN — SENNOSIDES AND DOCUSATE SODIUM 1 TABLET: 8.6; 5 TABLET ORAL at 09:15

## 2024-01-31 RX ADMIN — CEFAZOLIN SODIUM 2 G: 2 INJECTION, SOLUTION INTRAVENOUS at 21:22

## 2024-01-31 RX ADMIN — ASPIRIN 81 MG: 81 TABLET ORAL at 09:14

## 2024-01-31 RX ADMIN — HYDROMORPHONE HYDROCHLORIDE 0.5 MG: 1 INJECTION, SOLUTION INTRAMUSCULAR; INTRAVENOUS; SUBCUTANEOUS at 10:54

## 2024-01-31 ASSESSMENT — ACTIVITIES OF DAILY LIVING (ADL)
ADLS_ACUITY_SCORE: 26

## 2024-01-31 NOTE — CONSULTS
"Pain Service Consultation Note  [facility]      Patient Name: Alethea Patel  MRN: [id number]   Age: 43 year old  Sex: female  Date: January 31, 2024                                      Reviewed: Yes    Referring Provider:  Eran Corea MD  Referring Service:  medicine  Reason for Consultation: \"Post op pain managent in patient with opioid hx and ongoing methadone use \"    Assessment/Recommendations:  Alethea Patel is a 43 year old female who has PMH of  previous TKA w/ complications of prosthetic joint, chronic pain w/ long term opioid and methadone use, acute lymphocytic leukemia in remission, hypothyroidism, reactive airway disease admitted on 1/30/2024. She is s/p L revision TKA 1/30/24.     Pain service consulted to assist with pain management as she has been on chronic opioid for years.    Alethea reports pain on left knee as she feels nerve block is wearing off.  Pain level is 6-8/10.  Is using oxycodone 10mg with benefits as it reduces acute pain to baseline level of 6/10.  [facility].  On methadone 10mg at bedtime and oxycodone 25-30mg/day which she has worked hard to use lower dosage for the past few years as she was on much higher in the past.    Reviewed current pain medication regiment that she has available to use.  Indications, risks and benefits reviewed.  Encourage to use less as able.  She understands that goal is to eventually taper off acute pain dose and return to [facility] opioid dosing [facility].  She is agreeable with plan.     Plan:   Acetaminophen 975mg PO 8 hours  Robaxin 750mg PO Q 6 hours PRN (note she is okay to use this while inpatient, but outpatient was using tizanidine 4mg 4x/day-discussed risks and benefits of tizanidine)  Oxycodone 5-10mg PO Q 4 hours PRN    IV Dilaudid 0.2-0.5mg IV Q 2 hours PRN.  Use oral oxycodone first.  Reserve for rescue " only.   -plan on discontinue IV on 2/1/24  Gabapentin 300mg PO TID  Lidocaine patches 1-3 patches Q 24 hours, 12 hours on and 12 hours off  Menthol patches, 1 patch TD Q 8 hours  Bowel regimen     Thank you for the opportunity to participate in the care of Alethea CORAL Patel  Pain Service will continue to follow.    Discussed with attending anesthesiologist  Primary Service Contacted with Recommendations? Yes    Jennifer Winn PA-C  1/31/2024      Per [place]  [place] pulled from [place] on 01/31/24.     01/05/2024 12/05/2023 1 Gabapentin 300 Mg Capsule 270.00 30 Va Les [id number] Sup (6481) 1/3  [place]   01/03/2024 12/26/2023 1 Methadone Hcl 5 Mg Tablet 60.00 30 Va Les [id number] Sup (6481) 0/0 30.00 [place]   01/03/2024 12/26/2023 1 Oxycodone Hcl (Ir) 5 Mg Tablet 180.00 30 Va Les [id number] Sup (6481) 0/0 45.00 [place]   12/05/2023 12/05/2023 1 Gabapentin 300 Mg Capsule 270.00 30 [place] Les [id number] Sup (6481) 0/3  [place]   12/01/2023 11/28/2023 1 Methadone Hcl 5 Mg Tablet 60.00 30 [place] Les [id number] Sup (6481) 0/0 30.[place]   12/01/2023 11/28/2023 1 Oxycodone Hcl (Ir) 5 Mg Tablet 180.00 30 Va Les [id number] Sup (6481) 0/0 45.[place]   11/14/2023 07/21/2023 1 Gabapentin 300 Mg Capsule 270.00 30 Al And [id number] Sup (6481) 3/3  [place]   11/01/2023 10/30/2023 1 Methadone Hcl 5 Mg Tablet 60.00 30 Va Les [id number] Sup (6481) 0/0 30.[place]   11/01/2023 10/30/2023 1 Oxycodone Hcl (Ir) 5 Mg Tablet 180.00 30 Va Les [id number] Sup (6481) 0/0          Pain Medications/Prescriber: [facility]    Past Medical History:  Past Medical History:   Diagnosis Date    Acute lymphoblastic leukemia in remission (H)     Anxiety     Chest pain     Chronic osteoarthritis     Degenerative joint disease     Depression     Depressive disorder     Gastro-oesophageal reflux disease     Hypothyroidism     IBS (irritable bowel syndrome)     Osteonecrosis (H)     Osteonecrosis (H)     Thyroid disease           Family History:    Family History   Problem Relation Age of Onset    Migraines Son     Soft Tissue Cancer Brother     Cancer Brother     Other Cancer Brother     Low Back Problems Mother     Thyroid Disease Mother     Obesity Mother     Deep Vein Thrombosis Mother        Social History:  Social History     Tobacco Use    Smoking status: Every Day     Packs/day: 1.00     Years: 10.00     Additional pack years: 0.00     Total pack years: 10.00     Types: Cigarettes     Start date: 5/29/1997    Smokeless tobacco: Never   Substance Use Topics    Alcohol use: Not Currently         Review of Systems:  Complete ROS reviewed. Unless otherwise noted, all other systems found to be negative.        Laboratory Results:  Recent Labs   Lab Test 07/12/18  1627   INR 0.95      BUN 6*       Allergies:  Allergies   Allergen Reactions    Chantix [Varenicline] Nausea and Vomiting     Other reaction(s): Vomiting    No Clinical Screening - See Comments      Other reaction(s): Unknown Reaction    Seasonal Allergies     Ciprofloxacin Other (See Comments)     Interacts with other medication  Other reaction(s): Dizziness, passes out-not sure if it was the cipro or not      Latex Itching and Rash     Other reaction(s): Other - Describe In Comment Field  Vaginal itching, burning  Other reaction(s): Intolerance         Current Pain Related Medications:  Medications related to Pain Management (From now, onward)      Start     Dose/Rate Route Frequency Ordered Stop    02/02/24 0000  acetaminophen (TYLENOL) tablet 650 mg         650 mg Oral EVERY 4 HOURS PRN 01/30/24 1342      01/31/24 0800  polyethylene glycol (MIRALAX) Packet 17 g         17 g Oral DAILY 01/30/24 1503      01/30/24 2200  amitriptyline (ELAVIL) tablet 50 mg        Note to Pharmacy: PTA Sig:Take 50 mg by mouth at bedtime      50 mg Oral AT BEDTIME 01/30/24 1339      01/30/24 2200  methadone (DOLOPHINE) tablet 10 mg         10 mg Oral AT BEDTIME 01/30/24 1339       01/30/24 2000  senna-docusate (SENOKOT-S/PERICOLACE) 8.6-50 MG per tablet 1 tablet         1 tablet Oral 2 TIMES DAILY 01/30/24 1503      01/30/24 2000  aspirin EC tablet 81 mg         81 mg Oral 2 TIMES DAILY 01/30/24 1503      01/30/24 1503  magnesium hydroxide (MILK OF MAGNESIA) suspension 30 mL         30 mL Oral DAILY PRN 01/30/24 1503      01/30/24 1503  bisacodyl (DULCOLAX) suppository 10 mg         10 mg Rectal DAILY PRN 01/30/24 1503      01/30/24 1503  lidocaine 1 % 0.1-1 mL         0.1-1 mL Other EVERY 1 HOUR PRN 01/30/24 1503      01/30/24 1503  lidocaine (LMX4) cream          Topical EVERY 1 HOUR PRN 01/30/24 1503      01/30/24 1400  acetaminophen (TYLENOL) tablet 975 mg         975 mg Oral EVERY 8 HOURS 01/30/24 1342 02/02/24 1359    01/30/24 1400  baclofen (LIORESAL) tablet 10 mg         10 mg Oral DAILY 01/30/24 1339      01/30/24 1400  gabapentin (NEURONTIN) capsule 900 mg        Note to Pharmacy: PTA Sig:Take 900 mg by mouth 3 times daily       900 mg Oral 3 TIMES DAILY 01/30/24 1339      01/30/24 1342  HYDROmorphone (PF) (DILAUDID) injection 0.2 mg        See Hyperspace for full Linked Orders Report.    0.2 mg Intravenous EVERY 2 HOURS PRN 01/30/24 1342      01/30/24 1342  HYDROmorphone (PF) (DILAUDID) injection 0.5 mg        See Hyperspace for full Linked Orders Report.    0.5 mg Intravenous EVERY 2 HOURS PRN 01/30/24 1342      01/30/24 1342  oxyCODONE (ROXICODONE) tablet 5 mg        See Hyperspace for full Linked Orders Report.    5 mg Oral EVERY 4 HOURS PRN 01/30/24 1342      01/30/24 1342  oxyCODONE (ROXICODONE) tablet 10 mg        See Hyperspace for full Linked Orders Report.    10 mg Oral EVERY 4 HOURS PRN 01/30/24 1342      01/30/24 1342  methocarbamol (ROBAXIN) tablet 750 mg         750 mg Oral EVERY 6 HOURS PRN 01/30/24 1342      01/30/24 1342  hydrOXYzine HCl (ATARAX) tablet 25 mg         25 mg Oral EVERY 6 HOURS PRN 01/30/24 1342                Physical Exam:  Vitals: /74 (BP  "Location: Left arm, Patient Position: Fowlers)   Pulse 102   Temp 97.6  F (36.4  C) (Oral)   Resp 14   Ht 1.778 m (5' 10\")   Wt 89.4 kg (197 lb 1.5 oz)   LMP 01/22/2024   SpO2 90%   BMI 28.28 kg/m      Physical Exam:     CONSTITUTIONAL/GENERAL APPEARANCE:  NAD. Sitting up , eating breakfast  EYES: EOMI, sclera anicteric, PERRLA  ENT/NECK: atraumatic, lips and oral mucous membranes dry  RESPIRATORY: non-labored breathing. No cough, wheeze  CV: HR within normal limits  ABDOMEN: Soft, non-tender, non-distended  MUSCULOSKELETAL/BACK/SPINE/EXTREMITIES: Moves all extremities purposefully.  No edema or obvious joint deformities   NEURO: Alert and Oriented x3. Answers questions appropriately  SKIN/VASCULAR EXAM:  No jaundice, warm, dry.  Healed incision on right knee.  Left knee dressing-intact            Acute Inpatient Pain Service [facility]  Hours of pain coverage 24/7   Page via Amcom- Please Page the Pain Team Via Amcom: \"PAIN MANAGEMENT ACUTE INPATIENT/ [facility]\"           "

## 2024-01-31 NOTE — PLAN OF CARE
"Patient A/Ox4. VSS. Denies CP, SOB, dizziness/LH. LSCTA. +fl/BS. Voiding very well in commode. CMS intact, pt says she has some numbness and tingling in just her toes. Dressing to leg CDI, ice applied occasionally. Tolerating regular diet without NV. Activity level is decent, pt only pivoting to commode with SBA but said she's going to walk today. IV infusing fluids between antibiotics. Pain rated as tolerable throughout shift, managed with several scheduled meds and PRN oxycodone and robaxin. Pt says she takes tizanidine too, but did not see this in pt's chart. Unclear dispo, pt says she will \"see what happens today.\" Patient has demonstrated ability to call appropriately. Patient is resting with call light within reach. Will continue to monitor.  "

## 2024-01-31 NOTE — PROGRESS NOTES
"Orthopaedic Surgery Progress Note 01/31/2024    S: No acute events overnight.  Pain well controlled on current pain regimen. Tolerating diet.  Ambulating to bathroom. Questions answered. POC reviewed.    O:  Temp: 98.3  F (36.8  C) Temp src: Oral BP: 137/82 Pulse: 99   Resp: 14 SpO2: 90 % O2 Device: None (Room air) Oxygen Delivery: 1.5 LPM    Exam:  Gen: No acute distress, resting comfortably in bed.  Resp: Non-labored breathing  MSK:  LLE:  - Dressings c/d/i  - SILT femoral/tibial/sural/saphenous/DP/SP nerves  - Fires Quad, TA, EHL, FHL, GaSC  - PT/DP pulses 2+, foot wwp    Output by Drain (mL) 01/29/24 0700 - 01/29/24 1459 01/29/24 1500 - 01/29/24 2259 01/29/24 2300 - 01/30/24 0659 01/30/24 0700 - 01/30/24 1459 01/30/24 1500 - 01/30/24 2259 01/30/24 2300 - 01/31/24 0659 01/31/24 0700 - 01/31/24 0708   Closed/Suction Drain Left;Ventral Thigh Bulb 15 Burmese    70  165          No lab results found in last 7 days.    Invalid input(s): \"SEDRATE\"    Culture results: pending    Assessment/Plan: Alethea Patel is a 43 year old female with a complex orthopedic history related to her multi-focal osteonecrosis who presented with failure of her left TKA now s/p revision TKA of both the femoral and tibial components on 1/30/24 with Dr. Saleem.      Ortho Primary, Medicine co-management  Activity: Up with assist.  Weight bearing status: WBAT LLE  Antibiotics: Ancef x 72 hours (until surgical cultures result).  Diet: Begin with clear fluids and progress diet as tolerated.  DVT prophylaxis:  starting POD1 x 4 weeks  Elevation: Elevate LLE as much as possible.  Pulse oximeter to toe on left foot x 24hrs post op to monitor vascular status (vascular injury intraop s/p repair)  Wound Care: keep dressing in place until POD7  Drains: Document output per shift, discontinue when <30cc/shift.  Pain management: Transition from IV to orals as tolerated.   X-rays: PACU    PT/OT: eval and treat  Labs: Hgb POD1  Cultures: Pending, " follow culture results closely.  Consults: PT, OT. Medicine, pain management, appreciate assistance in caring for this patient.  Follow-up: Clinic with Dr. Saleem care team in 4 weeksneeded.         Future Appointments   Date Time Provider Department Center   1/31/2024  9:30 AM Kristine Tracy, PT [facility] [place]   1/31/2024 11:15 AM Carmelina Dow, OT [facility] [place]   1/31/2024  1:30 PM UR PT WAITLIST [facility] [place]   3/1/2024  2:40 PM Jacinda Kou PA-C [facility]         Disposition: Pending progress with therapies, pain control on orals, and medical stability, anticipate discharge to home vs TCU on POD #2-4.     Eran Corea MD  Orthopaedic Surgery, PGY-4  Pager: [phone]

## 2024-01-31 NOTE — PROGRESS NOTES
[facility]    Medicine Progress Note - Hospitalist Service, GOLD TEAM 18    Date of Admission:  1/30/2024    Assessment & Plan   Alethea Patel is a 43 year old female w/ a PMH of previous TKA w/ complications of prosthetic joint, chronic pain w/ long term opioid and methadone use, acute lymphocytic leukemia in remission, hypothyroidism, reactive airway disease admitted on 1/30/2024. She is s/p L revision TKA 1/30/24. Medicine was consulted for general co-management.     Complication of prosthetic joint s/p L revision TKA 1/30/24 by Dr. Saleem:  Hx of multifocal osteonecrosis of L knee s/p TKA (9 years ago)   Pain well-controlled.  ANAHY drain remains in place.  - Pain management per primary team  - scheduled bowel regimen     ABLA of Surgery:  Preoperative hemoglobin, MCV normal 1/25/2024.  Hemoglobin 8.4 on 1/31/2024.  ANAHY drain remains in place.  -Hemoglobin 2/1    Tachycardia, mild:  Noted postoperatively, improving despite expected postoperative anemia.  Blood pressure remains normal, euvolemic on exam.  Eating and drinking well now.  - CTM    Chronic pain, Long term opioid use:  [facility]. On PTA oxycodone 5mg 1-2 tabs Q4-6H PRN and methadone 10mg BID. Also uses tizanidine, gabapentin, baclofen, amitriptyline. [facility] aware she is having TKA.  - Pain management per primary team   -- Agree w/ continuing methadone and scheduled bowel regimen    Tobacco use:  No break from smoking prior to surgery. Would like to try a patch, declined gum and lozenges.   - Nicotine patch    ALL, in remission:   Diagnosed in 2003, s/p 3 years chemo therapy.     Hypothyroidism:   TSH 2.32 (WNL) on 6/5/23.   -Continue PTA levothyroxine 50mcg at bedtime.    Reactive airway disease:   No active cough or wheezing.  Lungs CTA, oxygenating adequately on room air.  -Incentive spirometry, encourage mobilization from bed  -PTA montelukast.   -Has PTA PRN albuterol inhaler  "available, doesn't use often. Can add on PRN albuterol if needed this stay.     GERD:   Compensated.  Eating and drinking well.  -Continue PTA omeprazole & famotidine    Depression, Anxiety:   Compensated on current regimen.  -PTA amitriptyline, bupropion, hydroxyzine PRN, quetiapine          Diet: Advance Diet as Tolerated: Regular Diet Adult    DVT Prophylaxis: ASA 81 mg bid per orthopedics   Jordan Catheter: Not present  Lines: None     Cardiac Monitoring: None  Code Status: Full Code      Clinically Significant Risk Factors                         # Overweight: Estimated body mass index is 28.28 kg/m  as calculated from the following:    Height as of this encounter: 1.778 m (5' 10\").    Weight as of this encounter: 89.4 kg (197 lb 1.5 oz)., PRESENT ON ADMISSION            Disposition Plan     Expected Discharge Date: 02/01/2024                    Lino Waite MD  Hospitalist Service, GOLD TEAM 18  M [facility]  Securely message with [employer] (more info)  Text page via [facility] Paging/Directory   See signed in provider for up to date coverage information  ______________________________________________________________________    Interval History   Doing \"great\".  Pain well-controlled.  Eating well.  Passing flatus.  Denies any dysuria or urgency, emptying well.  No fevers or chills.  Denies any cough or dyspnea, no wheezing.    Physical Exam   Vital Signs: Temp: 97.6  F (36.4  C) Temp src: Oral BP: 123/74 Pulse: 102     SpO2: 90 % O2 Device: None (Room air)    Weight: 197 lbs 1.46 oz  General: Alert and oriented x 4, very pleasant and cheerful.  HEENT: OP moist and clear  Chest: CTA bilaterally.  No wheezes.  CV: RRR.  No murmurs  Abdomen: NABS.  Soft, nontender, nondistended.  Extremities: No edema.  Left knee wrapped, ANAHY drain in place.  Neuro: Nonfocal      40 MINUTES SPENT BY ME on the date of service doing chart review, history, exam, documentation & further " "activities per the note.      Data      CMPNo lab results found in last 7 days.  CBC  Recent Labs   Lab 01/31/24  0745   HGB 8.4*     INRNo lab results found in last 7 days.  Arterial Blood GasNo lab results found in last 7 days.Venous Blood Gas  No lab results found in last 7 days.No results found for: \"A1C\", \"HEMOGLOBINA1\"  Results for orders placed or performed during the hospital encounter of 01/30/24   XR Knee Port Left 1/2 Views    Narrative    EXAM: XR KNEE PORT LEFT 1/2 VIEWS  LOCATION: [facility]  DATE: 1/30/2024    INDICATION: Left total knee revision.  COMPARISON: 1/11/2014      Impression    IMPRESSION: A single portable AP supine radiograph of the left knee intraoperative obtained during placement of a revision left total knee arthroplasty with new long-stemmed customized proximal left tibial component. Expected intraoperative soft tissue   and intra-articular gas. Pre-existing bone infarct in the proximal tibial metaphysis. Likely bone infarct in the distal femoral metaphysis medial. No visible fracture.   XR Knee Port Left 1/2 Views    Narrative    EXAM: XR KNEE PORT LEFT 1/2 VIEWS  LOCATION: [facility]  DATE: 1/30/2024    INDICATION: intra op left total knee arthroplasty  COMPARISON: None.      Impression    IMPRESSION: Single view of the knee shows changes of total knee arthroplasty. Components appear well seated.   XR Knee Port Left 1/2 Views    Narrative    EXAM: XR KNEE PORT LEFT 1/2 VIEWS  LOCATION: [facility]  DATE: 1/30/2024    INDICATION: Post Op Total Knee  COMPARISON: None.      Impression    IMPRESSION: Postop changes of a left total knee arthroplasty with longstem femoral and tibial components with patellar resurfacing. No fracture. Surgical drain in place.              "

## 2024-01-31 NOTE — PHARMACY-ADMISSION MEDICATION HISTORY
Pharmacist Admission Medication History    Admission medication history is complete. The information provided in this note is only as accurate as the sources available at the time of the update.    Information Source(s): Patient and CareEverywhere/SureScripts via phone    Changes made to PTA medication list:  Added: Vit D  Deleted: B complex, cyclobenzaprine, hydroxyzine, lorazepam, warfarin  Changed: Updated dose of the following: diphenhydramine, ibuprofen, melatonin, quetiapine, tizanidine, prochlorperazine    Allergies reviewed with patient and updates made in EHR: no    Medication History Completed By: Jacquelyn Yoon [facility] 1/31/2024 11:11 AM    PTA Med List   Medication Sig Last Dose    albuterol (PROAIR HFA/PROVENTIL HFA/VENTOLIN HFA) 108 (90 BASE) MCG/ACT Inhaler Inhale 1-2 puffs into the lungs every 6 hours as needed for shortness of breath or wheezing (during allergy season) More than a month    amitriptyline (ELAVIL) 50 MG tablet Take 50 mg by mouth at bedtime 1/29/2024    baclofen (LIORESAL) 10 MG tablet Take 10 mg by mouth daily 1/29/2024    buPROPion (WELLBUTRIN SR) 150 MG 12 hr tablet Take 150 mg by mouth at bedtime 1/29/2024    diphenhydrAMINE (BENADRYL) 25 MG tablet Take  mg by mouth at bedtime     famotidine (PEPCID) 20 MG tablet Take 1 tablet by mouth at bedtime 1/29/2024    ferrous sulfate (FEROSUL) 325 (65 Fe) MG tablet Take 325 mg by mouth daily (with breakfast)     gabapentin (NEURONTIN) 300 MG capsule Take 900 mg by mouth 3 times daily  1/30/2024 at 0645    ibuprofen (ADVIL/MOTRIN) 200 MG tablet Take 600-800 mg by mouth every 8 hours as needed for pain     levothyroxine (SYNTHROID/LEVOTHROID) 50 MCG tablet Take 50 mcg by mouth at bedtime 1/29/2024    Melatonin 10 MG TABS tablet Take 10 mg by mouth at bedtime 1/29/2024    methadone (DOLOPHINE) 5 MG tablet Take 10 mg by mouth at bedtime 1/29/2024    omeprazole (PRILOSEC OTC) 20 MG tablet Take 20 mg by mouth at bedtime 1/29/2024     oxyCODONE (ROXICODONE) 5 MG tablet Take 1 tablet (5 mg) by mouth four times a day. Also takes 2 tablets (10 mg) by mouth for an additional mid-day dose. (Max of 6 tablets/day) 1/30/2024 at 0345    prochlorperazine (COMPAZINE) 10 MG tablet Take 20 mg by mouth at bedtime     QUEtiapine (SEROQUEL) 25 MG tablet Take 50 mg by mouth at bedtime     senna-docusate (SENOKOT-S;PERICOLACE) 8.6-50 MG per tablet Take 2 tablets by mouth 2 times daily     tiZANidine (ZANAFLEX) 2 MG tablet Take 4 mg by mouth 4 times daily 1/30/2024 at 0345    Vitamin D3 (VITAMIN D, CHOLECALCIFEROL,) 25 mcg (1000 units) tablet Take 1 tablet by mouth daily

## 2024-01-31 NOTE — PROGRESS NOTES
"  VS: /74 (BP Location: Left arm, Patient Position: Fowlers)   Pulse 102   Temp 97.6  F (36.4  C) (Oral)   Resp 14   Ht 1.778 m (5' 10\")   Wt 89.4 kg (197 lb 1.5 oz)   LMP 01/22/2024   SpO2 90%   BMI 28.28 kg/m      O2: RA   Output: Voids in BSC or toilet   Last BM: 1/29   Activity: SBA with gait belt & walker   Skin: Intact except for L knee incision   Pain: Complains of intermittent pain in L knee   CMS: Intact, denies numbness or tingling   Dressing: L knee dressing, CDI   Diet: reg   LDA: PIV in RFA   Equipment: Gait belt & walker   Plan: Discharge home in 1-2 days   Additional Info:       "

## 2024-01-31 NOTE — PROGRESS NOTES
"   01/31/24 1000   Appointment Info   Signing Clinician's Name / Credentials (PT) Kristine Tracy DPT   Living Environment   People in Home spouse;other (see comments)  (roommate)   Current Living Arrangements house   Home Accessibility stairs to enter home;stairs within home   Number of Stairs, Main Entrance 2   Stair Railings, Main Entrance none   Number of Stairs, Within Home, Primary seven   Stair Railings, Within Home, Primary railing on right side (ascending)   Transportation Anticipated family or friend will provide   Living Environment Comments tub/shower with shower chair, able to stay on main floor once inside split entry   Self-Care   Usual Activity Tolerance good   Current Activity Tolerance moderate   Regular Exercise No   Equipment Currently Used at Home crutches;shower chair;commode chair;walker, rolling   Fall history within last six months no   Activity/Exercise/Self-Care Comment IND at baseline with all cares and mobility, was using crutcches just prior to surgery 2/2 pain. Works at [employer]. Pt has 24/7 support at home from spouse and roomate if needed.   General Information   Onset of Illness/Injury or Date of Surgery 01/30/24   Referring Physician Dr. Saleem   Pertinent History of Current Problem (include personal factors and/or comorbidities that impact the POC) per chart review, \"jeremiah Patel is a 43 year old female w/ a PMH of previous TKA w/ complications of prosthetic joint, chronic pain w/ long term opioid and methadone use, acute lymphocytic leukemia in remission, hypothyroidism, reactive airway disease admitted on 1/30/2024. She is s/p L revision TKA 1/30/24\"   Existing Precautions/Restrictions no known precautions/restrictions   Weight-Bearing Status - LLE weight-bearing as tolerated   Cognition   Affect/Mental Status (Cognition) WNL   Orientation Status (Cognition) oriented x 4   Follows Commands (Cognition) WNL   Pain Assessment   Patient Currently in Pain Yes, see Vital Sign " flowsheet   Integumentary/Edema   Integumentary/Edema no deficits were identifed   Posture    Posture Not impaired   Range of Motion (ROM)   ROM Comment toelrates 5-90 AROM, 0-110 PROM of L knee   Strength (Manual Muscle Testing)   Strength Comments pronounced quad weakness still, unable to perform SAQ or SLR without assist, fair quad set. All other motions in L LE WFL, R LE strong 5/5   Bed Mobility   Comment, (Bed Mobility) IND, uses UEs to assist with L LE   Transfers   Comment, (Transfers) Van STS to FWW   Gait/Stairs (Locomotion)   Comment, (Gait/Stairs) pt ambulates in room with FWW, initially little WBing through  L LE, mid-foot strike, heavy UE reliance but quite steady, compensates well   Balance   Balance no deficits were identified   Sensory Examination   Sensory Perception patient reports no sensory changes   Clinical Impression   Criteria for Skilled Therapeutic Intervention Yes, treatment indicated   PT Diagnosis (PT) impaired functional mobility   Influenced by the following impairments L LE post op weakness, limited ROM, pain   Functional limitations due to impairments tranfers, gait, stairs   Clinical Presentation (PT Evaluation Complexity) stable   Clinical Presentation Rationale PMH, clinician impression   Clinical Decision Making (Complexity) low complexity   Planned Therapy Interventions (PT) gait training;home exercise program;patient/family education;stair training;ROM (range of motion);strengthening;progressive activity/exercise;risk factor education;home program guidelines   Risk & Benefits of therapy have been explained evaluation/treatment results reviewed;care plan/treatment goals reviewed;risks/benefits reviewed;current/potential barriers reviewed;participants voiced agreement with care plan;participants included;patient   Clinical Impression Comments mobilizing well POD 1, excellent rehab prognosis   PT Total Evaluation Time   PT Eval, Low Complexity Minutes (83831) 8   Physical  Therapy Goals   PT Frequency 2x/day   PT Predicted Duration/Target Date for Goal Attainment 02/02/24   PT Goals Transfers;Gait;Stairs;PT Goal 1   PT: Transfers Modified independent;Sit to/from stand;Bed to/from chair;Assistive device   PT: Gait Modified independent;Rolling walker;Greater than 200 feet   PT: Stairs Supervision/stand-by assist;7 stairs;Assistive device;Rail on right  (+ 2 IMELDA no rail (R wall ,has crutches))   PT: Goal 1 pt will demo IND with TKA HEP to progress strength and ROM at discharge   Interventions   Interventions Quick Adds Gait Training;Therapeutic Activity;Therapeutic Procedure   Therapeutic Procedure/Exercise   Ther. Procedure: strength, endurance, ROM, flexibillity Minutes (02202) 9   Symptoms Noted During/After Treatment increased pain   Treatment Detail/Skilled Intervention instructed through TKA exercises to progress strength and ROM- x10 each ankle pumps, quad set, glute set, hamstring set, heel slide to ~90 deg, AAROM needed for SAQ and SLR, unable to lift without assist.   Therapeutic Activity   Therapeutic Activities: dynamic activities to improve functional performance Minutes (37399) 8   Treatment Detail/Skilled Intervention pt inquiring about CPM, has previously, educated that is surgeon dependent but generally research no longer supports efficacy. Best practice pt to be deligent with exercises, she is understandig. Does not yet have OP PT lined up, wants to go outside [facility], educated on schedule process for this and encouraged to try to get in next week. Discussed DME/ADL at home to screen for OT needs. Educated on easing burden of LB dressing with slippers and loose pants. Pt demos safe toileting use during session with FWW, steady.   Gait Training   Gait Training Minutes (31338) 9   Treatment Detail/Skilled Intervention gait training in lau with FWW SBA>supervision, mild quad wekness noted as pt requires heavy UE support, impaired heel strike and reduce TKE. VC for quad/glute  activation in stance, improves ~50% throughout. Stairs deferred this am due to quad weakness, agreeable to trial this pm.   Distance in Feet x200'   PT Discharge Planning   PT Plan stairs, quad activation, review HEP   PT Discharge Recommendation (DC Rec) home with assist;home with outpatient physical therapy   PT Rationale for DC Rec mobilizing well with FWW, supervision up in lau. Quad still weak, needs to do stairs this pm.   Total Session Time   Timed Code Treatment Minutes 26   Total Session Time (sum of timed and untimed services) 34

## 2024-01-31 NOTE — PLAN OF CARE
Occupational Therapy: Orders received. Chart reviewed and discussed with care team.? Occupational Therapy not indicated due to pt moving well and able to complete basic ADL mod I. Pt has all needed AE at home, assist for IADL as needed.? Defer discharge recommendations to ortho.? Will complete orders.

## 2024-01-31 NOTE — PROGRESS NOTES
Orthopaedic Post-op Check    S:  Doing well post-operatively.  Reports pain is under good control with minimal nausea/vomiting.  No new numbness/tingling    O:    General:  Pain is well controlled, no acute distress  Resp:  Breathing normally on room air  MSK:  Dressing C/D/I  Motor: Fires tibialis anterior, gastroc/soleus, EHL, FHL  Sensory: SILT to superficial peroneal, deep peroneal, saphenous, sural, and tibial nerve distribution  Cardiovascular: palpable DP and TP, foot warm and well perfused    A/P:    Alethea CORAL Jorge is a 43 year old female who is POD#0 status-post revision L TKA with Dr. Saleem.  Doing well in the immediate post-op period.    Continue cares as ordered.    Eran Corea MD  Orthopaedic Surgery, PGY-4

## 2024-02-01 ENCOUNTER — APPOINTMENT (OUTPATIENT)
Dept: PHYSICAL THERAPY | Facility: CLINIC | Age: 44
DRG: 467 | End: 2024-02-01
Attending: ORTHOPAEDIC SURGERY
Payer: COMMERCIAL

## 2024-02-01 LAB — HGB BLD-MCNC: 8.5 G/DL (ref 11.7–15.7)

## 2024-02-01 PROCEDURE — 99232 SBSQ HOSP IP/OBS MODERATE 35: CPT | Performed by: PHYSICIAN ASSISTANT

## 2024-02-01 PROCEDURE — 250N000013 HC RX MED GY IP 250 OP 250 PS 637: Performed by: INTERNAL MEDICINE

## 2024-02-01 PROCEDURE — 97110 THERAPEUTIC EXERCISES: CPT | Mod: GP | Performed by: REHABILITATION PRACTITIONER

## 2024-02-01 PROCEDURE — 36415 COLL VENOUS BLD VENIPUNCTURE: CPT | Performed by: STUDENT IN AN ORGANIZED HEALTH CARE EDUCATION/TRAINING PROGRAM

## 2024-02-01 PROCEDURE — 97530 THERAPEUTIC ACTIVITIES: CPT | Mod: GP | Performed by: REHABILITATION PRACTITIONER

## 2024-02-01 PROCEDURE — 250N000011 HC RX IP 250 OP 636: Performed by: STUDENT IN AN ORGANIZED HEALTH CARE EDUCATION/TRAINING PROGRAM

## 2024-02-01 PROCEDURE — 97116 GAIT TRAINING THERAPY: CPT | Mod: GP | Performed by: REHABILITATION PRACTITIONER

## 2024-02-01 PROCEDURE — 85018 HEMOGLOBIN: CPT | Performed by: STUDENT IN AN ORGANIZED HEALTH CARE EDUCATION/TRAINING PROGRAM

## 2024-02-01 PROCEDURE — 250N000013 HC RX MED GY IP 250 OP 250 PS 637

## 2024-02-01 PROCEDURE — 99232 SBSQ HOSP IP/OBS MODERATE 35: CPT | Performed by: INTERNAL MEDICINE

## 2024-02-01 PROCEDURE — 120N000002 HC R&B MED SURG/OB UMMC

## 2024-02-01 PROCEDURE — 250N000013 HC RX MED GY IP 250 OP 250 PS 637: Performed by: STUDENT IN AN ORGANIZED HEALTH CARE EDUCATION/TRAINING PROGRAM

## 2024-02-01 RX ADMIN — LEVOTHYROXINE SODIUM 50 MCG: 50 TABLET ORAL at 21:49

## 2024-02-01 RX ADMIN — GABAPENTIN 900 MG: 300 CAPSULE ORAL at 20:18

## 2024-02-01 RX ADMIN — METHOCARBAMOL 750 MG: 750 TABLET ORAL at 02:28

## 2024-02-01 RX ADMIN — OXYCODONE HYDROCHLORIDE 10 MG: 5 TABLET ORAL at 10:48

## 2024-02-01 RX ADMIN — GABAPENTIN 900 MG: 300 CAPSULE ORAL at 14:22

## 2024-02-01 RX ADMIN — AMITRIPTYLINE HYDROCHLORIDE 50 MG: 50 TABLET, FILM COATED ORAL at 21:49

## 2024-02-01 RX ADMIN — OXYCODONE HYDROCHLORIDE 10 MG: 5 TABLET ORAL at 01:18

## 2024-02-01 RX ADMIN — OXYCODONE HYDROCHLORIDE 10 MG: 5 TABLET ORAL at 18:58

## 2024-02-01 RX ADMIN — POLYETHYLENE GLYCOL 3350 17 G: 17 POWDER, FOR SOLUTION ORAL at 08:20

## 2024-02-01 RX ADMIN — HYDROXYZINE HYDROCHLORIDE 25 MG: 25 TABLET, FILM COATED ORAL at 04:30

## 2024-02-01 RX ADMIN — ACETAMINOPHEN 975 MG: 325 TABLET, FILM COATED ORAL at 14:21

## 2024-02-01 RX ADMIN — CEFAZOLIN SODIUM 2 G: 2 INJECTION, SOLUTION INTRAVENOUS at 13:11

## 2024-02-01 RX ADMIN — OXYCODONE HYDROCHLORIDE 10 MG: 5 TABLET ORAL at 14:49

## 2024-02-01 RX ADMIN — SENNOSIDES AND DOCUSATE SODIUM 1 TABLET: 8.6; 5 TABLET ORAL at 08:20

## 2024-02-01 RX ADMIN — GABAPENTIN 900 MG: 300 CAPSULE ORAL at 08:20

## 2024-02-01 RX ADMIN — PANTOPRAZOLE SODIUM 40 MG: 40 TABLET, DELAYED RELEASE ORAL at 08:20

## 2024-02-01 RX ADMIN — METHADONE HYDROCHLORIDE 10 MG: 10 TABLET ORAL at 21:49

## 2024-02-01 RX ADMIN — QUETIAPINE FUMARATE 50 MG: 50 TABLET ORAL at 21:49

## 2024-02-01 RX ADMIN — BUPROPION HYDROCHLORIDE 150 MG: 150 TABLET, EXTENDED RELEASE ORAL at 21:48

## 2024-02-01 RX ADMIN — MONTELUKAST 10 MG: 10 TABLET, FILM COATED ORAL at 21:49

## 2024-02-01 RX ADMIN — HYDROMORPHONE HYDROCHLORIDE 0.5 MG: 1 INJECTION, SOLUTION INTRAMUSCULAR; INTRAVENOUS; SUBCUTANEOUS at 02:25

## 2024-02-01 RX ADMIN — ACETAMINOPHEN 975 MG: 325 TABLET, FILM COATED ORAL at 05:23

## 2024-02-01 RX ADMIN — CEFAZOLIN SODIUM 2 G: 2 INJECTION, SOLUTION INTRAVENOUS at 20:32

## 2024-02-01 RX ADMIN — ASPIRIN 81 MG: 81 TABLET ORAL at 08:20

## 2024-02-01 RX ADMIN — OXYCODONE HYDROCHLORIDE 10 MG: 5 TABLET ORAL at 05:23

## 2024-02-01 RX ADMIN — HYDROMORPHONE HYDROCHLORIDE 0.5 MG: 1 INJECTION, SOLUTION INTRAMUSCULAR; INTRAVENOUS; SUBCUTANEOUS at 21:51

## 2024-02-01 RX ADMIN — HYDROMORPHONE HYDROCHLORIDE 0.5 MG: 1 INJECTION, SOLUTION INTRAMUSCULAR; INTRAVENOUS; SUBCUTANEOUS at 08:21

## 2024-02-01 RX ADMIN — FAMOTIDINE 20 MG: 20 TABLET ORAL at 21:49

## 2024-02-01 RX ADMIN — CEFAZOLIN SODIUM 2 G: 2 INJECTION, SOLUTION INTRAVENOUS at 04:30

## 2024-02-01 RX ADMIN — METHOCARBAMOL 750 MG: 750 TABLET ORAL at 18:10

## 2024-02-01 RX ADMIN — SENNOSIDES AND DOCUSATE SODIUM 1 TABLET: 8.6; 5 TABLET ORAL at 20:18

## 2024-02-01 RX ADMIN — BACLOFEN 10 MG: 10 TABLET ORAL at 08:20

## 2024-02-01 RX ADMIN — NICOTINE 1 PATCH: 14 PATCH, EXTENDED RELEASE TRANSDERMAL at 08:21

## 2024-02-01 RX ADMIN — ACETAMINOPHEN 975 MG: 325 TABLET, FILM COATED ORAL at 21:50

## 2024-02-01 RX ADMIN — ASPIRIN 81 MG: 81 TABLET ORAL at 20:18

## 2024-02-01 ASSESSMENT — ACTIVITIES OF DAILY LIVING (ADL)
ADLS_ACUITY_SCORE: 26

## 2024-02-01 NOTE — PROGRESS NOTES
Per chart review, no home care needs indicated at this time. Therapy recommending outpatient physical therapy. RNCC available as needed.    Maria M Pelayo RN, BSN  Care Coordinator, [facility] Orth[facility]  Phone [phone]  Pager [phone]

## 2024-02-01 NOTE — PLAN OF CARE
Goal Outcome Evaluation:      Plan of Care Reviewed With: patient    Overall Patient Progress: no change     Pt. A&Ox4. VSS. Afebrile. Maintaining sats on RA. Bowel sounds active, LBM 1/29 per pt report. CMS and neuro's are intact. Denies numbness and tingling in all extremities. Denies nausea, shortness of breath, and chest pain. Pt notes increased pain to L knee tonight managed w/ scheduled meds, prn Oxycodone, IV Dilaudid, Hydroxyzine, Robaxin and ice applied. Voids spontaneously without difficulty in the BSC. Tolerating regular diet. LLE incisional dressing is CDI, ACE wrap loosened. ANAHY in place. Pt up with pivot to BSC. PIV is patent and infusing TKO. Bilateral heels are elevated off the bed. Call light is within reach, pt able to make needs known and is resting comfortably between cares.

## 2024-02-01 NOTE — ADDENDUM NOTE
Addendum  created 02/01/24 1021 by Rosalind Bettencourt APRN CRNA    Intraprocedure Meds edited

## 2024-02-01 NOTE — PROGRESS NOTES
"  VS: /71 (BP Location: Left arm)   Pulse 116   Temp 98  F (36.7  C) (Oral)   Resp 18   Ht 1.778 m (5' 10\")   Wt 89.4 kg (197 lb 1.5 oz)   LMP 01/22/2024   SpO2 95%   BMI 28.28 kg/m      O2: RA   Output: Voids in BSC   Last BM: 1/29   Activity: SBA with walker, gait belt   Skin: Intact except for L knee incision   Pain: Complains of pain in L knee   CMS: Intact, denies numbness and tingling   Dressing: L knee dressing, CDI   Diet: Reg   LDA: PIV in RFA   Equipment: Gait belt, walker   Plan: Monitoring ANAHY drain output, will discharge home tomorrow with    Additional Info:       "

## 2024-02-01 NOTE — PROGRESS NOTES
"Pain Service Progress Note  [facility]  Date: 02/01/2024       Patient Name: Alethea Patel  MRN: [id number]  Age: 43 year old  Sex: female      Assessment:  Alethea Patel is a 43 year old female who has PMH of  previous TKA w/ complications of prosthetic joint, chronic pain w/ long term opioid and methadone use, acute lymphocytic leukemia in remission, hypothyroidism, reactive airway disease admitted on 1/30/2024. She is s/p L revision TKA 1/30/24.      Pain service consulted to assist with pain management as she has been on chronic opioid for years. PTA on oxycodone 5mg, 25-30mg/day as prescribed by [facility].    Alethea is seeing sitting with legs dangling over edge of bed.  States pain is okay.  States painful overnight.  Is working with Physical therapy.  Is using oxycodone 10mg every 4 hours or so and a few doses of IV Dilaudid.  Discussed over all pain plan of tapering to baseline as physical healing takes place.  Alethea states that she will try and will in her \"control.\"  Discussed tapering recs will be provided and IV Dilaudid recommended to discontinue 24 hours prior to discharge.    Plan/Recommendations:  Acetaminophen 975mg PO 8 hours  Robaxin 750mg PO Q 6 hours PRN (note she is okay to use this while inpatient, but outpatient was using tizanidine 4mg 4x/day-discussed risks and benefits of tizanidine)  Oxycodone 5-10mg PO Q 4 hours PRN.   To taper, recommend looking at last 24 hours of PO oxycodone use and start at that dose for 1-3days then taper by 1 dose per day every 1-3 days until oxycodone 10mg PO TID (baseline).  Follow up at [facility].         IV Dilaudid 0.2-0.5mg IV Q 2 hours PRN.  Use oral oxycodone first.  Reserve for rescue only.        -plan on discontinue IV Dilaudid  24 hours prior to discharge  Gabapentin 300mg PO TID  Lidocaine patches 1-3 patches Q 24 hours, 12 hours on and 12 hours off  Menthol patches, 1 patch TD Q 8 hours  Bowel " "regimen        Pain Service will sign off.    Discussed with attending anesthesiologist    Jennifer Winn PA-C  02/01/2024     Diet: Advance Diet as Tolerated: Regular Diet Adult    Relevant Labs:  Recent Labs   Lab Test 07/12/18  1627   INR 0.95      BUN 6*       Physical Exam:  Vitals: /85   Pulse 97   Temp 98.1  F (36.7  C)   Resp 18   Ht 1.778 m (5' 10\")   Wt 89.4 kg (197 lb 1.5 oz)   LMP 01/22/2024   SpO2 95%   BMI 28.28 kg/m      Physical Exam:   CONSTITUTIONAL/GENERAL APPEARANCE: Conversant.  EYES: EOMI, sclerae clear  ENT/NECK: neck is supple  RESPIRATORY:non labored breathing, on room air  CARDIOVASCULAR: HR within normal limits  GI:round  MUSCULOSKELETAL/BACK/SPINE/EXTREMITIES: Moving UE and LE independently.  LLE-dressing intact     NEURO:  AAOx3.   SKIN/VASCULAR EXAM:  Dry and warm.  PSYCHIATRIC/BEHAVIORAL/OBSERVATIONS:  No objective signs of pain observed during our interview.   Judgment/Insight -fair   Orientation - x3   Memory -fair   Mood and affect - irritable         Relevant Medications:  Current Pain Medications:  Medications related to Pain Management (From now, onward)      Start     Dose/Rate Route Frequency Ordered Stop    02/02/24 0000  acetaminophen (TYLENOL) tablet 650 mg         650 mg Oral EVERY 4 HOURS PRN 01/30/24 1342      01/31/24 0800  polyethylene glycol (MIRALAX) Packet 17 g         17 g Oral DAILY 01/30/24 1503      01/30/24 2200  amitriptyline (ELAVIL) tablet 50 mg        Note to Pharmacy: PTA Sig:Take 50 mg by mouth at bedtime      50 mg Oral AT BEDTIME 01/30/24 1339      01/30/24 2200  methadone (DOLOPHINE) tablet 10 mg         10 mg Oral AT BEDTIME 01/30/24 1339      01/30/24 2000  senna-docusate (SENOKOT-S/PERICOLACE) 8.6-50 MG per tablet 1 tablet         1 tablet Oral 2 TIMES DAILY 01/30/24 1503      01/30/24 2000  aspirin EC tablet 81 mg         81 mg Oral 2 TIMES DAILY 01/30/24 1503      01/30/24 1503  magnesium hydroxide (MILK OF MAGNESIA) " "suspension 30 mL         30 mL Oral DAILY PRN 01/30/24 1503      01/30/24 1503  bisacodyl (DULCOLAX) suppository 10 mg         10 mg Rectal DAILY PRN 01/30/24 1503      01/30/24 1503  lidocaine 1 % 0.1-1 mL         0.1-1 mL Other EVERY 1 HOUR PRN 01/30/24 1503      01/30/24 1503  lidocaine (LMX4) cream          Topical EVERY 1 HOUR PRN 01/30/24 1503      01/30/24 1400  acetaminophen (TYLENOL) tablet 975 mg         975 mg Oral EVERY 8 HOURS 01/30/24 1342 02/02/24 1359    01/30/24 1400  baclofen (LIORESAL) tablet 10 mg         10 mg Oral DAILY 01/30/24 1339      01/30/24 1400  gabapentin (NEURONTIN) capsule 900 mg        Note to Pharmacy: PTA Sig:Take 900 mg by mouth 3 times daily       900 mg Oral 3 TIMES DAILY 01/30/24 1339      01/30/24 1342  HYDROmorphone (PF) (DILAUDID) injection 0.2 mg        See Hyperspace for full Linked Orders Report.    0.2 mg Intravenous EVERY 2 HOURS PRN 01/30/24 1342      01/30/24 1342  HYDROmorphone (PF) (DILAUDID) injection 0.5 mg        See Hyperspace for full Linked Orders Report.    0.5 mg Intravenous EVERY 2 HOURS PRN 01/30/24 1342      01/30/24 1342  oxyCODONE (ROXICODONE) tablet 5 mg        See Hyperspace for full Linked Orders Report.    5 mg Oral EVERY 4 HOURS PRN 01/30/24 1342      01/30/24 1342  oxyCODONE (ROXICODONE) tablet 10 mg        See Hyperspace for full Linked Orders Report.    10 mg Oral EVERY 4 HOURS PRN 01/30/24 1342      01/30/24 1342  methocarbamol (ROBAXIN) tablet 750 mg         750 mg Oral EVERY 6 HOURS PRN 01/30/24 1342      01/30/24 1342  hydrOXYzine HCl (ATARAX) tablet 25 mg         25 mg Oral EVERY 6 HOURS PRN 01/30/24 1342              Primary Service Contacted with Recommendations? Yes            Acute Inpatient Pain Service [facility]  Hours of pain coverage 24/7   Page via Amcom- Please Page the Pain Team Via Amcom: \"PAIN MANAGEMENT ACUTE INPATIENT/ [facility]\"            "

## 2024-02-01 NOTE — PROGRESS NOTES
Orthopaedic Surgery Progress Note 02/01/2024    S: No acute events overnight.  Pain well controlled on current pain regimen. Tolerating diet.  Ambulating, working with therapies. Questions answered. POC reviewed.    O:  Temp: 98.7  F (37.1  C) Temp src: Oral BP: 136/84 Pulse: 99   Resp: 18 SpO2: 100 % O2 Device: None (Room air)      Exam:  Gen: No acute distress, resting comfortably in bed.  Resp: Non-labored breathing  MSK:  LLE:  - Dressings c/d/i  - SILT femoral/tibial/sural/saphenous/DP/SP nerves  - Fires Quad, TA, EHL, FHL, GaSC  - PT/DP pulses 2+, foot wwp    Output by Drain (mL) 01/30/24 0700 - 01/30/24 1459 01/30/24 1500 - 01/30/24 2259 01/30/24 2300 - 01/31/24 0659 01/31/24 0700 - 01/31/24 1459 01/31/24 1500 - 01/31/24 2259 01/31/24 2300 - 02/01/24 0654   Closed/Suction Drain Left;Ventral Thigh Bulb 15 [place] 70  165  100 40         Recent Labs   Lab 01/31/24  0745   HGB 8.4*       Culture results: pending    Assessment/Plan: Alethea Patel is a 43 year old female with a complex orthopedic history related to her multi-focal osteonecrosis who presented with failure of her left TKA now s/p revision TKA of both the femoral and tibial components on 1/30/24 with Dr. Saleem. Post op with some ABLA. Managed with monitoring.     Ortho Primary, Medicine co-management  Activity: Up with assist.  Weight bearing status: WBAT LLE  Antibiotics: Ancef x 72 hours (until surgical cultures result).  Diet: Begin with clear fluids and progress diet as tolerated.  DVT prophylaxis:  starting POD1 x 4 weeks  Elevation: Elevate LLE as much as possible.  Pulse oximeter to toe on left foot x 24hrs post op to monitor vascular status (vascular injury intraop s/p repair)  Wound Care: keep dressing in place until POD7  Drains: Document output per shift, discontinue when <30cc/shift.  Pain management: Transition from IV to orals as tolerated.   X-rays: PACU    PT/OT: eval and treat  Labs: Hgb POD1  Cultures: Pending, follow  culture results closely.  Consults: PT, OT. Medicine, pain management, appreciate assistance in caring for this patient.  Follow-up: Clinic with Dr. Saleem care team in 4 weeksneeded.         Future Appointments   Date Time Provider Department Center   1/31/2024  9:30 AM Kristine Tracy, PT [facility] [place]   1/31/2024 11:15 AM Carmelina Dow, OT [facility] [place]   1/31/2024  1:30 PM UR PT WAITLIST [facility] [place]   3/1/2024  2:40 PM Jacinda Kuo PA-C [facility]         Disposition: Pending progress with therapies, pain control on orals, and medical stability, anticipate discharge to home vs TCU on POD #2-4.     Eran Corea MD  Orthopaedic Surgery, PGY-4  Pager: [phone]

## 2024-02-02 ENCOUNTER — APPOINTMENT (OUTPATIENT)
Dept: PHYSICAL THERAPY | Facility: CLINIC | Age: 44
DRG: 467 | End: 2024-02-02
Attending: ORTHOPAEDIC SURGERY
Payer: COMMERCIAL

## 2024-02-02 VITALS
HEART RATE: 104 BPM | WEIGHT: 197.09 LBS | TEMPERATURE: 97.9 F | HEIGHT: 70 IN | DIASTOLIC BLOOD PRESSURE: 73 MMHG | SYSTOLIC BLOOD PRESSURE: 127 MMHG | OXYGEN SATURATION: 96 % | RESPIRATION RATE: 18 BRPM | BODY MASS INDEX: 28.22 KG/M2

## 2024-02-02 PROCEDURE — 97530 THERAPEUTIC ACTIVITIES: CPT | Mod: GP | Performed by: PHYSICAL THERAPIST

## 2024-02-02 PROCEDURE — 97116 GAIT TRAINING THERAPY: CPT | Mod: GP | Performed by: PHYSICAL THERAPIST

## 2024-02-02 PROCEDURE — 250N000013 HC RX MED GY IP 250 OP 250 PS 637: Performed by: STUDENT IN AN ORGANIZED HEALTH CARE EDUCATION/TRAINING PROGRAM

## 2024-02-02 PROCEDURE — 250N000011 HC RX IP 250 OP 636: Performed by: STUDENT IN AN ORGANIZED HEALTH CARE EDUCATION/TRAINING PROGRAM

## 2024-02-02 PROCEDURE — 250N000013 HC RX MED GY IP 250 OP 250 PS 637: Performed by: INTERNAL MEDICINE

## 2024-02-02 PROCEDURE — 250N000013 HC RX MED GY IP 250 OP 250 PS 637

## 2024-02-02 RX ORDER — ACETAMINOPHEN 325 MG/1
650 TABLET ORAL EVERY 4 HOURS PRN
Qty: 50 TABLET | Refills: 0 | Status: SHIPPED | OUTPATIENT
Start: 2024-02-02

## 2024-02-02 RX ORDER — POLYETHYLENE GLYCOL 3350 17 G/17G
17 POWDER, FOR SOLUTION ORAL DAILY
Qty: 510 G | Refills: 0 | Status: SHIPPED | OUTPATIENT
Start: 2024-02-02

## 2024-02-02 RX ORDER — OXYCODONE HYDROCHLORIDE 5 MG/1
5-10 TABLET ORAL EVERY 4 HOURS PRN
Qty: 30 TABLET | Refills: 0 | Status: SHIPPED | OUTPATIENT
Start: 2024-02-02 | End: 2024-02-26

## 2024-02-02 RX ORDER — ASPIRIN 81 MG/1
81 TABLET ORAL 2 TIMES DAILY
Qty: 56 TABLET | Refills: 0 | Status: SHIPPED | OUTPATIENT
Start: 2024-02-02

## 2024-02-02 RX ADMIN — SENNOSIDES AND DOCUSATE SODIUM 1 TABLET: 8.6; 5 TABLET ORAL at 08:52

## 2024-02-02 RX ADMIN — NICOTINE 1 PATCH: 14 PATCH, EXTENDED RELEASE TRANSDERMAL at 08:51

## 2024-02-02 RX ADMIN — OXYCODONE HYDROCHLORIDE 10 MG: 5 TABLET ORAL at 11:05

## 2024-02-02 RX ADMIN — POLYETHYLENE GLYCOL 3350 17 G: 17 POWDER, FOR SOLUTION ORAL at 08:51

## 2024-02-02 RX ADMIN — ACETAMINOPHEN 650 MG: 325 TABLET, FILM COATED ORAL at 03:47

## 2024-02-02 RX ADMIN — PANTOPRAZOLE SODIUM 40 MG: 40 TABLET, DELAYED RELEASE ORAL at 08:51

## 2024-02-02 RX ADMIN — BACLOFEN 10 MG: 10 TABLET ORAL at 08:51

## 2024-02-02 RX ADMIN — ASPIRIN 81 MG: 81 TABLET ORAL at 08:51

## 2024-02-02 RX ADMIN — CEFAZOLIN SODIUM 2 G: 2 INJECTION, SOLUTION INTRAVENOUS at 03:43

## 2024-02-02 RX ADMIN — GABAPENTIN 900 MG: 300 CAPSULE ORAL at 08:52

## 2024-02-02 RX ADMIN — OXYCODONE HYDROCHLORIDE 10 MG: 5 TABLET ORAL at 00:39

## 2024-02-02 RX ADMIN — Medication 10 MG: at 00:39

## 2024-02-02 RX ADMIN — METHOCARBAMOL 750 MG: 750 TABLET ORAL at 03:48

## 2024-02-02 RX ADMIN — OXYCODONE HYDROCHLORIDE 10 MG: 5 TABLET ORAL at 05:37

## 2024-02-02 ASSESSMENT — ACTIVITIES OF DAILY LIVING (ADL)
ADLS_ACUITY_SCORE: 26

## 2024-02-02 NOTE — PLAN OF CARE
Physical Therapy Discharge Summary    Reason for therapy discharge:    All goals and outcomes met, no further needs identified.    Progress towards therapy goal(s). See goals on Care Plan in [facility] electronic health record for goal details.  Goals met    Therapy recommendation(s):    Continued therapy is recommended.  Rationale/Recommendations:  Pt would benefit from further PT for strengthening/ROM and gait training.  Continue home exercise program.

## 2024-02-02 NOTE — PROGRESS NOTES
DISCHARGE SUMMARY    Pt discharging to: home  Transportation: private vehicle  AVS given and discussed: yes  Stoplight Tool given and discussed: yes  Medications given: yes  Belongings returned: yes  Comments:     Alethea understood her discharge instructions, she received her prescriptions and she had her belongings.       Alethea left the unit by wheelchair and her  drove her home.

## 2024-02-02 NOTE — PLAN OF CARE
"VS: /81 (BP Location: Left arm)   Pulse 94   Temp 98.1  F (36.7  C) (Oral)   Resp 18   Ht 1.778 m (5' 10\")   Wt 89.4 kg (197 lb 1.5 oz)   LMP 01/22/2024   SpO2 95%   BMI 28.28 kg/m      O2: O2 SATS >90% denies chest pain,  or SBO   Output: Voids adequately in the bathroom    Last BM: 1/31/24  BS present all x4 quadrants    Activity: Up ad ofelia, SBA with   with A-1   ambulated to     Up for meals? N/a    Skin: Intact  except L knee surgical incision    Pain: Pain managed with prn IV dilaudid , oxycodone and tylenol   CMS: AOX4 Denies numbness and tingling   Dressing: Dressing Clean dry and intact   Diet: Regular Diet   LDA: R PIV SL . J p drain    Equipment: IV Pole Gait Belt, crutches , walker..     Plan: To discharge  to home today    Additional Info:                              "

## 2024-02-02 NOTE — PROGRESS NOTES
"  VS: /73   Pulse 104   Temp 97.9  F (36.6  C)   Resp 18   Ht 1.778 m (5' 10\")   Wt 89.4 kg (197 lb 1.5 oz)   LMP 01/22/2024   SpO2 96%   BMI 28.28 kg/m      O2: RA   Output: Voids in toilet   Last BM: 1/29   Activity: SBA with gait belt & walker   Skin: Intact except for L knee incision   Pain: Complains of intermittent pain in L knee area   CMS: Intact, denies numbness or tingling   Dressing: L knee dressing, CDI   Diet: reg   LDA: PIV in RFA   Equipment: Gait belt & walker   Plan: [place] home today with    Additional Info:       "

## 2024-02-02 NOTE — PROGRESS NOTES
Orthopaedic Surgery Progress Note 02/02/2024    S: No acute events overnight. She reports that pain is well controlled. She has cleared physical therapy. Output from her drain has been decreasing. Denies questions or concerns at this time. Anticipates discharge today.    O:  Temp: 98.1  F (36.7  C) Temp src: Oral BP: 133/81 Pulse: 94   Resp: 18 SpO2: 95 % O2 Device: None (Room air)      Exam:  Gen: No acute distress, resting comfortably in bed.  Resp: Non-labored breathing  MSK:  LLE:  - Dressings c/d/i  - SILT femoral/tibial/sural/saphenous/DP/SP nerves  - Fires Quad, TA, EHL, FHL, GaSC  - PT/DP pulses 2+, foot wwp    Output by Drain (mL) 01/31/24 0700 - 01/31/24 1459 01/31/24 1500 - 01/31/24 2259 01/31/24 2300 - 02/01/24 0659 02/01/24 0700 - 02/01/24 1459 02/01/24 1500 - 02/01/24 2259 02/01/24 2300 - 02/02/24 0659 02/02/24 0700 - 02/02/24 0700   Closed/Suction Drain Left;Ventral Thigh Bulb 15 [place]  100 40 15            Recent Labs   Lab 02/01/24  0707 01/31/24  0745   HGB 8.5* 8.4*       Culture results: pending    Assessment/Plan: Alethea Patel is a 43 year old female with a complex orthopedic history related to her multi-focal osteonecrosis who presented with failure of her left TKA now s/p revision TKA of both the femoral and tibial components on 1/30/24 with Dr. Saleem. Post op with some ABLA. Managed with monitoring.     Ortho Primary, Medicine co-management  Activity: Up with assist.  Weight bearing status: WBAT LLE  Antibiotics: Ancef x 72 hours (until surgical cultures result).  Diet: Begin with clear fluids and progress diet as tolerated.  DVT prophylaxis:  starting POD1 x 4 weeks  Elevation: Elevate LLE as much as possible.  Pulse oximeter to toe on left foot x 24hrs post op to monitor vascular status (vascular injury intraop s/p repair)  Wound Care: keep dressing in place until POD7  Drains: Document output per shift, discontinue when <30cc/shift. -removed on 2/2/24  Pain management:  Transition from IV to orals as tolerated.   X-rays: PACU    PT/OT: eval and treat  Labs: Hgb POD1  Cultures: Pending, follow culture results closely.  Consults: PT, OT. Medicine, pain management, appreciate assistance in caring for this patient.  Follow-up: Clinic with Dr. Saleem care team in 4 weeksneeded.         Future Appointments   Date Time Provider Department Center   1/31/2024  9:30 AM Kristine Tracy, PT [facility] [place]   1/31/2024 11:15 AM Carmelina Dow, OT [facility] [place]   1/31/2024  1:30 PM UR PT WAITLIST [facility] [place]   3/1/2024  2:40 PM Jacinda Kuo PA-C [facility]         Disposition: Pending progress with therapies, pain control on orals, and medical stability, anticipate discharge to home vs TCU on POD #2-4.     Nemesio Dowling MD

## 2024-02-02 NOTE — PROGRESS NOTES
[facility]    Medicine Progress Note - Hospitalist Service, GOLD TEAM 18    Date of Admission:  1/30/2024    Assessment & Plan   Alethea Patel is a 43 year old female w/ a PMH of previous TKA w/ complications of prosthetic joint, chronic pain w/ long term opioid and methadone use, acute lymphocytic leukemia in remission, hypothyroidism, reactive airway disease admitted on 1/30/2024. She is s/p L revision TKA 1/30/24. Medicine was consulted for general co-management.     Complication of prosthetic joint s/p L revision TKA 1/30/24 by Dr. Saleem:  Hx of multifocal osteonecrosis of L knee s/p TKA (9 years ago)   Pain well-controlled.  ANAHY drain remains in place.  - Pain management per primary team  - scheduled bowel regimen     ABLA of Surgery:  Preoperative hemoglobin, MCV normal 1/25/2024.  Hemoglobin 8.4 on 1/31/2024, stable at 8.5 on 2/1.  ANAHY drain remains in place.  -Recommend repeat hemoglobin at PCP follow-up visit    Tachycardia, mild:  Noted postoperatively, improving despite expected postoperative anemia.  Blood pressure remains normal, euvolemic on exam.  Eating and drinking well now.  Tachycardia resolved.  - CTM    Chronic pain, Long term opioid use:  [facility]. On PTA oxycodone 5mg 1-2 tabs Q4-6H PRN and methadone 10mg BID. Also uses tizanidine, gabapentin, baclofen, amitriptyline. Spine aware she was having TKA.  - Pain management per primary team   -- Agree w/ continuing methadone and scheduled bowel regimen    Tobacco use:  No break from smoking prior to surgery. Would like to try a patch, declined gum and lozenges.   - Nicotine patch    ALL, in remission:   Diagnosed in 2003, s/p 3 years chemo therapy.     Hypothyroidism:   TSH 2.32 (WNL) on 6/5/23.   -Continue PTA levothyroxine 50mcg at bedtime.    Reactive airway disease:   No active cough or wheezing.  Lungs CTA, oxygenating adequately on room air.  -Incentive spirometry, encourage  "mobilization from bed  -PTA montelukast.   -Has PTA PRN albuterol inhaler available, doesn't use often. Can add on PRN albuterol if needed this stay.     GERD:   Compensated.  Eating and drinking well.  -Continue PTA omeprazole & famotidine    Depression, Anxiety:   Compensated on current regimen.  -PTA amitriptyline, bupropion, hydroxyzine PRN, quetiapine          Diet: Advance Diet as Tolerated: Regular Diet Adult    DVT Prophylaxis: ASA 81 mg bid per orthopedics   Jordan Catheter: Not present  Lines: None     Cardiac Monitoring: None  Code Status: Full Code      Clinically Significant Risk Factors                         # Overweight: Estimated body mass index is 28.28 kg/m  as calculated from the following:    Height as of this encounter: 1.778 m (5' 10\").    Weight as of this encounter: 89.4 kg (197 lb 1.5 oz)., PRESENT ON ADMISSION                     Lino Waite MD  Hospitalist Service, GOLD TEAM 18  [facility]  Securely message with [employer] (more info)  Text page via [facility] Paging/Directory   See signed in provider for up to date coverage information  ______________________________________________________________________    Interval History   Doing well, pain varies but generally adequately controlled.  Working with therapy.  No bowel movements but passing flatus.  Eating well.  No nausea or vomiting.  No dysuria or urgency.  Denies any cough or dyspnea.  No fevers or chills.    Physical Exam   Vital Signs: Temp: 98.1  F (36.7  C) Temp src: Oral BP: 133/81 Pulse: 94   Resp: 18 SpO2: 95 % O2 Device: None (Room air)    Weight: 197 lbs 1.46 oz  General: Alert and oriented x 4, very pleasant and cheerful.  HEENT: OP moist and clear  Chest: CTA bilaterally.  No wheezes.  CV: RRR.  No murmurs  Abdomen: NABS.  Soft, nontender, nondistended.  Extremities: No edema.  Left knee wrapped, ANAHY drain remains in place.  +1 left DP pulse, foot warm and " "well-perfused.  Neuro: Nonfocal      40 MINUTES SPENT BY ME on the date of service doing chart review, history, exam, documentation & further activities per the note.      Data      CMPNo lab results found in last 7 days.  CBC  Recent Labs   Lab 02/01/24  0707 01/31/24  0745   HGB 8.5* 8.4*     INRNo lab results found in last 7 days.  Arterial Blood GasNo lab results found in last 7 days.Venous Blood Gas  No lab results found in last 7 days.No results found for: \"A1C\", \"HEMOGLOBINA1\"  Results for orders placed or performed during the hospital encounter of 01/30/24   XR Knee Port Left 1/2 Views    Narrative    EXAM: XR KNEE PORT LEFT 1/2 VIEWS  LOCATION: [facility]  DATE: 1/30/2024    INDICATION: Left total knee revision.  COMPARISON: 1/11/2014      Impression    IMPRESSION: A single portable AP supine radiograph of the left knee intraoperative obtained during placement of a revision left total knee arthroplasty with new long-stemmed customized proximal left tibial component. Expected intraoperative soft tissue   and intra-articular gas. Pre-existing bone infarct in the proximal tibial metaphysis. Likely bone infarct in the distal femoral metaphysis medial. No visible fracture.   XR Knee Port Left 1/2 Views    Narrative    EXAM: XR KNEE PORT LEFT 1/2 VIEWS  LOCATION: [facility]  DATE: 1/30/2024    INDICATION: intra op left total knee arthroplasty  COMPARISON: None.      Impression    IMPRESSION: Single view of the knee shows changes of total knee arthroplasty. Components appear well seated.   XR Knee Port Left 1/2 Views    Narrative    EXAM: XR KNEE PORT LEFT 1/2 VIEWS  LOCATION: [facility]  DATE: 1/30/2024    INDICATION: Post Op Total Knee  COMPARISON: None.      Impression    IMPRESSION: Postop changes of a left total knee arthroplasty with longstem femoral and tibial components " with patellar resurfacing. No fracture. Surgical drain in place.

## 2024-02-03 NOTE — DISCHARGE SUMMARY
ORTHOPAEDIC SURGERY DISCHARGE SUMMARY     Date of Admission: 1/30/2024  Date of Discharge: 2/2/2024  1:24 PM  Disposition: Home  Staff Physician: No att. providers found  Primary Care Provider: Jeevan Boyce    DISCHARGE DIAGNOSIS:  Mechanical complication of prosthetic knee implant, initial encounter  (H24) [T84.780K, Z96.659]    PROCEDURES: Procedure(s):  REVISION, LEFT TOTAL ARTHROPLASTY, KNEE on 1/30/2024    BRIEF HISTORY:  43 year old female presents with failure of left total knee arthroplasty    HOSPITAL COURSE:    The patient was admitted following the above listed procedures for pain control and rehabilitation. Alethea Patel did well post-operatively. Medicine was consulted post operatively to aid in management of medical co-morbidities. The patient received routine nursing cares and at the time of discharge was medically stable. Vital signs were stable throughout admission. The patient is tolerating a regular diet and is voiding spontaneously. All PT/OT goals have been met for safe mobility. Pain is now controlled on oral medications which will be available on discharge. Stool softeners have been used while taking pain medications to help prevent constipation. Alethea Patel is deemed medically safe to discharge.     Antibiotics:  Cefazolin given periop and 24 hours postop.   DVT prophylaxis:  ASA initiated after surgery and will be continued for 4 weeks.   PT Progress:  Has met PT/OT goals for safe mobility.    Pain Meds:  Weaned off all IV pain meds by discharge.  Inpatient Events: No significant events or complications.     FOLLOWUP:    Follow up with Dr. Saleem at 2 weeks postoperatively.    Future Appointments   Date Time Provider Department Center   3/1/2024  2:40 PM Jacinda Kuo PA-C [facility]       Orthopaedic Surgery appointments are at the [facility] ([address] [place] [id number]). Call [phone] to schedule a  follow-up appointment at this location with your provider.     PLANNED DISCHARGE ORDERS:     DVT Prophylaxis: ASA 162mg     Activity: WBAT     Wound Care: see Below      Discharge Medication List as of 2/2/2024 11:28 AM        START taking these medications    Details   acetaminophen (TYLENOL) 325 MG tablet Take 2 tablets (650 mg) by mouth every 4 hours as needed for other (For optimal non-opioid multimodal pain management to improve pain control.), Disp-50 tablet, R-0, E-Prescribe      aspirin 81 MG EC tablet Take 1 tablet (81 mg) by mouth 2 times daily, Disp-56 tablet, R-0, E-Prescribe      polyethylene glycol (MIRALAX) 17 GM/Dose powder Take 17 g by mouth daily, Disp-510 g, R-0, E-Prescribe           CONTINUE these medications which have CHANGED    Details   oxyCODONE (ROXICODONE) 5 MG tablet Take 1-2 tablets (5-10 mg) by mouth every 4 hours as needed for moderate pain, Disp-30 tablet, R-0, E-Prescribe           CONTINUE these medications which have NOT CHANGED    Details   albuterol (PROAIR HFA/PROVENTIL HFA/VENTOLIN HFA) 108 (90 BASE) MCG/ACT Inhaler Inhale 1-2 puffs into the lungs every 6 hours as needed for shortness of breath or wheezing (during allergy season), Historical      amitriptyline (ELAVIL) 50 MG tablet Take 50 mg by mouth at bedtime, Historical      baclofen (LIORESAL) 10 MG tablet Take 10 mg by mouth daily, Historical      buPROPion (WELLBUTRIN SR) 150 MG 12 hr tablet Take 150 mg by mouth at bedtime, Historical      diphenhydrAMINE (BENADRYL) 25 MG tablet Take  mg by mouth at bedtime, Historical      famotidine (PEPCID) 20 MG tablet Take 1 tablet by mouth at bedtime, Historical      ferrous sulfate (FEROSUL) 325 (65 Fe) MG tablet Take 325 mg by mouth daily (with breakfast), Historical      gabapentin (NEURONTIN) 300 MG capsule Take 900 mg by mouth 3 times daily , Historical      ibuprofen (ADVIL/MOTRIN) 200 MG tablet Take 600-800 mg by mouth every 8 hours as needed for pain, Historical     "  levothyroxine (SYNTHROID/LEVOTHROID) 50 MCG tablet Take 50 mcg by mouth at bedtime, Historical      Melatonin 10 MG TABS tablet Take 10 mg by mouth at bedtime, Historical      methadone (DOLOPHINE) 5 MG tablet Take 10 mg by mouth at bedtime, Historical      omeprazole (PRILOSEC OTC) 20 MG tablet Take 20 mg by mouth at bedtime, Historical      prochlorperazine (COMPAZINE) 10 MG tablet Take 20 mg by mouth at bedtime, Historical      QUEtiapine (SEROQUEL) 25 MG tablet Take 50 mg by mouth at bedtime, Historical      senna-docusate (SENOKOT-S;PERICOLACE) 8.6-50 MG per tablet Take 2 tablets by mouth 2 times daily, Disp-30 tablet, R-0, E-Prescribe      tiZANidine (ZANAFLEX) 2 MG tablet Take 4 mg by mouth 4 times daily, Historical      Vitamin D3 (VITAMIN D, CHOLECALCIFEROL,) 25 mcg (1000 units) tablet Take 1 tablet by mouth daily, Historical      fluticasone (FLONASE) 50 MCG/ACT nasal spray Spray 1-2 sprays into both nostrils daily as needed for rhinitis or allergies (During allergy season), Historical      montelukast (SINGULAIR) 10 MG tablet Take 10 mg by mouth at bedtime, Historical      Multiple Vitamins-Minerals (MULTIVITAMIN ADULT PO) Take 1 tablet by mouth daily, Historical               Discharge Procedure Orders   Reason for your hospital stay   Order Comments: Revision of knee arthroplasty     When to call - Contact Surgeon Team   Order Comments: You may experience symptoms that require follow-up before your scheduled appointment. Refer to the \"Stoplight Tool\" for instructions on when to contact your Surgeon Team if you are concerned about pain control, blood clots, constipation, or if you are unable to urinate.     When to call - Reach out to Urgent Care   Order Comments: If you are not able to reach your Surgeon Team and you need immediate care, go to the Orthopedic Walk-in Clinic or Urgent Care at your Surgeon's office.  Do NOT go to the Emergency Room unless you have shortness of breath, chest pain, or " other signs of a medical emergency.     When to call - Reasons to Call 911   Order Comments: Call 911 immediately if you experience sudden-onset chest pain, arm weakness/numbness, slurred speech, or shortness of breath     Discharge Instruction - Breathing exercises   Order Comments: Perform breathing exercises using your Incentive Spirometer 10 times per hour while awake for 2 weeks.     Symptoms - Fever Management   Order Comments: A low grade fever can be expected after surgery.  Use acetaminophen (TYLENOL) as needed for fever management.  Contact your Surgeon Team if you have a fever greater than 101.5 F, chills, and/or night sweats.     Symptoms - Constipation management   Order Comments: Constipation (hard, dry bowel movements) is expected after surgery due to the combination of being less active, the anesthetic, and the opioid pain medication.  You can do the following to help reduce constipation:  ~  FLUIDS:  Drink clear liquids (water or Gatorade), or juice (apple/prune).  ~  DIET:  Eat a fiber rich diet.    ~  ACTIVITY:  Get up and move around several times a day.  Increase your activity as you are able.  MEDICATIONS:  Reduce the risk of constipation by starting medications before you are constipated.  You can take Miralax   (1 packet as directed) and/or a stool softener (Senokot 1-2 tablets 1-2 times a day).  If you already have constipation and these medications are not working, you can get magnesium citrate and use as directed.  If you continue to have constipation you can try an over the counter suppository or enema.  Call your Surgeon Team if it has been greater than 3 days since your last bowel movement.     Symptoms - Reduced Urine Output   Order Comments: Changes in the amount of fluids you drank before and after surgery may result in problems urinating.  It is important to stay well-hydrated after surgery and drink plenty of water. If it has been greater than 8 hours since you have urinated  despite drinking plenty of water, call your Surgeon Team.     Activity - Exercises to prevent blood clots   Order Comments: Unless otherwise directed by your Surgeon team, perform the following exercises at least three times per day for the first four weeks after surgery to prevent blood clots in your legs: 1) Point and flex your feet (Ankle Pumps), 2) Move your ankle around in big circles, 3) Wiggle your toes, 4) Walk, even for short distances, several times a day, will help decrease the risk of blood clots.     Order Specific Question Answer Comments   Is discharge order? Yes      Comfort and Pain Management - Pain after Surgery   Order Comments: Pain after surgery is normal and expected.  You will have some amount of pain for several weeks after surgery.  Your pain will improve with time.  There are several things you can do to help reduce your pain including: rest, ice, elevation, and using pain medications as needed. Contact your Surgeon Team if you have pain that persists or worsens after surgery despite rest, ice, elevation, and taking your medication(s) as prescribed. Contact your Surgeon Team if you have new numbness, tingling, or weakness in your operative extremity.     Comfort and Pain Management - Swelling after Surgery   Order Comments: Swelling and/or bruising of the surgical extremity is common and may persist for several months after surgery. In addition to frequent icing and elevation, gentle compressive support with an ACE wrap or tubigrip may help with swelling. Apply compression regularly, removing at least twice daily to perform skin checks. Contact your Surgeon Team if your swelling increases and is NOT associated with an increase in your activity level, or if your swelling increases and is associated with redness and pain.     Comfort and Pain Management - Cold therapy   Order Comments: Ice can be used to control swelling and discomfort after surgery. Place a thin towel over your operative  site and apply the ice pack overtop. Leave ice pack in place for 20 minutes, then remove for 20 minutes. Repeat this 20 minutes on/20 minutes off routine as often as tolerated.     Medication Instructions - Acetaminophen (TYLENOL) Instructions   Order Comments: You were discharged with acetaminophen (TYLENOL) for pain management after surgery. Acetaminophen most effectively manages pain symptoms when it is taken on a schedule without missing doses (every four, six, or eight hours). Your Provider will prescribe a safe daily dose between 3000 - 4000 mg.  Do NOT exceed this daily dose. Most patients use acetaminophen for pain control for the first four weeks after surgery.  You can wean from this medication as your pain decreases.     Medication Instructions - NSAID Instructions   Order Comments: You were discharged with an anti-inflammatory medication for pain management to use in combination with acetaminophen (TYLENOL) and the narcotic pain medication.  Take this medication exactly as directed.  You should only take one anti-inflammatory at a time.  Some common anti-inflammatories include: ibuprofen (ADVIL, MOTRIN), naproxen (ALEVE, NAPROSYN), celecoxib (CELEBREX), meloxicam (MOBIC), ketorolac (TORADOL).  Take this medication with food and water.     Medication Instructions - Opioids - Tapering Instructions   Order Comments: In the first three days following surgery, your symptoms may warrant use of the narcotic pain medication every four to six hours as prescribed. This is normal. As your pain symptoms improve, focus your efforts on decreasing (tapering) use of narcotic medications. The most successful tapering strategy is to first, decrease the number of tablets you take every 4-6 hours to the minimum prescribed. Then, increase the amount of time between doses.  For example:  First, taper to   or 1 tablet every 4-6 hours.  Then, taper to   or 1 tablet every 6-8 hours.  Then, taper to   or 1 tablet every 8-10  "hours.  Then, taper to   or 1 tablet every 10-12 hours.  Then, taper to   or 1 tablet at bedtime.  The bedtime dose can help with comfort during sleep and is typically the last dose to be discontinued after surgery.     Follow Up Care   Order Comments: Follow-up with your Surgeon Team in 2 weeks for wound check.     Medication instructions -  Anticoagulation - aspirin   Order Comments: Take the aspirin as prescribed for a total of four weeks after surgery.  This is given to help minimize your risk of blood clot.     Comfort and Pain Management - LOWER Extremity Elevation   Order Comments: Swelling is expected for several months after surgery. This type of swelling is usually associated with gravity and activity, and can be improved with elevation.   The best way to do this is to get your \"toes above your nose\" by laying down and placing several pillows lengthwise under your calf and heel. When elevating your leg keep your knee completely straight. Perform this elevation as often as possible especially for the first two weeks after surgery.     Medication Instructions - Opioid Instructions (1 - 2 tablets Q 4-6 hours, MAX 6 tablets)   Order Comments: You were discharged with an opioid medication (hydromorphone, oxycodone, hydrocodone, or tramadol). This medication should only be taken for breakthrough pain that is not controlled with acetaminophen (TYLENOL). If you rate your pain less than 3 you do not need this medication.  Pain rating 0-3:  You do not need this medication.  Pain rating 4-6:  Take 1 tablet every 4-6 hours as needed  Pain rating 7-10:  Take 2 tablets every 4-6 hours as needed.  Do not exceed 6 tablets per day     Activity - Total Knee Arthroplasty     Order Specific Question Answer Comments   Is discharge order? Yes      Return to Driving   Order Comments: Return to driving - Driving is NOT permitted until directed by your provider. Under no circumstance are you permitted to drive while using narcotic " pain medications.     Order Specific Question Answer Comments   Is discharge order? Yes      Dressing / Wound Care - Wound   Order Comments: You have a clean dressing on your surgical wound. Dressing change instructions as follows: dressing will be removed at your follow-up appointment. Contact your Surgeon Team if you have increased redness, warmth around the surgical wound, and/or drainage from the surgical wound.     Dressing / Wound Care - NO Tub Bathing   Order Comments: Tub bathing, swimming, or any other activities that will cause your incision to be submerged in water should be avoided until allowed by your Surgeon.     NO Precautions   Order Comments: No precautions directed by your Provider.  You may perform range of motion activities as tolerated.     Order Specific Question Answer Comments   Is discharge order? Yes      Weight bearing as tolerated   Order Comments: Weight bearing as tolerated on your operative extremity.     Order Specific Question Answer Comments   Is discharge order? Yes      Follow Up ([facility])   Order Comments: Follow up with primary care provider, [facility], within 7 days to evaluate after surgery.  The following labs/tests are recommended: CBC, BMP.      Appointments on [place] and/or [facility] (with [facility] or [facility] provider or service). Call [phone] if you haven't heard regarding these appointments within 7 days of discharge.     Crutches DME   Order Comments: DME Documentation: Describe the reason for need to support medical necessity: Impaired gait status post knee surgery. I, the undersigned, certify that the above prescribed supplies are medically necessary for this patient and is both reasonable and necessary in reference to accepted standards of medical practice in the treatment of this patient's condition and is not prescribed as a convenience.     Order Specific Question Answer Comments   Medical Equipment (DME) Supplier: [facility]  Medical Equipment    PATIENT INSTRUCTIONS: If you did not receive this ordered item today, please contact [employer] for availability ([place] Locations: [phone], [place]: [phone]).    Crutch Type: Standard    Crutches Add On: NA    Length of Need: Lifetime      Cane DME   Order Comments: DME Documentation: Describe the reason for need to support medical necessity: Impaired gait status post knee surgery. I, the undersigned, certify that the above prescribed supplies are medically necessary for this patient and is both reasonable and necessary in reference to accepted standards of medical practice in the treatment of this patient's condition and is not prescribed as a convenience.     Order Specific Question Answer Comments   Medical Equipment (DME) Supplier: [employer]    PATIENT INSTRUCTIONS: If you did not receive this ordered item today, please contact [employer] for availability ([place] Locations: [phone], [place]: [phone]).    Cane Type: Single Tip    Reminder: Patient can typically get 1 every 5 years      Walker DME   Order Comments: DME Documentation: Describe the reason for need to support medical necessity: Impaired gait status post knee surgery. I, the undersigned, certify that the above prescribed supplies are medically necessary for this patient and is both reasonable and necessary in reference to accepted standards of medical practice in the treatment of this patient's condition and is not prescribed as a convenience.     Order Specific Question Answer Comments   Medical Equipment (DME) Supplier: [employer]    PATIENT INSTRUCTIONS: If you did not receive this ordered item today, please contact [employer] for availability ([place] Locations: [phone], [place]: [phone]).    Walker Type: Standard (2 Wheel)    Accessories: N/A      Discharge Instruction - Regular Diet Adult    Order Comments: Return to your pre-surgery diet unless instructed otherwise     Order Specific Question Answer Comments   Is discharge order? Yes            Nemesio Dowling MD  Adult Reconstruction Fellow

## 2024-02-04 LAB
BACTERIA TISS BX CULT: NO GROWTH

## 2024-02-05 ENCOUNTER — TELEPHONE (OUTPATIENT)
Dept: ORTHOPEDICS | Facility: CLINIC | Age: 44
End: 2024-02-05
Payer: COMMERCIAL

## 2024-02-05 NOTE — TELEPHONE ENCOUNTER
Called Pt and let her know that her PT^ order [facility] fax #[phone].  Manpreet SILVESTRE CMA

## 2024-02-05 NOTE — TELEPHONE ENCOUNTER
[facility] Call Center    Phone Message    May a detailed message be left on voicemail: yes     Reason for Call: Other: Alethea Pitts is calling to get her orders for PT sent to [facility] fax it to @[phone]. Any questions call her     Action Taken: Other: [facility]    Travel Screening: Not Applicable

## 2024-02-06 LAB
BACTERIA TISS BX CULT: NORMAL

## 2024-02-08 LAB — BACTERIA SNV CULT: NO GROWTH

## 2024-02-13 ENCOUNTER — TRANSFERRED RECORDS (OUTPATIENT)
Dept: HEALTH INFORMATION MANAGEMENT | Facility: CLINIC | Age: 44
End: 2024-02-13
Payer: COMMERCIAL

## 2024-02-26 DIAGNOSIS — Z96.652 S/P TOTAL KNEE ARTHROPLASTY, LEFT: ICD-10-CM

## 2024-02-26 RX ORDER — OXYCODONE HYDROCHLORIDE 5 MG/1
5-10 TABLET ORAL EVERY 4 HOURS PRN
Qty: 30 TABLET | Refills: 0 | Status: SHIPPED | OUTPATIENT
Start: 2024-02-26 | End: 2024-03-04

## 2024-02-26 NOTE — TELEPHONE ENCOUNTER
Pt's pharmacy where she was going to  her narcotic, the system is down, so they want a hard script.    Pt is updating/changing her pharmacy to:    [facility]    [address] [facility] [place] [address] [phone]    Pt is changing her pharmacy, so that she does not have to drive 1.5 hours to  the hard script. If she needs to, she will, but changing the pharmacy next to her address, is much easier.     Please CALL pt with any questions and/or UPDATES. Thank you.

## 2024-02-26 NOTE — TELEPHONE ENCOUNTER
- A call was placed to the patient.     - Resending to [facility] pharmacy since [facility] is down. Needs to be signed by our PA.     - Patient verbalized understanding of plan and all questions were answered. Call back number to clinic was given and patient was told to call if they had an further questions.

## 2024-02-26 NOTE — TELEPHONE ENCOUNTER
- A call was placed to the patient.     Patient requested refill of narcotic medication. The following was discussed:     1) How often are you taking the oxycodone? How many tablets each time?  Taking 1 tablets every 4 hours as needed moderate-severe pain  *refill requested for this medication    2) Can you rate your pain (0-10) before and after administration?  Before: 7  After: 3     3) Are you taking any other pain medications?  tylenol every 6 hours as needed moderate-severe pain  **refill not needed at this time     - We discussed the importance of tapering the use of the narcotic medications. I said the most successful tapering strategy is to first, decrease the number of tablets you take to the minimum prescribed. Then, increase the amount of time between doses. I explained the bedtime dose can help with comfort during sleep and is typically the last dose to be discontinued after surgery. Patient agreed to work on tapering as able.      - Pharmacy was verified. I let the patient know the request for opoid medications go onto our PA so they may not be filled immediately and someone may be reaching out with further questions.     Gave phone number for clinic and after-hours line.     - Patient verbalized understanding of plan and all questions were answered. Call back number to clinic was given and patient was told to call if they had an further questions.

## 2024-02-26 NOTE — TELEPHONE ENCOUNTER
- Call returned to [facility]     - She let me was requesting refill. Said Dr. Saleem said it was okay to use chronic pain prescription for post-op pain.     - This was NOT mentioned in his op-not or discharge instructions.     - She got 180 tablets on 2/5 from [facility] and was discharged on 2/2 with 30 tablets.     - Getting medication from us does NOT break her pain contract with them. I said I would reach out to her about giving a refill.

## 2024-02-26 NOTE — TELEPHONE ENCOUNTER
[facility] Call Center    Phone Message    May a detailed message be left on voicemail: yes     Reason for Call: Other: Judie from [facility] called to find out what the plan was for post op pain management. Did Dr. Saleem give the patient permission to use her [facility] chronic pain medication to treat her post surgical pain and if so did Dr. Saleem give her dosing instructions.  She gets 180 tablets to last her until 3/6/24 and she is out and wanting [facility] to refill her chronic meds. She is coming there at 2pm today. Please call Judie as soon as possible at [phone] ext [phone]    Action Taken: Other: [facility]    Travel Screening: Not Applicable

## 2024-02-27 ENCOUNTER — TELEPHONE (OUTPATIENT)
Dept: ORTHOPEDICS | Facility: CLINIC | Age: 44
End: 2024-02-27
Payer: COMMERCIAL

## 2024-02-27 DIAGNOSIS — Z96.652 S/P TOTAL KNEE ARTHROPLASTY, LEFT: Primary | ICD-10-CM

## 2024-02-27 NOTE — TELEPHONE ENCOUNTER
- Spoke with pharmacist at [facility] and she told me it would be best to have patient pay out of pocket as changing it to 10 could change insurance coverage but likely would not.     - I let patient know this and she decided to pay out of pocket for this medication.

## 2024-02-27 NOTE — TELEPHONE ENCOUNTER
[facility] Call Center    Phone Message    May a detailed message be left on voicemail: no     Reason for Call: Medication Refill Request    Needs 10 mg instead of 5 mg due to insurance. Patient ok cutting in half if needed.  [facility]

## 2024-03-01 ENCOUNTER — OFFICE VISIT (OUTPATIENT)
Dept: ORTHOPEDICS | Facility: CLINIC | Age: 44
End: 2024-03-01
Payer: COMMERCIAL

## 2024-03-01 ENCOUNTER — ANCILLARY PROCEDURE (OUTPATIENT)
Dept: GENERAL RADIOLOGY | Facility: CLINIC | Age: 44
End: 2024-03-01
Attending: PHYSICIAN ASSISTANT
Payer: COMMERCIAL

## 2024-03-01 DIAGNOSIS — Z96.652 S/P TOTAL KNEE ARTHROPLASTY, LEFT: Primary | ICD-10-CM

## 2024-03-01 DIAGNOSIS — Z96.652 S/P TOTAL KNEE ARTHROPLASTY, LEFT: ICD-10-CM

## 2024-03-01 PROCEDURE — 73560 X-RAY EXAM OF KNEE 1 OR 2: CPT | Mod: LT | Performed by: RADIOLOGY

## 2024-03-01 PROCEDURE — 99024 POSTOP FOLLOW-UP VISIT: CPT | Performed by: PHYSICIAN ASSISTANT

## 2024-03-01 NOTE — PROGRESS NOTES
Chief Complaint: left knee check  Procedure date 1/30/2024   Pre-operative diagnosis: Mechanical complication of prosthetic knee implant, initial encounter  (H24) [T82.549V, Z69.657]   Post-operative diagnosis same as pre-operative   Procedure: Procedure(s):  REVISION, LEFT TOTAL ARTHROPLASTY, KNEE     DIAGNOSIS:   Multi-focal osteonecrosis.  Lumbar disc herniation  Acute Lymphoblastic Leukemia, chemotherapy [id number]   Chronic nicotine abuse     SURGERIES:  2005, L femoral head resurfacing hemiarthroplasty  6/5/2007, R HAJA  2/2/2010, right total knee arthroplasty. No patella resurfacing  6/12/2012, R patella resurfacing  1/8/2016, L TKA (Stiven) [facility]  4/3/2018, Revision L surface replacement bartolo to HAJA ([facility]) [facility]  4/19/18 , 5/18/2018, 5/23/2018  Closed reductions L HAJA  5/25/2018, Revision L HAJA to larger head size and cup repositioning (Stiven) [facility]. Cultures (-) x 3  7/12/18, Closed reduction of L HAJA under anesthesia (Stiven)     HPI: Alethea is a 43-year-old female here with her  today for follow-up 1 month status post revision left knee arthroplasty by Dr. Saleem.  Patient reports overall she is doing well.  She has minimal discomfort.  She is back on her standard pain medicine regimen for her chronic pain.  She only has a few more days of the baby aspirin.  She is not using anything for gait assistance.  She is attending physical therapy 1 day a week.  She would like to return to her job 4 to 6 hours a day and using a stool as needed.  No other concerns.    Physical Exam: Alethea is a pleasant 43-year-old female who is alert and oriented no apparent distress.  Patient has a nonantalgic gait without gait assistance today.  Her left knee incision is healing well with just a few areas of scabbing.  No erythema or drainage.  No effusion.  Mild swelling.  Her range of motion is 5 to 115 degrees today.  She is able to do a straight leg raise.  Passively I can get her to 0 degrees.  No calf tenderness.  She is  neurovascular intact distally.    Imaging: AP and lateral x-ray of the left knee was ordered and obtained today.  This shows a longstem left total knee arthroplasty with augments in excellent position with no sign of fracture or lucency.    Impression: 43-year-old female with history of left knee hardware failure, doing well status post left total knee arthroplasty    Plan: Patient can continue to progress activities as tolerated.  No soaking in a tub or pool until all the scabs are gone of the knee.  She can use vitamin E oil or cocoa butter on the incision.  Continue to work with physical therapy.  Finish out her aspirin and then discontinue.  She can go back to work on Monday with the use of a sitting stool as needed for 1 year.  She will follow-up with us 1 year after her surgery for x-rays or sooner if she has problems.  She agrees with the plan and all questions have been answered.

## 2024-03-01 NOTE — LETTER
Return to Work  2024     Seen today: Yes    Patient:  Alethea Patel  :   1980  MRN:     [id number]  Physician: JACINDA KUO    Alethea Patel may return to work on Date: 3/4/24.    The next clinic appointment is scheduled for 11 months from now.    Patient limitations:  Please allow patient to use a sitting stool as needed for one year to take pressure off of hip/knee replacements.      Sincerely,      Jacinda Kuo PA-C

## 2024-03-01 NOTE — LETTER
3/1/2024         RE: Alethea Patel  [address]  [place] [address]        Dear Colleague,    Thank you for referring your patient, Alethea Patel, to the [facility] [place]. Please see a copy of my visit note below.    Chief Complaint: left knee check  Procedure date 1/30/2024   Pre-operative diagnosis: Mechanical complication of prosthetic knee implant, initial encounter  (H24) [T84.338A, Z96.391]   Post-operative diagnosis same as pre-operative   Procedure: Procedure(s):  REVISION, LEFT TOTAL ARTHROPLASTY, KNEE     DIAGNOSIS:   Multi-focal osteonecrosis.  Lumbar disc herniation  Acute Lymphoblastic Leukemia, chemotherapy [id number]   Chronic nicotine abuse     SURGERIES:  2005, L femoral head resurfacing hemiarthroplasty  6/5/2007, R HAJA  2/2/2010, right total knee arthroplasty. No patella resurfacing  6/12/2012, R patella resurfacing  1/8/2016, L TKA (Stiven) [facility]  4/3/2018, Revision L surface replacement bartolo to HAJA (Stiven) [facility]  4/19/18 , 5/18/2018, 5/23/2018  Closed reductions L HAJA  5/25/2018, Revision L HAJA to larger head size and cup repositioning (Stiven) [facility]. Cultures (-) x 3  7/12/18, Closed reduction of L HAJA under anesthesia (tSiven)     HPI: Alethea is a 43-year-old female here with her  today for follow-up 1 month status post revision left knee arthroplasty by Dr. Saleem.  Patient reports overall she is doing well.  She has minimal discomfort.  She is back on her standard pain medicine regimen for her chronic pain.  She only has a few more days of the baby aspirin.  She is not using anything for gait assistance.  She is attending physical therapy 1 day a week.  She would like to return to her job 4 to 6 hours a day and using a stool as needed.  No other concerns.    Physical Exam: Alethea is a pleasant 43-year-old female who is alert and oriented no apparent distress.  Patient has a nonantalgic gait without gait assistance today.  Her left knee  incision is healing well with just a few areas of scabbing.  No erythema or drainage.  No effusion.  Mild swelling.  Her range of motion is 5 to 115 degrees today.  She is able to do a straight leg raise.  Passively I can get her to 0 degrees.  No calf tenderness.  She is neurovascular intact distally.    Imaging: AP and lateral x-ray of the left knee was ordered and obtained today.  This shows a longstem left total knee arthroplasty with augments in excellent position with no sign of fracture or lucency.    Impression: 43-year-old female with history of left knee hardware failure, doing well status post left total knee arthroplasty    Plan: Patient can continue to progress activities as tolerated.  No soaking in a tub or pool until all the scabs are gone of the knee.  She can use vitamin E oil or cocoa butter on the incision.  Continue to work with physical therapy.  Finish out her aspirin and then discontinue.  She can go back to work on Monday with the use of a sitting stool as needed for 1 year.  She will follow-up with us 1 year after her surgery for x-rays or sooner if she has problems.  She agrees with the plan and all questions have been answered.          Jacinda Kuo PA-C

## 2024-03-04 DIAGNOSIS — Z96.652 S/P TOTAL KNEE ARTHROPLASTY, LEFT: ICD-10-CM

## 2024-03-04 RX ORDER — OXYCODONE HYDROCHLORIDE 5 MG/1
5 TABLET ORAL EVERY 6 HOURS PRN
Qty: 15 TABLET | Refills: 0 | Status: SHIPPED | OUTPATIENT
Start: 2024-03-04

## 2024-03-04 NOTE — TELEPHONE ENCOUNTER
- A call was placed to the patient.     Patient requested refill of narcotic medication. The following was discussed:     1) How often are you taking the oxycodone? How many tablets each time?  Taking 1 tablets every 6 hours as needed moderate-severe pain  *refill requested for this medication    2) Can you rate your pain (0-10) before and after administration?  Before: 6  After: 2     3) Are you taking any other pain medications?  tylenol every 6 hours as needed moderate-severe pain  **refill not needed at this time     - We discussed the importance of tapering the use of the narcotic medications back down to her baseline. She agreed this would be her last refill from us and she will go back to her baseline [facility] pain medication.    - Pharmacy was verified. I let the patient know the request for opoid medications go onto our PA so they may not be filled immediately and someone may be reaching out with further questions.     Gave phone number for clinic and after-hours line.     - Patient verbalized understanding of plan and all questions were answered. Call back number to clinic was given and patient was told to call if they had an further questions.

## 2024-03-04 NOTE — TELEPHONE ENCOUNTER
[facility] Call Center    Phone Message    May a detailed message be left on voicemail: yes     Reason for Call: Medication Refill Request    Has the patient contacted the pharmacy for the refill? Yes   Name of medication being requested: oxyCODONE (ROXICODONE) 5 MG tablet  Provider who prescribed the medication: Roque Arambula, PATerrellC   Pharmacy: [id number], [address]  Date medication is needed: ASAP       Action Taken: Message routed to:  [facility] ([facility]): [facility]    Travel Screening: Not Applicable

## 2024-03-08 LAB
ACID FAST STAIN (ARUP): NORMAL

## 2025-03-19 NOTE — PROGRESS NOTES
[facility]  Orthopaedic Surgery, Joint Replacement Consultation  by Cheko Saleem M.D.    Alethea Patel MRN# [id number]   Age: 41 year old YOB: 1980     Requesting physician: No ref. provider found  [facility]  Dr. Rosa Hull          Background history:  DIAGNOSIS:   Multi-focal osteonecrosis.  Lumbar disc herniation  Acute Lymphoblastic Leukemia, chemotherapy [id number]   Chronic nicotine abuse     SURGERIES:  2005, L femoral head resurfacing hemiarthroplasty  6/5/2007, R HAJA  2/2/2010, right total knee arthroplasty. No patella resurfacing  6/12/2012, R patella resurfacing  1/8/2016, L TKA (Stiven) [facility]  4/3/2018, Revision L surface replacement bartolo to HAJA (Stiven) [facility]  4/19/18 , 5/18/2018, 5/23/2018  Closed reductions L HAJA  5/25/2018, Revision L HAJA to larger head size and cup repositioning (Stiven) [facility]. Cultures (-) x 3  7/12/18, Closed reduction of L HAJA under anesthesia ([facility])  1/30/24, Revision left total knee arthroplasty. (Stiven), [facility]      Alona returns for recheck of her left knee she is been experiencing some pain in the posterior lateral corner of her knee joint.  It has been intermittent ever since her date of surgery.  It has persisted and she is inquiring about further evaluation.  She denies buckling catching or giving way of the knee joint.  She has not had fever or drainage.  She does not have symptoms of the knee getting locked or stuck in 1 position.    Examination reveals no significant effusion.  Minimal warmth to the knee joint.  Some periarticular swelling.  No laxity in the varus or valgus direction.  2+ anterior drawer with negative posterior drawer due to the PS post.    Neurologic examination reveals intact quadriceps gastrosoleus EHL and tibialis anterior muscle function.    Radiographs obtained do not demonstrate any significant abnormality or change from previous radiographs.    Impression:  Left posterior lateral corner knee  pain of uncertain etiology.    Plan:  I suggested a course of physiotherapy with ultrasound focused treatments to the posterolateral corner which she should be able to tolerate this juncture.  Therapist may have other suggestions.  Patient informed she underwent radiofrequency ablation approximately 6 weeks ago and she thinks her symptoms are worse.  I suggested that she may return to whoever perform the radiofrequency ablation to asked him about any potential complications.  Acupuncture treatments would be acceptable as long as no needles were placed in or around the knee joint itself.    If symptoms persist despite the above investigations and Alona would like to pursue further evaluation, then additional advanced imaging would include bone scan, MRI and CT which could be done.    MD Blas Rhoades Family Professor  Oncology and Adult Reconstructive Surgery  Dept Orthopaedic Surgery, [facility]  [phone] office, [phone] pager  [email or web address]    Total combined visit time and work time before and after clinic visit, independent of trainee, on encounter date = 20 min

## 2025-03-31 ENCOUNTER — OFFICE VISIT (OUTPATIENT)
Dept: ORTHOPEDICS | Facility: CLINIC | Age: 45
End: 2025-03-31
Payer: COMMERCIAL

## 2025-03-31 ENCOUNTER — ANCILLARY PROCEDURE (OUTPATIENT)
Dept: GENERAL RADIOLOGY | Facility: CLINIC | Age: 45
End: 2025-03-31
Attending: ORTHOPAEDIC SURGERY
Payer: COMMERCIAL

## 2025-03-31 DIAGNOSIS — Z96.652 S/P TOTAL KNEE ARTHROPLASTY, LEFT: Primary | ICD-10-CM

## 2025-03-31 DIAGNOSIS — Z96.652 S/P TOTAL KNEE ARTHROPLASTY, LEFT: ICD-10-CM

## 2025-03-31 PROCEDURE — 99213 OFFICE O/P EST LOW 20 MIN: CPT | Performed by: ORTHOPAEDIC SURGERY

## 2025-03-31 PROCEDURE — 73560 X-RAY EXAM OF KNEE 1 OR 2: CPT | Mod: LT | Performed by: RADIOLOGY

## 2025-03-31 PROCEDURE — 1125F AMNT PAIN NOTED PAIN PRSNT: CPT | Performed by: ORTHOPAEDIC SURGERY

## 2025-03-31 ASSESSMENT — KOOS JR
BENDING TO THE FLOOR TO PICK UP OBJECT: MODERATE
RISING FROM SITTING: SEVERE
HOW SEVERE IS YOUR KNEE STIFFNESS AFTER FIRST WAKING IN MORNING: MODERATE
STRAIGHTENING KNEE FULLY: MILD
GOING UP OR DOWN STAIRS: MODERATE
KOOS JR SCORING: 50.01
TWISING OR PIVOTING ON KNEE: MODERATE
STANDING UPRIGHT: SEVERE

## 2025-03-31 NOTE — LETTER
3/31/2025      Alethea Patel  [address]  [place] [address]      Dear Colleague,    Thank you for referring your patient, Alethea Patel, to the [facility] [place]. Please see a copy of my visit note below.        [facility]  Orthopaedic Surgery, Joint Replacement Consultation  by Cheko Saleem M.D.    Alethea Patel MRN# [id number]   Age: 41 year old YOB: 1980     Requesting physician: No ref. provider found  [facility]  Dr. Rosa Hull          Background history:  DIAGNOSIS:   Multi-focal osteonecrosis.  Lumbar disc herniation  Acute Lymphoblastic Leukemia, chemotherapy [id number]   Chronic nicotine abuse     SURGERIES:  2005, L femoral head resurfacing hemiarthroplasty  6/5/2007, R HAJA  2/2/2010, right total knee arthroplasty. No patella resurfacing  6/12/2012, R patella resurfacing  1/8/2016, L TKA (Stiven) [facility]  4/3/2018, Revision L surface replacement bartolo to HAJA (Stiven) [facility]  4/19/18 , 5/18/2018, 5/23/2018  Closed reductions L HAJA  5/25/2018, Revision L HAJA to larger head size and cup repositioning (Stiven) [facility]. Cultures (-) x 3  7/12/18, Closed reduction of L HAJA under anesthesia (Stiven)  1/30/24, Revision left total knee arthroplasty. (Stiven), [facility]      Alona returns for recheck of her left knee she is been experiencing some pain in the posterior lateral corner of her knee joint.  It has been intermittent ever since her date of surgery.  It has persisted and she is inquiring about further evaluation.  She denies buckling catching or giving way of the knee joint.  She has not had fever or drainage.  She does not have symptoms of the knee getting locked or stuck in 1 position.    Examination reveals no significant effusion.  Minimal warmth to the knee joint.  Some periarticular swelling.  No laxity in the varus or valgus direction.  2+ anterior drawer with negative posterior drawer due to the PS  post.    Neurologic examination reveals intact quadriceps gastrosoleus EHL and tibialis anterior muscle function.    Radiographs obtained do not demonstrate any significant abnormality or change from previous radiographs.    Impression:  Left posterior lateral corner knee pain of uncertain etiology.    Plan:  I suggested a course of physiotherapy with ultrasound focused treatments to the posterolateral corner which she should be able to tolerate this juncture.  Therapist may have other suggestions.  Patient informed she underwent radiofrequency ablation approximately 6 weeks ago and she thinks her symptoms are worse.  I suggested that she may return to whoever perform the radiofrequency ablation to asked him about any potential complications.  Acupuncture treatments would be acceptable as long as no needles were placed in or around the knee joint itself.    If symptoms persist despite the above investigations and Alona would like to pursue further evaluation, then additional advanced imaging would include bone scan, MRI and CT which could be done.    Cheko Saleem MD  [facility] Family Professor  Oncology and Adult Reconstructive Surgery  Dept Orthopaedic Surgery, [facility]  [phone] office, [phone] pager  [email or web address]    Total combined visit time and work time before and after clinic visit, independent of trainee, on encounter date = 20 min          Again, thank you for allowing me to participate in the care of your patient.        Sincerely,        Cheko Saleem MD    Electronically signed

## (undated) DEVICE — GLOVE PROTEXIS W/NEU-THERA 7.0  2D73TE70

## (undated) DEVICE — SU VICRYL 2-0 CT 36" J957H

## (undated) DEVICE — DRAIN JACKSON PRATT ROUND W/TROCAR 15FR LF JP-HUR151

## (undated) DEVICE — DRAPE STOCKINETTE 8" 8586

## (undated) DEVICE — DRSG DRAIN 4X4" 7086

## (undated) DEVICE — STRAP KNEE/BODY 31143004

## (undated) DEVICE — DRSG AQUACEL AG 3.5X9.75" HYDROFIBER 412011

## (undated) DEVICE — BAG BIOHAZARD SPECIMEN 9X6" ORANGE/WHITE SBL2X69B

## (undated) DEVICE — BONE CEMENT MIXEVAC HI VAC W/CARTRIDGE 0306-563-000

## (undated) DEVICE — PREP DURAPREP REMOVER 4OZ 8611

## (undated) DEVICE — STRAP STIRRUP W/SLIP 30187-030

## (undated) DEVICE — ESU GROUND PAD UNIVERSAL W/O CORD

## (undated) DEVICE — SU SILK 2-0 SH 30" K833H

## (undated) DEVICE — SOL WATER IRRIG 1000ML BOTTLE 2F7114

## (undated) DEVICE — DRAPE MAYO STAND 23X54 8337

## (undated) DEVICE — LINEN GOWN OVERSIZE 5408

## (undated) DEVICE — SUCTION MANIFOLD DORNOCH ULTRA CART UL-CL500

## (undated) DEVICE — SOL NACL 0.9% IRRIG 1000ML BOTTLE 2F7124

## (undated) DEVICE — TUBE CULTURE ANAEROBIC A.C.T.I. 12401

## (undated) DEVICE — WIPES FOLEY CARE SURESTEP PROVON DFC100

## (undated) DEVICE — DRAPE CONVERTORS U-DRAPE 60X72" 8476

## (undated) DEVICE — BONE CEMENT MIXEVAC III HI VAC KIT  0206-015-000

## (undated) DEVICE — BONE CLEANING TIP INTERPULSE  0210-010-000

## (undated) DEVICE — POSITIONER ABDUCTION PILLOW FOAM MED FP-ABDUCTM

## (undated) DEVICE — IMM PILLOW ABDUCT HIP MED 31143061

## (undated) DEVICE — SPECIMEN CONTAINER 5OZ STERILE 2600SA

## (undated) DEVICE — DRAIN JACKSON PRATT 15FR ROUND SU130-1323

## (undated) DEVICE — SUCTION FRAZIER 12FR W/HANDLE K73

## (undated) DEVICE — DRAIN JACKSON PRATT RESERVOIR 400ML SU130-1000

## (undated) DEVICE — GOWN IMPERVIOUS ZONED XLG 9041

## (undated) DEVICE — SU PROLENE 5-0 C-1DA 36" 8720H

## (undated) DEVICE — DRSG TEGADERM 4X10" 1627

## (undated) DEVICE — BLADE KNIFE SURG 10 371110

## (undated) DEVICE — GLOVE PROTEXIS BLUE W/NEU-THERA 7.5  2D73EB75

## (undated) DEVICE — PREP POVIDONE IODINE SCRUB 7.5% 120ML

## (undated) DEVICE — KIT CULTURE TRANSPORT SYS A.C.T. II DUAL ANEROBE R124022

## (undated) DEVICE — GLOVE PROTEXIS W/NEU-THERA 8.0  2D73TE80

## (undated) DEVICE — PREP DURAPREP 26ML APL 8630

## (undated) DEVICE — SOL NACL 0.9% IRRIG 3000ML BAG 2B7477

## (undated) DEVICE — GLOVE BIOGEL PI MICRO INDICATOR UNDERGLOVE SZ 7.5 48975

## (undated) DEVICE — TRAY PREP DRY SKIN SCRUB 067

## (undated) DEVICE — MIDAS REX DISSECTING TOOL 9OV55

## (undated) DEVICE — SYR 10ML LL W/O NDL 302995

## (undated) DEVICE — SU PROLENE 6-0 BV-1 24" M8805

## (undated) DEVICE — LINEN TOWEL PACK X5 5464

## (undated) DEVICE — GLOVE PROTEXIS BLUE W/NEU-THERA 8.0  2D73EB80

## (undated) DEVICE — CAST PADDING 6" STERILE 9046S

## (undated) DEVICE — DRSG GAUZE 4X8" NON21842

## (undated) DEVICE — BLADE SAW SAGITTAL STRK 25X90X1.27MM HD SYS 6 6125-127-090

## (undated) DEVICE — PREP CHLORAPREP 26ML TINTED ORANGE  260815

## (undated) DEVICE — DRSG TEGADERM 4X4 3/4" 1626W

## (undated) DEVICE — SYR 10ML FINGER CONTROL W/O NDL 309695

## (undated) DEVICE — NDL 18GA 1.5" 305196

## (undated) DEVICE — TAPE MICROFOAM 4" 1528-4

## (undated) DEVICE — BASIN SET MAJOR

## (undated) DEVICE — DRAPE XRAY CASSETTE 21 X 36" 8720

## (undated) DEVICE — SUTURE BOOTS 051003PBX

## (undated) DEVICE — Device

## (undated) DEVICE — TOURNIQUET CUFF 30" REPRO BLUE 60-7070-105

## (undated) DEVICE — BLADE SAW RECIP STRK 77.5X11X1.23MM 0277-096-326

## (undated) DEVICE — PREP SKIN SCRUB TRAY 4461A

## (undated) DEVICE — PREP POVIDONE-IODINE 7.5% SCRUB 4OZ BOTTLE MDS093945

## (undated) DEVICE — SU VICRYL 0 CT-1 3X27" J430T

## (undated) DEVICE — SUCTION IRR SYSTEM W/O TIP INTERPULSE HANDPIECE 0210-100-000

## (undated) DEVICE — CATH TRAY FOLEY SURESTEP 16FR W/URINE MTR STATLK LF A303416A

## (undated) DEVICE — SU DERMABOND ADVANCED .7ML DNX12

## (undated) DEVICE — GLOVE PROTEXIS POWDER FREE 8.0 ORTHOPEDIC 2D73ET80

## (undated) DEVICE — SU VICRYL 0 CT-1 36" J946H

## (undated) DEVICE — DRSG STERI STRIP 1/2X4" R1547

## (undated) DEVICE — SPONGE RAY-TEC 4X8" 7318

## (undated) DEVICE — SU VICRYL 2-0 CT-1 27" UND J259H

## (undated) DEVICE — LINEN BACK PACK 5440

## (undated) DEVICE — TAPE DURAPORE 3" SILK 1538-3

## (undated) DEVICE — BLADE KNIFE SURG 15 371115

## (undated) DEVICE — SU PDS II 3-0 PS-1 18" Z683G

## (undated) DEVICE — BLADE SAW SAGITTAL STRK 25X79.5X1.24MM 4/2000 2108-318-000

## (undated) DEVICE — BLADE SAW RECIP STRK 70X12.5X1.2MM 0277-096-281

## (undated) DEVICE — LINEN GOWN X4 5410

## (undated) DEVICE — SU ETHIBOND 1 CTX CR 8/18" CX30D

## (undated) DEVICE — GLOVE BIOGEL PI ULTRATOUCH SZ 7.0 41170

## (undated) DEVICE — PACK TOTAL HIP W/POUCH RIVERSIDE LATEX FREE

## (undated) DEVICE — DRSG TEGADERM ALGINATE AG 4X5" 90303

## (undated) DEVICE — CONTAINER SPECIMEN 4OZ STERILE 17099

## (undated) DEVICE — BAG TRANSPORT RED LINE RECLOSABLE 15X12" MGRL2P1215

## (undated) DEVICE — LINEN MAYO STAND COVER OVERSIZE PACK 5458

## (undated) DEVICE — SU ETHIBOND 0 CT-1 CR 8X18" CX21D

## (undated) DEVICE — GLOVE PROTEXIS POWDER FREE 7.5 ORTHOPEDIC 2D73ET75

## (undated) DEVICE — SUCTION MANIFOLD NEPTUNE 2 SYS 4 PORT 0702-020-000

## (undated) DEVICE — SPONGE LAP 18X18" X8435

## (undated) DEVICE — SU PROLENE 4-0 SHDA 36" 8521H

## (undated) RX ORDER — HYDROMORPHONE HYDROCHLORIDE 1 MG/ML
INJECTION, SOLUTION INTRAMUSCULAR; INTRAVENOUS; SUBCUTANEOUS
Status: DISPENSED
Start: 2024-01-30

## (undated) RX ORDER — CEFAZOLIN SODIUM 1 G/3ML
INJECTION, POWDER, FOR SOLUTION INTRAMUSCULAR; INTRAVENOUS
Status: DISPENSED
Start: 2024-01-30

## (undated) RX ORDER — KETAMINE HCL IN 0.9 % NACL 50 MG/5 ML
SYRINGE (ML) INTRAVENOUS
Status: DISPENSED
Start: 2018-05-25

## (undated) RX ORDER — FENTANYL CITRATE 50 UG/ML
INJECTION, SOLUTION INTRAMUSCULAR; INTRAVENOUS
Status: DISPENSED
Start: 2018-04-03

## (undated) RX ORDER — SODIUM CHLORIDE, SODIUM LACTATE, POTASSIUM CHLORIDE, CALCIUM CHLORIDE 600; 310; 30; 20 MG/100ML; MG/100ML; MG/100ML; MG/100ML
INJECTION, SOLUTION INTRAVENOUS
Status: DISPENSED
Start: 2024-01-30

## (undated) RX ORDER — ACETAMINOPHEN 325 MG/1
TABLET ORAL
Status: DISPENSED
Start: 2018-04-03

## (undated) RX ORDER — PROPOFOL 10 MG/ML
INJECTION, EMULSION INTRAVENOUS
Status: DISPENSED
Start: 2018-05-25

## (undated) RX ORDER — ONDANSETRON 2 MG/ML
INJECTION INTRAMUSCULAR; INTRAVENOUS
Status: DISPENSED
Start: 2018-07-12

## (undated) RX ORDER — CEFAZOLIN SODIUM/WATER 2 G/20 ML
SYRINGE (ML) INTRAVENOUS
Status: DISPENSED
Start: 2024-01-30

## (undated) RX ORDER — ONDANSETRON 2 MG/ML
INJECTION INTRAMUSCULAR; INTRAVENOUS
Status: DISPENSED
Start: 2024-01-30

## (undated) RX ORDER — GABAPENTIN 300 MG/1
CAPSULE ORAL
Status: DISPENSED
Start: 2018-04-03

## (undated) RX ORDER — GABAPENTIN 300 MG/1
CAPSULE ORAL
Status: DISPENSED
Start: 2018-05-25

## (undated) RX ORDER — ROCURONIUM BROMIDE 50 MG/5 ML
SYRINGE (ML) INTRAVENOUS
Status: DISPENSED
Start: 2018-05-18

## (undated) RX ORDER — CEFAZOLIN SODIUM 1 G/3ML
INJECTION, POWDER, FOR SOLUTION INTRAMUSCULAR; INTRAVENOUS
Status: DISPENSED
Start: 2018-04-03

## (undated) RX ORDER — ONDANSETRON 2 MG/ML
INJECTION INTRAMUSCULAR; INTRAVENOUS
Status: DISPENSED
Start: 2018-05-18

## (undated) RX ORDER — ONDANSETRON 2 MG/ML
INJECTION INTRAMUSCULAR; INTRAVENOUS
Status: DISPENSED
Start: 2018-05-25

## (undated) RX ORDER — ACETAMINOPHEN 325 MG/1
TABLET ORAL
Status: DISPENSED
Start: 2024-01-30

## (undated) RX ORDER — FENTANYL CITRATE 50 UG/ML
INJECTION, SOLUTION INTRAMUSCULAR; INTRAVENOUS
Status: DISPENSED
Start: 2018-05-23

## (undated) RX ORDER — ROCURONIUM BROMIDE 50 MG/5 ML
SYRINGE (ML) INTRAVENOUS
Status: DISPENSED
Start: 2018-04-03

## (undated) RX ORDER — FENTANYL CITRATE 50 UG/ML
INJECTION, SOLUTION INTRAMUSCULAR; INTRAVENOUS
Status: DISPENSED
Start: 2018-07-12

## (undated) RX ORDER — EPHEDRINE SULFATE 50 MG/ML
INJECTION, SOLUTION INTRAMUSCULAR; INTRAVENOUS; SUBCUTANEOUS
Status: DISPENSED
Start: 2018-05-25

## (undated) RX ORDER — PHENYLEPHRINE HCL IN 0.9% NACL 1 MG/10 ML
SYRINGE (ML) INTRAVENOUS
Status: DISPENSED
Start: 2018-05-18

## (undated) RX ORDER — PROPOFOL 10 MG/ML
INJECTION, EMULSION INTRAVENOUS
Status: DISPENSED
Start: 2018-05-23

## (undated) RX ORDER — FENTANYL CITRATE-0.9 % NACL/PF 10 MCG/ML
PLASTIC BAG, INJECTION (ML) INTRAVENOUS
Status: DISPENSED
Start: 2024-01-30

## (undated) RX ORDER — METHOCARBAMOL 750 MG/1
TABLET, FILM COATED ORAL
Status: DISPENSED
Start: 2024-01-30

## (undated) RX ORDER — FENTANYL CITRATE 50 UG/ML
INJECTION, SOLUTION INTRAMUSCULAR; INTRAVENOUS
Status: DISPENSED
Start: 2018-05-18

## (undated) RX ORDER — FENTANYL CITRATE 50 UG/ML
INJECTION, SOLUTION INTRAMUSCULAR; INTRAVENOUS
Status: DISPENSED
Start: 2018-05-25

## (undated) RX ORDER — FENTANYL CITRATE 50 UG/ML
INJECTION, SOLUTION INTRAMUSCULAR; INTRAVENOUS
Status: DISPENSED
Start: 2024-01-30

## (undated) RX ORDER — DEXAMETHASONE SODIUM PHOSPHATE 4 MG/ML
INJECTION, SOLUTION INTRA-ARTICULAR; INTRALESIONAL; INTRAMUSCULAR; INTRAVENOUS; SOFT TISSUE
Status: DISPENSED
Start: 2018-04-03

## (undated) RX ORDER — HYDROMORPHONE HYDROCHLORIDE 1 MG/ML
INJECTION, SOLUTION INTRAMUSCULAR; INTRAVENOUS; SUBCUTANEOUS
Status: DISPENSED
Start: 2018-05-23

## (undated) RX ORDER — HYDROMORPHONE HYDROCHLORIDE 1 MG/ML
INJECTION, SOLUTION INTRAMUSCULAR; INTRAVENOUS; SUBCUTANEOUS
Status: DISPENSED
Start: 2018-07-12

## (undated) RX ORDER — PROPOFOL 10 MG/ML
INJECTION, EMULSION INTRAVENOUS
Status: DISPENSED
Start: 2024-01-30

## (undated) RX ORDER — CEFAZOLIN SODIUM 1 G/3ML
INJECTION, POWDER, FOR SOLUTION INTRAMUSCULAR; INTRAVENOUS
Status: DISPENSED
Start: 2018-05-25

## (undated) RX ORDER — DEXAMETHASONE SODIUM PHOSPHATE 4 MG/ML
INJECTION, SOLUTION INTRA-ARTICULAR; INTRALESIONAL; INTRAMUSCULAR; INTRAVENOUS; SOFT TISSUE
Status: DISPENSED
Start: 2024-01-30

## (undated) RX ORDER — CEFAZOLIN SODIUM 2 G/100ML
INJECTION, SOLUTION INTRAVENOUS
Status: DISPENSED
Start: 2018-04-03

## (undated) RX ORDER — PHENYLEPHRINE HCL IN 0.9% NACL 1 MG/10 ML
SYRINGE (ML) INTRAVENOUS
Status: DISPENSED
Start: 2018-05-25

## (undated) RX ORDER — PROPOFOL 10 MG/ML
INJECTION, EMULSION INTRAVENOUS
Status: DISPENSED
Start: 2018-04-03

## (undated) RX ORDER — ACETAMINOPHEN 325 MG/1
TABLET ORAL
Status: DISPENSED
Start: 2018-05-25

## (undated) RX ORDER — DEXAMETHASONE SODIUM PHOSPHATE 4 MG/ML
INJECTION, SOLUTION INTRA-ARTICULAR; INTRALESIONAL; INTRAMUSCULAR; INTRAVENOUS; SOFT TISSUE
Status: DISPENSED
Start: 2018-05-25

## (undated) RX ORDER — PROPOFOL 10 MG/ML
INJECTION, EMULSION INTRAVENOUS
Status: DISPENSED
Start: 2018-07-12

## (undated) RX ORDER — TRANEXAMIC ACID 650 MG/1
TABLET ORAL
Status: DISPENSED
Start: 2024-01-30

## (undated) RX ORDER — DEXAMETHASONE SODIUM PHOSPHATE 4 MG/ML
INJECTION, SOLUTION INTRA-ARTICULAR; INTRALESIONAL; INTRAMUSCULAR; INTRAVENOUS; SOFT TISSUE
Status: DISPENSED
Start: 2018-05-18

## (undated) RX ORDER — CITRIC ACID/SODIUM CITRATE 334-500MG
SOLUTION, ORAL ORAL
Status: DISPENSED
Start: 2018-04-03

## (undated) RX ORDER — OXYCODONE HYDROCHLORIDE 5 MG/1
TABLET ORAL
Status: DISPENSED
Start: 2024-01-30

## (undated) RX ORDER — HYDROMORPHONE HYDROCHLORIDE 2 MG/ML
INJECTION, SOLUTION INTRAMUSCULAR; INTRAVENOUS; SUBCUTANEOUS
Status: DISPENSED
Start: 2018-07-12

## (undated) RX ORDER — CELECOXIB 200 MG/1
CAPSULE ORAL
Status: DISPENSED
Start: 2024-01-30

## (undated) RX ORDER — ONDANSETRON 2 MG/ML
INJECTION INTRAMUSCULAR; INTRAVENOUS
Status: DISPENSED
Start: 2018-05-23

## (undated) RX ORDER — CELECOXIB 200 MG/1
CAPSULE ORAL
Status: DISPENSED
Start: 2018-04-03

## (undated) RX ORDER — LIDOCAINE HYDROCHLORIDE 20 MG/ML
INJECTION, SOLUTION EPIDURAL; INFILTRATION; INTRACAUDAL; PERINEURAL
Status: DISPENSED
Start: 2018-05-18

## (undated) RX ORDER — GABAPENTIN 300 MG/1
CAPSULE ORAL
Status: DISPENSED
Start: 2018-07-12

## (undated) RX ORDER — ONDANSETRON 2 MG/ML
INJECTION INTRAMUSCULAR; INTRAVENOUS
Status: DISPENSED
Start: 2018-04-03

## (undated) RX ORDER — LIDOCAINE HYDROCHLORIDE 20 MG/ML
INJECTION, SOLUTION EPIDURAL; INFILTRATION; INTRACAUDAL; PERINEURAL
Status: DISPENSED
Start: 2018-04-03

## (undated) RX ORDER — PROPOFOL 10 MG/ML
INJECTION, EMULSION INTRAVENOUS
Status: DISPENSED
Start: 2018-05-18